# Patient Record
Sex: MALE | Race: WHITE | Employment: OTHER | URBAN - METROPOLITAN AREA
[De-identification: names, ages, dates, MRNs, and addresses within clinical notes are randomized per-mention and may not be internally consistent; named-entity substitution may affect disease eponyms.]

---

## 2019-05-24 ENCOUNTER — HOSPITAL ENCOUNTER (INPATIENT)
Dept: CARDIAC CATH/INVASIVE PROCEDURES | Age: 49
DRG: 174 | End: 2019-05-24
Attending: INTERNAL MEDICINE | Admitting: INTERNAL MEDICINE
Payer: MEDICARE

## 2019-05-24 ENCOUNTER — APPOINTMENT (OUTPATIENT)
Dept: CT IMAGING | Age: 49
End: 2019-05-24
Payer: MEDICARE

## 2019-05-24 ENCOUNTER — APPOINTMENT (OUTPATIENT)
Dept: CARDIAC CATH/INVASIVE PROCEDURES | Age: 49
DRG: 174 | End: 2019-05-24
Attending: INTERNAL MEDICINE
Payer: MEDICARE

## 2019-05-24 ENCOUNTER — APPOINTMENT (OUTPATIENT)
Dept: GENERAL RADIOLOGY | Age: 49
DRG: 174 | End: 2019-05-24
Attending: INTERNAL MEDICINE
Payer: MEDICARE

## 2019-05-24 ENCOUNTER — HOSPITAL ENCOUNTER (EMERGENCY)
Age: 49
Discharge: ANOTHER HOSPITAL | End: 2019-05-24
Attending: EMERGENCY MEDICINE
Payer: MEDICARE

## 2019-05-24 ENCOUNTER — HOSPITAL ENCOUNTER (INPATIENT)
Age: 49
LOS: 3 days | Discharge: HOME | DRG: 174 | End: 2019-05-27
Attending: INTERNAL MEDICINE | Admitting: INTERNAL MEDICINE
Payer: MEDICARE

## 2019-05-24 VITALS
HEART RATE: 78 BPM | TEMPERATURE: 98.1 F | RESPIRATION RATE: 26 BRPM | WEIGHT: 258 LBS | DIASTOLIC BLOOD PRESSURE: 106 MMHG | SYSTOLIC BLOOD PRESSURE: 163 MMHG | HEIGHT: 73 IN | OXYGEN SATURATION: 100 % | BODY MASS INDEX: 34.19 KG/M2

## 2019-05-24 DIAGNOSIS — R07.9 ACUTE CHEST PAIN: Primary | ICD-10-CM

## 2019-05-24 DIAGNOSIS — I21.3 ST ELEVATION MYOCARDIAL INFARCTION (STEMI), UNSPECIFIED ARTERY (HCC): Primary | ICD-10-CM

## 2019-05-24 LAB
ABSOLUTE EOS #: 0.2 K/UL (ref 0–0.4)
ABSOLUTE IMMATURE GRANULOCYTE: ABNORMAL K/UL (ref 0–0.3)
ABSOLUTE LYMPH #: 1.9 K/UL (ref 1–4.8)
ABSOLUTE MONO #: 0.8 K/UL (ref 0.1–1.3)
ACTIVATED CLOTTING TIME: 160 SEC (ref 79–149)
ANION GAP SERPL CALCULATED.3IONS-SCNC: 12 MMOL/L (ref 9–17)
BASOPHILS # BLD: 1 % (ref 0–2)
BASOPHILS ABSOLUTE: 0.1 K/UL (ref 0–0.2)
BNP INTERPRETATION: ABNORMAL
BUN BLDV-MCNC: 23 MG/DL (ref 6–20)
BUN/CREAT BLD: ABNORMAL (ref 9–20)
CALCIUM SERPL-MCNC: 9.3 MG/DL (ref 8.6–10.4)
CHLORIDE BLD-SCNC: 101 MMOL/L (ref 98–107)
CHOLESTEROL/HDL RATIO: 4.7
CHOLESTEROL: 201 MG/DL
CO2: 27 MMOL/L (ref 20–31)
CREAT SERPL-MCNC: 1.52 MG/DL (ref 0.7–1.2)
DIFFERENTIAL TYPE: ABNORMAL
EOSINOPHILS RELATIVE PERCENT: 1 % (ref 0–4)
ESTIMATED AVERAGE GLUCOSE: 108 MG/DL
GFR AFRICAN AMERICAN: 60 ML/MIN
GFR NON-AFRICAN AMERICAN: 49 ML/MIN
GFR SERPL CREATININE-BSD FRML MDRD: ABNORMAL ML/MIN/{1.73_M2}
GFR SERPL CREATININE-BSD FRML MDRD: ABNORMAL ML/MIN/{1.73_M2}
GLUCOSE BLD-MCNC: 157 MG/DL (ref 70–99)
HBA1C MFR BLD: 5.4 % (ref 4–6)
HCT VFR BLD CALC: 44.9 % (ref 41–53)
HDLC SERPL-MCNC: 43 MG/DL
HEMOGLOBIN: 14.8 G/DL (ref 13.5–17.5)
IMMATURE GRANULOCYTES: ABNORMAL %
LDL CHOLESTEROL: 143 MG/DL (ref 0–130)
LV EF: 43 %
LVEF MODALITY: NORMAL
LYMPHOCYTES # BLD: 17 % (ref 24–44)
MCH RBC QN AUTO: 28.2 PG (ref 26–34)
MCHC RBC AUTO-ENTMCNC: 32.9 G/DL (ref 31–37)
MCV RBC AUTO: 85.8 FL (ref 80–100)
MONOCYTES # BLD: 7 % (ref 1–7)
NRBC AUTOMATED: ABNORMAL PER 100 WBC
PDW BLD-RTO: 14.2 % (ref 11.5–14.9)
PLATELET # BLD: 237 K/UL (ref 150–450)
PLATELET ESTIMATE: ABNORMAL
PMV BLD AUTO: 9.8 FL (ref 6–12)
POTASSIUM SERPL-SCNC: 3.4 MMOL/L (ref 3.7–5.3)
PRO-BNP: 651 PG/ML
RBC # BLD: 5.23 M/UL (ref 4.5–5.9)
RBC # BLD: ABNORMAL 10*6/UL
SEG NEUTROPHILS: 74 % (ref 36–66)
SEGMENTED NEUTROPHILS ABSOLUTE COUNT: 8.5 K/UL (ref 1.3–9.1)
SODIUM BLD-SCNC: 140 MMOL/L (ref 135–144)
TRIGL SERPL-MCNC: 73 MG/DL
TROPONIN INTERP: ABNORMAL
TROPONIN T: ABNORMAL NG/ML
TROPONIN, HIGH SENSITIVITY: 39 NG/L (ref 0–22)
TROPONIN, HIGH SENSITIVITY: 40 NG/L (ref 0–22)
TROPONIN, HIGH SENSITIVITY: 51 NG/L (ref 0–22)
VLDLC SERPL CALC-MCNC: ABNORMAL MG/DL (ref 1–30)
WBC # BLD: 11.4 K/UL (ref 3.5–11)
WBC # BLD: ABNORMAL 10*3/UL

## 2019-05-24 PROCEDURE — 93005 ELECTROCARDIOGRAM TRACING: CPT | Performed by: STUDENT IN AN ORGANIZED HEALTH CARE EDUCATION/TRAINING PROGRAM

## 2019-05-24 PROCEDURE — 80048 BASIC METABOLIC PNL TOTAL CA: CPT

## 2019-05-24 PROCEDURE — 93010 ELECTROCARDIOGRAM REPORT: CPT | Performed by: INTERNAL MEDICINE

## 2019-05-24 PROCEDURE — 6360000002 HC RX W HCPCS: Performed by: EMERGENCY MEDICINE

## 2019-05-24 PROCEDURE — 80061 LIPID PANEL: CPT

## 2019-05-24 PROCEDURE — 6370000000 HC RX 637 (ALT 250 FOR IP): Performed by: EMERGENCY MEDICINE

## 2019-05-24 PROCEDURE — C1769 GUIDE WIRE: HCPCS

## 2019-05-24 PROCEDURE — 6360000004 HC RX CONTRAST MEDICATION

## 2019-05-24 PROCEDURE — 96376 TX/PRO/DX INJ SAME DRUG ADON: CPT

## 2019-05-24 PROCEDURE — 92941 PRQ TRLML REVSC TOT OCCL AMI: CPT | Performed by: INTERNAL MEDICINE

## 2019-05-24 PROCEDURE — 71260 CT THORAX DX C+: CPT

## 2019-05-24 PROCEDURE — C1887 CATHETER, GUIDING: HCPCS

## 2019-05-24 PROCEDURE — 6360000002 HC RX W HCPCS: Performed by: STUDENT IN AN ORGANIZED HEALTH CARE EDUCATION/TRAINING PROGRAM

## 2019-05-24 PROCEDURE — 4A023N7 MEASUREMENT OF CARDIAC SAMPLING AND PRESSURE, LEFT HEART, PERCUTANEOUS APPROACH: ICD-10-PCS | Performed by: INTERNAL MEDICINE

## 2019-05-24 PROCEDURE — 2500000003 HC RX 250 WO HCPCS: Performed by: STUDENT IN AN ORGANIZED HEALTH CARE EDUCATION/TRAINING PROGRAM

## 2019-05-24 PROCEDURE — 6360000004 HC RX CONTRAST MEDICATION: Performed by: EMERGENCY MEDICINE

## 2019-05-24 PROCEDURE — 2709999900 HC NON-CHARGEABLE SUPPLY

## 2019-05-24 PROCEDURE — 2580000003 HC RX 258: Performed by: EMERGENCY MEDICINE

## 2019-05-24 PROCEDURE — 93458 L HRT ARTERY/VENTRICLE ANGIO: CPT | Performed by: INTERNAL MEDICINE

## 2019-05-24 PROCEDURE — 6370000000 HC RX 637 (ALT 250 FOR IP): Performed by: STUDENT IN AN ORGANIZED HEALTH CARE EDUCATION/TRAINING PROGRAM

## 2019-05-24 PROCEDURE — 93306 TTE W/DOPPLER COMPLETE: CPT

## 2019-05-24 PROCEDURE — C1874 STENT, COATED/COV W/DEL SYS: HCPCS

## 2019-05-24 PROCEDURE — C1894 INTRO/SHEATH, NON-LASER: HCPCS

## 2019-05-24 PROCEDURE — 36415 COLL VENOUS BLD VENIPUNCTURE: CPT

## 2019-05-24 PROCEDURE — 71045 X-RAY EXAM CHEST 1 VIEW: CPT

## 2019-05-24 PROCEDURE — 85347 COAGULATION TIME ACTIVATED: CPT

## 2019-05-24 PROCEDURE — 99291 CRITICAL CARE FIRST HOUR: CPT

## 2019-05-24 PROCEDURE — 83880 ASSAY OF NATRIURETIC PEPTIDE: CPT

## 2019-05-24 PROCEDURE — 85025 COMPLETE CBC W/AUTO DIFF WBC: CPT

## 2019-05-24 PROCEDURE — B2111ZZ FLUOROSCOPY OF MULTIPLE CORONARY ARTERIES USING LOW OSMOLAR CONTRAST: ICD-10-PCS | Performed by: INTERNAL MEDICINE

## 2019-05-24 PROCEDURE — 027034Z DILATION OF CORONARY ARTERY, ONE ARTERY WITH DRUG-ELUTING INTRALUMINAL DEVICE, PERCUTANEOUS APPROACH: ICD-10-PCS | Performed by: INTERNAL MEDICINE

## 2019-05-24 PROCEDURE — C1760 CLOSURE DEV, VASC: HCPCS

## 2019-05-24 PROCEDURE — 83036 HEMOGLOBIN GLYCOSYLATED A1C: CPT

## 2019-05-24 PROCEDURE — 2500000003 HC RX 250 WO HCPCS

## 2019-05-24 PROCEDURE — 93005 ELECTROCARDIOGRAM TRACING: CPT

## 2019-05-24 PROCEDURE — 93005 ELECTROCARDIOGRAM TRACING: CPT | Performed by: EMERGENCY MEDICINE

## 2019-05-24 PROCEDURE — 6360000002 HC RX W HCPCS

## 2019-05-24 PROCEDURE — 2000000000 HC ICU R&B

## 2019-05-24 PROCEDURE — 96374 THER/PROPH/DIAG INJ IV PUSH: CPT

## 2019-05-24 PROCEDURE — 6370000000 HC RX 637 (ALT 250 FOR IP)

## 2019-05-24 PROCEDURE — 84484 ASSAY OF TROPONIN QUANT: CPT

## 2019-05-24 PROCEDURE — 2580000003 HC RX 258

## 2019-05-24 RX ORDER — ATORVASTATIN CALCIUM 80 MG/1
80 TABLET, FILM COATED ORAL NIGHTLY
Status: DISCONTINUED | OUTPATIENT
Start: 2019-05-24 | End: 2019-05-27 | Stop reason: HOSPADM

## 2019-05-24 RX ORDER — ATORVASTATIN CALCIUM 80 MG/1
80 TABLET, FILM COATED ORAL NIGHTLY
Status: DISCONTINUED | OUTPATIENT
Start: 2019-05-24 | End: 2019-05-24 | Stop reason: SDUPTHER

## 2019-05-24 RX ORDER — MORPHINE SULFATE 4 MG/ML
4 INJECTION, SOLUTION INTRAMUSCULAR; INTRAVENOUS ONCE
Status: COMPLETED | OUTPATIENT
Start: 2019-05-24 | End: 2019-05-24

## 2019-05-24 RX ORDER — ASPIRIN 81 MG/1
81 TABLET ORAL DAILY
Status: DISCONTINUED | OUTPATIENT
Start: 2019-05-25 | End: 2019-05-27 | Stop reason: HOSPADM

## 2019-05-24 RX ORDER — SODIUM CHLORIDE 0.9 % (FLUSH) 0.9 %
10 SYRINGE (ML) INJECTION EVERY 12 HOURS SCHEDULED
Status: DISCONTINUED | OUTPATIENT
Start: 2019-05-24 | End: 2019-05-27 | Stop reason: HOSPADM

## 2019-05-24 RX ORDER — SODIUM CHLORIDE 0.9 % (FLUSH) 0.9 %
10 SYRINGE (ML) INJECTION EVERY 12 HOURS SCHEDULED
Status: DISPENSED | OUTPATIENT
Start: 2019-05-24

## 2019-05-24 RX ORDER — AMLODIPINE BESYLATE 10 MG/1
10 TABLET ORAL DAILY
Status: DISCONTINUED | OUTPATIENT
Start: 2019-05-24 | End: 2019-05-27 | Stop reason: HOSPADM

## 2019-05-24 RX ORDER — 0.9 % SODIUM CHLORIDE 0.9 %
80 INTRAVENOUS SOLUTION INTRAVENOUS ONCE
Status: COMPLETED | OUTPATIENT
Start: 2019-05-24 | End: 2019-05-24

## 2019-05-24 RX ORDER — ASPIRIN 81 MG/1
324 TABLET, CHEWABLE ORAL ONCE
Status: COMPLETED | OUTPATIENT
Start: 2019-05-24 | End: 2019-05-24

## 2019-05-24 RX ORDER — ASPIRIN 81 MG/1
81 TABLET ORAL DAILY
Status: DISCONTINUED | OUTPATIENT
Start: 2019-05-25 | End: 2019-05-24 | Stop reason: SDUPTHER

## 2019-05-24 RX ORDER — ACETAMINOPHEN 325 MG/1
650 TABLET ORAL EVERY 4 HOURS PRN
Status: DISCONTINUED | OUTPATIENT
Start: 2019-05-24 | End: 2019-05-27 | Stop reason: HOSPADM

## 2019-05-24 RX ORDER — SODIUM CHLORIDE 0.9 % (FLUSH) 0.9 %
10 SYRINGE (ML) INJECTION PRN
Status: DISCONTINUED | OUTPATIENT
Start: 2019-05-24 | End: 2019-05-27 | Stop reason: HOSPADM

## 2019-05-24 RX ORDER — SODIUM CHLORIDE 0.9 % (FLUSH) 0.9 %
10 SYRINGE (ML) INJECTION PRN
Status: ACTIVE | OUTPATIENT
Start: 2019-05-24

## 2019-05-24 RX ORDER — HYDRALAZINE HYDROCHLORIDE 20 MG/ML
10 INJECTION INTRAMUSCULAR; INTRAVENOUS EVERY 6 HOURS PRN
Status: DISCONTINUED | OUTPATIENT
Start: 2019-05-24 | End: 2019-05-26

## 2019-05-24 RX ORDER — SODIUM CHLORIDE 0.9 % (FLUSH) 0.9 %
10 SYRINGE (ML) INJECTION PRN
Status: DISCONTINUED | OUTPATIENT
Start: 2019-05-24 | End: 2019-05-24 | Stop reason: HOSPADM

## 2019-05-24 RX ORDER — NITROGLYCERIN 0.4 MG/1
0.4 TABLET SUBLINGUAL ONCE
Status: COMPLETED | OUTPATIENT
Start: 2019-05-24 | End: 2019-05-24

## 2019-05-24 RX ORDER — NITROGLYCERIN 0.4 MG/1
0.4 TABLET SUBLINGUAL EVERY 5 MIN PRN
Status: DISCONTINUED | OUTPATIENT
Start: 2019-05-24 | End: 2019-05-27 | Stop reason: HOSPADM

## 2019-05-24 RX ORDER — CARVEDILOL 3.12 MG/1
3.12 TABLET ORAL 2 TIMES DAILY WITH MEALS
Status: DISCONTINUED | OUTPATIENT
Start: 2019-05-24 | End: 2019-05-25

## 2019-05-24 RX ORDER — ACETAMINOPHEN 325 MG/1
650 TABLET ORAL EVERY 4 HOURS PRN
Status: DISCONTINUED | OUTPATIENT
Start: 2019-05-24 | End: 2020-02-14 | Stop reason: SDUPTHER

## 2019-05-24 RX ORDER — AMLODIPINE BESYLATE 2.5 MG/1
10 TABLET ORAL DAILY
Status: DISCONTINUED | OUTPATIENT
Start: 2019-05-24 | End: 2019-05-24 | Stop reason: SDUPTHER

## 2019-05-24 RX ADMIN — CARVEDILOL 3.12 MG: 3.12 TABLET, FILM COATED ORAL at 08:59

## 2019-05-24 RX ADMIN — Medication 10 ML: at 05:58

## 2019-05-24 RX ADMIN — Medication 4 MG: at 06:11

## 2019-05-24 RX ADMIN — CARVEDILOL 3.12 MG: 3.12 TABLET, FILM COATED ORAL at 16:51

## 2019-05-24 RX ADMIN — ATORVASTATIN CALCIUM 80 MG: 80 TABLET, FILM COATED ORAL at 21:42

## 2019-05-24 RX ADMIN — ACETAMINOPHEN 650 MG: 325 TABLET ORAL at 14:45

## 2019-05-24 RX ADMIN — NICARDIPINE HYDROCHLORIDE 5 MG: 0.1 INJECTION, SOLUTION INTRAVENOUS at 07:30

## 2019-05-24 RX ADMIN — IOVERSOL 75 ML: 741 INJECTION INTRA-ARTERIAL; INTRAVENOUS at 05:57

## 2019-05-24 RX ADMIN — NICARDIPINE HYDROCHLORIDE 10 MG/HR: 0.1 INJECTION, SOLUTION INTRAVENOUS at 21:41

## 2019-05-24 RX ADMIN — TICAGRELOR 90 MG: 90 TABLET ORAL at 08:59

## 2019-05-24 RX ADMIN — NICARDIPINE HYDROCHLORIDE 7.5 MG/HR: 0.1 INJECTION, SOLUTION INTRAVENOUS at 18:52

## 2019-05-24 RX ADMIN — MORPHINE SULFATE 4 MG: 4 INJECTION INTRAVENOUS at 05:34

## 2019-05-24 RX ADMIN — SODIUM CHLORIDE 80 ML: 9 INJECTION, SOLUTION INTRAVENOUS at 05:58

## 2019-05-24 RX ADMIN — ASPIRIN 81 MG 324 MG: 81 TABLET ORAL at 05:13

## 2019-05-24 RX ADMIN — NITROGLYCERIN 0.4 MG: 0.4 TABLET SUBLINGUAL at 05:13

## 2019-05-24 RX ADMIN — HYDRALAZINE HYDROCHLORIDE 10 MG: 20 INJECTION INTRAMUSCULAR; INTRAVENOUS at 22:08

## 2019-05-24 RX ADMIN — AMLODIPINE BESYLATE 10 MG: 10 TABLET ORAL at 08:59

## 2019-05-24 RX ADMIN — TICAGRELOR 90 MG: 90 TABLET ORAL at 21:42

## 2019-05-24 ASSESSMENT — ENCOUNTER SYMPTOMS
EYE PAIN: 0
BACK PAIN: 0
COUGH: 0
SORE THROAT: 0
ABDOMINAL PAIN: 0
SHORTNESS OF BREATH: 0
CHEST TIGHTNESS: 0
VOMITING: 0
DIARRHEA: 0
NAUSEA: 1
CONSTIPATION: 0

## 2019-05-24 ASSESSMENT — PAIN DESCRIPTION - DESCRIPTORS
DESCRIPTORS: ACHING
DESCRIPTORS: DISCOMFORT

## 2019-05-24 ASSESSMENT — PAIN SCALES - GENERAL
PAINLEVEL_OUTOF10: 8
PAINLEVEL_OUTOF10: 0
PAINLEVEL_OUTOF10: 8
PAINLEVEL_OUTOF10: 5
PAINLEVEL_OUTOF10: 3
PAINLEVEL_OUTOF10: 4
PAINLEVEL_OUTOF10: 0

## 2019-05-24 ASSESSMENT — PAIN DESCRIPTION - FREQUENCY
FREQUENCY: CONTINUOUS
FREQUENCY: CONTINUOUS

## 2019-05-24 ASSESSMENT — PAIN DESCRIPTION - ORIENTATION: ORIENTATION: MID

## 2019-05-24 ASSESSMENT — PAIN DESCRIPTION - PROGRESSION: CLINICAL_PROGRESSION: GRADUALLY IMPROVING

## 2019-05-24 ASSESSMENT — PAIN DESCRIPTION - LOCATION
LOCATION: CHEST
LOCATION: CHEST

## 2019-05-24 NOTE — CARE COORDINATION
Case Management Initial Discharge Plan  Vernell Felton             Met with:patient to discuss discharge plans. Information verified: address, contacts, phone number, , insurance Yes  PCP: No primary care provider on file. Date of last visit: Dr. Paris Boas, hasn't seen in a while     Insurance Provider: Wilsey ADV     Discharge Planning    Living Arrangements:  Family Members   Support Systems:  Family Members    Home has two  stories  Few  stairs to climb to get into front door, flight of stairs to climb to reach second floor  Location of bedroom/bathroom in home upstairs     Patient able to perform ADL's:Independent    Current Services (outpatient & in home) na  DME equipment: na  DME provider: halle    [facility]    Potential Assistance Purchasing Medications:  No  Does patient want to participate in local refill/ meds to beds program?  No    Potential Assistance Needed:  N/A    Patient agreeable to home care: No  [place] of choice provided:  n/a    Prior SNF/Rehab Placement and Facility: na  Agreeable to SNF/Rehab: No  [place] of choice provided: n/a   Evaluation: no    Expected Discharge date:  19  Patient expects to be discharged to:  home  Follow Up Appointment: Best Day/ Time:      Transportation provider: family   Transportation arrangements needed for discharge: No    Readmission Risk              Risk of Unplanned Readmission:        9             Does patient have a readmission risk score greater than 14?: No  If yes, follow-up appointment must be made within 7 days of discharge.      Discharge Plan: return home independently           Electronically signed by Solomon Rea RN on 19 at 10:54 AM

## 2019-05-24 NOTE — H&P
400 MG/5ML suspension 30 mL, 30 mL, Oral, Daily PRN  atorvastatin (LIPITOR) tablet 80 mg, 80 mg, Oral, Nightly  [START ON 5/25/2019] aspirin EC tablet 81 mg, 81 mg, Oral, Daily  ticagrelor (BRILINTA) tablet 90 mg, 90 mg, Oral, BID  Facility-Administered Medications Ordered in Other Encounters: niCARdipine in sodium chloride (CARDENE) 20-0.86 MG/200ML-% infusion, , ,     Allergies:  Patient has no known allergies. Social History:   reports that he has never smoked. He has never used smokeless tobacco. He reports that he drinks alcohol. He reports that he does not use drugs. Family History: family history is not on file. No h/o sudden cardiac death. No for premature CAD    REVIEW OF SYSTEMS:    · Constitutional: there has been no unanticipated weight loss. There's been No change in energy level, No change in activity level. · Eyes: No visual changes or diplopia. No scleral icterus. · ENT: No Headaches  · Cardiovascular: Remaining as above  · Respiratory: No previous pulmonary problems, No cough  · Gastrointestinal: No abdominal pain. No change in bowel or bladder habits. · Genitourinary: No dysuria, trouble voiding, or hematuria. · Musculoskeletal:  No gait disturbance, No weakness or joint complaints. · Integumentary: No rash or pruritis. · Neurological: No headache, diplopia, change in muscle strength, numbness or tingling. No change in gait, balance, coordination, mood, affect, memory, mentation, behavior. · Psychiatric: No anxiety, or depression. · Endocrine: No temperature intolerance. No excessive thirst, fluid intake, or urination. No tremor. · Hematologic/Lymphatic: No abnormal bruising or bleeding, blood clots or swollen lymph nodes. · Allergic/Immunologic: No nasal congestion or hives. PHYSICAL EXAM:      There were no vitals taken for this visit.    No intake or output data in the 24 hours ending 05/24/19 0735      Constitutional and General Appearance: alert, cooperative, no distress and appears stated age  [de-identified]: PERRL, no cervical lymphadenopathy. No masses palpable. Normal oral mucosa  Respiratory:  · Normal excursion and expansion without use of accessory muscles  · Resp Auscultation: Good respiratory effort. No for increased work of breathing. On auscultation: clear to auscultation bilaterally  Cardiovascular:  · The apical impulse is not displaced  · Heart tones are crisp and normal. regular S1 and S2.  · Jugular venous pulsation Normal  · The carotid upstroke is normal in amplitude and contour without delay or bruit  · Peripheral pulses are symmetrical and full     Abdomen:   · No masses or tenderness  · Bowel sounds present  Extremities:  ·  No Cyanosis or Clubbing  ·  Lower extremity edema: No  ·  Skin: Warm and dry  Neurological:  · Alert and oriented. · Moves all extremities well  · No abnormalities of mood, affect, memory, mentation, or behavior are noted    DATA:    Diagnostics:    EKG: NSR, LVH, T wave inverions in anterior leads. Labs:     CBC:   Recent Labs     05/24/19 0455   WBC 11.4*   HGB 14.8   HCT 44.9          BMP:   Recent Labs     05/24/19 0455      K 3.4*   CO2 27   BUN 23*   CREATININE 1.52*   LABGLOM 49*   GLUCOSE 157*     Pro-BNP:    Recent Labs     05/24/19 0455   PROBNP 651*     BNP: No results for input(s): BNP in the last 72 hours. PT/INR: No results for input(s): PROTIME, INR in the last 72 hours. APTT:No results for input(s): APTT in the last 72 hours. CARDIAC ENZYMES:No results for input(s): CKTOTAL, CKMB, CKMBINDEX, TROPONINI in the last 72 hours. Invalid input(s):  [address]  Recent Labs     05/24/19 0455   TROPONINT NOT REPORTED      Recent Labs     05/24/19 0455   TROPHS 39*     FASTING LIPID PANEL:No results found for: HDL, LDLDIRECT, LDLCALC, TRIG  LIVER PROFILE:No results for input(s): AST, ALT, LABALBU in the last 72 hours. Patient's Active Problem List  Active Problems:    * No active hospital problems. *  Resolved Problems:    * No resolved hospital problems. *        IMPRESSION:    1. Typical chest pain with EKG questionable for STEMI  2. HTN  3. RASHMI vs CKD (no previous baseline)    RECOMMENDATIONS:  1. Proceed with cardia cath  2. Further recommendations post procedure            Discussed with patient and Nurse. Janey Blancas M.D. Fellow, [address]        Attestation signed by      Attending Physician Statement:    I have discussed the care of  Sea Locke , including pertinent history and exam findings, with the Cardiology fellow/resident. I have seen and examined the patient and the key elements of all parts of the encounter have been performed by me. I agree with the assessment, plan and orders as documented by the fellow/resident, after I modified exam findings and plan of treatments, and the final version is my approved version of the assessment.      Additional Comments:

## 2019-05-24 NOTE — ED NOTES
..    5/24/2019 7:23 AM    Patient: Lukas Sutherland, 54 y.o., male Race:    Patient Address: [address] [place] [address] Gbae Robbins [address]    Sending Facility:  [] [facility]  [] [facility]  [x] [facility]  [] [facility]  [] [facility]  [] Other:    Sending Physician: Dr. Padilla Michael     Patient Phone #: [phone]   Insurance: Payor: Jessee Lowe /  /  /   Henry Farrow:  []  Yes   [x]  No  Emergency Contact: Extended Emergency Contact Information  Primary Emergency Contact: [address] [facility], [address] Phone: [phone]  Relation: Other  Secondary Emergency Contact: Kaelyn Vargas, [address] Phone: [phone]  Relation: Child  MRN: [id number]    YOB: 1970  Primary Care Physician: No primary care provider on file. Advance Directive: [x] Full Code   []DNR-CC   []DNR-CCA    [facility] Crew: Marcio Valdes  ALEX Osborne, T. [facility]    Receiving Facility:  [x] [facility]  [] [facility]  [] [facility]  [] Other:  Receiving Physician: Dr. Gordo Garcia    Reason for transfer:   [x]   Speciality care required, not available at sending facility   []   Pt/family request   []   Physician request    []   Other:    Response Code   [x] 2  [] 3  []   Change  [] 2  [] 3   Transport Code   [x] 2  [] 3  []   Change  [] 2  [] 3     Call Received [phone]   Dispatched [phone]   Enroute [id number]   Arrived Scene [id number]   At Patient [id number]   Departed Scene [facility] 0645   In Service 0645         Allergies  has No Known Allergies. Medications  Prior to Admission medications    Not on File    Scheduled Meds:   niCARdipine in sodium chloride         Continuous Infusions:  PRN Meds:.  Past Medical History   has a past medical history of Hypertension.     Patient Weight: 210lbs   [x]   Estimated   []   Stated     Vital Signs    Time Blood Pressure Pulse   Resp  Rate N-normal  S-shallow  D-deep Cardiac Monitor SpO2 O2  LPM BGL   Temp Pain pupils GCS              R L E  4 V  5 M  6 T  15   0603 179/112 78 17 N NSR 97% 2lNC secured with straps x3. Zoll ECG, SpO2, and NIBP applied and monitored throughout encounter. HOB maintained at 30 degrees. INFUSIONS        Time Medication Name Concentration Route Rate Dose Pump - Bassett, Carbajal, or gravity   NA                                        Patient is being transported to [facility]  for tertiary Cardiac care unavailable at sending hospital.   During transport patient rests comfortably. Cabin temp maintained at 70-72 °F throughout transport. Patient was transferred to cath lab table via 4 person sheet lift. Report, care, and all paperwork from sending facility turned over to cath team at bedside.         IV Lines:  Time Type Site/Route Size # Attempts Performed By   PTA INT RT AC 18g UNK ST [facility] ED   [facility]                     Total Amount of IV Fluids Infused: NONE    Meds / Electrical rx   Time Therapy Dosage Route Response Preformed By   ISAAC                                                        Mileage:  Scene 31 [facility] Notification:    Report called to -  [x] [facility] Marinelli 83  [] [facility]  [] [facility]  [] Other:     []    Med Channel:     [x]    Cell Phone     Med Control Orders Received:   [x] No  []  Yes:     Med Control Physician (if on line medical direction received)  -      Bedside Report Given To:  Cath lab  []  RN   []   MD   []   DO    Hospital Team Alert:   []    Trauma Alert    [x]    STEMI Alert    []    RACE Alert      Description Of Valuables: Black t-shirt, (tennis shoes with family)    Patient Valuables:   [x]    With Patient    []    Not Recieved    [x]    ER / Lab     Call Outcome:   [x]    Transport to Facility    []    Transported by other     []    Cancelled    []    Patient Refusal    []                [facility]  Mobile Stroke Unit    Electronically signed by Daniela Ashraf RN on 5/24/19 at 7:23 AM     Daniela Ashraf RN  05/24/19 6272

## 2019-05-24 NOTE — ED NOTES
Per Dr. Suzanna Jerez Pt is to go to CT before going to [facility]    Pt to CT on monitor with RN     Dacia Gar RN  05/24/19 5879

## 2019-05-24 NOTE — H&P
[facility]    Consult / H&P               Today's Date: 5/24/2019  Patient Name: Kwame Copping  Date of admission: 5/24/2019  6:20 AM  Patient's age: 54 y.o., 1970  Admission Dx: STEMI (ST elevation myocardial infarction) ([facility] [address] [I21.3]    Reason for Admission / Consult:  STEMI    Requesting Physician: Eveline Ngueyn MD    CHIEF COMPLAINT:  Chest pain    History Obtained From:  patient, electronic medical record    HISTORY OF PRESENT ILLNESS:      The patient is a 54 y.o.  male with a history of HTN (not on medications) who presented with the chief complaint of chest pain. 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 He states that the pressure started in his neck and started working its way down to his chest. He denies associated shortness of breath or palpitations. He denies any previous episodes of chest pain. He initially went to [facility], where his son drove him to. EKG showed signs of LVH with mostly J point elevations and T wave inversions concerning for strain pattern in lateral leads. Due to ongoing chest pain, patient was taken to the cath lab. Past Medical History:   has a past medical history of Hypertension. Past Surgical History:   has a past surgical history that includes knee surgery and Foot surgery (Bilateral).      Home Medications:    Prior to Admission medications    Not on File        Current Facility-Administered Medications: sodium chloride flush 0.9 % injection 10 mL, 10 mL, Intravenous, 2 times per day  sodium chloride flush 0.9 % injection 10 mL, 10 mL, Intravenous, PRN  acetaminophen (TYLENOL) tablet 650 mg, 650 mg, Oral, Q4H PRN  carvedilol (COREG) tablet 3.125 mg, 3.125 mg, Oral, BID WC  atorvastatin (LIPITOR) tablet 80 mg, 80 mg, Oral, Nightly  [START ON 5/25/2019] aspirin EC tablet 81 mg, 81 mg, Oral, Daily  ticagrelor (BRILINTA) tablet 90 mg, 90 mg, Oral, 69   Temp 98.3 °F (36.8 °C) (Oral)   Resp 14   SpO2 97%    No intake or output data in the 24 hours ending 05/24/19 1102      Constitutional and General Appearance: alert, cooperative, no distress and appears stated age  HEENT: PERRL, no cervical lymphadenopathy. No masses palpable. Normal oral mucosa  Respiratory:  · Normal excursion and expansion without use of accessory muscles  · Resp Auscultation: Good respiratory effort. No for increased work of breathing. On auscultation: clear to auscultation bilaterally  Cardiovascular:  · The apical impulse is not displaced  · Heart tones are crisp and normal. regular S1 and S2.  · Jugular venous pulsation Normal  · The carotid upstroke is normal in amplitude and contour without delay or bruit  · Peripheral pulses are symmetrical and full     Abdomen:   · No masses or tenderness  · Bowel sounds present  Extremities:  ·  No Cyanosis or Clubbing  ·  Lower extremity edema: No  ·  Skin: Warm and dry  Neurological:  · Alert and oriented. · Moves all extremities well  · No abnormalities of mood, affect, memory, mentation, or behavior are noted    DATA:    Diagnostics:    EKG: NSR, LVH, T wave inverions in anterior leads. Labs:     CBC:   Recent Labs     05/24/19 0455   WBC 11.4*   HGB 14.8   HCT 44.9          BMP:   Recent Labs     05/24/19 0455      K 3.4*   CO2 27   BUN 23*   CREATININE 1.52*   LABGLOM 49*   GLUCOSE 157*     Pro-BNP:    Recent Labs     05/24/19 0455   PROBNP 651*     BNP: No results for input(s): BNP in the last 72 hours. PT/INR: No results for input(s): PROTIME, INR in the last 72 hours. APTT:No results for input(s): APTT in the last 72 hours. CARDIAC ENZYMES:No results for input(s): CKTOTAL, CKMB, CKMBINDEX, TROPONINI in the last 72 hours.     Invalid input(s):  [address]     05/24/19 0455 05/24/19  0936   TROPONINT NOT REPORTED NOT REPORTED      Recent Labs     05/24/19 0455 05/24/19  0936   TROPHS 39* 40* FASTING LIPID PANEL:  Lab Results   Component Value Date    HDL 43 05/24/2019    TRIG 73 05/24/2019     LIVER PROFILE:No results for input(s): AST, ALT, LABALBU in the last 72 hours. Patient's Active Problem List  Active Problems:    STEMI (ST elevation myocardial infarction) ([address]  Resolved Problems:    * No resolved hospital problems. *        IMPRESSION:    1. Typical chest pain with EKG questionable for STEMI  2. HTN  3. RASHMI vs CKD (no previous baseline)  4. HLD    RECOMMENDATIONS:  1. Proceed with cardia cath  2. Further recommendations post procedure            Discussed with patient and Nurse. Blake Love M.D. Fellow, [address]        Attestation signed by      Attending Physician Statement:    I have discussed the care of  Queen Jginesh , including pertinent history and exam findings, with the Cardiology fellow/resident. I have seen and examined the patient and the key elements of all parts of the encounter have been performed by me. I agree with the assessment, plan and orders as documented by the fellow/resident, after I modified exam findings and plan of treatments, and the final version is my approved version of the assessment.      Additional Comments:

## 2019-05-24 NOTE — ED NOTES

## 2019-05-24 NOTE — OP NOTE
disease   [] Prior PCI              [] Diabetes Mellitus    [] Left Main PCI. [] Currently on Dialysis. [] Prior CABG. [] Currently smoker. [] Cardiac Arrest outside of healthcare facility. [] Yes    [] No        Witnessed     [] Yes   [] No     Arrest after arrival of EMS  [] Yes   [] No     [] Cardiac Arrest at other Facility. [] Yes   [] No    Pre-Procedure Information. Heart Failure       [] Yes    [] No        Class  [] I      [] II  [] III    [] IV. New Diagnosis    [] Yes  [] No    HF Type      [] Systolic   [] Diastolic          [] Unknown. Diagnostic Test:   EKG       [] Normal   [] Abnormal    New antiarrhythmia medications:    [] Yes   [] No   New onset atrial fibrillation / Flutter     [] Yes   [] No   ECG Abnormalities:      [] V. Fib   [] Iva V. Tach           [] NS V. T   [] New LBBB           [] T. Inv  []  ST dev > 0.5 mm         [] PVC's freq  [] PVC's infrequent    Stress Test Performed:      [] Yes    [] No     Type:     [] Stress Echo   [] Exercise Stress Test (no imaging)      [] Stress Nuclear  [] Stress Imaging     Results   [] Negative   [] Positive        [] Indeterminate  [] Unavailable     If Positive/ Risk / Extent of Ischemia:       [] Low  [] Intermediate         [] High  [] Unavailable      Cardiac CTA Performed:     [] Yes    [] No      Results   [] CAD   [] Non obstructive CAD      [] No CAD   [] Uncertain      [] Unknown   [] Structural Disease.      Pre Procedure Medications:   [] Yes    [] No         [] ASA  [] Beta Blockers      [] Nitrate  [] Ca Channel Blockers      [] Ranolazine  [] Statin       [] Plavix/Others antiplatelets          Rolf Wynn MD  Fellow, [address]            I have reviewed the case / procedure with resident / fellow  I have examined the patient personally  Patient agree with treatment plan, correction innotes was made as appropriate, and discussed final arrangement based on  my evaluation and exam.    Risk and benefit of procedure if planned were explained in details. Procedure was performed by me, with all aspect of the procedure being done using standard protocol. Note was modified based on my own assessment and treatment.     Kendra Hernandez MD  [facility]

## 2019-05-24 NOTE — OP NOTE
[facility]        Date:   5/24/2019  Patient name:  Juel Castleman  Date of admission:  5/24/2019  6:20 AM  MRN:   [id number]  YOB: 1970    CARDIAC CATHETERIZATION    Operators:  Car Damon MD        Procedure performed:     [] Left Heart Catheterization. [] Graft Angiography.  [] Left Ventriculography. [] Right Heart Catheterization. [x] Coronary Angiography. [] Aortic Valve Studies. [x] PCI:      [] Other:       Pre Procedure Conscious Sedation Data:    ASA Class:    [] I [x] II [] III [] IV    Mallampati Class:  [] I [x] II [] III [] IV      Indication:  [x] STEMI      [] + Stress test  [] ACS      [] + EKG Changes  [] Non Q MI       [] Significant Risk Factors  [] Recurrent Angina             [] Diabetes Mellitus    [] New LBBB      [] Uncontrolled HTN. [] CHF / Low EF changes     [] Abnormal CTA / Ca Score  [] Other:     Procedure:  Access:  [x] Femoral artery  [] Radial  artery       [x] Right   [] Left    Procedure: After informed consent was obtained with explanation of the risks and benefits, patient was brought to the cath lab. The access area was prepped and draped in sterile fashion. 1% lidocaine was used for local block. The artery was cannulated with 6  Fr sheath with brisk arterial blood return. The side port was frequently flushed and aspirated with normal saline. Findings:    Cardiac Arteries and Lesion Findings     LMCA: Normal 0% stenosis. LAD: mid area stenosis 80%  d1 and D2: Minimal disease    LCx: Normal 0% stenosis. RCA: Mild irregularities 20-30%. The LV gram: Not performed              Conclusions:  1. Single vessel CAD  2. Successful PTCA -SANDEEP mid LAD.       Recommendations:  1.  Post MI and stent protocol   2. ECho to assess LV    History and Risk Factors    [x] Hypertension     [] Family history of CAD  [] Hyperlipidemia     [] Cerebrovascular Disease   [] Prior MI       [] Peripheral Vascular disease   [] Prior PCI

## 2019-05-24 NOTE — PROGRESS NOTES
Smoking Cessation - topics covered   []  Health Risks  []  Benefits of Quitting   []  Smoking Cessation  [x]  Patient has no history of tobacco use  []  Patient is former smoker. [x]  No need for tobacco cessation education. []  Booklet given  []  Patient verbalizes understanding. []  Patient denies need for tobacco cessation education. []  Unable to meet with patient today. Will follow up as able.   Deshawn Sexton  9:33 AM

## 2019-05-24 NOTE — OP NOTE
[facility]        Date:   5/24/2019  Patient name:  Yvette Mccallum  Date of admission:  No admission date for patient encounter. MRN:   [id number]  YOB: 1970    CARDIAC CATHETERIZATION    Operators:  Denise Davidson MD        Procedure performed:    [] Left Heart Catheterization. [] Graft Angiography.  [] Left Ventriculography. [] Right Heart Catheterization. [x] Coronary Angiography. [] Aortic Valve Studies. [x] PCI:      [] Other:       Pre Procedure Conscious Sedation Data:    ASA Class:    [] I [x] II [] III [] IV    Mallampati Class:  [] I [x] II [] III [] IV      Indication:  [x] STEMI      [] + Stress test  [] ACS      [] + EKG Changes  [] Non Q MI       [] Significant Risk Factors  [] Recurrent Angina             [] Diabetes Mellitus    [] New LBBB      [] Uncontrolled HTN. [] CHF / Low EF changes     [] Abnormal CTA / Ca Score  [] Other:     Procedure:  Access:  [x] Femoral artery  [] Radial  artery       [x] Right   [] Left    Procedure: After informed consent was obtained with explanation of the risks and benefits, patient was brought to the cath lab. The access area was prepped and draped in sterile fashion. 1% lidocaine was used for local block. The artery was cannulated with 6  Fr sheath with brisk arterial blood return. The side port was frequently flushed and aspirated with normal saline. Findings:    LMCA: Normal 0% stenosis. LAD: mid area stenosis 80%  d1 and D2: Minimal disease    LCx: Normal 0% stenosis. RCA: Mild irregularities 20-30%. The LV gram was not  Performed due to RASHMI        Conclusions:  1. Single vessel CAD  2. Successful PTCA -SANDEEP mid LAD.       Recommendations:  1. Post MI and stent protocol  2.  ECho to assess LVMedical Therapy.   3.       History and Risk Factors    [x] Hypertension     [] Family history of CAD  [] Hyperlipidemia     [] Cerebrovascular Disease   [] Prior MI       [] Peripheral Vascular disease   [] Prior PCI              [] Diabetes Mellitus    [] Left Main PCI. [] Currently on Dialysis. [] Prior CABG. [] Currently smoker. [] Cardiac Arrest outside of healthcare facility. [] Yes    [x] No        Witnessed     [] Yes   [] No     Arrest after arrival of EMS  [] Yes   [] No     [] Cardiac Arrest at other Facility. [] Yes   [x] No    Pre-Procedure Information. Heart Failure       [] Yes    [x] No        Class  [] I      [] II  [] III    [] IV. New Diagnosis    [] Yes  [] No    HF Type      [] Systolic   [] Diastolic          [] Unknown. Diagnostic Test:   EKG       [] Normal   [x] Abnormal    New antiarrhythmia medications:    [] Yes   [] No   New onset atrial fibrillation / Flutter     [] Yes   [] No   ECG Abnormalities:      [] V. Fib   [] Iva V. Tach           [] NS V. T   [] New LBBB           [] T. Inv  [x]  ST dev > 0.5 mm         [] PVC's freq  [] PVC's infrequent    Stress Test Performed:      [] Yes    [x] No     Type:     [] Stress Echo   [] Exercise Stress Test (no imaging)      [] Stress Nuclear  [] Stress Imaging     Results   [] Negative   [] Positive        [] Indeterminate  [] Unavailable     If Positive/ Risk / Extent of Ischemia:       [] Low  [] Intermediate         [] High  [] Unavailable      Cardiac CTA Performed:     [] Yes    [x] No      Results   [] CAD   [] Non obstructive CAD      [] No CAD   [] Uncertain      [] Unknown   [] Structural Disease.      Pre Procedure Medications:   [] Yes    [x] No         [] ASA  [] Beta Blockers      [] Nitrate  [] Ca Channel Blockers      [] Ranolazine  [] Statin       [] Plavix/Others antiplatelets          Janes Strong MD  Fellow, [address]            I have reviewed the case / procedure with resident / fellow  I have examined the patient personally  Patient agree with treatment plan, correction innotes was made as appropriate, and discussed final arrangement based on  my evaluation and exam.    Risk and benefit of procedure if planned were explained in details. Procedure was performed by me, with all aspect of the procedure being done using standard protocol. Note was modified based on my own assessment and treatment.     Carin Raygoza MD  [facility]

## 2019-05-25 LAB
ABSOLUTE EOS #: 0.07 K/UL (ref 0–0.44)
ABSOLUTE IMMATURE GRANULOCYTE: 0.06 K/UL (ref 0–0.3)
ABSOLUTE LYMPH #: 2.56 K/UL (ref 1.1–3.7)
ABSOLUTE MONO #: 1.55 K/UL (ref 0.1–1.2)
ALBUMIN SERPL-MCNC: 4 G/DL (ref 3.5–5.2)
ALBUMIN/GLOBULIN RATIO: 1.3 (ref 1–2.5)
ALP BLD-CCNC: 103 U/L (ref 40–129)
ALT SERPL-CCNC: 57 U/L (ref 5–41)
AMPHETAMINE SCREEN URINE: NEGATIVE
ANION GAP SERPL CALCULATED.3IONS-SCNC: 14 MMOL/L (ref 9–17)
AST SERPL-CCNC: 29 U/L
BARBITURATE SCREEN URINE: NEGATIVE
BASOPHILS # BLD: 0 % (ref 0–2)
BASOPHILS ABSOLUTE: 0.04 K/UL (ref 0–0.2)
BENZODIAZEPINE SCREEN, URINE: NEGATIVE
BILIRUB SERPL-MCNC: 1.02 MG/DL (ref 0.3–1.2)
BUN BLDV-MCNC: 15 MG/DL (ref 6–20)
BUN/CREAT BLD: ABNORMAL (ref 9–20)
BUPRENORPHINE URINE: NORMAL
CALCIUM SERPL-MCNC: 9.2 MG/DL (ref 8.6–10.4)
CANNABINOID SCREEN URINE: NEGATIVE
CHLORIDE BLD-SCNC: 103 MMOL/L (ref 98–107)
CO2: 22 MMOL/L (ref 20–31)
COCAINE METABOLITE, URINE: NEGATIVE
CREAT SERPL-MCNC: 1.04 MG/DL (ref 0.7–1.2)
DIFFERENTIAL TYPE: ABNORMAL
EKG ATRIAL RATE: 72 BPM
EKG ATRIAL RATE: 84 BPM
EKG P AXIS: 41 DEGREES
EKG P AXIS: 42 DEGREES
EKG P-R INTERVAL: 150 MS
EKG P-R INTERVAL: 152 MS
EKG Q-T INTERVAL: 384 MS
EKG Q-T INTERVAL: 412 MS
EKG QRS DURATION: 118 MS
EKG QRS DURATION: 118 MS
EKG QTC CALCULATION (BAZETT): 451 MS
EKG QTC CALCULATION (BAZETT): 453 MS
EKG R AXIS: 12 DEGREES
EKG R AXIS: 22 DEGREES
EKG T AXIS: -100 DEGREES
EKG T AXIS: 161 DEGREES
EKG VENTRICULAR RATE: 72 BPM
EKG VENTRICULAR RATE: 84 BPM
EOSINOPHILS RELATIVE PERCENT: 1 % (ref 1–4)
GFR AFRICAN AMERICAN: >60 ML/MIN
GFR NON-AFRICAN AMERICAN: >60 ML/MIN
GFR SERPL CREATININE-BSD FRML MDRD: ABNORMAL ML/MIN/{1.73_M2}
GFR SERPL CREATININE-BSD FRML MDRD: ABNORMAL ML/MIN/{1.73_M2}
GLUCOSE BLD-MCNC: 107 MG/DL (ref 70–99)
HCT VFR BLD CALC: 41.3 % (ref 40.7–50.3)
HEMOGLOBIN: 13.5 G/DL (ref 13–17)
IMMATURE GRANULOCYTES: 0 %
LYMPHOCYTES # BLD: 18 % (ref 24–43)
MAGNESIUM: 2.1 MG/DL (ref 1.6–2.6)
MCH RBC QN AUTO: 28.3 PG (ref 25.2–33.5)
MCHC RBC AUTO-ENTMCNC: 32.7 G/DL (ref 28.4–34.8)
MCV RBC AUTO: 86.6 FL (ref 82.6–102.9)
MDMA URINE: NORMAL
METHADONE SCREEN, URINE: NEGATIVE
METHAMPHETAMINE, URINE: NORMAL
MONOCYTES # BLD: 11 % (ref 3–12)
NRBC AUTOMATED: 0 PER 100 WBC
OPIATES, URINE: NEGATIVE
OXYCODONE SCREEN URINE: NEGATIVE
PDW BLD-RTO: 13.2 % (ref 11.8–14.4)
PHENCYCLIDINE, URINE: NEGATIVE
PLATELET # BLD: 214 K/UL (ref 138–453)
PLATELET ESTIMATE: ABNORMAL
PMV BLD AUTO: 11.9 FL (ref 8.1–13.5)
POTASSIUM SERPL-SCNC: 3.4 MMOL/L (ref 3.7–5.3)
PROPOXYPHENE, URINE: NORMAL
RBC # BLD: 4.77 M/UL (ref 4.21–5.77)
RBC # BLD: ABNORMAL 10*6/UL
SEG NEUTROPHILS: 71 % (ref 36–65)
SEGMENTED NEUTROPHILS ABSOLUTE COUNT: 10.36 K/UL (ref 1.5–8.1)
SODIUM BLD-SCNC: 139 MMOL/L (ref 135–144)
TEST INFORMATION: NORMAL
TOTAL PROTEIN: 7.1 G/DL (ref 6.4–8.3)
TRICYCLIC ANTIDEPRESSANTS, UR: NORMAL
TROPONIN INTERP: ABNORMAL
TROPONIN INTERP: ABNORMAL
TROPONIN T: ABNORMAL NG/ML
TROPONIN T: ABNORMAL NG/ML
TROPONIN, HIGH SENSITIVITY: 47 NG/L (ref 0–22)
TROPONIN, HIGH SENSITIVITY: 52 NG/L (ref 0–22)
WBC # BLD: 13.7 K/UL (ref 3.5–11.3)
WBC # BLD: ABNORMAL 10*3/UL

## 2019-05-25 PROCEDURE — 6370000000 HC RX 637 (ALT 250 FOR IP): Performed by: INTERNAL MEDICINE

## 2019-05-25 PROCEDURE — 2580000003 HC RX 258: Performed by: STUDENT IN AN ORGANIZED HEALTH CARE EDUCATION/TRAINING PROGRAM

## 2019-05-25 PROCEDURE — 2500000003 HC RX 250 WO HCPCS: Performed by: STUDENT IN AN ORGANIZED HEALTH CARE EDUCATION/TRAINING PROGRAM

## 2019-05-25 PROCEDURE — 80053 COMPREHEN METABOLIC PANEL: CPT

## 2019-05-25 PROCEDURE — 6370000000 HC RX 637 (ALT 250 FOR IP): Performed by: STUDENT IN AN ORGANIZED HEALTH CARE EDUCATION/TRAINING PROGRAM

## 2019-05-25 PROCEDURE — 80307 DRUG TEST PRSMV CHEM ANLYZR: CPT

## 2019-05-25 PROCEDURE — 6360000002 HC RX W HCPCS: Performed by: STUDENT IN AN ORGANIZED HEALTH CARE EDUCATION/TRAINING PROGRAM

## 2019-05-25 PROCEDURE — 83735 ASSAY OF MAGNESIUM: CPT

## 2019-05-25 PROCEDURE — 2000000000 HC ICU R&B

## 2019-05-25 PROCEDURE — 85027 COMPLETE CBC AUTOMATED: CPT

## 2019-05-25 PROCEDURE — 84484 ASSAY OF TROPONIN QUANT: CPT

## 2019-05-25 RX ORDER — CARVEDILOL 12.5 MG/1
12.5 TABLET ORAL 2 TIMES DAILY WITH MEALS
Status: DISCONTINUED | OUTPATIENT
Start: 2019-05-25 | End: 2019-05-27

## 2019-05-25 RX ORDER — VERAPAMIL HYDROCHLORIDE 2.5 MG/ML
5 INJECTION, SOLUTION INTRAVENOUS EVERY 6 HOURS PRN
Status: DISCONTINUED | OUTPATIENT
Start: 2019-05-25 | End: 2019-05-25

## 2019-05-25 RX ORDER — POTASSIUM CHLORIDE 20 MEQ/1
40 TABLET, EXTENDED RELEASE ORAL ONCE
Status: COMPLETED | OUTPATIENT
Start: 2019-05-25 | End: 2019-05-25

## 2019-05-25 RX ADMIN — POTASSIUM CHLORIDE 40 MEQ: 20 TABLET, EXTENDED RELEASE ORAL at 12:13

## 2019-05-25 RX ADMIN — ACETAMINOPHEN 650 MG: 325 TABLET ORAL at 00:50

## 2019-05-25 RX ADMIN — NICARDIPINE HYDROCHLORIDE 10 MG/HR: 0.1 INJECTION, SOLUTION INTRAVENOUS at 04:43

## 2019-05-25 RX ADMIN — AMLODIPINE BESYLATE 10 MG: 10 TABLET ORAL at 08:55

## 2019-05-25 RX ADMIN — ACETAMINOPHEN 650 MG: 325 TABLET ORAL at 22:09

## 2019-05-25 RX ADMIN — NICARDIPINE HYDROCHLORIDE 5 MG/HR: 0.1 INJECTION, SOLUTION INTRAVENOUS at 17:23

## 2019-05-25 RX ADMIN — NICARDIPINE HYDROCHLORIDE 10 MG/HR: 0.1 INJECTION, SOLUTION INTRAVENOUS at 06:44

## 2019-05-25 RX ADMIN — NICARDIPINE HYDROCHLORIDE 7.5 MG/HR: 0.1 INJECTION, SOLUTION INTRAVENOUS at 02:40

## 2019-05-25 RX ADMIN — ASPIRIN 81 MG: 81 TABLET ORAL at 08:52

## 2019-05-25 RX ADMIN — HYDRALAZINE HYDROCHLORIDE 10 MG: 20 INJECTION INTRAMUSCULAR; INTRAVENOUS at 18:50

## 2019-05-25 RX ADMIN — NICARDIPINE HYDROCHLORIDE 10 MG/HR: 0.1 INJECTION, SOLUTION INTRAVENOUS at 12:13

## 2019-05-25 RX ADMIN — NICARDIPINE HYDROCHLORIDE 10 MG/HR: 0.1 INJECTION, SOLUTION INTRAVENOUS at 08:57

## 2019-05-25 RX ADMIN — TICAGRELOR 90 MG: 90 TABLET ORAL at 22:00

## 2019-05-25 RX ADMIN — TICAGRELOR 90 MG: 90 TABLET ORAL at 08:56

## 2019-05-25 RX ADMIN — CARVEDILOL 12.5 MG: 12.5 TABLET, FILM COATED ORAL at 17:22

## 2019-05-25 RX ADMIN — Medication 10 ML: at 22:01

## 2019-05-25 RX ADMIN — NICARDIPINE HYDROCHLORIDE 10 MG/HR: 0.1 INJECTION, SOLUTION INTRAVENOUS at 00:00

## 2019-05-25 RX ADMIN — NICARDIPINE HYDROCHLORIDE 10 MG/HR: 0.1 INJECTION, SOLUTION INTRAVENOUS at 14:18

## 2019-05-25 RX ADMIN — NICARDIPINE HYDROCHLORIDE 15 MG/HR: 0.1 INJECTION, SOLUTION INTRAVENOUS at 10:36

## 2019-05-25 RX ADMIN — CARVEDILOL 12.5 MG: 12.5 TABLET, FILM COATED ORAL at 08:53

## 2019-05-25 RX ADMIN — NICARDIPINE HYDROCHLORIDE 4 MG/HR: 0.1 INJECTION, SOLUTION INTRAVENOUS at 22:27

## 2019-05-25 RX ADMIN — ATORVASTATIN CALCIUM 80 MG: 80 TABLET, FILM COATED ORAL at 22:00

## 2019-05-25 ASSESSMENT — PAIN DESCRIPTION - PAIN TYPE: TYPE: OTHER (COMMENT)

## 2019-05-25 ASSESSMENT — PAIN DESCRIPTION - DESCRIPTORS
DESCRIPTORS: ACHING
DESCRIPTORS: SORE;TENDER

## 2019-05-25 ASSESSMENT — PAIN DESCRIPTION - PROGRESSION
CLINICAL_PROGRESSION: GRADUALLY IMPROVING
CLINICAL_PROGRESSION: GRADUALLY WORSENING

## 2019-05-25 ASSESSMENT — PAIN - FUNCTIONAL ASSESSMENT: PAIN_FUNCTIONAL_ASSESSMENT: PREVENTS OR INTERFERES SOME ACTIVE ACTIVITIES AND ADLS

## 2019-05-25 ASSESSMENT — PAIN SCALES - GENERAL
PAINLEVEL_OUTOF10: 5
PAINLEVEL_OUTOF10: 0
PAINLEVEL_OUTOF10: 2
PAINLEVEL_OUTOF10: 4
PAINLEVEL_OUTOF10: 0
PAINLEVEL_OUTOF10: 2

## 2019-05-25 ASSESSMENT — PAIN DESCRIPTION - ONSET: ONSET: GRADUAL

## 2019-05-25 ASSESSMENT — PAIN DESCRIPTION - LOCATION
LOCATION: GROIN
LOCATION: CHEST

## 2019-05-25 ASSESSMENT — PAIN DESCRIPTION - ORIENTATION: ORIENTATION: RIGHT

## 2019-05-25 ASSESSMENT — PAIN DESCRIPTION - FREQUENCY
FREQUENCY: CONTINUOUS
FREQUENCY: CONTINUOUS

## 2019-05-25 NOTE — PLAN OF CARE
Problem: Pain:  Goal: Pain level will decrease  Description  Pain level will decrease  5/25/2019 0042 by Felice Bullock RN  Outcome: Met This Shift     Problem: Pain:  Goal: Control of acute pain  Description  Control of acute pain  5/25/2019 0042 by Felice Bullock RN  Outcome: Met This Shift     Problem: Pain:  Goal: Control of chronic pain  Description  Control of chronic pain  Outcome: Met This Shift

## 2019-05-25 NOTE — PROGRESS NOTES
[facility]   Progress Note                 Date:   5/25/2019  Patient name:  Asia Ray  Date of admission:  5/24/2019  6:20 AM  MRN:   [id number]  YOB: 1970  PCP:    No primary care provider on file. Reason for Admission: STEMI      Subjective:       Clinical Changes / Abnormalities:     Patient seen and examined. Had some chest pressure last night after patient's BP decreased to 110s. Still on Cardene due to HTN. D/W RN. Medications:   Scheduled Meds:    sodium chloride flush  10 mL Intravenous 2 times per day    carvedilol  3.125 mg Oral BID WC    atorvastatin  80 mg Oral Nightly    aspirin  81 mg Oral Daily    ticagrelor  90 mg Oral BID    amLODIPine  10 mg Oral Daily     Continuous Infusions:    niCARdipine 10 mg/hr (05/25/19 0644)     CBC:   Recent Labs     05/24/19  0455 05/25/19  0610   WBC 11.4* 13.7*   HGB 14.8 13.5    214     BMP:    Recent Labs     05/24/19  0455      K 3.4*      CO2 27   BUN 23*   CREATININE 1.52*   GLUCOSE 157*     Hepatic: No results for input(s): AST, ALT, ALB, BILITOT, ALKPHOS in the last 72 hours. Troponin:   Recent Labs     05/24/19  0936 05/24/19  1547 05/24/19  2359   TROPHS 40* 51* 52*     BNP: No results for input(s): BNP in the last 72 hours. Lipids:   Recent Labs     05/24/19  0936   CHOL 201*   HDL 43     INR: No results for input(s): INR in the last 72 hours. Objective:   Vitals: BP (!) 144/77   Pulse 89   Temp 98.4 °F (36.9 °C) (Oral)   Resp 17   SpO2 98%   General appearance: Alert. No acute distress. HEENT: Head: Normal, normocephalic, atraumatic.   Neck: Supple, no carotid bruit, no JVD, trachea midline  Lungs: Clear to auscultation bilaterally  Heart: Regular rate and rhythm, S1, S2 normal, no murmur, click, rub or gallop  Abdomen: Soft, non-tender; bowel sounds normal  Extremities: No cyanosis or edema  Neurologic: No focal neurologic deficits  Mental status: Alert, oriented, mood appropriate    EK19  Normal sinus rhythm, Left ventricular hypertrophy with QRS widening  T wave abnormality, consider inferolateral ischemia. ECHO: 19  Normal LV size , severely increased LV wall thickness. Moderate global hypokinesis seen. Moderately LV systolic function with LVEF 40-45%. Normal RV size and function. RVSP 31 mmHg  LA and RA appears mildly dilated. No obvious significant structural valvular abnormality noted. No significant valvular stenosis or regurgitation noted. Moderately dilated Aortic root 4.8 cm  No significant pericardial effusion. Cardiac Angiography: 19. LMCA: Normal 0% stenosis. LAD: mid area stenosis 80%  D1 and D2: Minimal disease    LCx: Normal 0% stenosis. RCA: Mild irregularities 20-30%.    Conclusions:  1. Single vessel CAD  2. Successful PTCA -SANDEEP mid LAD.        Recommendations:  1. Post MI and stent protocol  2. Echo to assess LV  3. Medical Therapy. Assessment:   1. Anterior STEMI s/p SANDEEP to LAD  2. Ischemic cardiomyopathy (EF 40-45%)  3. HTN emergency - on Cardene drip  4. RASHMI - resolved  5. Hypokalemia      Plan / Recommendations:   1. On ASA, Brilinta, Lipitor, Coreg, and Norvasc  2. Still on Cardene drip at 10 mg/hr  3. Wean Cardene as able  4. Increase Coreg to 12.5 bid from 3.125 bid  5. Replace potassium  6. Monitor labs      Given stent placement, dual antiplatelet therapy, including Aspirin and Brilinta was discussed with patient. Explained to patient, in detail, that stopping or missing any doses of ASA and/or Brilinta can result in-stent thrombosis, which can lead to a heart attack that can result in severe debility, and even death. Patient verbalized understanding.     Electronically signed on 19 at 6:50 AM by:    Sophia Alejandra MD   Fellow, [address]      Attending Physician Statement  I have discussed the care of Yesica Tolbert, including pertinent history and exam findings,  with the cardiology fellow/resident. I have seen and examined the patient and the key elements of all parts of the encounter have been performed by me. I agree with the assessment, plan and orders as documented by the resident with additional recommendations as below:       Feeling better, no further chest pain, no dyspnea, groin soft and no hematoma, BP still elevated, will uptitrate coreg and norvasc and d/c drip today. Ok to step down. D/c planning once BP controlled.        Britta Deshpande MD   [facility]  [facility], [address]   [phone]

## 2019-05-26 LAB
ABSOLUTE EOS #: 0.26 K/UL (ref 0–0.44)
ABSOLUTE IMMATURE GRANULOCYTE: 0 K/UL (ref 0–0.3)
ABSOLUTE LYMPH #: 1.69 K/UL (ref 1.1–3.7)
ABSOLUTE MONO #: 1.56 K/UL (ref 0.1–1.2)
ANION GAP SERPL CALCULATED.3IONS-SCNC: 12 MMOL/L (ref 9–17)
BASOPHILS # BLD: 1 % (ref 0–2)
BASOPHILS ABSOLUTE: 0.13 K/UL (ref 0–0.2)
BUN BLDV-MCNC: 14 MG/DL (ref 6–20)
BUN/CREAT BLD: ABNORMAL (ref 9–20)
CALCIUM SERPL-MCNC: 9.2 MG/DL (ref 8.6–10.4)
CHLORIDE BLD-SCNC: 103 MMOL/L (ref 98–107)
CO2: 23 MMOL/L (ref 20–31)
CREAT SERPL-MCNC: 0.91 MG/DL (ref 0.7–1.2)
DIFFERENTIAL TYPE: ABNORMAL
EOSINOPHILS RELATIVE PERCENT: 2 % (ref 1–4)
GFR AFRICAN AMERICAN: >60 ML/MIN
GFR NON-AFRICAN AMERICAN: >60 ML/MIN
GFR SERPL CREATININE-BSD FRML MDRD: ABNORMAL ML/MIN/{1.73_M2}
GFR SERPL CREATININE-BSD FRML MDRD: ABNORMAL ML/MIN/{1.73_M2}
GLUCOSE BLD-MCNC: 112 MG/DL (ref 70–99)
HCT VFR BLD CALC: 41.5 % (ref 40.7–50.3)
HEMOGLOBIN: 13.3 G/DL (ref 13–17)
IMMATURE GRANULOCYTES: 0 %
LYMPHOCYTES # BLD: 13 % (ref 24–43)
MAGNESIUM: 2.1 MG/DL (ref 1.6–2.6)
MCH RBC QN AUTO: 28.1 PG (ref 25.2–33.5)
MCHC RBC AUTO-ENTMCNC: 32 G/DL (ref 28.4–34.8)
MCV RBC AUTO: 87.6 FL (ref 82.6–102.9)
MONOCYTES # BLD: 12 % (ref 3–12)
MORPHOLOGY: NORMAL
NRBC AUTOMATED: 0 PER 100 WBC
PDW BLD-RTO: 13.3 % (ref 11.8–14.4)
PLATELET # BLD: 221 K/UL (ref 138–453)
PLATELET ESTIMATE: ABNORMAL
PMV BLD AUTO: 11.8 FL (ref 8.1–13.5)
POTASSIUM SERPL-SCNC: 3.5 MMOL/L (ref 3.7–5.3)
RBC # BLD: 4.74 M/UL (ref 4.21–5.77)
RBC # BLD: ABNORMAL 10*6/UL
SEG NEUTROPHILS: 72 % (ref 36–65)
SEGMENTED NEUTROPHILS ABSOLUTE COUNT: 9.36 K/UL (ref 1.5–8.1)
SODIUM BLD-SCNC: 138 MMOL/L (ref 135–144)
WBC # BLD: 13 K/UL (ref 3.5–11.3)
WBC # BLD: ABNORMAL 10*3/UL

## 2019-05-26 PROCEDURE — 2580000003 HC RX 258: Performed by: STUDENT IN AN ORGANIZED HEALTH CARE EDUCATION/TRAINING PROGRAM

## 2019-05-26 PROCEDURE — 6370000000 HC RX 637 (ALT 250 FOR IP): Performed by: INTERNAL MEDICINE

## 2019-05-26 PROCEDURE — 6370000000 HC RX 637 (ALT 250 FOR IP): Performed by: STUDENT IN AN ORGANIZED HEALTH CARE EDUCATION/TRAINING PROGRAM

## 2019-05-26 PROCEDURE — 85025 COMPLETE CBC W/AUTO DIFF WBC: CPT

## 2019-05-26 PROCEDURE — 2000000000 HC ICU R&B

## 2019-05-26 PROCEDURE — 80048 BASIC METABOLIC PNL TOTAL CA: CPT

## 2019-05-26 PROCEDURE — 83735 ASSAY OF MAGNESIUM: CPT

## 2019-05-26 PROCEDURE — 36415 COLL VENOUS BLD VENIPUNCTURE: CPT

## 2019-05-26 RX ORDER — CARVEDILOL 12.5 MG/1
12.5 TABLET ORAL 2 TIMES DAILY WITH MEALS
Qty: 60 TABLET | Refills: 3 | Status: SHIPPED | OUTPATIENT
Start: 2019-05-26 | End: 2019-05-27 | Stop reason: HOSPADM

## 2019-05-26 RX ORDER — LISINOPRIL 10 MG/1
10 TABLET ORAL DAILY
Status: DISCONTINUED | OUTPATIENT
Start: 2019-05-26 | End: 2019-05-27

## 2019-05-26 RX ORDER — LISINOPRIL 10 MG/1
10 TABLET ORAL DAILY
Qty: 30 TABLET | Refills: 3 | Status: SHIPPED | OUTPATIENT
Start: 2019-05-26 | End: 2019-05-27 | Stop reason: HOSPADM

## 2019-05-26 RX ORDER — NITROGLYCERIN 0.4 MG/1
TABLET SUBLINGUAL
Qty: 25 TABLET | Refills: 3 | Status: ON HOLD | OUTPATIENT
Start: 2019-05-26 | End: 2020-03-11 | Stop reason: HOSPADM

## 2019-05-26 RX ORDER — ATORVASTATIN CALCIUM 80 MG/1
80 TABLET, FILM COATED ORAL NIGHTLY
Qty: 30 TABLET | Refills: 3 | Status: ON HOLD | OUTPATIENT
Start: 2019-05-26 | End: 2020-03-11 | Stop reason: HOSPADM

## 2019-05-26 RX ORDER — POTASSIUM CHLORIDE 20 MEQ/1
40 TABLET, EXTENDED RELEASE ORAL ONCE
Status: COMPLETED | OUTPATIENT
Start: 2019-05-26 | End: 2019-05-26

## 2019-05-26 RX ORDER — AMLODIPINE BESYLATE 10 MG/1
10 TABLET ORAL DAILY
Qty: 30 TABLET | Refills: 3 | Status: SHIPPED | OUTPATIENT
Start: 2019-05-26

## 2019-05-26 RX ORDER — LORAZEPAM 0.5 MG/1
0.5 TABLET ORAL EVERY 4 HOURS PRN
Status: DISCONTINUED | OUTPATIENT
Start: 2019-05-26 | End: 2019-05-27 | Stop reason: HOSPADM

## 2019-05-26 RX ORDER — ASPIRIN 81 MG/1
81 TABLET ORAL DAILY
Qty: 30 TABLET | Refills: 3 | Status: SHIPPED | OUTPATIENT
Start: 2019-05-26

## 2019-05-26 RX ORDER — SPIRONOLACTONE 25 MG/1
25 TABLET ORAL DAILY
Status: DISCONTINUED | OUTPATIENT
Start: 2019-05-26 | End: 2019-05-27 | Stop reason: HOSPADM

## 2019-05-26 RX ADMIN — ATORVASTATIN CALCIUM 80 MG: 80 TABLET, FILM COATED ORAL at 22:04

## 2019-05-26 RX ADMIN — TICAGRELOR 90 MG: 90 TABLET ORAL at 08:13

## 2019-05-26 RX ADMIN — SPIRONOLACTONE 25 MG: 25 TABLET ORAL at 19:47

## 2019-05-26 RX ADMIN — TICAGRELOR 90 MG: 90 TABLET ORAL at 22:04

## 2019-05-26 RX ADMIN — LORAZEPAM 0.5 MG: 0.5 TABLET ORAL at 16:39

## 2019-05-26 RX ADMIN — ASPIRIN 81 MG: 81 TABLET ORAL at 08:13

## 2019-05-26 RX ADMIN — AMLODIPINE BESYLATE 10 MG: 10 TABLET ORAL at 08:13

## 2019-05-26 RX ADMIN — Medication 10 ML: at 08:14

## 2019-05-26 RX ADMIN — LISINOPRIL 10 MG: 10 TABLET ORAL at 08:14

## 2019-05-26 RX ADMIN — CARVEDILOL 12.5 MG: 12.5 TABLET, FILM COATED ORAL at 06:46

## 2019-05-26 RX ADMIN — Medication 10 ML: at 22:04

## 2019-05-26 RX ADMIN — CARVEDILOL 12.5 MG: 12.5 TABLET, FILM COATED ORAL at 16:03

## 2019-05-26 RX ADMIN — POTASSIUM CHLORIDE 40 MEQ: 20 TABLET, EXTENDED RELEASE ORAL at 07:05

## 2019-05-26 ASSESSMENT — PAIN DESCRIPTION - PROGRESSION

## 2019-05-26 ASSESSMENT — PAIN SCALES - GENERAL
PAINLEVEL_OUTOF10: 0
PAINLEVEL_OUTOF10: 3

## 2019-05-26 NOTE — PROGRESS NOTES
Notified on call via perfect serve text Dr Logan Nicolas. Mariana Dennis of pt had 8 beat run v-tach, asymptomatic bp 149/84. Off Cardene drip for one hour.

## 2019-05-26 NOTE — FLOWSHEET NOTE
Assessment: Patient is a 54 y.o. male who was admitted to the hospital due to experiencing a \"heart attack. \" Patient was standing beside hospital bed, with friends in room visiting with him, when  visited. Patient's sister and other family members also arrived during visit.  experienced patient as restless and family confirmed that patient is \"very active and having difficulty having to stay in the hospital.\" Patient has good family support and expressed gratitude for their visits. Intervention:  visited patient per initial rounding visits and nurse referral. Chioma Ac introduced herself to patient, friends, and family and learned about patient's heart attack.  provided comfort, encouragement, and hospitality to patient and family during visit. Outcomes: Patient and family thanked  for visit and support. Recommendation: Chaplains can make follow-up visit, per request. Yogi Hansen can be reached 24/7 via [employer]. Denise Veronica     05/25/19 2039   Encounter Summary   Services provided to: Patient and family together   Referral/Consult From: Nurse;Rounding   Support System Family members   Place of [address] [place] [address] Visiting   (5/25/2019)   Complexity of Encounter Low   Length of Encounter 15 minutes   Spiritual Assessment Completed Yes   Routine   Type Initial   Spiritual/Restoration   Type Spiritual support   Assessment Calm; Approachable; Anxious; Hopeful;Coping   Intervention Active listening;Explored feelings, thoughts, concerns;Sustaining presence/ Ministry of presence; Discussed illness/injury and it's impact   Outcome Comfort;Expressed gratitude;Receptive

## 2019-05-26 NOTE — PROGRESS NOTES
in-stent thrombosis, which can lead to a heart attack that can result in severe debility, and even death. Patient verbalized understanding. Electronically signed on 05/26/19 by:    Sophia Alejandra MD   Fellow, [address]      Attending Physician Statement  I have discussed the care of Yesica Tolbert, including pertinent history and exam findings,  with the cardiology fellow/resident. I have seen and examined the patient and the key elements of all parts of the encounter have been performed by me.   I agree with the assessment, plan and orders as documented by the fellow.   Elizabeth Singletary [facility]  [address] [facility], [address]   [phone]

## 2019-05-26 NOTE — DISCHARGE SUMMARY
[facility]  Discharge Note                 Name:  Mary Ellen Ortiz  YOB: 1970  Social Security Number:  [id number]  Medical Record Number:  [id number]    Date of Admission:  5/24/2019  Date of Discharge:  5/27/2019    Admitting physician: Jose Rea MD    Discharge Attending: Dr. Melanie Feng  Primary Care Physician: No primary care provider on file. Consultants: None  Discharge to [address] [place] [address] LIST:  Patient Active Problem List   Diagnosis    STEMI (ST elevation myocardial infarction) ([facility] [address]       Procedures:  cardiac catheterization, echocardiogram    HOSPITAL COURSE :   The patient is a 54 y.o.  male with a history of HTN (not on medications) who presented with the chief complaint of chest pain. 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 He states that the pressure started in his neck and started working its way down to his chest. He denies associated shortness of breath or palpitations. He denies any previous episodes of chest pain.     He initially went to [facility], ED, where his son drove him to. EKG showed signs of LVH with mostly J point elevations and T wave inversions concerning for strain pattern in lateral leads. Due to ongoing chest pain, patient was taken to the cath lab. His cath showed:  LMCA: Normal 0% stenosis. LAD: mid area stenosis 80% reduced to 0% using PTCA-SANDEEP  D1 and D2: Minimal disease    LCx: Normal 0% stenosis. RCA: Mild irregularities 20-30%.    Conclusions:  1. Single vessel CAD  2. Successful PTCA-SANDEEP mid LAD        Recommendations:  1. Post MI and stent protocol  2. Echo to assess LV  3. Medical Therapy    Echocardiogram showed:  Normal LV size , severely increased LV wall thickness. Moderate global hypokinesis seen. Moderately LV systolic function with LVEF 40-45%. Normal RV size and function.  RVSP 31 mmHg  LA and RA appears mildly dilated. No obvious significant structural valvular abnormality noted. No significant valvular stenosis or regurgitation noted. Moderately dilated Aortic root 4.8 cm  No significant pericardial effusion      He continued to have hypertensive emergency and required Nicardipine drip post-cath. His BP improved on Day 3 with increase in Coreg and Lisinopril. He was successfully discharged on 5/27/2019. He will need work-up for resistant HTN as outpatient       Medications changes recommendation: As per discharge list   Follow Up Plan: 4 weeks as outpatient       Discharge exam:   Vitals:    05/27/19 0929   BP:    Pulse: 69   Resp:    Temp:    SpO2:        Patient is feeling much better, denies any chest pain, dyspnea, orthopnea or palpitations. Neuro: normal  Chest: Clear to ausculation. No wheezing. Cardiac: Cor RRR  Abdomen/groin: soft, non-tender, without masses or organomegaly  Lower extremity edema: none     Follow up with primary care provider 1 week  Follow up with cardiology 4 weeks  Follow up with other consultant physicians at their directions. Discharge Medications:   [facility] Medication Instructions VKQ:[id number]    Printed on:05/27/19 1768   Medication Information                      amLODIPine (NORVASC) 10 MG tablet  Take 1 tablet by mouth daily             aspirin 81 MG EC tablet  Take 1 tablet by mouth daily             atorvastatin (LIPITOR) 80 MG tablet  Take 1 tablet by mouth nightly             carvedilol (COREG) 25 MG tablet  Take 1 tablet by mouth 2 times daily (with meals)             lisinopril (PRINIVIL;ZESTRIL) 20 MG tablet  Take 1 tablet by mouth daily             nitroGLYCERIN (NITROSTAT) 0.4 MG SL tablet  up to max of 3 total doses. If no relief after 1 dose, call 911. ticagrelor (BRILINTA) 90 MG TABS tablet  Take 1 tablet by mouth 2 times daily                    Discussed with patient and nursing.  Medications and discharge instructions reviewed with patient and nursing. Patient counseled in detail regarding medication compliance and risk factor modification.        Electronically signed by Shannon Delgado MD on 5/27/2019 at 9:39 AM  Fellow, [address]

## 2019-05-27 VITALS
DIASTOLIC BLOOD PRESSURE: 92 MMHG | WEIGHT: 261.47 LBS | BODY MASS INDEX: 34.65 KG/M2 | HEIGHT: 73 IN | HEART RATE: 79 BPM | TEMPERATURE: 98.5 F | OXYGEN SATURATION: 98 % | SYSTOLIC BLOOD PRESSURE: 140 MMHG | RESPIRATION RATE: 18 BRPM

## 2019-05-27 LAB
ABSOLUTE EOS #: 0.25 K/UL (ref 0–0.44)
ABSOLUTE IMMATURE GRANULOCYTE: 0.13 K/UL (ref 0–0.3)
ABSOLUTE LYMPH #: 2.38 K/UL (ref 1.1–3.7)
ABSOLUTE MONO #: 1.63 K/UL (ref 0.1–1.2)
ANION GAP SERPL CALCULATED.3IONS-SCNC: 14 MMOL/L (ref 9–17)
ANION GAP SERPL CALCULATED.3IONS-SCNC: 15 MMOL/L (ref 9–17)
BASOPHILS # BLD: 1 % (ref 0–2)
BASOPHILS ABSOLUTE: 0.13 K/UL (ref 0–0.2)
BUN BLDV-MCNC: 19 MG/DL (ref 6–20)
BUN BLDV-MCNC: 22 MG/DL (ref 6–20)
BUN/CREAT BLD: ABNORMAL (ref 9–20)
BUN/CREAT BLD: ABNORMAL (ref 9–20)
CALCIUM SERPL-MCNC: 9.2 MG/DL (ref 8.6–10.4)
CALCIUM SERPL-MCNC: 9.5 MG/DL (ref 8.6–10.4)
CHLORIDE BLD-SCNC: 101 MMOL/L (ref 98–107)
CHLORIDE BLD-SCNC: 103 MMOL/L (ref 98–107)
CO2: 22 MMOL/L (ref 20–31)
CO2: 24 MMOL/L (ref 20–31)
CREAT SERPL-MCNC: 1.32 MG/DL (ref 0.7–1.2)
CREAT SERPL-MCNC: 1.39 MG/DL (ref 0.7–1.2)
DIFFERENTIAL TYPE: ABNORMAL
EOSINOPHILS RELATIVE PERCENT: 2 % (ref 1–4)
GFR AFRICAN AMERICAN: >60 ML/MIN
GFR AFRICAN AMERICAN: >60 ML/MIN
GFR NON-AFRICAN AMERICAN: 55 ML/MIN
GFR NON-AFRICAN AMERICAN: 58 ML/MIN
GFR SERPL CREATININE-BSD FRML MDRD: ABNORMAL ML/MIN/{1.73_M2}
GLUCOSE BLD-MCNC: 88 MG/DL (ref 70–99)
GLUCOSE BLD-MCNC: 93 MG/DL (ref 70–99)
HCT VFR BLD CALC: 42.4 % (ref 40.7–50.3)
HEMOGLOBIN: 14 G/DL (ref 13–17)
IMMATURE GRANULOCYTES: 1 %
LYMPHOCYTES # BLD: 19 % (ref 24–43)
MAGNESIUM: 2.3 MG/DL (ref 1.6–2.6)
MCH RBC QN AUTO: 28.7 PG (ref 25.2–33.5)
MCHC RBC AUTO-ENTMCNC: 33 G/DL (ref 28.4–34.8)
MCV RBC AUTO: 87.1 FL (ref 82.6–102.9)
MONOCYTES # BLD: 13 % (ref 3–12)
MORPHOLOGY: NORMAL
NRBC AUTOMATED: 0 PER 100 WBC
PDW BLD-RTO: 13 % (ref 11.8–14.4)
PLATELET # BLD: 233 K/UL (ref 138–453)
PLATELET ESTIMATE: ABNORMAL
PMV BLD AUTO: 11.7 FL (ref 8.1–13.5)
POTASSIUM SERPL-SCNC: 3.7 MMOL/L (ref 3.7–5.3)
POTASSIUM SERPL-SCNC: 4.1 MMOL/L (ref 3.7–5.3)
RBC # BLD: 4.87 M/UL (ref 4.21–5.77)
RBC # BLD: ABNORMAL 10*6/UL
SEG NEUTROPHILS: 64 % (ref 36–65)
SEGMENTED NEUTROPHILS ABSOLUTE COUNT: 7.98 K/UL (ref 1.5–8.1)
SODIUM BLD-SCNC: 139 MMOL/L (ref 135–144)
SODIUM BLD-SCNC: 140 MMOL/L (ref 135–144)
WBC # BLD: 12.5 K/UL (ref 3.5–11.3)
WBC # BLD: ABNORMAL 10*3/UL

## 2019-05-27 PROCEDURE — 80048 BASIC METABOLIC PNL TOTAL CA: CPT

## 2019-05-27 PROCEDURE — 2580000003 HC RX 258: Performed by: STUDENT IN AN ORGANIZED HEALTH CARE EDUCATION/TRAINING PROGRAM

## 2019-05-27 PROCEDURE — 6370000000 HC RX 637 (ALT 250 FOR IP): Performed by: INTERNAL MEDICINE

## 2019-05-27 PROCEDURE — 36415 COLL VENOUS BLD VENIPUNCTURE: CPT

## 2019-05-27 PROCEDURE — 85025 COMPLETE CBC W/AUTO DIFF WBC: CPT

## 2019-05-27 PROCEDURE — 6370000000 HC RX 637 (ALT 250 FOR IP): Performed by: STUDENT IN AN ORGANIZED HEALTH CARE EDUCATION/TRAINING PROGRAM

## 2019-05-27 PROCEDURE — 83735 ASSAY OF MAGNESIUM: CPT

## 2019-05-27 PROCEDURE — 6360000002 HC RX W HCPCS: Performed by: INTERNAL MEDICINE

## 2019-05-27 RX ORDER — LISINOPRIL 20 MG/1
20 TABLET ORAL DAILY
Status: DISCONTINUED | OUTPATIENT
Start: 2019-05-27 | End: 2019-05-27 | Stop reason: HOSPADM

## 2019-05-27 RX ORDER — FUROSEMIDE 10 MG/ML
40 INJECTION INTRAMUSCULAR; INTRAVENOUS ONCE
Status: COMPLETED | OUTPATIENT
Start: 2019-05-27 | End: 2019-05-27

## 2019-05-27 RX ORDER — CARVEDILOL 25 MG/1
25 TABLET ORAL 2 TIMES DAILY WITH MEALS
Status: DISCONTINUED | OUTPATIENT
Start: 2019-05-27 | End: 2019-05-27 | Stop reason: HOSPADM

## 2019-05-27 RX ORDER — POTASSIUM CHLORIDE 20 MEQ/1
40 TABLET, EXTENDED RELEASE ORAL
Status: COMPLETED | OUTPATIENT
Start: 2019-05-27 | End: 2019-05-27

## 2019-05-27 RX ORDER — LISINOPRIL 20 MG/1
20 TABLET ORAL DAILY
Qty: 30 TABLET | Refills: 3 | Status: SHIPPED | OUTPATIENT
Start: 2019-05-28 | End: 2019-10-18 | Stop reason: SDUPTHER

## 2019-05-27 RX ORDER — CARVEDILOL 25 MG/1
25 TABLET ORAL 2 TIMES DAILY WITH MEALS
Qty: 60 TABLET | Refills: 3 | Status: SHIPPED | OUTPATIENT
Start: 2019-05-27 | End: 2019-10-18 | Stop reason: SDUPTHER

## 2019-05-27 RX ADMIN — SPIRONOLACTONE 25 MG: 25 TABLET ORAL at 09:27

## 2019-05-27 RX ADMIN — LISINOPRIL 20 MG: 20 TABLET ORAL at 09:28

## 2019-05-27 RX ADMIN — AMLODIPINE BESYLATE 10 MG: 10 TABLET ORAL at 09:28

## 2019-05-27 RX ADMIN — TICAGRELOR 90 MG: 90 TABLET ORAL at 09:27

## 2019-05-27 RX ADMIN — ASPIRIN 81 MG: 81 TABLET ORAL at 09:27

## 2019-05-27 RX ADMIN — POTASSIUM CHLORIDE 40 MEQ: 1500 TABLET, EXTENDED RELEASE ORAL at 09:27

## 2019-05-27 RX ADMIN — Medication 10 ML: at 09:42

## 2019-05-27 RX ADMIN — CARVEDILOL 25 MG: 12.5 TABLET, FILM COATED ORAL at 09:29

## 2019-05-27 RX ADMIN — FUROSEMIDE 40 MG: 10 INJECTION, SOLUTION INTRAVENOUS at 06:28

## 2019-05-27 RX ADMIN — POTASSIUM CHLORIDE 40 MEQ: 1500 TABLET, EXTENDED RELEASE ORAL at 13:42

## 2019-05-27 ASSESSMENT — PAIN SCALES - GENERAL
PAINLEVEL_OUTOF10: 0

## 2019-05-27 NOTE — PROGRESS NOTES
Case management at bedside- talked to pt re:primary care doctor and resources given to pt. Case management will make appointment for pt at the internal medicine office tomorrow and notify pt. Discharge instructions given to pt and pt significant other. Educated on new meds, new diagnosis and need to keep up with appointments to manage his diagnosis. Questions answered, both verbalized understanding of discharge instructions. IV d/cd at tjis time and pt escorted to the front of the hospital per tech.

## 2019-05-28 NOTE — CARE COORDINATION
Call to Primary Clinic, attempted to schedule appt with Dr Ruby Cardoza. Spoke with Lex Xiao, states Dr Ruby Cardoza does not schedule appt, it is walk in only. Call to Mr James Farfan and informed him of this.   Phone # given so he can find out office hours

## 2019-05-30 LAB
EKG ATRIAL RATE: 77 BPM
EKG P AXIS: 34 DEGREES
EKG P-R INTERVAL: 154 MS
EKG Q-T INTERVAL: 384 MS
EKG QRS DURATION: 112 MS
EKG QTC CALCULATION (BAZETT): 434 MS
EKG R AXIS: 14 DEGREES
EKG T AXIS: 176 DEGREES
EKG VENTRICULAR RATE: 77 BPM

## 2019-06-03 ENCOUNTER — OFFICE VISIT (OUTPATIENT)
Dept: PRIMARY CARE CLINIC | Age: 49
End: 2019-06-03
Payer: MEDICARE

## 2019-06-03 VITALS
HEART RATE: 59 BPM | SYSTOLIC BLOOD PRESSURE: 131 MMHG | TEMPERATURE: 97.9 F | WEIGHT: 259 LBS | BODY MASS INDEX: 34.17 KG/M2 | DIASTOLIC BLOOD PRESSURE: 78 MMHG

## 2019-06-03 DIAGNOSIS — I21.9 MYOCARDIAL INFARCTION, UNSPECIFIED MI TYPE, UNSPECIFIED ARTERY (HCC): Primary | ICD-10-CM

## 2019-06-03 DIAGNOSIS — I25.10 CORONARY ARTERY DISEASE INVOLVING NATIVE CORONARY ARTERY OF NATIVE HEART WITHOUT ANGINA PECTORIS: ICD-10-CM

## 2019-06-03 PROCEDURE — 1036F TOBACCO NON-USER: CPT | Performed by: INTERNAL MEDICINE

## 2019-06-03 PROCEDURE — G8427 DOCREV CUR MEDS BY ELIG CLIN: HCPCS | Performed by: INTERNAL MEDICINE

## 2019-06-03 PROCEDURE — G8417 CALC BMI ABV UP PARAM F/U: HCPCS | Performed by: INTERNAL MEDICINE

## 2019-06-03 PROCEDURE — 1111F DSCHRG MED/CURRENT MED MERGE: CPT | Performed by: INTERNAL MEDICINE

## 2019-06-03 PROCEDURE — 99202 OFFICE O/P NEW SF 15 MIN: CPT | Performed by: INTERNAL MEDICINE

## 2019-06-03 PROCEDURE — G8598 ASA/ANTIPLAT THER USED: HCPCS | Performed by: INTERNAL MEDICINE

## 2019-06-03 ASSESSMENT — ENCOUNTER SYMPTOMS: SHORTNESS OF BREATH: 1

## 2019-06-03 NOTE — PROGRESS NOTES
[address] [facility]  [facility] [address]  Dept: [phone]  Dept Fax: [phone]    Manolo Winter is a 54 y.o. male who presents today for   Chief Complaint   Patient presents with    Follow-Up from Hospital    and follow upof chronic medical problems:   Patient Active Problem List   Diagnosis    STEMI (ST elevation myocardial infarction) ([facility] [address]   . Past Medical History:   Diagnosis Date    Hypertension        Past Surgical History:   Procedure Laterality Date    FOOT SURGERY Bilateral     KNEE SURGERY         No family history on file. Social History     Tobacco Use    Smoking status: Never Smoker    Smokeless tobacco: Never Used   Substance Use Topics    Alcohol use: Yes      Current Outpatient Medications   Medication Sig Dispense Refill    lisinopril (PRINIVIL;ZESTRIL) 20 MG tablet Take 1 tablet by mouth daily 30 tablet 3    carvedilol (COREG) 25 MG tablet Take 1 tablet by mouth 2 times daily (with meals) 60 tablet 3    aspirin 81 MG EC tablet Take 1 tablet by mouth daily 30 tablet 3    atorvastatin (LIPITOR) 80 MG tablet Take 1 tablet by mouth nightly 30 tablet 3    amLODIPine (NORVASC) 10 MG tablet Take 1 tablet by mouth daily 30 tablet 3    ticagrelor (BRILINTA) 90 MG TABS tablet Take 1 tablet by mouth 2 times daily 60 tablet 11    nitroGLYCERIN (NITROSTAT) 0.4 MG SL tablet up to max of 3 total doses. If no relief after 1 dose, call 911. 25 tablet 3     No current facility-administered medications for this visit.       Facility-Administered Medications Ordered in Other Visits   Medication Dose Route Frequency Provider Last Rate Last Dose    sodium chloride flush 0.9 % injection 10 mL  10 mL Intravenous 2 times per day Rolf Wynn MD        sodium chloride flush 0.9 % injection 10 mL  10 mL Intravenous PRN Rolf Wynn MD        acetaminophen (TYLENOL) tablet 650 mg  650 mg Oral Q4H PRN Rolf Wynn MD  magnesium hydroxide (MILK OF MAGNESIA) 400 MG/5ML suspension 30 mL  30 mL Oral Daily PRN Eder Burciaga MD         No Known Allergies    Health Maintenance   Topic Date Due    HIV screen  01/10/1987    DTaP/Tdap/Td vaccine (1 - Tdap) 01/10/1991    Flu vaccine (Season Ended) 09/01/2019    Potassium monitoring  05/27/2020    Creatinine monitoring  05/27/2020    Lipid screen  05/24/2024    Pneumococcal 0-64 years Vaccine  Aged Out       Controlled Substances Monitoring:      HPI:     Coronary Artery Disease   Presents for initial visit. The disease course has been improving. The condition has lasted for 10 days. Symptoms include chest pain and shortness of breath. Past treatments include beta blockers, ACE inhibitors, aspirin, calcium channel blockers and nitrates (brilinta). The treatment provided significant relief. Compliance with prior treatments has been good. His past medical history is significant for past myocardial infarction. Past surgical history includes angioplasty and cardiac stents. ROS:     Review of Systems   Respiratory: Positive for shortness of breath. Cardiovascular: Positive for chest pain. All other systems reviewed and are negative. Objective:     Physical Exam   Constitutional: He is oriented to person, place, and time. He appears well-developed and well-nourished. HENT:   Head: Normocephalic and atraumatic. Cardiovascular: Normal rate, regular rhythm, normal heart sounds and intact distal pulses. Pulmonary/Chest: Effort normal and breath sounds normal.   Abdominal: Soft. Musculoskeletal: He exhibits no edema. Neurological: He is alert and oriented to person, place, and time. Skin: Skin is warm and dry. Psychiatric: He has a normal mood and affect. His behavior is normal.   Vitals reviewed.

## 2019-06-05 ENCOUNTER — HOSPITAL ENCOUNTER (OUTPATIENT)
Dept: CARDIAC REHAB | Age: 49
Setting detail: THERAPIES SERIES
Discharge: HOME | End: 2019-06-05
Payer: MEDICARE

## 2019-06-05 VITALS — HEIGHT: 73 IN | WEIGHT: 259.2 LBS | BODY MASS INDEX: 34.35 KG/M2

## 2019-06-05 PROCEDURE — 93798 PHYS/QHP OP CAR RHAB W/ECG: CPT

## 2019-06-05 ASSESSMENT — PATIENT HEALTH QUESTIONNAIRE - PHQ9: SUM OF ALL RESPONSES TO PHQ QUESTIONS 1-9: 0

## 2019-06-07 ENCOUNTER — HOSPITAL ENCOUNTER (OUTPATIENT)
Dept: CARDIAC REHAB | Age: 49
Setting detail: THERAPIES SERIES
Discharge: HOME | End: 2019-06-07
Payer: MEDICARE

## 2019-06-07 VITALS — BODY MASS INDEX: 33.91 KG/M2 | WEIGHT: 257 LBS

## 2019-06-07 PROCEDURE — 93798 PHYS/QHP OP CAR RHAB W/ECG: CPT

## 2019-06-10 ENCOUNTER — HOSPITAL ENCOUNTER (OUTPATIENT)
Dept: CARDIAC REHAB | Age: 49
Setting detail: THERAPIES SERIES
Discharge: HOME | End: 2019-06-10
Payer: MEDICARE

## 2019-06-10 VITALS — BODY MASS INDEX: 33.91 KG/M2 | WEIGHT: 257 LBS

## 2019-06-10 PROCEDURE — 93798 PHYS/QHP OP CAR RHAB W/ECG: CPT

## 2019-06-11 ENCOUNTER — HOSPITAL ENCOUNTER (OUTPATIENT)
Dept: CARDIAC REHAB | Age: 49
Setting detail: THERAPIES SERIES
Discharge: HOME | End: 2019-06-11
Payer: MEDICARE

## 2019-06-11 VITALS — BODY MASS INDEX: 33.67 KG/M2 | WEIGHT: 255.2 LBS

## 2019-06-11 PROCEDURE — 93798 PHYS/QHP OP CAR RHAB W/ECG: CPT

## 2019-06-14 ENCOUNTER — HOSPITAL ENCOUNTER (OUTPATIENT)
Dept: CARDIAC REHAB | Age: 49
Setting detail: THERAPIES SERIES
Discharge: HOME | End: 2019-06-14
Payer: MEDICARE

## 2019-06-14 VITALS — WEIGHT: 254 LBS | BODY MASS INDEX: 33.51 KG/M2

## 2019-06-14 PROCEDURE — 93798 PHYS/QHP OP CAR RHAB W/ECG: CPT

## 2019-06-17 ENCOUNTER — HOSPITAL ENCOUNTER (OUTPATIENT)
Dept: CARDIAC REHAB | Age: 49
Setting detail: THERAPIES SERIES
Discharge: HOME | End: 2019-06-17
Payer: MEDICARE

## 2019-06-17 VITALS — BODY MASS INDEX: 33.31 KG/M2 | WEIGHT: 252.5 LBS

## 2019-06-17 PROCEDURE — 93798 PHYS/QHP OP CAR RHAB W/ECG: CPT

## 2019-06-21 ENCOUNTER — HOSPITAL ENCOUNTER (OUTPATIENT)
Dept: CARDIAC REHAB | Age: 49
Setting detail: THERAPIES SERIES
Discharge: HOME | End: 2019-06-21
Payer: MEDICARE

## 2019-06-21 VITALS — WEIGHT: 251.7 LBS | BODY MASS INDEX: 33.21 KG/M2

## 2019-06-21 PROCEDURE — 93798 PHYS/QHP OP CAR RHAB W/ECG: CPT

## 2019-06-24 ENCOUNTER — HOSPITAL ENCOUNTER (OUTPATIENT)
Dept: CARDIAC REHAB | Age: 49
Setting detail: THERAPIES SERIES
Discharge: HOME | End: 2019-06-24
Payer: MEDICARE

## 2019-06-24 VITALS — WEIGHT: 250.1 LBS | BODY MASS INDEX: 33 KG/M2

## 2019-06-24 PROCEDURE — 93798 PHYS/QHP OP CAR RHAB W/ECG: CPT

## 2019-06-28 ENCOUNTER — HOSPITAL ENCOUNTER (OUTPATIENT)
Dept: CARDIAC REHAB | Age: 49
Setting detail: THERAPIES SERIES
Discharge: HOME | End: 2019-06-28
Payer: MEDICARE

## 2019-06-28 VITALS — WEIGHT: 251 LBS | BODY MASS INDEX: 33.12 KG/M2

## 2019-06-28 PROCEDURE — 93798 PHYS/QHP OP CAR RHAB W/ECG: CPT

## 2019-06-28 NOTE — PROGRESS NOTES
GOAL REVIEWED. FEELS LIKE HE IS GETTING BACK TO HIS NORMAL \"SLOWLY BUT SURELY\" STATES HE STILL GETS SOB AND SOME CHEST DISCOMFORT RANDOMLY. HAS NOT VERBALIZED THIS DURING HIS TIME IN CARDIAC REHAB.

## 2019-07-01 ENCOUNTER — HOSPITAL ENCOUNTER (OUTPATIENT)
Dept: CARDIAC REHAB | Age: 49
Setting detail: THERAPIES SERIES
Discharge: HOME | End: 2019-07-01
Payer: MEDICARE

## 2019-07-01 VITALS — WEIGHT: 248.6 LBS | BODY MASS INDEX: 32.8 KG/M2

## 2019-07-01 PROCEDURE — 93798 PHYS/QHP OP CAR RHAB W/ECG: CPT

## 2019-07-02 ENCOUNTER — OFFICE VISIT (OUTPATIENT)
Dept: PRIMARY CARE CLINIC | Age: 49
End: 2019-07-02
Payer: MEDICARE

## 2019-07-02 VITALS
BODY MASS INDEX: 32.74 KG/M2 | HEIGHT: 73 IN | TEMPERATURE: 97 F | DIASTOLIC BLOOD PRESSURE: 87 MMHG | HEART RATE: 59 BPM | SYSTOLIC BLOOD PRESSURE: 132 MMHG | WEIGHT: 247 LBS

## 2019-07-02 DIAGNOSIS — I21.3 ST ELEVATION MYOCARDIAL INFARCTION (STEMI), UNSPECIFIED ARTERY (HCC): ICD-10-CM

## 2019-07-02 DIAGNOSIS — E66.09 CLASS 1 OBESITY DUE TO EXCESS CALORIES WITH BODY MASS INDEX (BMI) OF 32.0 TO 32.9 IN ADULT, UNSPECIFIED WHETHER SERIOUS COMORBIDITY PRESENT: ICD-10-CM

## 2019-07-02 DIAGNOSIS — I25.10 CORONARY ARTERY DISEASE INVOLVING NATIVE CORONARY ARTERY OF NATIVE HEART WITHOUT ANGINA PECTORIS: Primary | ICD-10-CM

## 2019-07-02 DIAGNOSIS — Z76.89 ENCOUNTER TO ESTABLISH CARE: ICD-10-CM

## 2019-07-02 PROCEDURE — 99396 PREV VISIT EST AGE 40-64: CPT | Performed by: PHYSICIAN ASSISTANT

## 2019-07-02 ASSESSMENT — ENCOUNTER SYMPTOMS
COUGH: 1
SHORTNESS OF BREATH: 1

## 2019-07-02 NOTE — PROGRESS NOTES
[facility] PRIMARY CARE  [address]  [address]  Dept: [phone]  Dept Fax: [phone]    New Patient Visit Note  Date of patient's visit: 7/2/2019  Patient's Name:  Alyssa Maurer YOB: 1970            Patient Care Team:  Tricia Nunez PA-C as PCP - General (Physician Assistant)  Tricia Nunez PA-C as PCP - [facility] Provider  Kyle Rooney MD as Consulting Physician (Cardiology)  ______________________________________________________________________    Reason for visit: Establish care/Preventative care  ______________________________________________________________________  Chief Compliant   Establish Care      ______________________________________________________________________  History of Presenting Illness:  History was obtained from the patient. Alyssa Maurer is a 54 y.o. is here to establish care. Patient significant other is present. Patient is here for physical.  Patient was admitted to the hospital on 5/24/2019 for MI, received 1 stent. Patient denies any chest pain since being discharged, currently going to cardiac rehab twice weekly. Patient states he was seen by cardiology this a.m., Dr. Shannan Carmona, informed patient will obtain records. Patient has past medical history of high blood pressure. Patient blood pressure stable in office. Patient is obese. Discussed weight loss in depth with patient, encourage patient make changes in diet. Patient is a non-smoker. Labs reviewed. Health maintenance reviewed. Medications reviewed.      HPI  ______________________________________________________________________  Past Medical/Surgical History:        Diagnosis Date    Hypertension            Procedure Laterality Date    CORONARY ANGIOPLASTY WITH STENT PLACEMENT  2019    x 1 stent for LAD    FOOT SURGERY Bilateral     KNEE SURGERY Right ______________________________________________________________________  Health Maintenance Due   Topic Date Due    HIV screen  01/10/1987    DTaP/Tdap/Td vaccine (1 - Tdap) 01/10/1991       No Known Allergies    Current Outpatient Medications   Medication Sig Dispense Refill    lisinopril (PRINIVIL;ZESTRIL) 20 MG tablet Take 1 tablet by mouth daily 30 tablet 3    carvedilol (COREG) 25 MG tablet Take 1 tablet by mouth 2 times daily (with meals) 60 tablet 3    aspirin 81 MG EC tablet Take 1 tablet by mouth daily 30 tablet 3    atorvastatin (LIPITOR) 80 MG tablet Take 1 tablet by mouth nightly 30 tablet 3    amLODIPine (NORVASC) 10 MG tablet Take 1 tablet by mouth daily 30 tablet 3    ticagrelor (BRILINTA) 90 MG TABS tablet Take 1 tablet by mouth 2 times daily 60 tablet 11    nitroGLYCERIN (NITROSTAT) 0.4 MG SL tablet up to max of 3 total doses. If no relief after 1 dose, call 911. 25 tablet 3     No current facility-administered medications for this visit. Facility-Administered Medications Ordered in Other Visits   Medication Dose Route Frequency Provider Last Rate Last Dose    sodium chloride flush 0.9 % injection 10 mL  10 mL Intravenous 2 times per day Jordon Figueroa MD        sodium chloride flush 0.9 % injection 10 mL  10 mL Intravenous PRN Jordon Figueroa MD        acetaminophen (TYLENOL) tablet 650 mg  650 mg Oral Q4H PRN Jordon Figueroa MD        magnesium hydroxide (MILK OF MAGNESIA) 400 MG/5ML suspension 30 mL  30 mL Oral Daily PRN Jordon Figueroa MD           Social History     Tobacco Use    Smoking status: Never Smoker    Smokeless tobacco: Never Used   Substance Use Topics    Alcohol use:  Yes    Drug use: Never       Family History   Problem Relation Age of Onset    High Blood Pressure Mother     High Blood Pressure Father     Hypertension Sister     Hypertension Brother       ______________________________________________________________________  Review of Systems:    Review of Systems   Constitutional: Negative for chills and fever. HENT: Negative for congestion, hearing loss, rhinorrhea and sore throat. Eyes: Negative for pain and visual disturbance. Respiratory: Positive for cough and shortness of breath. Cardiovascular: Positive for chest pain. Gastrointestinal: Negative for abdominal pain, constipation, diarrhea, nausea and vomiting. Genitourinary: Negative for difficulty urinating, dysuria, frequency and urgency. Musculoskeletal: Negative for arthralgias, back pain and myalgias. Neurological: Negative for dizziness and headaches.     ______________________________________________________________________  Physical Exam:  Vitals:    07/02/19 1144   BP: 132/87   Site: Right Upper Arm   Position: Sitting   Cuff Size: Large Adult   Pulse: 59   Temp: 97 °F (36.1 °C)   TempSrc: Oral   Weight: 247 lb (112 kg)   Height: 6' 1\" (1.854 m)     BP Readings from Last 3 Encounters:   07/02/19 132/87   06/03/19 131/78   05/27/19 (!) 140/92        Physical Exam   Constitutional: He is oriented to person, place, and time. Vital signs are normal. He appears well-developed and well-nourished. He is cooperative. HENT:   Head: Normocephalic and atraumatic. Right Ear: Hearing and external ear normal.   Left Ear: Hearing and external ear normal.   Nose: Nose normal.   Mouth/Throat: Uvula is midline, oropharynx is clear and moist and mucous membranes are normal.   Eyes: Pupils are equal, round, and reactive to light. Conjunctivae, EOM and lids are normal.   Cardiovascular: Normal rate, regular rhythm and normal heart sounds. Pulmonary/Chest: Effort normal and breath sounds normal.   Abdominal: Soft. There is no tenderness. Musculoskeletal: Normal range of motion. He exhibits no edema, tenderness or deformity. Neurological: He is alert and oriented to person, place, and time. He has normal strength. Skin: Skin is warm, dry and intact.    Psychiatric: He has a normal mood and intending to generate a document that actually reflects thecontent of the visit, the document can still have some mistakes which may not have been identified and corrected by editing.

## 2019-07-02 NOTE — PROGRESS NOTES
Visit Information    Have you changed or started any medications since your last visit including any over-the-counter medicines, vitamins, or herbal medicines? no   Are you having any side effects from any of your medications? -  no  Have you stopped taking any of your medications? Is so, why? -  no    Have you seen any other physician or provider since your last visit? Yes - Records Obtained  Have you had any other diagnostic tests since your last visit? No  Have you been seen in the emergency room and/or had an admission to a hospital since we last saw you? No  Have you had your routine dental cleaning in the past 6 months? no    Have you activated your [employer] account? If not, what are your barriers?  No:      No care team member to display    Medical History Review  Past Medical, Family, and Social History reviewed and does contribute to the patient presenting condition    Health Maintenance   Topic Date Due    HIV screen  01/10/1987    DTaP/Tdap/Td vaccine (1 - Tdap) 01/10/1991    Flu vaccine (1) 09/01/2019    Potassium monitoring  05/27/2020    Creatinine monitoring  05/27/2020    Lipid screen  05/24/2024    Pneumococcal 0-64 years Vaccine  Aged Ck

## 2019-07-08 ENCOUNTER — HOSPITAL ENCOUNTER (OUTPATIENT)
Dept: CARDIAC REHAB | Age: 49
Setting detail: THERAPIES SERIES
Discharge: HOME | End: 2019-07-08
Payer: MEDICARE

## 2019-07-08 PROCEDURE — 93798 PHYS/QHP OP CAR RHAB W/ECG: CPT

## 2019-07-11 VITALS — BODY MASS INDEX: 32.74 KG/M2 | HEIGHT: 73 IN | WEIGHT: 247 LBS

## 2019-07-12 ENCOUNTER — HOSPITAL ENCOUNTER (OUTPATIENT)
Dept: CARDIAC REHAB | Age: 49
Setting detail: THERAPIES SERIES
End: 2019-07-12
Payer: MEDICARE

## 2019-07-13 PROBLEM — I25.10 CORONARY ARTERY DISEASE INVOLVING NATIVE CORONARY ARTERY OF NATIVE HEART WITHOUT ANGINA PECTORIS: Status: ACTIVE | Noted: 2019-07-13

## 2019-07-13 ASSESSMENT — ENCOUNTER SYMPTOMS
NAUSEA: 0
DIARRHEA: 0
ABDOMINAL PAIN: 0
BACK PAIN: 0
CONSTIPATION: 0
EYE PAIN: 0
VOMITING: 0
SORE THROAT: 0
RHINORRHEA: 0

## 2019-07-15 ENCOUNTER — HOSPITAL ENCOUNTER (OUTPATIENT)
Dept: CARDIAC REHAB | Age: 49
Setting detail: THERAPIES SERIES
Discharge: HOME | End: 2019-07-15
Payer: MEDICARE

## 2019-07-15 VITALS — WEIGHT: 245.8 LBS | BODY MASS INDEX: 32.43 KG/M2

## 2019-07-15 PROCEDURE — 93798 PHYS/QHP OP CAR RHAB W/ECG: CPT

## 2019-07-19 ENCOUNTER — APPOINTMENT (OUTPATIENT)
Dept: CARDIAC REHAB | Age: 49
End: 2019-07-19
Payer: MEDICARE

## 2019-07-22 ENCOUNTER — HOSPITAL ENCOUNTER (OUTPATIENT)
Dept: CARDIAC REHAB | Age: 49
Setting detail: THERAPIES SERIES
Discharge: HOME | End: 2019-07-22
Payer: MEDICARE

## 2019-07-22 VITALS — BODY MASS INDEX: 32.3 KG/M2 | WEIGHT: 244.8 LBS

## 2019-07-22 PROCEDURE — 93798 PHYS/QHP OP CAR RHAB W/ECG: CPT

## 2019-07-26 ENCOUNTER — HOSPITAL ENCOUNTER (OUTPATIENT)
Dept: CARDIAC REHAB | Age: 49
Setting detail: THERAPIES SERIES
Discharge: HOME | End: 2019-07-26
Payer: MEDICARE

## 2019-07-26 VITALS — WEIGHT: 245 LBS | BODY MASS INDEX: 32.32 KG/M2

## 2019-07-26 PROCEDURE — 93798 PHYS/QHP OP CAR RHAB W/ECG: CPT

## 2019-07-29 ENCOUNTER — HOSPITAL ENCOUNTER (OUTPATIENT)
Dept: CARDIAC REHAB | Age: 49
Setting detail: THERAPIES SERIES
Discharge: HOME | End: 2019-07-29
Payer: MEDICARE

## 2019-07-30 ENCOUNTER — HOSPITAL ENCOUNTER (OUTPATIENT)
Dept: CARDIAC REHAB | Age: 49
Setting detail: THERAPIES SERIES
Discharge: HOME | End: 2019-07-30
Payer: MEDICARE

## 2019-07-30 VITALS — BODY MASS INDEX: 32.56 KG/M2 | WEIGHT: 246.8 LBS

## 2019-07-30 PROCEDURE — 93798 PHYS/QHP OP CAR RHAB W/ECG: CPT

## 2019-08-02 ENCOUNTER — HOSPITAL ENCOUNTER (OUTPATIENT)
Dept: CARDIAC REHAB | Age: 49
Setting detail: THERAPIES SERIES
End: 2019-08-02
Payer: MEDICARE

## 2019-08-05 ENCOUNTER — HOSPITAL ENCOUNTER (OUTPATIENT)
Dept: CARDIAC REHAB | Age: 49
Setting detail: THERAPIES SERIES
Discharge: HOME | End: 2019-08-05
Payer: MEDICARE

## 2019-08-05 VITALS — BODY MASS INDEX: 32.4 KG/M2 | WEIGHT: 245.6 LBS

## 2019-08-05 PROCEDURE — 93798 PHYS/QHP OP CAR RHAB W/ECG: CPT

## 2019-08-09 ENCOUNTER — HOSPITAL ENCOUNTER (OUTPATIENT)
Dept: CARDIAC REHAB | Age: 49
Setting detail: THERAPIES SERIES
Discharge: HOME | End: 2019-08-09
Payer: MEDICARE

## 2019-08-09 VITALS — WEIGHT: 246.1 LBS | BODY MASS INDEX: 32.47 KG/M2

## 2019-08-09 PROCEDURE — 93798 PHYS/QHP OP CAR RHAB W/ECG: CPT

## 2019-08-12 ENCOUNTER — HOSPITAL ENCOUNTER (OUTPATIENT)
Dept: CARDIAC REHAB | Age: 49
Setting detail: THERAPIES SERIES
Discharge: HOME | End: 2019-08-12
Payer: MEDICARE

## 2019-08-12 VITALS — WEIGHT: 245.3 LBS | HEIGHT: 73 IN | BODY MASS INDEX: 32.51 KG/M2

## 2019-08-12 PROCEDURE — 93798 PHYS/QHP OP CAR RHAB W/ECG: CPT

## 2019-08-16 ENCOUNTER — HOSPITAL ENCOUNTER (OUTPATIENT)
Dept: CARDIAC REHAB | Age: 49
Setting detail: THERAPIES SERIES
Discharge: HOME | End: 2019-08-16
Payer: MEDICARE

## 2019-08-16 VITALS — BODY MASS INDEX: 32.1 KG/M2 | WEIGHT: 243.3 LBS

## 2019-08-16 PROCEDURE — 93798 PHYS/QHP OP CAR RHAB W/ECG: CPT

## 2019-08-19 ENCOUNTER — HOSPITAL ENCOUNTER (OUTPATIENT)
Dept: CARDIAC REHAB | Age: 49
Setting detail: THERAPIES SERIES
Discharge: HOME | End: 2019-08-19
Payer: MEDICARE

## 2019-08-19 VITALS — WEIGHT: 241.3 LBS | BODY MASS INDEX: 31.84 KG/M2

## 2019-08-19 PROCEDURE — 93798 PHYS/QHP OP CAR RHAB W/ECG: CPT

## 2019-08-20 ENCOUNTER — HOSPITAL ENCOUNTER (OUTPATIENT)
Dept: CARDIAC REHAB | Age: 49
Setting detail: THERAPIES SERIES
End: 2019-08-20
Payer: MEDICARE

## 2019-08-23 ENCOUNTER — HOSPITAL ENCOUNTER (OUTPATIENT)
Dept: CARDIAC REHAB | Age: 49
Setting detail: THERAPIES SERIES
Discharge: HOME | End: 2019-08-23
Payer: MEDICARE

## 2019-08-23 VITALS — BODY MASS INDEX: 31.99 KG/M2 | WEIGHT: 242.5 LBS

## 2019-08-23 PROCEDURE — 93798 PHYS/QHP OP CAR RHAB W/ECG: CPT

## 2019-08-26 ENCOUNTER — HOSPITAL ENCOUNTER (OUTPATIENT)
Dept: CARDIAC REHAB | Age: 49
Setting detail: THERAPIES SERIES
Discharge: HOME | End: 2019-08-26
Payer: MEDICARE

## 2019-08-26 VITALS — WEIGHT: 243.2 LBS | BODY MASS INDEX: 32.09 KG/M2

## 2019-08-26 PROCEDURE — 93798 PHYS/QHP OP CAR RHAB W/ECG: CPT

## 2019-08-30 ENCOUNTER — HOSPITAL ENCOUNTER (OUTPATIENT)
Dept: CARDIAC REHAB | Age: 49
Setting detail: THERAPIES SERIES
Discharge: HOME | End: 2019-08-30
Payer: MEDICARE

## 2019-08-30 VITALS — WEIGHT: 243 LBS | BODY MASS INDEX: 32.06 KG/M2

## 2019-08-30 PROCEDURE — 93798 PHYS/QHP OP CAR RHAB W/ECG: CPT

## 2019-09-06 ENCOUNTER — HOSPITAL ENCOUNTER (OUTPATIENT)
Dept: CARDIAC REHAB | Age: 49
Setting detail: THERAPIES SERIES
Discharge: HOME | End: 2019-09-06
Payer: MEDICARE

## 2019-09-06 VITALS — WEIGHT: 243.5 LBS | BODY MASS INDEX: 32.13 KG/M2

## 2019-09-06 PROCEDURE — 93798 PHYS/QHP OP CAR RHAB W/ECG: CPT

## 2019-09-09 ENCOUNTER — HOSPITAL ENCOUNTER (OUTPATIENT)
Dept: CARDIAC REHAB | Age: 49
Setting detail: THERAPIES SERIES
Discharge: HOME | End: 2019-09-09
Payer: MEDICARE

## 2019-09-09 VITALS — WEIGHT: 239.9 LBS | BODY MASS INDEX: 31.65 KG/M2

## 2019-09-09 PROCEDURE — 93798 PHYS/QHP OP CAR RHAB W/ECG: CPT

## 2019-09-13 ENCOUNTER — HOSPITAL ENCOUNTER (OUTPATIENT)
Dept: CARDIAC REHAB | Age: 49
Setting detail: THERAPIES SERIES
Discharge: HOME | End: 2019-09-13
Payer: MEDICARE

## 2019-09-13 VITALS — BODY MASS INDEX: 32.18 KG/M2 | WEIGHT: 243.9 LBS

## 2019-09-13 PROCEDURE — 93798 PHYS/QHP OP CAR RHAB W/ECG: CPT

## 2019-09-16 ENCOUNTER — HOSPITAL ENCOUNTER (OUTPATIENT)
Dept: CARDIAC REHAB | Age: 49
Setting detail: THERAPIES SERIES
Discharge: HOME | End: 2019-09-16
Payer: MEDICARE

## 2019-09-16 VITALS — WEIGHT: 240.5 LBS | BODY MASS INDEX: 31.73 KG/M2

## 2019-09-16 PROCEDURE — 93798 PHYS/QHP OP CAR RHAB W/ECG: CPT

## 2019-09-20 ENCOUNTER — HOSPITAL ENCOUNTER (OUTPATIENT)
Dept: CARDIAC REHAB | Age: 49
Setting detail: THERAPIES SERIES
Discharge: HOME | End: 2019-09-20
Payer: MEDICARE

## 2019-09-20 VITALS — BODY MASS INDEX: 32.05 KG/M2 | WEIGHT: 242.9 LBS

## 2019-09-20 PROCEDURE — 93798 PHYS/QHP OP CAR RHAB W/ECG: CPT

## 2019-09-23 ENCOUNTER — HOSPITAL ENCOUNTER (OUTPATIENT)
Dept: CARDIAC REHAB | Age: 49
Setting detail: THERAPIES SERIES
Discharge: HOME | End: 2019-09-23
Payer: MEDICARE

## 2019-09-23 VITALS — BODY MASS INDEX: 31.99 KG/M2 | WEIGHT: 242.5 LBS

## 2019-09-23 PROCEDURE — 93798 PHYS/QHP OP CAR RHAB W/ECG: CPT

## 2019-09-27 ENCOUNTER — HOSPITAL ENCOUNTER (OUTPATIENT)
Dept: CARDIAC REHAB | Age: 49
Setting detail: THERAPIES SERIES
Discharge: HOME | End: 2019-09-27
Payer: MEDICARE

## 2019-09-27 VITALS — WEIGHT: 245.1 LBS | BODY MASS INDEX: 32.34 KG/M2

## 2019-09-27 PROCEDURE — 93798 PHYS/QHP OP CAR RHAB W/ECG: CPT

## 2019-09-27 NOTE — PROGRESS NOTES
GOAL REVIEWED. PT STATES HE IS ALMOST BACK TO HIS NORMAL. WOULD LIKE TO GET OFF FROM SOME MEDICATIONS. REMINDED PT MEDICATIONS ARE A VITAL PART OF HIS RECOVERY AND HE WILL BE ON SOME FROM NOW ON. VOICED UNDERSTANDING.

## 2019-09-30 ENCOUNTER — HOSPITAL ENCOUNTER (OUTPATIENT)
Dept: CARDIAC REHAB | Age: 49
Setting detail: THERAPIES SERIES
Discharge: HOME | End: 2019-09-30
Payer: MEDICARE

## 2019-09-30 VITALS — WEIGHT: 244.9 LBS | BODY MASS INDEX: 32.31 KG/M2

## 2019-09-30 PROCEDURE — 93798 PHYS/QHP OP CAR RHAB W/ECG: CPT

## 2019-10-04 ENCOUNTER — HOSPITAL ENCOUNTER (OUTPATIENT)
Dept: CARDIAC REHAB | Age: 49
Setting detail: THERAPIES SERIES
Discharge: HOME | End: 2019-10-04
Payer: MEDICARE

## 2019-10-04 VITALS — WEIGHT: 245.2 LBS | BODY MASS INDEX: 32.35 KG/M2

## 2019-10-04 PROCEDURE — 93798 PHYS/QHP OP CAR RHAB W/ECG: CPT

## 2019-10-07 ENCOUNTER — HOSPITAL ENCOUNTER (OUTPATIENT)
Dept: CARDIAC REHAB | Age: 49
Setting detail: THERAPIES SERIES
Discharge: HOME | End: 2019-10-07
Payer: MEDICARE

## 2019-10-07 VITALS — BODY MASS INDEX: 32.27 KG/M2 | WEIGHT: 244.6 LBS

## 2019-10-07 PROCEDURE — 93798 PHYS/QHP OP CAR RHAB W/ECG: CPT

## 2019-10-08 ENCOUNTER — OFFICE VISIT (OUTPATIENT)
Dept: PRIMARY CARE CLINIC | Age: 49
End: 2019-10-08
Payer: MEDICARE

## 2019-10-08 DIAGNOSIS — E66.09 CLASS 1 OBESITY DUE TO EXCESS CALORIES WITH BODY MASS INDEX (BMI) OF 32.0 TO 32.9 IN ADULT, UNSPECIFIED WHETHER SERIOUS COMORBIDITY PRESENT: ICD-10-CM

## 2019-10-08 DIAGNOSIS — I10 ESSENTIAL HYPERTENSION: Primary | ICD-10-CM

## 2019-10-08 DIAGNOSIS — N52.1 ERECTILE DYSFUNCTION DUE TO DISEASES CLASSIFIED ELSEWHERE: ICD-10-CM

## 2019-10-08 DIAGNOSIS — I25.10 CORONARY ARTERY DISEASE INVOLVING NATIVE CORONARY ARTERY OF NATIVE HEART WITHOUT ANGINA PECTORIS: ICD-10-CM

## 2019-10-08 PROCEDURE — G8427 DOCREV CUR MEDS BY ELIG CLIN: HCPCS | Performed by: PHYSICIAN ASSISTANT

## 2019-10-08 PROCEDURE — G8417 CALC BMI ABV UP PARAM F/U: HCPCS | Performed by: PHYSICIAN ASSISTANT

## 2019-10-08 PROCEDURE — 99214 OFFICE O/P EST MOD 30 MIN: CPT | Performed by: PHYSICIAN ASSISTANT

## 2019-10-08 PROCEDURE — G8484 FLU IMMUNIZE NO ADMIN: HCPCS | Performed by: PHYSICIAN ASSISTANT

## 2019-10-08 PROCEDURE — 1036F TOBACCO NON-USER: CPT | Performed by: PHYSICIAN ASSISTANT

## 2019-10-08 PROCEDURE — G8598 ASA/ANTIPLAT THER USED: HCPCS | Performed by: PHYSICIAN ASSISTANT

## 2019-10-08 RX ORDER — TADALAFIL 10 MG/1
10 TABLET ORAL PRN
Qty: 6 TABLET | Refills: 0 | Status: SHIPPED | OUTPATIENT
Start: 2019-10-08 | End: 2019-10-22 | Stop reason: DRUGHIGH

## 2019-10-08 ASSESSMENT — ENCOUNTER SYMPTOMS: BACK PAIN: 1

## 2019-10-09 VITALS
DIASTOLIC BLOOD PRESSURE: 89 MMHG | WEIGHT: 243.8 LBS | BODY MASS INDEX: 32.17 KG/M2 | SYSTOLIC BLOOD PRESSURE: 152 MMHG | HEART RATE: 59 BPM | TEMPERATURE: 97.3 F

## 2019-10-11 ENCOUNTER — HOSPITAL ENCOUNTER (OUTPATIENT)
Dept: CARDIAC REHAB | Age: 49
Setting detail: THERAPIES SERIES
Discharge: HOME | End: 2019-10-11
Payer: MEDICARE

## 2019-10-11 VITALS — BODY MASS INDEX: 32.36 KG/M2 | WEIGHT: 245.3 LBS

## 2019-10-11 PROCEDURE — 93798 PHYS/QHP OP CAR RHAB W/ECG: CPT

## 2019-10-13 PROBLEM — I10 ESSENTIAL HYPERTENSION: Status: ACTIVE | Noted: 2019-10-13

## 2019-10-13 PROBLEM — I21.3 ACUTE ST SEGMENT ELEVATION MYOCARDIAL INFARCTION (HCC): Status: ACTIVE | Noted: 2019-05-24

## 2019-10-13 PROBLEM — E66.09 CLASS 1 OBESITY DUE TO EXCESS CALORIES WITH BODY MASS INDEX (BMI) OF 32.0 TO 32.9 IN ADULT: Status: ACTIVE | Noted: 2019-10-13

## 2019-10-13 ASSESSMENT — ENCOUNTER SYMPTOMS
VOMITING: 0
CONSTIPATION: 0
WHEEZING: 0
NAUSEA: 0
SHORTNESS OF BREATH: 0
COUGH: 0
DIARRHEA: 0

## 2019-10-14 ENCOUNTER — APPOINTMENT (OUTPATIENT)
Dept: CARDIAC REHAB | Age: 49
End: 2019-10-14
Payer: MEDICARE

## 2019-10-15 ENCOUNTER — TELEPHONE (OUTPATIENT)
Dept: PRIMARY CARE CLINIC | Age: 49
End: 2019-10-15

## 2019-10-15 ENCOUNTER — APPOINTMENT (OUTPATIENT)
Dept: CT IMAGING | Age: 49
End: 2019-10-15
Payer: MEDICARE

## 2019-10-15 ENCOUNTER — HOSPITAL ENCOUNTER (OUTPATIENT)
Dept: CARDIAC REHAB | Age: 49
Setting detail: THERAPIES SERIES
Discharge: HOME | End: 2019-10-15
Payer: MEDICARE

## 2019-10-15 ENCOUNTER — HOSPITAL ENCOUNTER (EMERGENCY)
Age: 49
Discharge: HOME | End: 2019-10-15
Attending: EMERGENCY MEDICINE
Payer: MEDICARE

## 2019-10-15 VITALS
OXYGEN SATURATION: 100 % | BODY MASS INDEX: 32.22 KG/M2 | RESPIRATION RATE: 18 BRPM | DIASTOLIC BLOOD PRESSURE: 74 MMHG | WEIGHT: 243.1 LBS | HEART RATE: 65 BPM | TEMPERATURE: 97.5 F | SYSTOLIC BLOOD PRESSURE: 153 MMHG | HEIGHT: 73 IN

## 2019-10-15 DIAGNOSIS — M54.50 ACUTE BILATERAL LOW BACK PAIN WITHOUT SCIATICA: Primary | ICD-10-CM

## 2019-10-15 PROCEDURE — 6360000002 HC RX W HCPCS: Performed by: NURSE PRACTITIONER

## 2019-10-15 PROCEDURE — 72131 CT LUMBAR SPINE W/O DYE: CPT

## 2019-10-15 PROCEDURE — 99283 EMERGENCY DEPT VISIT LOW MDM: CPT

## 2019-10-15 PROCEDURE — 96372 THER/PROPH/DIAG INJ SC/IM: CPT

## 2019-10-15 PROCEDURE — 6370000000 HC RX 637 (ALT 250 FOR IP): Performed by: NURSE PRACTITIONER

## 2019-10-15 RX ORDER — PREDNISONE 10 MG/1
TABLET ORAL
Qty: 20 TABLET | Refills: 0 | Status: SHIPPED | OUTPATIENT
Start: 2019-10-15 | End: 2020-02-10

## 2019-10-15 RX ORDER — HYDROCODONE BITARTRATE AND ACETAMINOPHEN 5; 325 MG/1; MG/1
1 TABLET ORAL ONCE
Status: COMPLETED | OUTPATIENT
Start: 2019-10-15 | End: 2019-10-15

## 2019-10-15 RX ORDER — METHOCARBAMOL 750 MG/1
750 TABLET, FILM COATED ORAL 3 TIMES DAILY
Qty: 30 TABLET | Refills: 0 | Status: SHIPPED | OUTPATIENT
Start: 2019-10-15 | End: 2020-02-10

## 2019-10-15 RX ORDER — DEXAMETHASONE SODIUM PHOSPHATE 10 MG/ML
10 INJECTION INTRAMUSCULAR; INTRAVENOUS ONCE
Status: COMPLETED | OUTPATIENT
Start: 2019-10-15 | End: 2019-10-15

## 2019-10-15 RX ORDER — HYDROCODONE BITARTRATE AND ACETAMINOPHEN 5; 325 MG/1; MG/1
1 TABLET ORAL EVERY 8 HOURS PRN
Qty: 12 TABLET | Refills: 0 | Status: SHIPPED | OUTPATIENT
Start: 2019-10-15 | End: 2019-10-20

## 2019-10-15 RX ADMIN — HYDROCODONE BITARTRATE AND ACETAMINOPHEN 1 TABLET: 5; 325 TABLET ORAL at 18:23

## 2019-10-15 RX ADMIN — DEXAMETHASONE SODIUM PHOSPHATE 10 MG: 10 INJECTION INTRAMUSCULAR; INTRAVENOUS at 18:23

## 2019-10-15 ASSESSMENT — ENCOUNTER SYMPTOMS
COLOR CHANGE: 0
ABDOMINAL PAIN: 0
BACK PAIN: 1

## 2019-10-15 ASSESSMENT — PAIN DESCRIPTION - PAIN TYPE: TYPE: ACUTE PAIN

## 2019-10-15 ASSESSMENT — PAIN DESCRIPTION - LOCATION: LOCATION: BACK

## 2019-10-15 ASSESSMENT — PAIN SCALES - GENERAL
PAINLEVEL_OUTOF10: 8
PAINLEVEL_OUTOF10: 8

## 2019-10-17 ENCOUNTER — TELEPHONE (OUTPATIENT)
Dept: PRIMARY CARE CLINIC | Age: 49
End: 2019-10-17

## 2019-10-18 ENCOUNTER — HOSPITAL ENCOUNTER (OUTPATIENT)
Dept: CARDIAC REHAB | Age: 49
Setting detail: THERAPIES SERIES
Discharge: HOME | End: 2019-10-18
Payer: MEDICARE

## 2019-10-18 RX ORDER — LISINOPRIL 20 MG/1
20 TABLET ORAL DAILY
Qty: 30 TABLET | Refills: 2 | Status: SHIPPED | OUTPATIENT
Start: 2019-10-18 | End: 2020-02-10 | Stop reason: SDUPTHER

## 2019-10-18 RX ORDER — CARVEDILOL 25 MG/1
25 TABLET ORAL 2 TIMES DAILY WITH MEALS
Qty: 60 TABLET | Refills: 2 | Status: SHIPPED | OUTPATIENT
Start: 2019-10-18 | End: 2020-02-24

## 2019-10-21 ENCOUNTER — HOSPITAL ENCOUNTER (OUTPATIENT)
Dept: CARDIAC REHAB | Age: 49
Setting detail: THERAPIES SERIES
Discharge: HOME | End: 2019-10-21
Payer: MEDICARE

## 2019-10-21 ENCOUNTER — OFFICE VISIT (OUTPATIENT)
Dept: ORTHOPEDIC SURGERY | Age: 49
End: 2019-10-21
Payer: MEDICARE

## 2019-10-21 ENCOUNTER — TELEPHONE (OUTPATIENT)
Dept: PRIMARY CARE CLINIC | Age: 49
End: 2019-10-21

## 2019-10-21 VITALS — BODY MASS INDEX: 32.5 KG/M2 | WEIGHT: 246.3 LBS

## 2019-10-21 VITALS — HEIGHT: 73 IN | WEIGHT: 246.25 LBS | BODY MASS INDEX: 32.64 KG/M2

## 2019-10-21 DIAGNOSIS — M54.50 CHRONIC LEFT-SIDED LOW BACK PAIN WITHOUT SCIATICA: Primary | ICD-10-CM

## 2019-10-21 DIAGNOSIS — N52.1 ERECTILE DYSFUNCTION DUE TO DISEASES CLASSIFIED ELSEWHERE: ICD-10-CM

## 2019-10-21 DIAGNOSIS — G89.29 CHRONIC LEFT-SIDED LOW BACK PAIN WITHOUT SCIATICA: Primary | ICD-10-CM

## 2019-10-21 PROCEDURE — G8427 DOCREV CUR MEDS BY ELIG CLIN: HCPCS | Performed by: ORTHOPAEDIC SURGERY

## 2019-10-21 PROCEDURE — 99204 OFFICE O/P NEW MOD 45 MIN: CPT | Performed by: ORTHOPAEDIC SURGERY

## 2019-10-21 PROCEDURE — G8417 CALC BMI ABV UP PARAM F/U: HCPCS | Performed by: ORTHOPAEDIC SURGERY

## 2019-10-21 PROCEDURE — 93798 PHYS/QHP OP CAR RHAB W/ECG: CPT

## 2019-10-21 PROCEDURE — G8598 ASA/ANTIPLAT THER USED: HCPCS | Performed by: ORTHOPAEDIC SURGERY

## 2019-10-21 PROCEDURE — 1036F TOBACCO NON-USER: CPT | Performed by: ORTHOPAEDIC SURGERY

## 2019-10-21 PROCEDURE — G8484 FLU IMMUNIZE NO ADMIN: HCPCS | Performed by: ORTHOPAEDIC SURGERY

## 2019-10-21 RX ORDER — METHYLPREDNISOLONE 4 MG/1
TABLET ORAL
Qty: 1 KIT | Refills: 0 | Status: SHIPPED | OUTPATIENT
Start: 2019-10-21 | End: 2019-10-27

## 2019-10-22 RX ORDER — TADALAFIL 20 MG/1
20 TABLET ORAL PRN
Qty: 6 TABLET | Refills: 2 | Status: SHIPPED | OUTPATIENT
Start: 2019-10-22 | End: 2020-02-10 | Stop reason: SDUPTHER

## 2019-10-25 ENCOUNTER — HOSPITAL ENCOUNTER (OUTPATIENT)
Dept: CARDIAC REHAB | Age: 49
Setting detail: THERAPIES SERIES
Discharge: HOME | End: 2019-10-25
Payer: MEDICARE

## 2019-10-25 VITALS — HEIGHT: 73 IN | WEIGHT: 246.5 LBS | BODY MASS INDEX: 32.67 KG/M2

## 2019-10-25 PROCEDURE — 93798 PHYS/QHP OP CAR RHAB W/ECG: CPT

## 2019-10-29 ENCOUNTER — HOSPITAL ENCOUNTER (OUTPATIENT)
Dept: PHYSICAL THERAPY | Age: 49
Setting detail: THERAPIES SERIES
Discharge: HOME | End: 2019-10-29
Payer: MEDICARE

## 2019-10-29 PROCEDURE — 97140 MANUAL THERAPY 1/> REGIONS: CPT

## 2019-10-29 PROCEDURE — 97161 PT EVAL LOW COMPLEX 20 MIN: CPT

## 2020-01-27 ENCOUNTER — HOSPITAL ENCOUNTER (EMERGENCY)
Age: 50
Discharge: HOME | End: 2020-01-27
Attending: EMERGENCY MEDICINE
Payer: MEDICARE

## 2020-01-27 VITALS
OXYGEN SATURATION: 98 % | HEART RATE: 61 BPM | BODY MASS INDEX: 33.19 KG/M2 | HEIGHT: 73 IN | SYSTOLIC BLOOD PRESSURE: 161 MMHG | RESPIRATION RATE: 16 BRPM | TEMPERATURE: 97.7 F | DIASTOLIC BLOOD PRESSURE: 93 MMHG | WEIGHT: 250.4 LBS

## 2020-01-27 PROCEDURE — 6370000000 HC RX 637 (ALT 250 FOR IP): Performed by: EMERGENCY MEDICINE

## 2020-01-27 PROCEDURE — 99283 EMERGENCY DEPT VISIT LOW MDM: CPT

## 2020-01-27 RX ORDER — HYDROCODONE BITARTRATE AND ACETAMINOPHEN 5; 325 MG/1; MG/1
2 TABLET ORAL ONCE
Status: COMPLETED | OUTPATIENT
Start: 2020-01-27 | End: 2020-01-27

## 2020-01-27 RX ORDER — HYDROCODONE BITARTRATE AND ACETAMINOPHEN 5; 325 MG/1; MG/1
1 TABLET ORAL EVERY 6 HOURS PRN
Qty: 10 TABLET | Refills: 0 | Status: SHIPPED | OUTPATIENT
Start: 2020-01-27 | End: 2020-01-30

## 2020-01-27 RX ORDER — CYCLOBENZAPRINE HCL 10 MG
10 TABLET ORAL ONCE
Status: COMPLETED | OUTPATIENT
Start: 2020-01-27 | End: 2020-01-27

## 2020-01-27 RX ORDER — CYCLOBENZAPRINE HCL 10 MG
10 TABLET ORAL 3 TIMES DAILY PRN
Qty: 21 TABLET | Refills: 0 | Status: SHIPPED | OUTPATIENT
Start: 2020-01-27 | End: 2020-02-06

## 2020-01-27 RX ADMIN — CYCLOBENZAPRINE 10 MG: 10 TABLET, FILM COATED ORAL at 22:23

## 2020-01-27 RX ADMIN — HYDROCODONE BITARTRATE AND ACETAMINOPHEN 2 TABLET: 5; 325 TABLET ORAL at 22:22

## 2020-01-27 ASSESSMENT — PAIN SCALES - GENERAL
PAINLEVEL_OUTOF10: 7
PAINLEVEL_OUTOF10: 7

## 2020-01-27 ASSESSMENT — ENCOUNTER SYMPTOMS
EYE DISCHARGE: 0
COUGH: 0
SORE THROAT: 0
SHORTNESS OF BREATH: 0
EYE REDNESS: 0
BACK PAIN: 1
RHINORRHEA: 0
NAUSEA: 0
VOMITING: 0
COLOR CHANGE: 0
DIARRHEA: 0

## 2020-01-27 ASSESSMENT — PAIN DESCRIPTION - ONSET: ONSET: ON-GOING

## 2020-01-27 ASSESSMENT — PAIN DESCRIPTION - FREQUENCY: FREQUENCY: CONTINUOUS

## 2020-01-27 ASSESSMENT — PAIN DESCRIPTION - DESCRIPTORS: DESCRIPTORS: SHARP

## 2020-01-27 ASSESSMENT — PAIN DESCRIPTION - LOCATION: LOCATION: BACK

## 2020-01-27 ASSESSMENT — PAIN DESCRIPTION - PROGRESSION: CLINICAL_PROGRESSION: NOT CHANGED

## 2020-01-27 ASSESSMENT — PAIN DESCRIPTION - ORIENTATION: ORIENTATION: LOWER

## 2020-01-27 ASSESSMENT — PAIN DESCRIPTION - PAIN TYPE: TYPE: ACUTE PAIN

## 2020-01-28 ENCOUNTER — TELEPHONE (OUTPATIENT)
Dept: PRIMARY CARE CLINIC | Age: 50
End: 2020-01-28

## 2020-01-28 NOTE — ED NOTES
Pt presents to ED via private auto with c/o back pain. Pt states he started having lower back pain last night. Pt denies a specific injury but states that he could have \"tweeked it while doing some extra things around the house. \" Pt ambulated to room 6 without difficulty. Pt refused wheelchair. Pt denies numbness and tingling. Pt's mucous membranes are pink and moist, pt's skin is warm and dry. Pt's respirations are even and non-labored, no distress noted at this time, will continue to monitor pt.       Jack Rodriguez RN  01/27/20 4067

## 2020-01-28 NOTE — ED PROVIDER NOTES
EMERGENCY DEPARTMENT ENCOUNTER    Pt Name: Ruchi Abrams  MRN: [id number]  [place] 1970  Date of evaluation: 1/27/20  CHIEF COMPLAINT       Chief Complaint   Patient presents with    Back Pain     lower back possible injury. Pt states he \"could've tweaked it last night doing things around the house\"     HISTORY OF PRESENT ILLNESS   This is a 26-year-old male that presents with complaints of back pain. Patient states that yesterday evening he was doing some exercises, he then did some rebounding for his son at the gym and subsequently was moving some tables at home. He states that he woke up today with severe pain in the bilateral low back. He denies any incontinence to urine or stool. Patient has a previous history of back pain and injury. He denies any numbness or tingling in his lower extremities, he denies any significant weakness in his legs no gait difficulties. REVIEW OF SYSTEMS     Review of Systems   Constitutional: Negative for chills and fever. HENT: Negative for rhinorrhea and sore throat. Eyes: Negative for discharge, redness and visual disturbance. Respiratory: Negative for cough and shortness of breath. Cardiovascular: Negative for chest pain, palpitations and leg swelling. Gastrointestinal: Negative for diarrhea, nausea and vomiting. Genitourinary: Negative for difficulty urinating, dysuria and hematuria. Musculoskeletal: Positive for back pain. Negative for arthralgias, myalgias and neck pain. Skin: Negative for color change and rash. Neurological: Negative for seizures, weakness and headaches. Psychiatric/Behavioral: Negative for hallucinations, self-injury and suicidal ideas.      PASTMEDICAL HISTORY     Past Medical History:   Diagnosis Date    Heart attack ([address]     Hyperlipidemia     Hypertension      SURGICAL HISTORY       Past Surgical History:   Procedure Laterality Date    CARDIAC SURGERY      CORONARY ANGIOPLASTY WITH STENT PLACEMENT  2019    x 1 reports below, unless otherwisenoted in MDM or here. No orders to display     LABS: All lab results were reviewed by myself, and all abnormals are listed below. Labs Reviewed - No data to display    EMERGENCY DEPARTMENTCOURSE:         Vitals:    Vitals:    01/27/20 2111   BP: (!) 161/93   Pulse: 61   Resp: 16   Temp: 97.7 °F (36.5 °C)   TempSrc: Oral   SpO2: 98%   Weight: 250 lb 6.4 oz (113.6 kg)   Height: 6' 1\" (1.854 m)       The patient was given the following medications while in the emergency department:  Orders Placed This Encounter   Medications    HYDROcodone-acetaminophen (NORCO) 5-325 MG per tablet 2 tablet    cyclobenzaprine (FLEXERIL) tablet 10 mg    HYDROcodone-acetaminophen (NORCO) 5-325 MG per tablet     Sig: Take 1 tablet by mouth every 6 hours as needed for Pain for up to 3 days. Dispense:  10 tablet     Refill:  0    cyclobenzaprine (FLEXERIL) 10 MG tablet     Sig: Take 1 tablet by mouth 3 times daily as needed for Muscle spasms     Dispense:  21 tablet     Refill:  0     CONSULTS:  None    FINAL IMPRESSION      1. Back strain, initial encounter    2. Spasm of muscle          DISPOSITION/PLAN   DISPOSITION        PATIENT REFERRED TO:  Alva Long PA-C  [address]  [address]  [phone]    Schedule an appointment as soon as possible for a visit in 2 days      DISCHARGE MEDICATIONS:  New Prescriptions    CYCLOBENZAPRINE (FLEXERIL) 10 MG TABLET    Take 1 tablet by mouth 3 times daily as needed for Muscle spasms    HYDROCODONE-ACETAMINOPHEN (NORCO) 5-325 MG PER TABLET    Take 1 tablet by mouth every 6 hours as needed for Pain for up to 3 days.      Alverto Joseph MD  Attending Emergency Physician                      Alverto Joseph MD  01/27/20 1232

## 2020-02-10 ENCOUNTER — OFFICE VISIT (OUTPATIENT)
Dept: PRIMARY CARE CLINIC | Age: 50
End: 2020-02-10
Payer: MEDICARE

## 2020-02-10 VITALS
OXYGEN SATURATION: 97 % | DIASTOLIC BLOOD PRESSURE: 83 MMHG | BODY MASS INDEX: 33.38 KG/M2 | SYSTOLIC BLOOD PRESSURE: 151 MMHG | TEMPERATURE: 99 F | WEIGHT: 253 LBS | HEART RATE: 55 BPM

## 2020-02-10 PROCEDURE — G8427 DOCREV CUR MEDS BY ELIG CLIN: HCPCS | Performed by: PHYSICIAN ASSISTANT

## 2020-02-10 PROCEDURE — 96160 PT-FOCUSED HLTH RISK ASSMT: CPT | Performed by: PHYSICIAN ASSISTANT

## 2020-02-10 PROCEDURE — 99214 OFFICE O/P EST MOD 30 MIN: CPT | Performed by: PHYSICIAN ASSISTANT

## 2020-02-10 PROCEDURE — G8484 FLU IMMUNIZE NO ADMIN: HCPCS | Performed by: PHYSICIAN ASSISTANT

## 2020-02-10 PROCEDURE — 1036F TOBACCO NON-USER: CPT | Performed by: PHYSICIAN ASSISTANT

## 2020-02-10 PROCEDURE — G8417 CALC BMI ABV UP PARAM F/U: HCPCS | Performed by: PHYSICIAN ASSISTANT

## 2020-02-10 RX ORDER — TADALAFIL 20 MG/1
20 TABLET ORAL PRN
Qty: 6 TABLET | Refills: 3 | Status: ON HOLD
Start: 2020-02-10 | End: 2020-03-11 | Stop reason: HOSPADM

## 2020-02-10 RX ORDER — CARVEDILOL 25 MG/1
25 TABLET ORAL 2 TIMES DAILY WITH MEALS
Qty: 60 TABLET | Refills: 3 | Status: CANCELLED | OUTPATIENT
Start: 2020-02-10

## 2020-02-10 RX ORDER — MELOXICAM 7.5 MG/1
7.5 TABLET ORAL 2 TIMES DAILY PRN
Qty: 14 TABLET | Refills: 0 | Status: ON HOLD | OUTPATIENT
Start: 2020-02-10 | End: 2020-03-11 | Stop reason: HOSPADM

## 2020-02-10 RX ORDER — NEBIVOLOL 5 MG/1
10 TABLET ORAL DAILY
Qty: 14 TABLET | Refills: 0 | COMMUNITY
Start: 2020-02-10 | End: 2020-02-24

## 2020-02-10 RX ORDER — LISINOPRIL 40 MG/1
40 TABLET ORAL DAILY
Qty: 30 TABLET | Refills: 2 | Status: SHIPPED | OUTPATIENT
Start: 2020-02-10

## 2020-02-10 ASSESSMENT — PATIENT HEALTH QUESTIONNAIRE - PHQ9
SUM OF ALL RESPONSES TO PHQ QUESTIONS 1-9: 10
6. FEELING BAD ABOUT YOURSELF - OR THAT YOU ARE A FAILURE OR HAVE LET YOURSELF OR YOUR FAMILY DOWN: 2
SUM OF ALL RESPONSES TO PHQ9 QUESTIONS 1 & 2: 4
4. FEELING TIRED OR HAVING LITTLE ENERGY: 0
9. THOUGHTS THAT YOU WOULD BE BETTER OFF DEAD, OR OF HURTING YOURSELF: 2
SUM OF ALL RESPONSES TO PHQ QUESTIONS 1-9: 10
3. TROUBLE FALLING OR STAYING ASLEEP: 2
10. IF YOU CHECKED OFF ANY PROBLEMS, HOW DIFFICULT HAVE THESE PROBLEMS MADE IT FOR YOU TO DO YOUR WORK, TAKE CARE OF THINGS AT HOME, OR GET ALONG WITH OTHER PEOPLE: 1
5. POOR APPETITE OR OVEREATING: 0
8. MOVING OR SPEAKING SO SLOWLY THAT OTHER PEOPLE COULD HAVE NOTICED. OR THE OPPOSITE, BEING SO FIGETY OR RESTLESS THAT YOU HAVE BEEN MOVING AROUND A LOT MORE THAN USUAL: 0
1. LITTLE INTEREST OR PLEASURE IN DOING THINGS: 2
2. FEELING DOWN, DEPRESSED OR HOPELESS: 2
7. TROUBLE CONCENTRATING ON THINGS, SUCH AS READING THE NEWSPAPER OR WATCHING TELEVISION: 0

## 2020-02-10 ASSESSMENT — ENCOUNTER SYMPTOMS
BACK PAIN: 1
SHORTNESS OF BREATH: 1

## 2020-02-10 ASSESSMENT — COLUMBIA-SUICIDE SEVERITY RATING SCALE - C-SSRS
2. HAVE YOU ACTUALLY HAD ANY THOUGHTS OF KILLING YOURSELF?: YES
3. HAVE YOU BEEN THINKING ABOUT HOW YOU MIGHT KILL YOURSELF?: NO
1. WITHIN THE PAST MONTH, HAVE YOU WISHED YOU WERE DEAD OR WISHED YOU COULD GO TO SLEEP AND NOT WAKE UP?: NO
6. HAVE YOU EVER DONE ANYTHING, STARTED TO DO ANYTHING, OR PREPARED TO DO ANYTHING TO END YOUR LIFE?: NO
5. HAVE YOU STARTED TO WORK OUT OR WORKED OUT THE DETAILS OF HOW TO KILL YOURSELF? DO YOU INTEND TO CARRY OUT THIS PLAN?: NO
4. HAVE YOU HAD THESE THOUGHTS AND HAD SOME INTENTION OF ACTING ON THEM?: NO

## 2020-02-10 NOTE — PROGRESS NOTES
Visit Information    Have you changed or started any medications since your last visit including any over-the-counter medicines, vitamins, or herbal medicines? no   Are you having any side effects from any of your medications? -  no  Have you stopped taking any of your medications? Is so, why? -  no    Have you seen any other physician or provider since your last visit? No  Have you had any other diagnostic tests since your last visit? No  Have you been seen in the emergency room and/or had an admission to a hospital since we last saw you? Yes - Records Requested  Have you had your routine dental cleaning in the past 6 months? no    Have you activated your [employer] account? If not, what are your barriers?  Yes     Patient Care Team:  Delonte Castro PA-C as PCP - General (Physician Assistant)  Delonte Castro PA-C as PCP - [facility]  Preston Gardner MD as Consulting Physician (Cardiology)    Medical History Review  Past Medical, Family, and Social History reviewed and does not contribute to the patient presenting condition    Health Maintenance   Topic Date Due    DTaP/Tdap/Td vaccine (1 - Tdap) 01/10/1983    HIV screen  01/10/1987    Flu vaccine (1) 09/01/2019    Lipid screen  05/24/2020    Potassium monitoring  05/27/2020    Creatinine monitoring  05/27/2020    Shingles Vaccine (1 of 2) 01/10/2022    Hepatitis A vaccine  Aged Out    Hepatitis B vaccine  Aged Out    Hib vaccine  Aged Out    Meningococcal (ACWY) vaccine  Aged Out    Pneumococcal 0-64 years Vaccine  Aged Out

## 2020-02-10 NOTE — PROGRESS NOTES
patient, encourage patient make changes in diet and the importance of physical activity by exercising multiple times weekly. Labs reviewed. Health maintenance reviewed, discussed influenza vaccination in depth with patient, patient declined influenza vaccine in in office. Medications reviewed, prescribed Lisinopril 40 mg daily, refilled meloxicam 7.5 mg, Cialis 20 mg. HPI    Patient Active Problem List   Diagnosis    [facility]I ([facility] elevation myocardial infarction) [facility] [facility]    Coronary artery disease involving native coronary artery of native heart without angina pectoris    Acute ST segment elevation myocardial infarction ([facility] [address] [facility]    Essential hypertension    Class 1 obesity due to excess calories with body mass index (BMI) of 32.0 to 32.9 in adult    Stroke-like symptoms    Ischemic stroke ([facility] [address]    Hypertensive emergency without congestive heart failure    Numbness    Basilar artery stenosis/occlusion with infarction ([facility] [address] [facility]    Right arm weakness    Acute ischemic stroke ([facility] [address]    Left-sided weakness    Systolic murmur       Health Maintenance Due   Topic Date Due    DTaP/Tdap/Td vaccine (1 - Tdap) 01/10/1983    HIV screen  01/10/1987       No Known Allergies      No current facility-administered medications for this visit. No current outpatient medications on file.      Facility-Administered Medications Ordered in Other Visits   Medication Dose Route Frequency Provider Last Rate Last Dose    hydrALAZINE (APRESOLINE) injection 10 mg  10 mg Intravenous Q4H PRN Jase Hogan MD   10 mg at 02/17/20 1658    enoxaparin (LOVENOX) injection 30 mg  30 mg Subcutaneous BID Jase Hogan MD   30 mg at 02/17/20 0843    amLODIPine (NORVASC) tablet 10 mg  10 mg Oral Nightly Jase Hogan MD       [facility] [START ON 2/18/2020] hydrochlorothiazide (HYDRODIURIL) tablet 12.5 mg  12.5 mg Oral Daily Jase Hogan MD        FLUoxetine (PROZAC) capsule 20 mg  20 mg Oral Daily Susan Velez MD   20 mg at 02/17/20 1702    lisinopril (PRINIVIL;ZESTRIL) tablet 40 mg  40 mg Oral Daily Marlon Wynn MD   40 mg at 02/17/20 0843    rosuvastatin (CRESTOR) tablet 40 mg  40 mg Oral Nightly Frieda Dowling MD   40 mg at 42/04/86 1633    folic acid (FOLVITE) tablet 1 mg  1 mg Oral BID Frieda Dowling MD   1 mg at 02/17/20 0843    sennosides-docusate sodium (SENOKOT-S) 8.6-50 MG tablet 2 tablet  2 tablet Oral Daily Annette Roldan, APRN - CNP   2 tablet at 02/17/20 0843    aspirin EC tablet 81 mg  81 mg Oral Daily Angelita Flores MD   81 mg at 02/17/20 0843    ticagrelor (BRILINTA) tablet 90 mg  90 mg Oral BID Angelita Flores MD   90 mg at 02/17/20 0843    magnesium hydroxide (MILK OF MAGNESIA) 400 MG/5ML suspension 30 mL  30 mL Oral Daily PRN Angelita Flores MD        ondansetron [facility]) injection 4 mg  4 mg Intravenous Q6H PRN Angelita Flores MD        sodium chloride flush 0.9 % injection 10 mL  10 mL Intravenous 2 times per day Layla Doty DO   10 mL at 02/17/20 0759    sodium chloride flush 0.9 % injection 10 mL  10 mL Intravenous PRN Layla Doty DO        acetaminophen (TYLENOL) tablet 650 mg  650 mg Oral Q4H PRN Layla Doty DO   650 mg at 02/16/20 2116    0.9 % sodium chloride infusion   Intravenous Continuous Marlon Wynn MD 75 mL/hr at 02/14/20 1030      sodium chloride flush 0.9 % injection 10 mL  10 mL Intravenous 2 times per day Negrita Stoll MD        sodium chloride flush 0.9 % injection 10 mL  10 mL Intravenous PRN Negrita Stoll MD           Social History     Tobacco Use    Smoking status: Never Smoker    Smokeless tobacco: Never Used   Substance Use Topics    Alcohol use: Yes    Drug use: Never       Family History   Problem Relation Age of Onset    High Blood Pressure Mother     High Blood Pressure Father     Hypertension Sister     Hypertension Brother         REVIEW OFSYSTEMS:  Review of Systems   Constitutional: Negative for chills and fever.    HENT: Negative for assistance of a speech-recognition program. While intendingto generate a document that actually reflects the content of the visit, the document can still have some mistakes which may not have been identified and corrected by editing.

## 2020-02-10 NOTE — PATIENT INSTRUCTIONS
· Call and schedule appointment with cardiology  [address] Candelaria MD   [facility], [address] [place] [address], [address] [phone]    · Hold Coreg (Carvedilol),take Bystolic sample (Nebivolol) 2 tablets daily, contact PCP office after finishing samples to update on symptoms  · Increase lisinopril to 40 mg, take 2-20 mg mg until new prescription is picked up

## 2020-02-13 ENCOUNTER — APPOINTMENT (OUTPATIENT)
Dept: MRI IMAGING | Age: 50
DRG: 045 | End: 2020-02-13
Payer: MEDICARE

## 2020-02-13 ENCOUNTER — APPOINTMENT (OUTPATIENT)
Dept: CT IMAGING | Age: 50
DRG: 045 | End: 2020-02-13
Payer: MEDICARE

## 2020-02-13 ENCOUNTER — HOSPITAL ENCOUNTER (INPATIENT)
Age: 50
LOS: 6 days | Discharge: NURSING, REHAB OR LONG-TERM CARE | DRG: 045 | End: 2020-02-19
Attending: EMERGENCY MEDICINE | Admitting: INTERNAL MEDICINE
Payer: MEDICARE

## 2020-02-13 ENCOUNTER — APPOINTMENT (OUTPATIENT)
Dept: GENERAL RADIOLOGY | Age: 50
DRG: 045 | End: 2020-02-13
Payer: MEDICARE

## 2020-02-13 PROBLEM — R29.90 STROKE-LIKE SYMPTOMS: Status: ACTIVE | Noted: 2020-02-13

## 2020-02-13 PROBLEM — I63.9 ISCHEMIC STROKE (HCC): Status: ACTIVE | Noted: 2020-02-13

## 2020-02-13 PROBLEM — I16.1 HYPERTENSIVE EMERGENCY WITHOUT CONGESTIVE HEART FAILURE: Status: ACTIVE | Noted: 2020-02-13

## 2020-02-13 LAB
% CKMB: 1.6 % (ref 0–3.5)
ABSOLUTE EOS #: 0.62 K/UL (ref 0–0.44)
ABSOLUTE IMMATURE GRANULOCYTE: <0.03 K/UL (ref 0–0.3)
ABSOLUTE LYMPH #: 2.76 K/UL (ref 1.1–3.7)
ABSOLUTE MONO #: 0.86 K/UL (ref 0.1–1.2)
ALLEN TEST: ABNORMAL
ANION GAP SERPL CALCULATED.3IONS-SCNC: 12 MMOL/L (ref 9–17)
ANION GAP: 7 MMOL/L (ref 7–16)
BASOPHILS # BLD: 1 % (ref 0–2)
BASOPHILS ABSOLUTE: 0.08 K/UL (ref 0–0.2)
BUN BLDV-MCNC: 11 MG/DL (ref 6–20)
BUN/CREAT BLD: ABNORMAL (ref 9–20)
CALCIUM SERPL-MCNC: 9.3 MG/DL (ref 8.6–10.4)
CHLORIDE BLD-SCNC: 101 MMOL/L (ref 98–107)
CK MB: 4.5 NG/ML
CKMB INTERPRETATION: ABNORMAL
CO2: 24 MMOL/L (ref 20–31)
CREAT SERPL-MCNC: 1.19 MG/DL (ref 0.7–1.2)
DIFFERENTIAL TYPE: ABNORMAL
EOSINOPHILS RELATIVE PERCENT: 7 % (ref 1–4)
FIO2: ABNORMAL
GFR AFRICAN AMERICAN: >60 ML/MIN
GFR NON-AFRICAN AMERICAN: 51 ML/MIN
GFR NON-AFRICAN AMERICAN: >60 ML/MIN
GFR SERPL CREATININE-BSD FRML MDRD: >60 ML/MIN
GFR SERPL CREATININE-BSD FRML MDRD: ABNORMAL ML/MIN/{1.73_M2}
GLUCOSE BLD-MCNC: 93 MG/DL (ref 70–99)
GLUCOSE BLD-MCNC: 96 MG/DL (ref 74–100)
HCO3 VENOUS: 29.1 MMOL/L (ref 22–29)
HCT VFR BLD CALC: 46.6 % (ref 40.7–50.3)
HEMOGLOBIN: 15.5 G/DL (ref 13–17)
IMMATURE GRANULOCYTES: 0 %
INR BLD: 1
LYMPHOCYTES # BLD: 31 % (ref 24–43)
MCH RBC QN AUTO: 28.9 PG (ref 25.2–33.5)
MCHC RBC AUTO-ENTMCNC: 33.3 G/DL (ref 28.4–34.8)
MCV RBC AUTO: 86.8 FL (ref 82.6–102.9)
MODE: ABNORMAL
MONOCYTES # BLD: 10 % (ref 3–12)
MYOGLOBIN: 73 NG/ML (ref 28–72)
NEGATIVE BASE EXCESS, VEN: ABNORMAL (ref 0–2)
NRBC AUTOMATED: 0 PER 100 WBC
O2 DEVICE/FLOW/%: ABNORMAL
O2 SAT, VEN: 72 % (ref 60–85)
PARTIAL THROMBOPLASTIN TIME: 24.9 SEC (ref 20.5–30.5)
PARTIAL THROMBOPLASTIN TIME: 25.2 SEC (ref 20.5–30.5)
PATIENT TEMP: ABNORMAL
PCO2, VEN: 49.8 MM HG (ref 41–51)
PDW BLD-RTO: 12.7 % (ref 11.8–14.4)
PH VENOUS: 7.37 (ref 7.32–7.43)
PLATELET # BLD: 275 K/UL (ref 138–453)
PLATELET ESTIMATE: ABNORMAL
PMV BLD AUTO: 11 FL (ref 8.1–13.5)
PO2, VEN: 39.7 MM HG (ref 30–50)
POC CHLORIDE: 107 MMOL/L (ref 98–107)
POC CREATININE: 1.46 MG/DL (ref 0.51–1.19)
POC HEMATOCRIT: 51 % (ref 41–53)
POC HEMOGLOBIN: 17.3 G/DL (ref 13.5–17.5)
POC IONIZED CALCIUM: 1.25 MMOL/L (ref 1.15–1.33)
POC LACTIC ACID: 0.75 MMOL/L (ref 0.56–1.39)
POC PCO2 TEMP: ABNORMAL MM HG
POC PH TEMP: ABNORMAL
POC PO2 TEMP: ABNORMAL MM HG
POC POTASSIUM: 3.5 MMOL/L (ref 3.5–4.5)
POC SODIUM: 143 MMOL/L (ref 138–146)
POSITIVE BASE EXCESS, VEN: 3 (ref 0–3)
POTASSIUM SERPL-SCNC: 3.9 MMOL/L (ref 3.7–5.3)
PROTHROMBIN TIME: 10.9 SEC (ref 9–12)
RBC # BLD: 5.37 M/UL (ref 4.21–5.77)
RBC # BLD: ABNORMAL 10*6/UL
SAMPLE SITE: ABNORMAL
SEG NEUTROPHILS: 51 % (ref 36–65)
SEGMENTED NEUTROPHILS ABSOLUTE COUNT: 4.49 K/UL (ref 1.5–8.1)
SODIUM BLD-SCNC: 137 MMOL/L (ref 135–144)
TOTAL CK: 275 U/L (ref 39–308)
TOTAL CO2, VENOUS: 31 MMOL/L (ref 23–30)
TROPONIN INTERP: ABNORMAL
TROPONIN INTERP: ABNORMAL
TROPONIN T: ABNORMAL NG/ML
TROPONIN T: ABNORMAL NG/ML
TROPONIN, HIGH SENSITIVITY: 23 NG/L (ref 0–22)
TROPONIN, HIGH SENSITIVITY: 25 NG/L (ref 0–22)
WBC # BLD: 8.8 K/UL (ref 3.5–11.3)
WBC # BLD: ABNORMAL 10*3/UL

## 2020-02-13 PROCEDURE — 70450 CT HEAD/BRAIN W/O DYE: CPT

## 2020-02-13 PROCEDURE — 85014 HEMATOCRIT: CPT

## 2020-02-13 PROCEDURE — 84484 ASSAY OF TROPONIN QUANT: CPT

## 2020-02-13 PROCEDURE — 84132 ASSAY OF SERUM POTASSIUM: CPT

## 2020-02-13 PROCEDURE — 70551 MRI BRAIN STEM W/O DYE: CPT

## 2020-02-13 PROCEDURE — 83605 ASSAY OF LACTIC ACID: CPT

## 2020-02-13 PROCEDURE — 82550 ASSAY OF CK (CPK): CPT

## 2020-02-13 PROCEDURE — 2000000003 HC NEURO ICU R&B

## 2020-02-13 PROCEDURE — 70496 CT ANGIOGRAPHY HEAD: CPT

## 2020-02-13 PROCEDURE — 82330 ASSAY OF CALCIUM: CPT

## 2020-02-13 PROCEDURE — 84295 ASSAY OF SERUM SODIUM: CPT

## 2020-02-13 PROCEDURE — 99285 EMERGENCY DEPT VISIT HI MDM: CPT

## 2020-02-13 PROCEDURE — 80048 BASIC METABOLIC PNL TOTAL CA: CPT

## 2020-02-13 PROCEDURE — APPNB180 APP NON BILLABLE TIME > 60 MINS: Performed by: NURSE PRACTITIONER

## 2020-02-13 PROCEDURE — 82803 BLOOD GASES ANY COMBINATION: CPT

## 2020-02-13 PROCEDURE — 82553 CREATINE MB FRACTION: CPT

## 2020-02-13 PROCEDURE — 82947 ASSAY GLUCOSE BLOOD QUANT: CPT

## 2020-02-13 PROCEDURE — 99223 1ST HOSP IP/OBS HIGH 75: CPT | Performed by: INTERNAL MEDICINE

## 2020-02-13 PROCEDURE — 71260 CT THORAX DX C+: CPT

## 2020-02-13 PROCEDURE — 83874 ASSAY OF MYOGLOBIN: CPT

## 2020-02-13 PROCEDURE — 85610 PROTHROMBIN TIME: CPT

## 2020-02-13 PROCEDURE — 6360000002 HC RX W HCPCS: Performed by: NURSE PRACTITIONER

## 2020-02-13 PROCEDURE — 6360000004 HC RX CONTRAST MEDICATION: Performed by: STUDENT IN AN ORGANIZED HEALTH CARE EDUCATION/TRAINING PROGRAM

## 2020-02-13 PROCEDURE — 71046 X-RAY EXAM CHEST 2 VIEWS: CPT

## 2020-02-13 PROCEDURE — 85730 THROMBOPLASTIN TIME PARTIAL: CPT

## 2020-02-13 PROCEDURE — 93005 ELECTROCARDIOGRAM TRACING: CPT | Performed by: STUDENT IN AN ORGANIZED HEALTH CARE EDUCATION/TRAINING PROGRAM

## 2020-02-13 PROCEDURE — 82435 ASSAY OF BLOOD CHLORIDE: CPT

## 2020-02-13 PROCEDURE — 82565 ASSAY OF CREATININE: CPT

## 2020-02-13 PROCEDURE — 85025 COMPLETE CBC W/AUTO DIFF WBC: CPT

## 2020-02-13 RX ORDER — HEPARIN SODIUM 10000 [USP'U]/100ML
1000 INJECTION, SOLUTION INTRAVENOUS CONTINUOUS
Status: DISCONTINUED | OUTPATIENT
Start: 2020-02-13 | End: 2020-02-15

## 2020-02-13 RX ORDER — LISINOPRIL 10 MG/1
40 TABLET ORAL DAILY
Status: CANCELLED | OUTPATIENT
Start: 2020-02-13

## 2020-02-13 RX ADMIN — IOVERSOL 90 ML: 741 INJECTION INTRA-ARTERIAL; INTRAVENOUS at 15:39

## 2020-02-13 RX ADMIN — IOVERSOL 75 ML: 741 INJECTION INTRA-ARTERIAL; INTRAVENOUS at 16:36

## 2020-02-13 RX ADMIN — HEPARIN SODIUM AND DEXTROSE 1000 UNITS/HR: 10000; 5 INJECTION INTRAVENOUS at 20:19

## 2020-02-13 RX ADMIN — HEPARIN SODIUM AND DEXTROSE 1000 UNITS/HR: 10000; 5 INJECTION INTRAVENOUS at 22:36

## 2020-02-13 ASSESSMENT — ENCOUNTER SYMPTOMS
VOMITING: 0
ABDOMINAL PAIN: 0
PHOTOPHOBIA: 0
SORE THROAT: 0
SHORTNESS OF BREATH: 0
NAUSEA: 0
RHINORRHEA: 0
BACK PAIN: 0
COUGH: 0

## 2020-02-13 NOTE — ED NOTES
Kandace Wagner NP and Jeff BEAVERS with stroke team at bedside evaluating patient.      Juan Valle RN  02/13/20 8193

## 2020-02-13 NOTE — ED NOTES
Pt ambulatory to ED with steady gait c/o numbness to left arm and leg and slurred speech since 7am this morning. Pt with hx of HTN and cardiac stent. Recent changes to BP meds. Denies chest pain, headaches, blurred vision, nausea or vomiting. Pt placed on full cardiac monitor. Resting on stretcher, NAD noted, will continue to monitor.      Carloyn Bowels  02/13/20 1525

## 2020-02-13 NOTE — H&P
[facility]     Department of Internal Medicine - Staff Internal Medicine Teaching Service          ADMISSION NOTE/HISTORY AND PHYSICAL EXAMINATION   Date: 2/13/2020  Patient Name: Rose Cruz  Date of admission: 2/13/2020  2:54 PM  YOB: 1970  PCP: Roxanne Bragg PA-C  History Obtained From:  patient, electronic medical record    CHIEF COMPLAINT     Chief complaint: left sided numbness and weakness     HISTORY OF PRESENTING ILLNESS   The patient is a pleasant 52 y.o. male with PMHx significant for   · Hypertension on amlodipine 10, lisinopril 40, nebivolol 5,   · CAD s/p PTCA -SANDEEP mid LAD 5/24/19 on aspirin 81, brilinta 90 and Lipitor 80     presents with a chief complaint of acute onset left-sided numbness/weakness, dizziness, last known well 7:30 AM. Patient says symptoms started all of a sudden while he was standing and getting his son ready for the school. Patient says he had a dizziness episode in the past while he was walking on the steps but denied any other symptoms associated with the dizziness episode in the past.  Patient denies headache, h/o seizure, chest pain, SOB, palpitation, fever with chills, nausea, vomiting, bowel/bladder dysfunction. On arrival to the ED, /80, VT 63, RR 18, afebrile, sating on RA. Labs drawn unremarkable except for myoglobin 73 and troponin 25-23. Rest of the Ix unremarkable. CT head Wo contrast  No acute intracranial abnormality. CTA head and neck showed  No significant stenosis or evidence for occlusion.       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  The superior cerebellar arteries   also appear to arise from the posterior cerebral arteries. CT chest   1. No acute abnormalities seen in the chest   2. Tortuous ascending thoracic aorta accounts for the density seen on the   chest radiograph   3.  Coronary artery calcifications MRI brain   Acute right paramedian pontine stroke. 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 Remote left posterior cerebellar stroke. Mild non-specific white matter changes favor chronic small ischemic disease. Demyelinating process may also be considered in differential although thought less likely. Initial plan was to admit the patient to interna medicine floor but after MRI report came back, it was decided to transfer patient to neuro critical ICU. PAST MEDICAL HISTORY     Past Medical History:   Diagnosis Date    Heart attack ([address]     Hyperlipidemia     Hypertension        PAST SURGICAL HISTORY     Past Surgical History:   Procedure Laterality Date    CARDIAC SURGERY      CORONARY ANGIOPLASTY WITH STENT PLACEMENT  2019    x 1 stent for LAD    FOOT SURGERY Bilateral     KNEE SURGERY Right      ALLERGIES     Patient has no known allergies. MEDICATIONS PRIOR TO ADMISSION     Prior to Admission medications    Medication Sig Start Date End Date Taking? Authorizing Provider   meloxicam (MOBIC) 7.5 MG tablet Take 1 tablet by mouth 2 times daily as needed for Pain 2/10/20 2/17/20  Barbaraann Seip, PA-C   nebivolol (BYSTOLIC) 5 MG tablet Take 2 tablets by mouth daily for 7 days 2/10/20 2/17/20  Barbaraann Seip, PA-C   tadalafil (CIALIS) 20 MG tablet Take 1 tablet by mouth as needed for Erectile Dysfunction 2/10/20   Barbaraann Seip, PA-C   lisinopril (PRINIVIL;ZESTRIL) 40 MG tablet Take 1 tablet by mouth daily 2/10/20   Barbaraann Seip, PA-C   carvedilol (COREG) 25 MG tablet Take 1 tablet by mouth 2 times daily (with meals) 10/18/19   Barbaraann Seip, PA-C   aspirin 81 MG EC tablet Take 1 tablet by mouth daily 5/26/19   Lazarus Derrick, MD   nitroGLYCERIN (NITROSTAT) 0.4 MG SL tablet up to max of 3 total doses.  If no relief after 1 dose, call 911. 5/26/19   Lazarus Derrick, MD   atorvastatin (LIPITOR) 80 MG tablet Take 1 tablet by mouth nightly 5/26/19   Lazarus Derrick, MD amLODIPine (NORVASC) 10 MG tablet Take 1 tablet by mouth daily 19   Wendy Adler MD   ticagrelor (BRILINTA) 90 MG TABS tablet Take 1 tablet by mouth 2 times daily 19   Wendy Adler MD       SOCIAL HISTORY   Tobacco: denies   Alcohol: social   Illicits: denies   FAMILY HISTORY     Family History   Problem Relation Age of Onset    High Blood Pressure Mother     High Blood Pressure Father     Hypertension Sister     Hypertension Brother      PHYSICAL EXAM   Vitals: BP (!) 169/77   Pulse 54   Temp 98.4 °F (36.9 °C) (Oral)   Resp 10   Ht 6' 1\" (1.854 m)   Wt 250 lb (113.4 kg)   SpO2 99%   BMI 32.98 kg/m²   Tmax: Temp (24hrs), Av.4 °F (36.9 °C), Min:98.4 °F (36.9 °C), Max:98.4 °F (36.9 °C)    Last Body weight:   Wt Readings from Last 3 Encounters:   20 250 lb (113.4 kg)   02/10/20 253 lb (114.8 kg)   20 250 lb 6.4 oz (113.6 kg)     Body Mass Index : Body mass index is 32.98 kg/m². PHYSICAL EXAMINATION:  Constitutional: This is a well developed, well nourished, 52y.o. year old male who is alert, oriented, cooperative and in no apparent distress. EENT:  PERRLA. No conjunctival injections. Septum was midline, mucosa was without erythema, exudates or cobblestoning. No thrush was noted. Neck: Supple without thyromegaly. No elevated JVP. Trachea was midline. Respiratory:Breath sounds bilaterally were clear to auscultation. There were no wheezes, rhonchi or rales. Cardiovascular: Regular without murmur, clicks, gallops or rubs. Abdomen: Slightly rounded and soft without organomegaly. No rebound, rigidity or guarding was appreciated. Extremities:  No lower extremity edema, ulcerations, tenderness, varicosities or erythema. Skin:  Warm and dry.   Neurological/Psychiatric: The patient's general behavior, level of consciousness, thought content and emotional status is normal.  INVESTIGATIONS     Laboratory Testing:     Recent Results (from the past 24 hour(s))   EKG 12 Lead mmol/L   POCT Glucose    Collection Time: 02/13/20  3:07 PM   Result Value Ref Range    POC Glucose 96 74 - 100 mg/dL   Anion Gap (Calc) POC    Collection Time: 02/13/20  3:07 PM   Result Value Ref Range    Anion Gap 7 7 - 16 mmol/L   STROKE PANEL    Collection Time: 02/13/20  3:16 PM   Result Value Ref Range    WBC 8.8 3.5 - 11.3 k/uL    RBC 5.37 4.21 - 5.77 m/uL    Hemoglobin 15.5 13.0 - 17.0 g/dL    Hematocrit 46.6 40.7 - 50.3 %    MCV 86.8 82.6 - 102.9 fL    MCH 28.9 25.2 - 33.5 pg    MCHC 33.3 28.4 - 34.8 g/dL    RDW 12.7 11.8 - 14.4 %    Platelets 610 180 - 939 k/uL    MPV 11.0 8.1 - 13.5 fL    NRBC Automated 0.0 0.0 per 100 WBC    Differential Type NOT REPORTED     Seg Neutrophils 51 36 - 65 %    Lymphocytes 31 24 - 43 %    Monocytes 10 3 - 12 %    Eosinophils % 7 (H) 1 - 4 %    Basophils 1 0 - 2 %    Immature Granulocytes 0 0 %    Segs Absolute 4.49 1.50 - 8.10 k/uL    Absolute Lymph # 2.76 1.10 - 3.70 k/uL    Absolute Mono # 0.86 0.10 - 1.20 k/uL    Absolute Eos # 0.62 (H) 0.00 - 0.44 k/uL    Basophils Absolute 0.08 0.00 - 0.20 k/uL    Absolute Immature Granulocyte <0.03 0.00 - 0.30 k/uL    WBC Morphology NOT REPORTED     RBC Morphology NOT REPORTED     Platelet Estimate NOT REPORTED     Protime 10.9 9.0 - 12.0 sec    INR 1.0     PTT 25.2 20.5 - 30.5 sec    Myoglobin 73 (H) 28 - 72 ng/mL    Troponin, High Sensitivity 25 (H) 0 - 22 ng/L    Troponin T NOT REPORTED <0.03 ng/mL    Troponin Interp NOT REPORTED     Glucose 93 70 - 99 mg/dL    BUN 11 6 - 20 mg/dL    CREATININE 1.19 0.70 - 1.20 mg/dL    Bun/Cre Ratio NOT REPORTED 9 - 20    Calcium 9.3 8.6 - 10.4 mg/dL    Sodium 137 135 - 144 mmol/L    Potassium 3.9 3.7 - 5.3 mmol/L    Chloride 101 98 - 107 mmol/L    CO2 24 20 - 31 mmol/L    Anion Gap 12 9 - 17 mmol/L    GFR Non-African American >60 >60 mL/min    GFR African American >60 >60 mL/min    GFR Comment          GFR Staging NOT REPORTED     Total  39 - 308 U/L    CK-MB 4.5 <10.5 ng/mL    % CKMB 1.6 0.0 - 3.5 %    CKMB Interpretation NORMAL ISOENZYME PATTERN        Imaging:   Xr Chest Standard (2 Vw)    Result Date: 2/13/2020  Indeterminate prominence of the right hilar shadow could relate to adenopathy, mass, adjacent consolidation or pulmonary artery enlargement. Correlation with IV contrast-enhanced CT chest recommended. Cardiomegaly. RECOMMENDATION: IV contrast-enhanced CT chest     Ct Head Wo Contrast    Result Date: 2/13/2020  No acute intracranial abnormality. Findings were discussed with Dr. Cristian Lazo At 3:41 pm on 2/13/2020. Ct Chest W Contrast    Result Date: 2/13/2020  1. No acute abnormalities seen in the chest 2. Tortuous ascending thoracic aorta accounts for the density seen on the chest radiograph 3. Coronary artery calcifications     Cta Head Neck W Contrast    Result Date: 2/13/2020  No significant stenosis or evidence for occlusion. 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 The superior cerebellar arteries also appear to arise from the posterior cerebral arteries. ASSESSMENT & PLAN   · Ischemic stroke- no TPA given as was outside treatment window, started on stroke pathway. Neurology following. · Hypertensive emergency-systolic BP more than 410 at presentation, will keep on IV labetalol as needed, permissive hypertension. Hold on home medication for now   · Coronary artery disease- will resume home medication except antihypertensives after swallow study.    · Class I obesity      DVT ppx: Lovenox   GI ppx: none  PT/OT/SW: consulted   Discharge Planning: CM to assist with       Charo Haider MD.  Internal Medicine Resident   [facility], [facility]   2/13/2020, 5:31 PM

## 2020-02-13 NOTE — ED PROVIDER NOTES
[address] ED  Emergency Department Encounter  EmergencyMedicine Resident     Pt Gt Band  MRN: [id number]  Gus 1970  Date of evaluation: 2/13/20  PCP:  Deshaun Beach [address]       Chief Complaint   Patient presents with    Aphasia    Numbness     left side since this morning       HISTORY OF PRESENT ILLNESS  (Location/Symptom, Timing/Onset, Context/Setting, Quality, Duration, Modifying Factors, Severity.)      Tierra Collado is a 52 y.o. male who presents with strokelike symptoms. No history of CVA does have history of cardiac disease, STEMI. Presenting with last known well 0430 today states that he was up using the bathroom last known well for 30 as he went back to sleep woke up at 7 with mild slurred speech dysarthria, numbness to mainly left upper extremity but as well as left lower extremity with mild weakness. Denies chest pain shortness of breath fevers chills recent URI symptoms. PAST MEDICAL / SURGICAL / SOCIAL / FAMILY HISTORY      has a past medical history of Heart attack ([address] Hyperlipidemia, and Hypertension. has a past surgical history that includes knee surgery (Right); Foot surgery (Bilateral); Coronary angioplasty with stent (2019); and Cardiac surgery. Social History     Socioeconomic History    Marital status: Single     Spouse name: Not on file    Number of children: Not on file    Years of education: Not on file    Highest education level: Not on file   Occupational History    Not on file   Social Needs    Financial resource strain: Not on file    Food insecurity:     Worry: Not on file     Inability: Not on file    Transportation needs:     Medical: Not on file     Non-medical: Not on file   Tobacco Use    Smoking status: Never Smoker    Smokeless tobacco: Never Used   Substance and Sexual Activity    Alcohol use:  Yes    Drug use: Never    Sexual activity: Not on file   Lifestyle    Physical activity: Days per week: Not on file     Minutes per session: Not on file    Stress: Not on file   Relationships    Social connections:     Talks on phone: Not on file     Gets together: Not on file     Attends [employer] service: Not on file     Active member of club or organization: Not on file     Attends meetings of clubs or organizations: Not on file     Relationship status: Not on file    Intimate partner violence:     Fear of current or ex partner: Not on file     Emotionally abused: Not on file     Physically abused: Not on file     Forced sexual activity: Not on file   Other Topics Concern    Not on file   Social History Narrative    Not on file       Family History   Problem Relation Age of Onset    High Blood Pressure Mother     High Blood Pressure Father     Hypertension Sister     Hypertension Brother        Allergies:  Patient has no known allergies. Home Medications:  Prior to Admission medications    Medication Sig Start Date End Date Taking? Authorizing Provider   meloxicam (MOBIC) 7.5 MG tablet Take 1 tablet by mouth 2 times daily as needed for Pain 2/10/20 2/17/20  Ruslan Mendieta PA-C   nebivolol (BYSTOLIC) 5 MG tablet Take 2 tablets by mouth daily for 7 days 2/10/20 2/17/20  Ruslan Mendieta PA-C   tadalafil (CIALIS) 20 MG tablet Take 1 tablet by mouth as needed for Erectile Dysfunction 2/10/20   Ruslan Mendieta PA-C   lisinopril (PRINIVIL;ZESTRIL) 40 MG tablet Take 1 tablet by mouth daily 2/10/20   Ruslan Mendieta PA-C   carvedilol (COREG) 25 MG tablet Take 1 tablet by mouth 2 times daily (with meals) 10/18/19   Ruslan Mendieta PA-C   aspirin 81 MG EC tablet Take 1 tablet by mouth daily 5/26/19   Otoniel Ford MD   nitroGLYCERIN (NITROSTAT) 0.4 MG SL tablet up to max of 3 total doses.  If no relief after 1 dose, call 911. 5/26/19   Otoniel Ford MD   atorvastatin (LIPITOR) 80 MG tablet Take 1 tablet by mouth nightly 5/26/19   Otoniel Ford MD   amLODIPine (NORVASC) 10 MG tablet Take 1 tablet by mouth daily 5/26/19   Griffin Sigala MD   ticagrelor (BRILINTA) 90 MG TABS tablet Take 1 tablet by mouth 2 times daily 5/26/19   Griffin Sigala MD       REVIEW OF SYSTEMS    (2-9 systems for level 4, 10 or more for level 5)      Review of Systems   Constitutional: Negative for chills, fatigue and fever. HENT: Negative for congestion, rhinorrhea and sore throat. Eyes: Negative for photophobia and visual disturbance. Respiratory: Negative for cough and shortness of breath. Cardiovascular: Negative for chest pain. Gastrointestinal: Negative for abdominal pain, nausea and vomiting. Genitourinary: Negative for decreased urine volume. Musculoskeletal: Negative for back pain, neck pain and neck stiffness. Skin: Negative for rash. Neurological: Positive for speech difficulty, weakness and numbness. Negative for headaches. Psychiatric/Behavioral: Negative for confusion. PHYSICAL EXAM   (up to 7 for level 4, 8 or more for level 5)      INITIAL VITALS:   BP (!) 169/77   Pulse 54   Temp 98.4 °F (36.9 °C) (Oral)   Resp 10   Ht 6' 1\" (1.854 m)   Wt 250 lb (113.4 kg)   SpO2 99%   BMI 32.98 kg/m²     Physical Exam  Constitutional:       General: He is not in acute distress. Appearance: He is not diaphoretic. HENT:      Head: Normocephalic and atraumatic. Eyes:      Pupils: Pupils are equal, round, and reactive to light. Neck:      Musculoskeletal: Normal range of motion and neck supple. Trachea: No tracheal deviation. Cardiovascular:      Rate and Rhythm: Normal rate and regular rhythm. Heart sounds: Murmur present. Pulmonary:      Effort: Pulmonary effort is normal. No respiratory distress. Breath sounds: No stridor. No wheezing. Abdominal:      General: There is no distension. Palpations: Abdomen is soft. Tenderness: There is no abdominal tenderness. Musculoskeletal: Normal range of motion. General: No deformity.    Skin:     General: Creatinine W/GFR Point of Care    Lactic Acid, POC    POCT Glucose    Anion Gap (Calc) POC    EKG 12 Lead       MEDICATIONS ORDERED:  Orders Placed This Encounter   Medications    ioversol (OPTIRAY) 74 % injection 90 mL    ioversol (OPTIRAY) 74 % injection 75 mL           DIAGNOSTIC RESULTS / EMERGENCY DEPARTMENT COURSE / MDM     LABS:  Results for orders placed or performed during the hospital encounter of 02/13/20   STROKE PANEL   Result Value Ref Range    WBC 8.8 3.5 - 11.3 k/uL    RBC 5.37 4.21 - 5.77 m/uL    Hemoglobin 15.5 13.0 - 17.0 g/dL    Hematocrit 46.6 40.7 - 50.3 %    MCV 86.8 82.6 - 102.9 fL    MCH 28.9 25.2 - 33.5 pg    MCHC 33.3 28.4 - 34.8 g/dL    RDW 12.7 11.8 - 14.4 %    Platelets 041 888 - 046 k/uL    MPV 11.0 8.1 - 13.5 fL    NRBC Automated 0.0 0.0 per 100 WBC    Differential Type NOT REPORTED     Seg Neutrophils 51 36 - 65 %    Lymphocytes 31 24 - 43 %    Monocytes 10 3 - 12 %    Eosinophils % 7 (H) 1 - 4 %    Basophils 1 0 - 2 %    Immature Granulocytes 0 0 %    Segs Absolute 4.49 1.50 - 8.10 k/uL    Absolute Lymph # 2.76 1.10 - 3.70 k/uL    Absolute Mono # 0.86 0.10 - 1.20 k/uL    Absolute Eos # 0.62 (H) 0.00 - 0.44 k/uL    Basophils Absolute 0.08 0.00 - 0.20 k/uL    Absolute Immature Granulocyte <0.03 0.00 - 0.30 k/uL    WBC Morphology NOT REPORTED     RBC Morphology NOT REPORTED     Platelet Estimate NOT REPORTED     Protime 10.9 9.0 - 12.0 sec    INR 1.0     PTT 25.2 20.5 - 30.5 sec    Myoglobin 73 (H) 28 - 72 ng/mL    Troponin, High Sensitivity 25 (H) 0 - 22 ng/L    Troponin T NOT REPORTED <0.03 ng/mL    Troponin Interp NOT REPORTED     Glucose 93 70 - 99 mg/dL    BUN 11 6 - 20 mg/dL    CREATININE 1.19 0.70 - 1.20 mg/dL    Bun/Cre Ratio NOT REPORTED 9 - 20    Calcium 9.3 8.6 - 10.4 mg/dL    Sodium 137 135 - 144 mmol/L    Potassium 3.9 3.7 - 5.3 mmol/L    Chloride 101 98 - 107 mmol/L    CO2 24 20 - 31 mmol/L    Anion Gap 12 9 - 17 mmol/L    GFR Non-African American >60 >60 mL/min GFR African American >60 >60 mL/min    GFR Comment          GFR Staging NOT REPORTED     Total  39 - 308 U/L    CK-MB 4.5 <10.5 ng/mL    % CKMB 1.6 0.0 - 3.5 %    CKMB Interpretation NORMAL ISOENZYME PATTERN    Hemoglobin and hematocrit, blood   Result Value Ref Range    POC Hemoglobin 17.3 13.5 - 17.5 g/dL    POC Hematocrit 51 41 - 53 %   SODIUM (POC)   Result Value Ref Range    POC Sodium 143 138 - 146 mmol/L   POTASSIUM (POC)   Result Value Ref Range    POC Potassium 3.5 3.5 - 4.5 mmol/L   CHLORIDE (POC)   Result Value Ref Range    POC Chloride 107 98 - 107 mmol/L   CALCIUM, IONIC (POC)   Result Value Ref Range    POC Ionized Calcium 1.25 1.15 - 1.33 mmol/L   Venous Blood Gas, POC   Result Value Ref Range    pH, Bladimir 7.374 7.320 - 7.430    pCO2, Bladimir 49.8 41.0 - 51.0 mm Hg    pO2, Bladimir 39.7 30.0 - 50.0 mm Hg    HCO3, Venous 29.1 (H) 22.0 - 29.0 mmol/L    Total CO2, Venous 31 (H) 23.0 - 30.0 mmol/L    Negative Base Excess, Bladimir NOT REPORTED 0.0 - 2.0    Positive Base Excess, Bladimir 3 0.0 - 3.0    O2 Sat, Bladimir 72 60.0 - 85.0 %    O2 Device/Flow/% NOT REPORTED     Glenn Test NOT REPORTED     Sample Site NOT REPORTED     Mode NOT REPORTED     FIO2 NOT REPORTED     Pt Temp NOT REPORTED     POC pH Temp NOT REPORTED     POC pCO2 Temp NOT REPORTED mm Hg    POC pO2 Temp NOT REPORTED mm Hg   Creatinine W/GFR Point of Care   Result Value Ref Range    POC Creatinine 1.46 (H) 0.51 - 1.19 mg/dL    GFR Comment >60 >60 mL/min    GFR Non- 51 (L) >60 mL/min    GFR Comment         Lactic Acid, POC   Result Value Ref Range    POC Lactic Acid 0.75 0.56 - 1.39 mmol/L   POCT Glucose   Result Value Ref Range    POC Glucose 96 74 - 100 mg/dL   Anion Gap (Calc) POC   Result Value Ref Range    Anion Gap 7 7 - 16 mmol/L   EKG 12 Lead   Result Value Ref Range    Ventricular Rate 59 BPM    Atrial Rate 59 BPM    P-R Interval 192 ms    QRS Duration 132 ms    Q-T Interval 418 ms    QTc Calculation (Bazett) 413 ms    P Axis 39 degrees    R Axis 16 degrees    T Axis 29 degrees           RADIOLOGY:  CT CHEST W CONTRAST   Final Result   1. No acute abnormalities seen in the chest   2. Tortuous ascending thoracic aorta accounts for the density seen on the   chest radiograph   3. Coronary artery calcifications         XR CHEST STANDARD (2 VW)   Final Result   Indeterminate prominence of the right hilar shadow could relate to   adenopathy, mass, adjacent consolidation or pulmonary artery enlargement. Correlation with IV contrast-enhanced CT chest recommended. Cardiomegaly. RECOMMENDATION:   IV contrast-enhanced CT chest         CTA HEAD NECK W CONTRAST   Final Result   No significant stenosis or evidence for occlusion. 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 The superior cerebellar arteries   also appear to arise from the posterior cerebral arteries. CT HEAD WO CONTRAST   Final Result   No acute intracranial abnormality. Findings were discussed with Dr. Cinthia Sorto At 3:41 pm on 2/13/2020. MRI BRAIN WO CONTRAST    (Results Pending)          EKG  None    All EKG's are interpreted by the Emergency Department Physician who either signs or Co-signs this chart in the absence of a cardiologist.    EMERGENCY DEPARTMENT COURSE:  Patient is a 82-year-old male presenting with left-sided numbness, mild weakness since 4:30 AM 2/13/2020last known normal.  Woke up at 7 with symptoms also complaining of dysarthria. On physical exam patient with reported mild sensory change to left upper left lower extremity as well as a subtle pronator drift to left upper extremity, does have drift minimally to left lower. No facial asymmetry. Patient's voice sounds fluent however he notes some difficulty with speech. Heart regular rate and rhythm, positive murmur patient has never been told he has murmur before., lungs are clear abdomen soft nontender.   There is no headaches no vision change. Stroke alert due to symptom onset less than 24 hours, will obtain CT head CTAs, discussed with stroke team. Patient outside of TPA window    4:26 PM cussed with stroke team who is recommending a medicine admission with neurology consult. Patient was updated on plan is agreeable for admission. Chest x-ray came back concerning area right hilum, recommended CT chest will obtain this now prior to admission. Otherwise patient remained stable. CT chest showing tortuous aorta. No new changes CT imaging negative for acute pathology. Will admit to internal medicine for further management. Neurology will be on his consult. PROCEDURES:  None    CONSULTS:  IP CONSULT TO STROKE TEAM  IP CONSULT TO INTERNAL MEDICINE    CRITICAL CARE:  None    FINAL IMPRESSION      1. Numbness    2. Cardiac murmur          DISPOSITION / PLAN     DISPOSITION        PATIENT REFERRED TO:  No follow-up provider specified.     DISCHARGE MEDICATIONS:  New Prescriptions    No medications on file       Raza Monsivais DO  Emergency Medicine Resident    (Please note that portions of thisnote were completed with a voice recognition program.  Efforts were made to edit the dictations but occasionally words are mis-transcribed.)        Raza Monsivais DO  02/13/20 [id number]

## 2020-02-13 NOTE — CONSULTS
Department of Neurology                                         Resident Progress Note    Reason for Consult: Left-sided numbness, dysarthria  Requesting Physician: Dino Guzman DO      History Obtained From:  patient, electronic medical record, left sided numbness    CHIEF COMPLAINT:       Left-sided numbness    HISTORY OF PRESENT ILLNESS:       The patient is a 52 y.o. male who presents with left-sided numbness/weakness, dizziness, last known well 7:30 AM.  Patient says symptoms started all of a sudden while he was standing and getting his son ready for the school. Patient past medical history of hypertension on aspirin 81 mg at home patient says he takes his medication daily. Patient denies any smoking, drugs or alcohol in the past.  Patient says he had a dizziness episode in the past while he was walking on the steps but denied any other symptoms associated with the dizziness episode in the past.  Patient denies history of headaches or seizures in the past.  Patient was brought to the ER. CT head was done that was negative. Initial NIH was 3. SBP 3.  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 SCAs arising from PCAs. Was admitted for stroke work-up.              PAST MEDICAL HISTORY :       Past Medical History:        Diagnosis Date    Heart attack ([address]     Hyperlipidemia     Hypertension        Past Surgical History:        Procedure Laterality Date    CARDIAC SURGERY      CORONARY ANGIOPLASTY WITH STENT PLACEMENT  2019    x 1 stent for LAD    FOOT SURGERY Bilateral     KNEE SURGERY Right        Social History:   Social History     Socioeconomic History    Marital status: Single     Spouse name: Not on file    Number of children: Not on file    Years of education: Not on file    Highest education level: Not on file   Occupational History    Not on file   Social Needs    Financial resource strain: Not on file    Food insecurity:     Worry: Not on file     Inability: Not on file    Transportation needs:     Medical: Not on file     Non-medical: Not on file   Tobacco Use    Smoking status: Never Smoker    Smokeless tobacco: Never Used   Substance and Sexual Activity    Alcohol use: Yes    Drug use: Never    Sexual activity: Not on file   Lifestyle    Physical activity:     Days per week: Not on file     Minutes per session: Not on file    Stress: Not on file   Relationships    Social connections:     Talks on phone: Not on file     Gets together: Not on file     Attends Mu-ism service: Not on file     Active member of club or organization: Not on file     Attends meetings of clubs or organizations: Not on file     Relationship status: Not on file    Intimate partner violence:     Fear of current or ex partner: Not on file     Emotionally abused: Not on file     Physically abused: Not on file     Forced sexual activity: Not on file   Other Topics Concern    Not on file   Social History Narrative    Not on file       Family History:       Problem Relation Age of Onset    High Blood Pressure Mother     High Blood Pressure Father     Hypertension Sister     Hypertension Brother        Allergies:  Patient has no known allergies. Home Medications:  Prior to Admission medications    Medication Sig Start Date End Date Taking?  Authorizing Provider   meloxicam (MOBIC) 7.5 MG tablet Take 1 tablet by mouth 2 times daily as needed for Pain 2/10/20 2/17/20  Ruslan Mendieta PA-C   nebivolol (BYSTOLIC) 5 MG tablet Take 2 tablets by mouth daily for 7 days 2/10/20 2/17/20  Ruslan Mendieta PA-C   tadalafil (CIALIS) 20 MG tablet Take 1 tablet by mouth as needed for Erectile Dysfunction 2/10/20   Ruslan Mendieta PA-C   lisinopril (PRINIVIL;ZESTRIL) 40 MG tablet Take 1 tablet by mouth daily 2/10/20   Ruslan Mendieta PA-C   carvedilol (COREG) 25 MG tablet Take 1 tablet by mouth 2 times daily (with meals) 10/18/19   Leopoldo Goring, PA-C   aspirin 81 MG EC tablet Take 1 tablet by mouth daily 5/26/19   Rose Perea MD   nitroGLYCERIN (NITROSTAT) 0.4 MG SL tablet up to max of 3 total doses. If no relief after 1 dose, call 911. 5/26/19   Rose Perea, MD   atorvastatin (LIPITOR) 80 MG tablet Take 1 tablet by mouth nightly 5/26/19   Rose Check, MD   amLODIPine (NORVASC) 10 MG tablet Take 1 tablet by mouth daily 5/26/19   Rose Check, MD   ticagrelor (BRILINTA) 90 MG TABS tablet Take 1 tablet by mouth 2 times daily 5/26/19   Rose Perea MD       Current Medications:   No current facility-administered medications for this encounter. Facility-Administered Medications Ordered in Other Encounters: sodium chloride flush 0.9 % injection 10 mL, 10 mL, Intravenous, 2 times per day  sodium chloride flush 0.9 % injection 10 mL, 10 mL, Intravenous, PRN  acetaminophen (TYLENOL) tablet 650 mg, 650 mg, Oral, Q4H PRN  magnesium hydroxide (MILK OF MAGNESIA) 400 MG/5ML suspension 30 mL, 30 mL, Oral, Daily PRN    REVIEW OF SYSTEMS:       CONSTITUTIONAL: negative for fatigue and malaise   EYES: negative for double vision and photophobia    HEENT: negative for tinnitus and sore throat   RESPIRATORY: negative for cough, shortness of breath   CARDIOVASCULAR: negative for chest pain, palpitations   GASTROINTESTINAL: negative for nausea, vomiting   GENITOURINARY: negative for incontinence   MUSCULOSKELETAL: negative for neck or back pain   NEUROLOGICAL: negative for seizures   PSYCHIATRIC: negative for fatigue     Review of systems otherwise negative. PHYSICAL EXAM:       BP (!) 169/77   Pulse 54   Temp 98.4 °F (36.9 °C) (Oral)   Resp 10   Ht 6' 1\" (1.854 m)   Wt 250 lb (113.4 kg)   SpO2 99%   BMI 32.98 kg/m²     CONSTITUTIONAL:  Well developed, well nourished, alert and oriented x 3, in no acute distress. GCS 15, nontoxic. No dysarthria, no aphasia. EOMI.     HEAD: paralysis (flattened nasolabial fold, asymmetric on smiling)  5a. Motor left arm:  0 - no drift, limb holds 90 (or 45) degrees for full 10 seconds  5b. Motor right arm:  0 - no drift, limb holds 90 (or 45) degrees for full 10 seconds  6a. Motor left le - no drift; leg holds 30 degree position for full 5 seconds  6b. Motor right le - no drift; leg holds 30 degree position for full 5 seconds  7. Limb Ataxia:  0 - absent  8. Sensory:  0 - normal; no sensory loss  9. Best Language:  0 - no aphasia, normal  10. Dysarthria:  0 - normal  11.   Extinction and Inattention:  0 - no abnormality    TOTAL:  0     SKIN:  no rash      LABS AND IMAGING:     CBC with Differential:    Lab Results   Component Value Date    WBC 8.8 2020    RBC 5.37 2020    HGB 15.5 2020    HCT 46.6 2020     2020    MCV 86.8 2020    MCH 28.9 2020    MCHC 33.3 2020    RDW 12.7 2020    LYMPHOPCT 31 2020    MONOPCT 10 2020    BASOPCT 1 2020    MONOSABS 0.86 2020    LYMPHSABS 2.76 2020    EOSABS 0.62 2020    BASOSABS 0.08 2020    DIFFTYPE NOT REPORTED 2020     BMP:    Lab Results   Component Value Date     2020    K 3.9 2020     2020    CO2 24 2020    BUN 11 2020    LABALBU 4.0 2019    CREATININE 1.19 2020    CALCIUM 9.3 2020    GFRAA >60 2020    LABGLOM >60 2020    GLUCOSE 93 2020       Radiology Review:  As above      ASSESSMENT AND PLAN:       Patient Active Problem List   Diagnosis    STEMI ([facility] elevation myocardial infarction) [address] [address]    Coronary artery disease involving native coronary artery of native heart without angina pectoris    Acute ST segment elevation myocardial infarction ([facility] [address] [facility]    Essential hypertension    Class 1 obesity due to excess calories with body mass index (BMI) of 32.0 to 32.9 in adult       52 y.o. male who presents with sudden onset dizziness, left-sided numbness, dysarthria. Initial NIH 3 improved to NIH 1.     - MRI Brain WO   - ECHO,   -cont. ASA 81mg ,brillinta 90 bid(home meds)   - Folic acid 1mg BID   - lipitor 80mg   - Fasting Lipid panel   - PT, OT, Speech eval    - Hydrate with  IVF NS @ 75cc/hr    - Telemetry    - Neuro checks per protocol  - We recommend SBP<200  - Blood glucose goal less than 180  - Please avoid dextrose containing solutions    Additional recommendations may follow    Please contact Neurology with any changes in patients neurologic status. Thank you for your consult.        Sonia Suárez MD   2/13/2020  5:06 PM

## 2020-02-13 NOTE — ED NOTES
Pt and wife provided with warm blankets and updated on poc, deny further needs at this time. Call light within reach, will continue to monitor.      Lina Macias RN  02/13/20 4113

## 2020-02-13 NOTE — ED PROVIDER NOTES
treatment plan and disposition of the patient as recorded by the APC.     Lainey Courtney MD  Attending Emergency  Physician       Camilo Murphy MD  02/13/20 2527

## 2020-02-13 NOTE — FLOWSHEET NOTE
[address] [facility] [address]     Emergency/Trauma Note    PATIENT NAME: Jennifer Manley    Shift date: 2/13/2020  Shift day: Thursday   Shift # 2    Room # 29/29   Name: Jennifer Manley            Age: 52 y.o. Gender: male          [place]: [address] of [place]: [employer]    Trauma/Incident type: Stroke Alert  Admit Date & Time: 2/13/2020  2:54 PM  TRAUMA NAME:         PATIENT/EVENT DESCRIPTION:  Jennifer Manley is a 52 y.o. male who arrived  as a Stroke Alert. Patient present with left sided numbness and stroke like symptoms. Pt to be admitted to 29/29. SPIRITUAL ASSESSMENT/INTERVENTION:   responded to page for a Stroke Alert in ED #29. Patient was awake and alert and his significant other was in the room. Patient expressed that he was nervous about the numbness he presents with in his left arm. Patient indicated strong support from his family.  was ministry of presence. Patient was anxious, but expressed confidence in the medical team.  Jared Carpenter provided active, empathetic listening. Patient stated he attends [employer].  asked if a prayer would be helpful and patient accepted. Jared Carpenter will continue to follow up with patient during patient's hospital stay. PATIENT BELONGINGS:  With patient    ANY BELONGINGS OF SIGNIFICANT VALUE NOTED:  None noted    REGISTRATION STAFF NOTIFIED? NO      WHAT IS YOUR SPIRITUAL CARE PLAN FOR THIS PATIENT?:  Chaplains will remain available for spiritual and emotional support as needed. Electronically signed by Vivian Rand Resident      02/13/20 8884   Encounter Summary   Services provided to: Patient;Significant other   Referral/Consult From: Multi-disciplinary team   Support System Significant other   Place of [employer]   [place] No)   Continue Visiting   (2/13/2020)   Complexity of Encounter Moderate   Length of Encounter 30 minutes   Spiritual Assessment Completed Yes   Crisis   Type Stroke Alert   Assessment Calm; Approachable   Intervention Active listening;Prayer;Sustaining presence/ Ministry of presence   Outcome Acceptance;Expressed gratitude   , on 2/13/2020 at 4:21 PM.  [facility]  [phone]

## 2020-02-14 LAB
AMPHETAMINE SCREEN URINE: NEGATIVE
ANION GAP SERPL CALCULATED.3IONS-SCNC: 13 MMOL/L (ref 9–17)
BARBITURATE SCREEN URINE: NEGATIVE
BENZODIAZEPINE SCREEN, URINE: NEGATIVE
BUN BLDV-MCNC: 12 MG/DL (ref 6–20)
BUN/CREAT BLD: ABNORMAL (ref 9–20)
BUPRENORPHINE URINE: NORMAL
CALCIUM SERPL-MCNC: 9.1 MG/DL (ref 8.6–10.4)
CANNABINOID SCREEN URINE: NEGATIVE
CHLORIDE BLD-SCNC: 104 MMOL/L (ref 98–107)
CHOLESTEROL/HDL RATIO: 3.4
CHOLESTEROL: 140 MG/DL
CO2: 19 MMOL/L (ref 20–31)
COCAINE METABOLITE, URINE: NEGATIVE
CREAT SERPL-MCNC: 1.33 MG/DL (ref 0.7–1.2)
EKG ATRIAL RATE: 59 BPM
EKG P AXIS: 39 DEGREES
EKG P-R INTERVAL: 192 MS
EKG Q-T INTERVAL: 418 MS
EKG QRS DURATION: 132 MS
EKG QTC CALCULATION (BAZETT): 413 MS
EKG R AXIS: 16 DEGREES
EKG T AXIS: 29 DEGREES
EKG VENTRICULAR RATE: 59 BPM
ESTIMATED AVERAGE GLUCOSE: 114 MG/DL
GFR AFRICAN AMERICAN: >60 ML/MIN
GFR NON-AFRICAN AMERICAN: 57 ML/MIN
GFR SERPL CREATININE-BSD FRML MDRD: ABNORMAL ML/MIN/{1.73_M2}
GFR SERPL CREATININE-BSD FRML MDRD: ABNORMAL ML/MIN/{1.73_M2}
GLUCOSE BLD-MCNC: 127 MG/DL (ref 70–99)
HBA1C MFR BLD: 5.6 % (ref 4–6)
HCT VFR BLD CALC: 45.4 % (ref 40.7–50.3)
HDLC SERPL-MCNC: 41 MG/DL
HEMOGLOBIN: 14.6 G/DL (ref 13–17)
LDL CHOLESTEROL: 91 MG/DL (ref 0–130)
LV EF: 55 %
LVEF MODALITY: NORMAL
MCH RBC QN AUTO: 29.4 PG (ref 25.2–33.5)
MCHC RBC AUTO-ENTMCNC: 32.2 G/DL (ref 28.4–34.8)
MCV RBC AUTO: 91.5 FL (ref 82.6–102.9)
MDMA URINE: NORMAL
METHADONE SCREEN, URINE: NEGATIVE
METHAMPHETAMINE, URINE: NORMAL
MRSA, DNA, NASAL: NORMAL
NRBC AUTOMATED: 0 PER 100 WBC
OPIATES, URINE: NEGATIVE
OXYCODONE SCREEN URINE: NEGATIVE
PARTIAL THROMBOPLASTIN TIME: 25.7 SEC (ref 20.5–30.5)
PARTIAL THROMBOPLASTIN TIME: 35.5 SEC (ref 20.5–30.5)
PARTIAL THROMBOPLASTIN TIME: 45.7 SEC (ref 20.5–30.5)
PARTIAL THROMBOPLASTIN TIME: 56.1 SEC (ref 20.5–30.5)
PDW BLD-RTO: 12.8 % (ref 11.8–14.4)
PHENCYCLIDINE, URINE: NEGATIVE
PLATELET # BLD: 362 K/UL (ref 138–453)
PMV BLD AUTO: 11.1 FL (ref 8.1–13.5)
POTASSIUM SERPL-SCNC: 3.6 MMOL/L (ref 3.7–5.3)
PROPOXYPHENE, URINE: NORMAL
RBC # BLD: 4.96 M/UL (ref 4.21–5.77)
SODIUM BLD-SCNC: 136 MMOL/L (ref 135–144)
SPECIMEN DESCRIPTION: NORMAL
TEST INFORMATION: NORMAL
TRICYCLIC ANTIDEPRESSANTS, UR: NORMAL
TRIGL SERPL-MCNC: 40 MG/DL
VLDLC SERPL CALC-MCNC: NORMAL MG/DL (ref 1–30)
WBC # BLD: 13.3 K/UL (ref 3.5–11.3)

## 2020-02-14 PROCEDURE — 81241 F5 GENE: CPT

## 2020-02-14 PROCEDURE — 99223 1ST HOSP IP/OBS HIGH 75: CPT | Performed by: PSYCHIATRY & NEUROLOGY

## 2020-02-14 PROCEDURE — 87641 MR-STAPH DNA AMP PROBE: CPT

## 2020-02-14 PROCEDURE — 80048 BASIC METABOLIC PNL TOTAL CA: CPT

## 2020-02-14 PROCEDURE — 36415 COLL VENOUS BLD VENIPUNCTURE: CPT

## 2020-02-14 PROCEDURE — 99255 IP/OBS CONSLTJ NEW/EST HI 80: CPT | Performed by: PSYCHIATRY & NEUROLOGY

## 2020-02-14 PROCEDURE — 6370000000 HC RX 637 (ALT 250 FOR IP): Performed by: FAMILY MEDICINE

## 2020-02-14 PROCEDURE — 80061 LIPID PANEL: CPT

## 2020-02-14 PROCEDURE — 97162 PT EVAL MOD COMPLEX 30 MIN: CPT

## 2020-02-14 PROCEDURE — 97166 OT EVAL MOD COMPLEX 45 MIN: CPT | Performed by: OCCUPATIONAL THERAPIST

## 2020-02-14 PROCEDURE — 6360000002 HC RX W HCPCS: Performed by: NURSE PRACTITIONER

## 2020-02-14 PROCEDURE — 85730 THROMBOPLASTIN TIME PARTIAL: CPT

## 2020-02-14 PROCEDURE — 81291 MTHFR GENE: CPT

## 2020-02-14 PROCEDURE — B246ZZZ ULTRASONOGRAPHY OF RIGHT AND LEFT HEART: ICD-10-PCS | Performed by: INTERNAL MEDICINE

## 2020-02-14 PROCEDURE — 83036 HEMOGLOBIN GLYCOSYLATED A1C: CPT

## 2020-02-14 PROCEDURE — 80307 DRUG TEST PRSMV CHEM ANLYZR: CPT

## 2020-02-14 PROCEDURE — 97535 SELF CARE MNGMENT TRAINING: CPT | Performed by: OCCUPATIONAL THERAPIST

## 2020-02-14 PROCEDURE — 92523 SPEECH SOUND LANG COMPREHEN: CPT

## 2020-02-14 PROCEDURE — 85027 COMPLETE CBC AUTOMATED: CPT

## 2020-02-14 PROCEDURE — 81240 F2 GENE: CPT

## 2020-02-14 PROCEDURE — 2580000003 HC RX 258: Performed by: STUDENT IN AN ORGANIZED HEALTH CARE EDUCATION/TRAINING PROGRAM

## 2020-02-14 PROCEDURE — 93306 TTE W/DOPPLER COMPLETE: CPT

## 2020-02-14 PROCEDURE — 6370000000 HC RX 637 (ALT 250 FOR IP): Performed by: STUDENT IN AN ORGANIZED HEALTH CARE EDUCATION/TRAINING PROGRAM

## 2020-02-14 PROCEDURE — 97530 THERAPEUTIC ACTIVITIES: CPT

## 2020-02-14 PROCEDURE — 2000000003 HC NEURO ICU R&B

## 2020-02-14 PROCEDURE — 93010 ELECTROCARDIOGRAM REPORT: CPT | Performed by: INTERNAL MEDICINE

## 2020-02-14 RX ORDER — SODIUM CHLORIDE 0.9 % (FLUSH) 0.9 %
10 SYRINGE (ML) INJECTION EVERY 12 HOURS SCHEDULED
Status: DISCONTINUED | OUTPATIENT
Start: 2020-02-14 | End: 2020-02-14

## 2020-02-14 RX ORDER — ATORVASTATIN CALCIUM 80 MG/1
80 TABLET, FILM COATED ORAL NIGHTLY
Status: DISCONTINUED | OUTPATIENT
Start: 2020-02-14 | End: 2020-02-15

## 2020-02-14 RX ORDER — SODIUM CHLORIDE 0.9 % (FLUSH) 0.9 %
10 SYRINGE (ML) INJECTION PRN
Status: DISCONTINUED | OUTPATIENT
Start: 2020-02-14 | End: 2020-02-14

## 2020-02-14 RX ORDER — SODIUM CHLORIDE 0.9 % (FLUSH) 0.9 %
10 SYRINGE (ML) INJECTION EVERY 12 HOURS SCHEDULED
Status: DISCONTINUED | OUTPATIENT
Start: 2020-02-14 | End: 2020-02-19 | Stop reason: HOSPADM

## 2020-02-14 RX ORDER — ASPIRIN 81 MG/1
81 TABLET ORAL DAILY
Status: DISCONTINUED | OUTPATIENT
Start: 2020-02-14 | End: 2020-02-19 | Stop reason: HOSPADM

## 2020-02-14 RX ORDER — ACETAMINOPHEN 325 MG/1
650 TABLET ORAL EVERY 4 HOURS PRN
Status: DISCONTINUED | OUTPATIENT
Start: 2020-02-14 | End: 2020-02-19 | Stop reason: HOSPADM

## 2020-02-14 RX ORDER — POTASSIUM CHLORIDE 20 MEQ/1
20 TABLET, EXTENDED RELEASE ORAL ONCE
Status: COMPLETED | OUTPATIENT
Start: 2020-02-14 | End: 2020-02-14

## 2020-02-14 RX ORDER — ONDANSETRON 2 MG/ML
4 INJECTION INTRAMUSCULAR; INTRAVENOUS EVERY 6 HOURS PRN
Status: DISCONTINUED | OUTPATIENT
Start: 2020-02-14 | End: 2020-02-19 | Stop reason: HOSPADM

## 2020-02-14 RX ORDER — SODIUM CHLORIDE 9 MG/ML
INJECTION, SOLUTION INTRAVENOUS CONTINUOUS
Status: DISCONTINUED | OUTPATIENT
Start: 2020-02-14 | End: 2020-02-19 | Stop reason: HOSPADM

## 2020-02-14 RX ORDER — LABETALOL 20 MG/4 ML (5 MG/ML) INTRAVENOUS SYRINGE
10 EVERY 10 MIN PRN
Status: DISCONTINUED | OUTPATIENT
Start: 2020-02-14 | End: 2020-02-17

## 2020-02-14 RX ORDER — SODIUM CHLORIDE 0.9 % (FLUSH) 0.9 %
10 SYRINGE (ML) INJECTION PRN
Status: DISCONTINUED | OUTPATIENT
Start: 2020-02-14 | End: 2020-02-19 | Stop reason: HOSPADM

## 2020-02-14 RX ADMIN — SODIUM CHLORIDE, PRESERVATIVE FREE 10 ML: 5 INJECTION INTRAVENOUS at 10:31

## 2020-02-14 RX ADMIN — POTASSIUM CHLORIDE 20 MEQ: 1500 TABLET, EXTENDED RELEASE ORAL at 10:52

## 2020-02-14 RX ADMIN — ATORVASTATIN CALCIUM 80 MG: 80 TABLET, FILM COATED ORAL at 01:50

## 2020-02-14 RX ADMIN — TICAGRELOR 90 MG: 90 TABLET ORAL at 21:23

## 2020-02-14 RX ADMIN — ATORVASTATIN CALCIUM 80 MG: 80 TABLET, FILM COATED ORAL at 21:29

## 2020-02-14 RX ADMIN — Medication 81 MG: at 10:52

## 2020-02-14 RX ADMIN — SODIUM CHLORIDE, PRESERVATIVE FREE 10 ML: 5 INJECTION INTRAVENOUS at 21:23

## 2020-02-14 RX ADMIN — SODIUM CHLORIDE: 9 INJECTION, SOLUTION INTRAVENOUS at 10:30

## 2020-02-14 RX ADMIN — HEPARIN SODIUM AND DEXTROSE 18 UNITS/KG/HR: 10000; 5 INJECTION INTRAVENOUS at 16:22

## 2020-02-14 ASSESSMENT — PAIN DESCRIPTION - PAIN TYPE
TYPE: ACUTE PAIN

## 2020-02-14 ASSESSMENT — PAIN SCALES - GENERAL
PAINLEVEL_OUTOF10: 0
PAINLEVEL_OUTOF10: 0
PAINLEVEL_OUTOF10: 5
PAINLEVEL_OUTOF10: 5
PAINLEVEL_OUTOF10: 0
PAINLEVEL_OUTOF10: 5

## 2020-02-14 ASSESSMENT — PAIN DESCRIPTION - LOCATION
LOCATION: GENERALIZED
LOCATION: GENERALIZED

## 2020-02-14 ASSESSMENT — PAIN DESCRIPTION - ORIENTATION
ORIENTATION: LEFT

## 2020-02-14 NOTE — FLOWSHEET NOTE
Pt stopped  in hallway. Pt inquired about contacting dietary.  suggested contacting nurse.  assisted with pt's remote to call nurse. Pt was thankful for the assistance.

## 2020-02-14 NOTE — PROGRESS NOTES
Occupational Therapy   Occupational Therapy Initial Assessment  Date: 2020   Patient Name: Yolanda English  MRN: [id number]     : 1970    Date of Service: 2020    Discharge Recommendations:    Further therapy recommended at discharge. Assessment   Performance deficits / Impairments: Decreased functional mobility ; Decreased strength;Decreased safe awareness;Decreased ADL status; Decreased high-level IADLs;Decreased balance;Decreased fine motor control;Decreased coordination  Prognosis: Good  Decision Making: Medium Complexity  OT Education: OT Role;Transfer Training;Plan of Care;Home Exercise Program  REQUIRES OT FOLLOW UP: Yes  Activity Tolerance  Activity Tolerance: Patient Tolerated treatment well  Safety Devices  Safety Devices in place: Yes  Type of devices: Left in bed;Gait belt;Call light within reach; All fall risk precautions in place           Patient Diagnosis(es): The primary encounter diagnosis was Numbness. A diagnosis of Cardiac murmur was also pertinent to this visit. has a past medical history of Heart attack ([facility] [address] Hyperlipidemia, and Hypertension. has a past surgical history that includes knee surgery (Right); Foot surgery (Bilateral); Coronary angioplasty with stent (2019); and Cardiac surgery.          Restrictions  Restrictions/Precautions  Restrictions/Precautions: Fall Risk, Up as Tolerated, General Precautions  Required Braces or Orthoses?: No  Position Activity Restriction  Other position/activity restrictions: SBP goal 140-220    Subjective   General  Patient assessed for rehabilitation services?: Yes  Family / Caregiver Present: Yes(oldest son)    Social/Functional History  Social/Functional History  Lives With: Significant other, Son(son is 17)  Type of Home: House  Home Layout: Two level, Bed/Bath upstairs, Able to Live on Main level with bedroom/bathroom(bathroom on main level)  Home Access: Stairs to enter without rails  Entrance Stairs - Number of Steps:

## 2020-02-14 NOTE — PROGRESS NOTES
Smoking Cessation - topics covered   []  Health Risks  []  Benefits of Quitting   []  Smoking Cessation  [x]  Patient has no history of tobacco use per note in significant history. []  Patient is former smoker per note in significant history. Patient quit in   [x]  No need for tobacco cessation education. []  Booklet given  []  Patient verbalizes understanding. []  Patient denies need for tobacco cessation education. []  Unable to meet with patient today. Will follow up as able.   Anastasia Denny  9:38 AM

## 2020-02-14 NOTE — H&P
Neuro ICU History & Physical    Patient Name: Hermes Perry  Patient : 1970  Room/Bed: 9768/3689-91  Code Status: Full Code  Allergies: No Known Allergies    CHIEF COMPLAINT     L hemiparesis, dysarthria    HPI    History Obtained From: pt, efe    Hermes Perry is a 52 y.o. male with history of HTN and MI s/p cardiac stent x 1 2019 who presents with dysarthria and mild left hemiparesis. LKW 20 @ 0530AM. Patient states he awoke from sleep around 330 AM without symptoms, falling back to sleep around 0530 - he then reports he awoke around 0700 with dizziness and then shortly thereafter developed dysarthria and left sided weakness. Denies any prior stroke or stroke like symptoms. On exam patient is awake, alert, and oriented. He is tearful on exam. Mild dysarthria and left upper and lower drift noted. NIH 3. Out of tPA window.      Patient denies chest pain but reports intermittent dyspnea - mostly at rest.  On exam patient was found to have a undocumented heart murmur. CTA chest was obtained which showed tortuous aorta and no other abnormalities.     Patient states he is on Aspirin 81 mg and Brilinta 90 mg BID currently along with Lipitor 80 mg QHS and his home antihypertensives. Stents placed by Dr. Kenyetta Aguilar 2019.      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 MRI brain with acute right pontine infarct and concern for possible basilar thrombus. Low-dose heparin was started without bolus. At 2200, while patient was still in the ED nurses noted that patient had worsening left upper extremity weakness. At this point he had 1500 units of heparin. Heparin was stopped, on my exam he had 4+/5 flexion of left upper extremity, 5/5 to remaining motions of extremities. He was not complained of a headache or any other symptoms. Stat CT was obtained did not show any hemorrhagic conversion. Heparin infusion was restarted.       Admitted to ICU From: known allergies. Medications Prior to Admission:    Medications Prior to Admission: meloxicam (MOBIC) 7.5 MG tablet, Take 1 tablet by mouth 2 times daily as needed for Pain  nebivolol (BYSTOLIC) 5 MG tablet, Take 2 tablets by mouth daily for 7 days  tadalafil (CIALIS) 20 MG tablet, Take 1 tablet by mouth as needed for Erectile Dysfunction  lisinopril (PRINIVIL;ZESTRIL) 40 MG tablet, Take 1 tablet by mouth daily  carvedilol (COREG) 25 MG tablet, Take 1 tablet by mouth 2 times daily (with meals)  aspirin 81 MG EC tablet, Take 1 tablet by mouth daily  nitroGLYCERIN (NITROSTAT) 0.4 MG SL tablet, up to max of 3 total doses.  If no relief after 1 dose, call 911.  atorvastatin (LIPITOR) 80 MG tablet, Take 1 tablet by mouth nightly  amLODIPine (NORVASC) 10 MG tablet, Take 1 tablet by mouth daily  ticagrelor (BRILINTA) 90 MG TABS tablet, Take 1 tablet by mouth 2 times daily    Current Medications:  Current Facility-Administered Medications: aspirin EC tablet 81 mg, 81 mg, Oral, Daily  atorvastatin (LIPITOR) tablet 80 mg, 80 mg, Oral, Nightly  ticagrelor (BRILINTA) tablet 90 mg, 90 mg, Oral, BID  sodium chloride flush 0.9 % injection 10 mL, 10 mL, Intravenous, 2 times per day  sodium chloride flush 0.9 % injection 10 mL, 10 mL, Intravenous, PRN  magnesium hydroxide (MILK OF MAGNESIA) 400 MG/5ML suspension 30 mL, 30 mL, Oral, Daily PRN  ondansetron (ZOFRAN) injection 4 mg, 4 mg, Intravenous, Q6H PRN  labetalol (NORMODYNE;TRANDATE) injection syringe 10 mg, 10 mg, Intravenous, Q10 Min PRN  sodium chloride flush 0.9 % injection 10 mL, 10 mL, Intravenous, 2 times per day  sodium chloride flush 0.9 % injection 10 mL, 10 mL, Intravenous, PRN  acetaminophen (TYLENOL) tablet 650 mg, 650 mg, Oral, Q4H PRN  heparin 25,000 units in dextrose 5% 250 mL infusion, 1,000 Units/hr, Intravenous, Continuous  Facility-Administered Medications Ordered in Other Encounters: sodium chloride flush 0.9 % injection 10 mL, 10 mL, Intravenous, 2 times per day  sodium chloride flush 0.9 % injection 10 mL, 10 mL, Intravenous, PRN    REVIEW OF SYSTEMS     CONSTITUTIONAL: negative for fatigue and malaise   EYES: negative for double vision and photophobia    HEENT: negative for tinnitus and sore throat   RESPIRATORY: negative for cough, shortness of breath   CARDIOVASCULAR: negative for chest pain, palpitations   GASTROINTESTINAL: negative for nausea, vomiting   GENITOURINARY: negative for incontinence   MUSCULOSKELETAL: negative for neck or back pain   NEUROLOGICAL: negative for seizures; positive for dysarthria and left sided weakness   PSYCHIATRIC: negative for agitated     PHYSICAL EXAM:     BP (!) 150/95   Pulse 61   Temp 98.6 °F (37 °C) (Oral)   Resp 17   Ht 6' 1\" (1.854 m)   Wt 250 lb (113.4 kg)   SpO2 95%   BMI 32.98 kg/m²     PHYSICAL EXAM:  CONSTITUTIONAL:  Well developed, well nourished, alert and oriented x 3, in no acute distress. GCS 15. Nontoxic. Mild dysarthria. No aphasia.    HEAD:  normocephalic, atraumatic    EYES:  PERRLA, EOMI.   ENT:  moist mucous membranes   NECK:  supple, symmetric   LUNGS:  Equal air entry bilaterally   CARDIOVASCULAR:  Systolic heart murmur, RRR, distal pulses intact   ABDOMEN:  Soft, no rigidity   NEUROLOGIC:  Mental Status:  A & O x3,awake             Cranial Nerves:    II: Visual fields:  normal  III: Pupils:  equal, round, reactive to light  III,IV,VI: Extra Ocular Movements: intact  V: Facial sensation:  intact  VII: Facial strength: intact  XI: shoulder shrug equal b/l     Motor Exam:    Drift:  present - left upper slight, none to LE  Tone:  normal     4+/5 MS in LUE with flexion, normal extension  5/5 to remaining BUE and BLE     Sensory:    Touch:    Right Upper Extremity:  normal  Left Upper Extremity:  normal  Right Lower Extremity:  normal  Left Lower Extremity:  normal     Coordination/Dysmetria:  Heel to Shin:  Right:  normal  Left:  normal  Finger to Nose:   Right:  normal  Left:  abnormal - mild end point dysmetria        LABS AND IMAGING:     RECENT LABS:  CBC with Differential:    Lab Results   Component Value Date    WBC 8.8 02/13/2020    RBC 5.37 02/13/2020    HGB 15.5 02/13/2020    HCT 46.6 02/13/2020     02/13/2020    MCV 86.8 02/13/2020    MCH 28.9 02/13/2020    MCHC 33.3 02/13/2020    RDW 12.7 02/13/2020    LYMPHOPCT 31 02/13/2020    MONOPCT 10 02/13/2020    BASOPCT 1 02/13/2020    MONOSABS 0.86 02/13/2020    LYMPHSABS 2.76 02/13/2020    EOSABS 0.62 02/13/2020    BASOSABS 0.08 02/13/2020    DIFFTYPE NOT REPORTED 02/13/2020     BMP:    Lab Results   Component Value Date     02/13/2020    K 3.9 02/13/2020     02/13/2020    CO2 24 02/13/2020    BUN 11 02/13/2020    LABALBU 4.0 05/25/2019    CREATININE 1.19 02/13/2020    CALCIUM 9.3 02/13/2020    GFRAA >60 02/13/2020    LABGLOM >60 02/13/2020    GLUCOSE 93 02/13/2020       RADIOLOGY:  CT HEAD WO CONTRAST   Final Result   No evidence of hemorrhagic transformation of the right paramedian pontine   infarct. Moderate sinus disease. MRI BRAIN WO CONTRAST   Final Result   Acute right paramedian pontine stroke. 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 Remote left posterior cerebellar stroke. Mild non-specific white matter changes favor chronic small ischemic disease. Demyelinating process may also be considered in differential although thought   less likely. CT CHEST W CONTRAST   Final Result   1. No acute abnormalities seen in the chest   2. Tortuous ascending thoracic aorta accounts for the density seen on the   chest radiograph   3. Coronary artery calcifications         XR CHEST STANDARD (2 VW)   Final Result   Indeterminate prominence of the right hilar shadow could relate to   adenopathy, mass, adjacent consolidation or pulmonary artery enlargement. Correlation with IV contrast-enhanced CT chest recommended. Cardiomegaly.       RECOMMENDATION:   IV contrast-enhanced CT chest         CTA HEAD NECK W CONTRAST   Final Result   No significant stenosis or evidence for occlusion. 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 The superior cerebellar arteries   also appear to arise from the posterior cerebral arteries. CT HEAD WO CONTRAST   Final Result   No acute intracranial abnormality. Findings were discussed with Dr. Kayden Sheridan At 3:41 pm on 2020. Labs and Images reviewed with:    [] Mayo Choi MD    [x] Richie Arenas MD  [] Laura Guzman MD  --[] there are no new interval images to review. ASSESSMENT AND PLAN:         ASSESSMENT:     This is a 52 y.o. male with hypertension and stents placed for MI 2019 presented with left sided weakness, dysarthria. Last known well 5:30 AM.  MRI showed acute pontine stroke, occlusion of basilar artery, remote left posterior cerebellar stroke. CTA head and neck showed hypoplastic basilar artery, persistent fetal PC OM bilaterally. Patient care will be discussed with attending, will reevaluate patient along with attending. PLAN/MEDICAL DECISION MAKING:    NEUROLOGIC:  -MRI brain: Acute right paramedian pontine stroke, occlusion of basilar artery, remote left posterior cerebellar stroke, mild nonspecific white matter changes  -CTA head neck: Hypoplastic basilar artery, persistent fetal PC OM bilaterally  -Initial and repeat CT head negative.   -Goal -220  -DC Brilinta  -Continue aspirin  -Low dose Heparin w/o bolus, goal PTT 50-70    CARDIOVASCULAR:  BP Range: Systolic (26TLC), ZCO:291 , Min:140 , MARIANA:563     Diastolic (84AME), YAM:98, Min:60, Max:95    Pulse Range: Pulse  Av.1  Min: 50  Max: 68  - Goal -220  - Continue telemetry  - CTA Chest: tortuous aorta  - trop 25, repeat  - f/u Lipid panel  - f/u ECHO for new murmur    PULMONARY:  Respiration Range: Resp  Avg: 15.9  Min: 10  Max: 22  Current Pulse Ox: SpO2: 95 %  24HR Pulse Ox Range: SpO2  Av.1 %  Min: 95 %  Max: 100 %  On room air    RENAL/FLUID/ELECTROLYTE:  - BUN 11/ Creatinine 1.19  - I/O: No intake/output data recorded. - IVF: NS @75cc/hr    GI/NUTRITION:  NUTRITION:  Diet NPO Effective Now  - Bowel regimen:  MoM, Zofran  - GI prophylaxis: pepcid    ID:  Tmax: Temp (24hrs), Av.5 °F (36.9 °C), Min:98.4 °F (36.9 °C), Max:98.6 °F (37 °C)    Temperature Range: Temp: 98.6 °F (37 °C) Temp  Av.5 °F (36.9 °C)  Min: 98.4 °F (36.9 °C)  Max: 98.6 °F (37 °C)  - WBC   Lab Results   Component Value Date    WBC 8.8 2020     - Antimicrobials: not indicated     HEME:   Recent Labs     20  1516   HGB 15.5    stable    ENDOCRINE:  - Continue to monitor blood glucose, goal <180  - glucose controlled - most recent BGL is   Recent Labs     20  1516   GLUCOSE 93   - HgA1C    OTHER:  - PT/OT/ST   - Code Status: Full Code    PROPHYLAXIS:  Stress ulcer: H2 blocker    DVT PROPHYLAXIS:  - SCD sleeves - Thigh High   -low dose heparin for stroke      Brian Gallagher DO  Neuro Critical Care Service   Pager [phone]  2020     1:26 AM

## 2020-02-14 NOTE — PROGRESS NOTES
Physical Therapy    Facility/Department: [address]  Initial Assessment    NAME: Miguel Dior  : 1970  MRN: [id number]    Date of Service: 2020  Chief Complaint   Patient presents with    Aphasia    Numbness     left side since this morning     Discharge Recommendations:    Further therapy recommended at discharge. PT Equipment Recommendations  Vendor Name: ongoing assessment for AD need    Assessment   Assessment: Pt is grossly SBA For bed mobility. CGA for transfers with mild unsteadiness upon initial rising. Amb x30ft with RW modA d/t imbalance and impulsivity. Pt is frustrated about his current situation and very addiment about going home; poor safety awareness and regard for safety at this time placing him at increased risk for falls. In present state pt is unsafe to return home. Pt would benefit from acute PT services at this time to address deficits. Prognosis: Good  Decision Making: Medium Complexity  PT Education: PT Role;Plan of Care;General Safety;Gait Training;Equipment;Transfer Training  REQUIRES PT FOLLOW UP: Yes  Activity Tolerance  Activity Tolerance: Patient Tolerated treatment well;Patient limited by endurance; Patient limited by fatigue  Activity Tolerance: pt did note fatigue after amb and requested to get back in bed       Patient Diagnosis(es): The primary encounter diagnosis was Numbness. A diagnosis of Cardiac murmur was also pertinent to this visit. has a past medical history of Heart attack ([address] Hyperlipidemia, and Hypertension. has a past surgical history that includes knee surgery (Right); Foot surgery (Bilateral); Coronary angioplasty with stent (2019); and Cardiac surgery.     Restrictions  Restrictions/Precautions  Restrictions/Precautions: Fall Risk, Up as Tolerated, General Precautions  Required Braces or Orthoses?: No  Position Activity Restriction  Other position/activity restrictions: SBP goal 140-220  Vision/Hearing  Vision: Within Functional Limits  Hearing: Within functional limits     Subjective  General  Chart Reviewed: Yes  Patient assessed for rehabilitation services?: Yes  Response To Previous Treatment: Not applicable  Family / Caregiver Present: Yes(oldest son)  Follows Commands: Within Functional Limits  General Comment  Comments: co-eval with OT  Subjective  Subjective: RN reports pt is okay for therapy. Pt is laying in bed watching television with son present. Pain Screening  Patient Currently in Pain: Yes  Pain Assessment  Pain Assessment: 0-10  Pain Level: 5  Pain Type: Acute pain  Pain Location: Generalized  Pain Orientation: Left  Non-Pharmaceutical Pain Intervention(s): Ambulation/Increased Activity; Emotional support  Response to Pain Intervention: Patient Satisfied  Vital Signs  Patient Currently in Pain: Yes  Pre Treatment Pain Screening  Intervention List: Patient able to continue with treatment    Orientation  Orientation  Overall Orientation Status: Within Functional Limits  Social/Functional History  Social/Functional History  Lives With: Significant other, Son(son is 17)  Type of Home: House  Home Layout: Two level, Bed/Bath upstairs, Able to Live on Main level with bedroom/bathroom(bathroom on main level)  Home Access: Stairs to enter without rails  Entrance Stairs - Number of Steps: 1  Bathroom Shower/Tub: Walk-in shower, Tub/Shower unit(tub/shower on main floor, walk in shower in basement)  Bathroom Toilet: Standard  Bathroom Equipment: Grab bars in [address]: (pt reports no DME)  Receives Help From: Family, Friend(s)  ADL Assistance: Independent  Homemaking Assistance: Independent  Homemaking Responsibilities: Yes  Ambulation Assistance: Independent  Transfer Assistance: Independent  Active : Yes  Mode of Transportation: [place]  Occupation: Full time employment  Type of occupation: self employed  Leisure & Hobbies: watch basketball  Cognition   Cognition  Overall Cognitive Status: Exceptions  Safety Judgement: Decreased awareness of need for assistance;Decreased awareness of need for safety  Problem Solving: Decreased awareness of errors  Insights: Decreased awareness of deficits  Cognition Comment: pt's mood changes throughout session from flat to frustrated to defensive about his present condition    Objective   AROM RLE (degrees)  RLE AROM: WFL  AROM LLE (degrees)  LLE AROM : WFL  AROM RUE (degrees)  RUE AROM : WFL  AROM LUE (degrees)  LUE General AROM: shoulder to ~90 deg, elbow flex to ~20 deg  Strength RLE  Strength RLE: WFL  Strength LLE  Comment: grossly 4-/5  Strength RUE  Strength RUE: WFL  Strength LUE  Comment: at least 3+/5 based on observed functional mobility;  3/5  Tone RLE  RLE Tone: Normotonic  Tone LLE  LLE Tone: Normotonic  Motor Control  Gross Motor?: WFL  Sensation  Overall Sensation Status: Impaired(numbness/tingling in L UE/LE)  Bed mobility  Supine to Sit: Stand by assistance  Sit to Supine: Stand by assistance  Scooting: Stand by assistance  Comment: completed with HOB elevated ~45 deg and use of bedrails  Transfers  Sit to Stand: Contact guard assistance  Stand to sit: Contact guard assistance  Comment: pt is impulsive and stood up with the mention of walking with mild unsteadiness upon standing; completed x2 additional transfers with RW this date  Ambulation  Ambulation?: Yes  More Ambulation?: No  Ambulation 1  Surface: level tile  Device: Rolling Walker  Assistance:  Moderate assistance  Quality of Gait: pt is mild-moderately unsteady with amb and has increased lateral sway, uses small stepping strategies when needed to regain balance, short but equal step lengths, no evidence of LE buckling or giving way  Distance: 30ft  Comments: pt without warning starts squatting using RW in attempt to \"gain feeling and put weight through\" his L LE; request writer let go of gait belt when amb as to not \"hold him back\"  Stairs/Curb  Stairs?: No     Balance  Sitting - Static: Good  Sitting - Dynamic: Good  Standing - Static: Fair  Standing - Dynamic: Fair;-  Comments: standing balance assessed with RW  Exercises  Comments: mini squats x6     Plan   Plan  Times per week: 6-7x/wk  Current Treatment Recommendations: Functional Mobility Training, Safety Education & Training, Transfer Training, Endurance Training, Home Exercise Program, Balance Training, Equipment Evaluation, Education, & procurement, Stair training, ROM, Strengthening, Gait Training, Patient/Caregiver Education & Training  Safety Devices  Type of devices: Left in bed, Nurse notified, Gait belt, Patient at risk for falls, Call light within reach  Restraints  Initially in place: No    AM-PAC Score  AM-PAC Inpatient Mobility Raw Score : 20 (02/14/20 1135)  AM-PAC Inpatient T-Scale Score : 47.67 (02/14/20 1135)  Mobility Inpatient CMS 0-100% Score: 35.83 (02/14/20 1135)  Mobility Inpatient CMS G-Code Modifier : Ian Sánchez (02/14/20 1135)          Goals  Short term goals  Time Frame for Short term goals: 14 visits  Short term goal 1: Complete bed mobiltiy independently from flat position without bedrails for return to prior level  Short term goal 2: Transfer MOD I with RW or least restrictive device for greater independence  Short term goal 3: Amb x250ft MOD I with RW or least restrictive device for ease of home navigation  Short term goal 4: Complete x3 stairs SBA without handrails for safe home entry/exit  Short term goal 5: Improve stnding static and dynamic balance to good to reduce risk of falls and injury       Therapy Time   Individual Concurrent Group Co-treatment   Time In 0916         Time Out 0940         Minutes 24         Timed Code Treatment Minutes: 10 Minutes       Angelic Shore    This treatment/evaluation completed by signing SPT. Signing PT agrees with treatment and documentation.

## 2020-02-14 NOTE — PROGRESS NOTES
Speech Language Pathology  Facility/Department: [facility]  Initial Speech/Language/Cognitive Assessment    NAME: Carmen Beck  : 1970   MRN: [id number]  ADMISSION DATE: 2020  ADMITTING DIAGNOSIS: has STEMI (ST elevation myocardial infarction) ([facility] [address] Coronary artery disease involving native coronary artery of native heart without angina pectoris; Acute ST segment elevation myocardial infarction [facility]); Essential hypertension; Class 1 obesity due to excess calories with body mass index (BMI) of 32.0 to 32.9 in adult; Stroke-like symptoms; Ischemic stroke [facility]); and Hypertensive emergency without congestive heart failure on their problem list.    Date of Eval: 2020   Evaluating Therapist: Elio Guzman,  Clinician       Primary Complaint:   Celestino Sue a 52 y. o. male with history of HTN and MI s/p cardiac stent x 1 2019 who presents with dysarthria and mild left hemiparesis. LKW 20 @ 0530AM. Patient states he awoke from sleep around 330 AM without symptoms, falling back to sleep around 0530 - he then reports he awoke around 0700 with dizziness and then shortly thereafter developed dysarthria and left sided weakness. Denies any prior stroke or stroke like symptoms. On exam patient is awake, alert, and oriented. He is tearful on exam. Mild dysarthria and left upper and lower drift noted. NIH 3. Out of tPA window. Pain:  Pain Assessment  Pain Assessment: 0-10  Pain Level: 0/10    Assessment:  Pt presents with mild cognitive deficits characterized by impaired immediate recall and deductive reasoning. Pt. Presents with mild dysarthria characterized by slurred speech that impacts intelligibility, no O/M deficits at this time. ST to follow up and provide treatment to address noted deficits. Education provided.      Recommendations:  Requires SLP Intervention: Yes  Duration/Frequency of Treatment: 3-5x per week  D/C Recommendations: Further therapy recommended at discharge. Plan:   Goals:  Short-term Goals  Goal 1: Pt will recall 3-5 units of information without distractions with 90% accuracy  Goal 2: Pt will utilize memory compensatory strategies to aid in recall  Goal 3: Pt will complete deductuve reasoning tasks with 90% accuracy  Goal 4: Pt will utilize speech compensatory strategies to improve intelligibility   Patient/family involved in developing goals and treatment plan: yes    Subjective:  General  Chart Reviewed: Yes  Family / Caregiver Present: Yes  Social/Functional History  Lives With: Significant other; Son  Vision  Vision: Within Functional Limits  Hearing  Hearing: Within functional limits           Objective:     Oral/Motor  Oral Motor: Within functional limits    Auditory Comprehension  Comprehension: Within Functional Limits         Expression  Primary Mode of Expression: Verbal    Verbal Expression  Verbal Expression: Within functional limits         Motor Speech  Motor Speech: Exceptions to [employer] [place]  Intelligibility: Mild  Dysarthria : Mild         Cognition:      Orientation  Overall Orientation Status: Within Normal Limits  Attention  Attention: Within Functional Limits  Memory  Memory: Exceptions to [facility] [place]  Immediate Memory: Mild(3/3, 4/5, 3/3)  Problem Solving  Verbal Reasoning Skills: Mild(Deductive Reasonin/5)  Word Associations: Within Functional Limits  Verbal Sequencing: Within Functional Limits  Word Generation: Within Functional Limits    Prognosis:  Speech Therapy Prognosis  Prognosis: Fair  Individuals consulted  Consulted and agree with results and recommendations: Patient    Education:  Patient Education: yes  Patient Education Response: Verbalizes understanding          Therapy Time:   Individual Concurrent Group Co-treatment   Time In 85 99 60         Time Out 4761         Minutes 11               Completed by: Lorena Aguilar,  Clinician    Cosigned By: Jojo Worley S.CCC/SLP    2020 8:54 AM

## 2020-02-14 NOTE — ED NOTES
Pt and wife provided with boxed lunch x2 and meal tray ordered.      Domenic Barillas RN  02/13/20 [id number]

## 2020-02-14 NOTE — ED NOTES
RN reconnected heparin per neuro resident. Pt able to completely lift and hold left arm up for RN to connect heparin. Family at bedside.       Loi So RN  02/13/20 0010

## 2020-02-14 NOTE — CONSULTS
Endovascular Neurosurgery Note  Stroke Alert paged @ [phone]  ER Room # 29  Arrival to patient bedside @ [phone]  2/13/2020 8:06 PM    Pt Name: Toñito Linares  MRN: [id number]  [place]: 1970  Date of evaluation: 2/13/2020  Primary Care Physician: Wendy Wong PA-C    Toñito Linares is a 52 y.o. male with history of HTN and MI s/p cardiac stent x 1 05/2019 who presents with dysarthria and mild left hemiparesis. LKW 2/13/20 @ 0530AM. Patient states he awoke from sleep around 330 AM without symptoms, falling back to sleep around 0530 - he then reports he awoke around 0700 with dizziness and then shortly thereafter developed dysarthria and left sided weakness. Denies any prior stroke or stroke like symptoms. On exam patient is awake, alert, and oriented. He is tearful on exam. Mild dysarthria and left upper and lower drift noted. NIH 3. Out of tPA window. Patient denies chest pain but reports intermittent dyspnea - mostly at rest.     Patient states he is on Aspirin 81 mg and Brilinta 90 mg BID currently along with Lipitor 80 mg QHS and his home antihypertensives. 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 MRI brain with acute right pontine infarct and concern for possible basilar thrombus. Patient to be admitted to the neuro ICU for close neurological and hemodynamic monitoring. Will start low dose - no bolus heparin. Allergies  has No Known Allergies. Medications  Prior to Admission medications    Medication Sig Start Date End Date Taking?  Authorizing Provider   meloxicam (MOBIC) 7.5 MG tablet Take 1 tablet by mouth 2 times daily as needed for Pain 2/10/20 2/17/20  Wendy Wong PA-C   nebivolol (BYSTOLIC) 5 MG tablet Take 2 tablets by mouth daily for 7 days 2/10/20 2/17/20  Wendy Wong PA-C   tadalafil (CIALIS) 20 MG tablet Take 1 tablet by mouth as needed for Erectile Dysfunction 2/10/20   Wendy Wong PA-C   lisinopril Motor left le - drift; leg falls by the end of the 5 second period but does not hit bed  6b. Motor right le - no drift; leg holds 30 degree position for full 5 seconds  7. Limb Ataxia:  1 - present in one limb  8. Sensory:  0 - normal; no sensory loss  9. Best Language:  0 - no aphasia, normal  10. Dysarthria:  1 - mild to moderate, patient slurs at least some words and at worst, can be understood with some difficulty  11. Extinction and Inattention:  0 - no abnormality    TOTAL:  4    Imaging:  CT brain without contrast: no acute abnormality  CTA head/neck with and without contrast: hypoplastic basilar with bilateral fetal PCA  MRI brain without contrast (limited stroke protocol): acute right pontine infarct    Assessment  1. Last Known Well (date and time): 20 @ 0530  2. Inclusion and Exclusion criteria reviewed: Candidate for IV tPA therapy     Yes []     No  [x] due to the following exclusion criteria: out of window  3. Candidate for Thrombectomy    Yes []      No [x] due to the following exclusion criteria: Concern for possible basilar thrombus, in setting of low NIH - will hold off on thrombectomy or diagnostic angiogram at this time.      Recommendations:   [x] ICU Status - prefer Neuro ICU (5B)  Please use the following admission order set for stroke admission:   [] [id number] - JOE Intercerebral Hemorrhage Admission   [] [id number] - JOE Sub Arachnoid Hemorrhage Admission   [] [id number] - JOE Ischemic Stroke TPA Treatment Focused   [] [id number] - IP Ischemic Stroke ICU Post Alteplase (TPA) Admission    [x] [id number] - GEN Ischemic Stroke Non-Thrombolytic Focussed    MRI head without contrast - limited stroke evaluation  Monitor neurological status closely, q1h neuro checks  0.9% NS infusion at 125 ml/hour  Recommend -220  Start low intensity heparin infusion without bolus, PTT goal 50-70  ASA 81 mg daily   Lipitor 80mg nightly  2D cardiac echo  Physical Therapy  Occupational Therapy  Speech Therapy with swallow evaluation  Fasting Lipid panel  Hgb A1c  Ok for primary team to start anticoagulation as appropriate for DVT prophylaxis  NIHSS assessment every shift / change in caregiver.      Discussed with Dr. Cleopatra Berumen and Dr. Radha Deleon APRN - CNP   Neuro Critical Care  Pager [address] [place] [address]

## 2020-02-14 NOTE — ED NOTES
Pt now experiencing left sided arm flaccid and left leg weakness. Heparin paused. Dr. Shannan Winters notified.       Guanakito Molina RN  02/13/20 [id number]

## 2020-02-14 NOTE — PLAN OF CARE
Pt assessed as a fall risk this shift. Remains free from falls and accidental injury at this time. Fall precautions in place, including falling star sign and fall risk band on pt. Floor free from obstacles, and bed is locked and in lowest position. Adequate lighting provided. Pt encouraged to call before getting Out Of Bed for any need. Bed alarm activated.  Will continue to monitor needs during hourly rounding, and reinforce education on use of call light

## 2020-02-14 NOTE — ED NOTES
15ml total of heparin administered since starting heparin. Neuro at bedside.  Pt to CT      Matti Avila RN  02/13/20 [id number]

## 2020-02-14 NOTE — PROGRESS NOTES
Daily Progress Note  Neuro Critical Care    Patient Name: Tierra Collado  Patient : 1970  Room/Bed: 7360/6224-56  Allergies: No Known Allergies  Problem List:   Patient Active Problem List   Diagnosis    STEMI (ST elevation myocardial infarction) ([facility]    Coronary artery disease involving native coronary artery of native heart without angina pectoris    Acute ST segment elevation [facility] infarction ([facility]    Essential hypertension    Class 1 obesity due to excess calories with body mass index (BMI) of 32.0 to 32.9 in adult    Stroke-like symptoms    Ischemic stroke ([facility]    Hypertensive emergency without congestive heart failure       CHIEF COMPLAINT:     Dizziness, dysarthria, left-sided weakness and numbness     INTERVAL HISTORY    History Obtained From: pt, efe     Elisha Ahn a 52 y. o. male with history of HTN and MI s/p cardiac stent x 1 2019 who presents with dysarthria and mild left hemiparesis. LKW 20 @ 0530AM. Patient states he awoke from sleep around 330 AM without symptoms, falling back to sleep around 0530 - he then reports he awoke around 0700 with dizziness and then shortly thereafter developed dysarthria and left sided weakness. Denies any prior stroke or stroke like symptoms. On exam patient is awake, alert, and oriented. He is tearful on exam. Mild dysarthria and left upper and lower drift noted. NIH 3. Out of tPA window.      Patient denies chest pain but reports intermittent dyspnea - mostly at rest.  On exam patient was found to have a undocumented heart murmur. CTA chest was obtained which showed tortuous aorta and no other abnormalities.     Patient states he is on Aspirin 81 mg and Brilinta 90 mg BID currently along with Lipitor 80 mg QHS and his home antihypertensives. Stents placed by Dr. Ambreen Sarabia 2019.      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  MRI brain with acute right pontine infarct and concern for possible basilar thrombus.      Low-dose heparin was started without bolus. At 2200, while patient was still in the ED nurses noted that patient had worsening left upper extremity weakness. At this point he had 1500 units of heparin. Heparin was stopped, on my exam he had 4+/5 flexion of left upper extremity, 5/5 to remaining motions of extremities. He was not complained of a headache or any other symptoms. Stat CT was obtained did not show any hemorrhagic conversion. Heparin infusion was restarted.     Last 24h: The patient was seen and examined at bedside, he is vitally stable alert oriented x4. No acute events overnight. The patient is on heparin drip for 48 hours. MRI of the brain showed paramedian pontine infarction. Pending hypercoagulable test.  Pending echo and hemoglobin A1c. Pending urine drug screen.     CURRENT MEDICATIONS:  SCHEDULED MEDICATIONS:   aspirin  81 mg Oral Daily    atorvastatin  80 mg Oral Nightly    [Held by provider] ticagrelor  90 mg Oral BID    sodium chloride flush  10 mL Intravenous 2 times per day    sodium chloride flush  10 mL Intravenous 2 times per day    potassium chloride  20 mEq Oral Once     CONTINUOUS INFUSIONS:   sodium chloride      heparin (porcine) 1,400 Units/hr (20 0449)     PRN MEDICATIONS:   sodium chloride flush, magnesium hydroxide, ondansetron, labetalol, sodium chloride flush, acetaminophen    VITALS:  Temperature Range: Temp: 98.5 °F (36.9 °C) Temp  Av.5 °F (36.9 °C)  Min: 98.4 °F (36.9 °C)  Max: 98.6 °F (37 °C)  BP Range: Systolic (85JYV), OPI:124 , Min:138 , DANISHA:361     Diastolic (38WYC), PRX:24, Min:56, Max:125    Pulse Range: Pulse  Av.7  Min: 50  Max: 68  Respiration Range: Resp  Av.1  Min: 10  Max: 22  Current Pulse Ox: SpO2: 97 %  24HR Pulse Ox Range: SpO2  Av.6 %  Min: 95 %  Max: 100 %  Patient Vitals for the past 12 hrs:   BP Temp Temp src Pulse Resp SpO2   20 0704 (!) 161/56 -- -- 56 -- 97 % Exam: Unknown FINDINGS: The cardiac silhouette is enlarged. The cardiothoracic ratio is 16.8/31.1 cm. Indeterminate prominence of the right hilar silhouette. The mediastinal and left hilar silhouettes appear unremarkable. The lungs appear clear. No pleural effusion. No pneumothorax is seen. No acute osseous abnormality is identified. Indeterminate prominence of the right hilar shadow could relate to adenopathy, mass, adjacent consolidation or pulmonary artery enlargement. Correlation with IV contrast-enhanced CT chest recommended. Cardiomegaly. RECOMMENDATION: IV contrast-enhanced CT chest     Ct Head Wo Contrast    Result Date: 2/13/2020  EXAMINATION: CT OF THE HEAD WITHOUT CONTRAST  2/13/2020 10:03 pm TECHNIQUE: CT of the head was performed without the administration of intravenous contrast. Dose modulation, iterative reconstruction, and/or weight based adjustment of the mA/kV was utilized to reduce the radiation dose to as low as reasonably achievable. COMPARISON: CT and MRI 02/13/2020 HISTORY: ORDERING SYSTEM PROVIDED HISTORY: worsening left weakness, was on heparin TECHNOLOGIST PROVIDED HISTORY: worsening left weakness, was on heparin FINDINGS: BRAIN/VENTRICLES: There is no acute intracranial hemorrhage, mass effect or midline shift. No abnormal extra-axial fluid collection. Right paramedian pontine infarct not well visualized on this exam.  There is no evidence of hydrocephalus. Intracranial vascular calcifications. ORBITS: The visualized portion of the orbits demonstrate no acute abnormality. SINUSES: Moderate right sphenoid sinus air-fluid level. Moderate ethmoid sinus mucosal thickening. Mild-to-moderate mucosal thickening involving maxillary sinuses. Mastoids are clear SOFT TISSUES/SKULL:  No acute abnormality of the visualized skull or soft tissues. No evidence of hemorrhagic transformation of the right paramedian pontine infarct. Moderate sinus disease.      Ct Head Wo Contrast    Result Date: 2/13/2020  EXAMINATION: CT OF THE HEAD WITHOUT CONTRAST  2/13/2020 3:26 pm TECHNIQUE: CT of the head was performed without the administration of intravenous contrast. Dose modulation, iterative reconstruction, and/or weight based adjustment of the mA/kV was utilized to reduce the radiation dose to as low as reasonably achievable. COMPARISON: None. HISTORY: ORDERING SYSTEM PROVIDED HISTORY: left sided numbness, weakness, slurred speech TECHNOLOGIST PROVIDED HISTORY: left sided numbness, weakness, slurred speech FINDINGS: BRAIN/VENTRICLES: There is no acute intracranial hemorrhage, mass effect, or midline shift. There is satisfactory overall gray-white matter differentiation. The ventricular structures are symmetric and unremarkable. The infratentorial structures are unremarkable. ORBITS: The visualized portion of the orbits demonstrate no acute abnormality. SINUSES: There is chronic sinusitis. The mastoid air cells are normally aerated. SOFT TISSUES/SKULL:  No acute abnormality of the visualized skull or soft tissues. No acute intracranial abnormality. Findings were discussed with Dr. Yovany Pickens At 3:41 pm on 2/13/2020. Ct Chest W Contrast    Result Date: 2/13/2020  EXAMINATION: CT OF THE CHEST WITH CONTRAST 2/13/2020 4:50 pm TECHNIQUE: CT of the chest was performed with the administration of intravenous contrast. Multiplanar reformatted images are provided for review. Dose modulation, iterative reconstruction, and/or weight based adjustment of the mA/kV was utilized to reduce the radiation dose to as low as reasonably achievable. COMPARISON: Chest radiograph done today HISTORY: ORDERING SYSTEM PROVIDED HISTORY: right hilar shadow on CXR TECHNOLOGIST PROVIDED HISTORY: right hilar shadow on CXR Reason for Exam: right hilar shadow on CXR FINDINGS: Mediastinum: No mediastinal or hilar masses. Coronary artery calcifications. No pericardial effusion.   The aorta is mildly tortuous accounting for the density seen on chest x-ray. Lungs/pleura: No acute airspace disease. No pleural or pericardial effusion Upper Abdomen: Unremarkable. Soft Tissues/Bones: No acute fracture. No lytic or blastic lesion. Degenerative changes seen in the spine. 1. No acute abnormalities seen in the chest 2. Tortuous ascending thoracic aorta accounts for the density seen on the chest radiograph 3. Coronary artery calcifications     Cta Head Neck W Contrast    Result Date: 2/13/2020  EXAMINATION: CTA OF THE HEAD AND NECK WITH CONTRAST 2/13/2020 3:27 pm: TECHNIQUE: CTA of the head and neck was performed with the administration of intravenous contrast. Multiplanar reformatted images are provided for review. MIP images are provided for review. Stenosis of the internal carotid arteries measured using NASCET criteria. Dose modulation, iterative reconstruction, and/or weight based adjustment of the mA/kV was utilized to reduce the radiation dose to as low as reasonably achievable. COMPARISON: None. HISTORY: ORDERING SYSTEM PROVIDED HISTORY: left sided deficits TECHNOLOGIST PROVIDED HISTORY: left sided deficits Reason for Exam: lt sided numbness weakness slurred speech FINDINGS: CTA NECK: AORTIC ARCH/ARCH VESSELS: No dissection or arterial injury. No significant stenosis of the brachiocephalic or subclavian arteries. CAROTID ARTERIES: No dissection, arterial injury, or hemodynamically significant stenosis by NASCET criteria. VERTEBRAL ARTERIES: No dissection, arterial injury, or significant stenosis. SOFT TISSUES: The lung apices are clear. No cervical or superior mediastinal lymphadenopathy. The larynx and pharynx are unremarkable. No acute abnormality of the salivary and thyroid glands. BONES: No acute osseous abnormality. CTA HEAD: ANTERIOR CIRCULATION: No significant stenosis of the intracranial internal carotid, anterior cerebral, or middle cerebral arteries. No aneurysm. 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 There is persistent fetal origin of the posterior cerebral arteries bilaterally. The superior cerebellar arteries appear to arise from the posterior cerebral arteries. OTHER: No dural venous sinus thrombosis on this non-dedicated study. BRAIN: No mass effect or midline shift. No extra-axial fluid collection. The gray-white differentiation is maintained. No significant stenosis or evidence for occlusion. 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 The superior cerebellar arteries also appear to arise from the posterior cerebral arteries. Mri Brain Wo Contrast    Result Date: 2/13/2020  EXAMINATION: MRI OF THE BRAIN WITHOUT CONTRAST  2/13/2020 6:20 pm TECHNIQUE: Multiplanar multisequence MRI of the brain was performed without the administration of intravenous contrast. COMPARISON: CT head neck 02/13/2020 and CT head 02/13/2020 HISTORY: ORDERING SYSTEM PROVIDED HISTORY: left sided weakness TECHNOLOGIST PROVIDED HISTORY: left sided weakness FINDINGS: INTRACRANIAL STRUCTURES/VENTRICLES: There is a right paramedian pontine acute stroke. No obvious T2 prolongation. No mass effect or midline shift. No evidence of an acute intracranial hemorrhage. The ventricles and sulci are normal in size and configuration. Mild non-specific periventricular and subcortical T2 prolongation identified may be sequelae of chronic small ischemic disease versus demyelinating process. Wedge-shaped focus of T2 prolongation identified left posterior cerebellum without corresponding restricted diffusion suggestive of prior stroke. The sellar/suprasellar regions appear unremarkable. There is normal flow of the normal flow void involving the basilar artery. The Remainder of the normal signal voids within the major intracranial vessels appear maintained.   Foci of hemosiderin left posterior cerebellum noted of uncertain clinical significance ORBITS: The visualized perverted reflex, no Babinski sign   STATION and GAIT  Not tested          DRAINS:  [] There are no drains for Neuro Critical Care to monitor at this time. ASSESSMENT AND PLAN:       68-year-old male patient with past medical history of hypertension, MI status post stent in May 2019 on aspirin implant, presented to the hospital with left-sided weakness and dysarthria. NEUROLOGIC:  - Imaging CT head was negative for acute changes. 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 MRI of the brain showed right pontine infarction  - Goal -220  -Continue heparin drip for 48 hours  -On aspirin and Brilinta 90 mg twice daily  - Neuro checks per protocol    CARDIOVASCULAR:  - Goal -220  -Pending echo  -Pending lipid panel  - Continue telemetry    PULMONARY:  -Maintain oxygen saturation at room air    RENAL/FLUID/ELECTROLYTE:  -Improving RASHMI with creatinine around 1.3  -Monitor urine output   - IVF: 75 cc/h  - Replace electrolytes PRN  - Daily BMP    GI/NUTRITION:  NUTRITION:    - Bowel regimen: Milk of mag  - GI prophylaxis: None    ID:  -Afebrile  - Continue to monitor for fevers  - Daily CBC    HEME:   - H&H 14  - Platelets 685  - Daily CBC    ENDOCRINE:  - Continue to monitor blood glucose, goal <180  -Pending hemoglobin A1c    OTHER:  - PT/OT/ST   - Code Status: full     PROPHYLAXIS:  Stress ulcer: None    DVT PROPHYLAXIS:  - SCD sleeves - Thigh High   - HIEU stockings - Thigh High  -On heparin drip    DISPOSITION:  [x] To remain ICU:   [] OK for out of ICU from Neuro Critical Care standpoint    We will continue to follow along. For any changes in exam or patient status please contact Neuro Critical Care.       Wilmer Mckeon MD  Neuro Critical Care  2/14/2020     8:22 AM

## 2020-02-15 ENCOUNTER — APPOINTMENT (OUTPATIENT)
Dept: CT IMAGING | Age: 50
DRG: 045 | End: 2020-02-15
Payer: MEDICARE

## 2020-02-15 LAB
ABSOLUTE EOS #: 0.88 K/UL (ref 0–0.44)
ABSOLUTE IMMATURE GRANULOCYTE: 0.03 K/UL (ref 0–0.3)
ABSOLUTE LYMPH #: 2.09 K/UL (ref 1.1–3.7)
ABSOLUTE MONO #: 0.88 K/UL (ref 0.1–1.2)
ANION GAP SERPL CALCULATED.3IONS-SCNC: 13 MMOL/L (ref 9–17)
BASOPHILS # BLD: 1 % (ref 0–2)
BASOPHILS ABSOLUTE: 0.07 K/UL (ref 0–0.2)
BUN BLDV-MCNC: 14 MG/DL (ref 6–20)
BUN/CREAT BLD: ABNORMAL (ref 9–20)
CALCIUM SERPL-MCNC: 9 MG/DL (ref 8.6–10.4)
CHLORIDE BLD-SCNC: 109 MMOL/L (ref 98–107)
CO2: 22 MMOL/L (ref 20–31)
CREAT SERPL-MCNC: 1.06 MG/DL (ref 0.7–1.2)
DIFFERENTIAL TYPE: ABNORMAL
EOSINOPHILS RELATIVE PERCENT: 10 % (ref 1–4)
GFR AFRICAN AMERICAN: >60 ML/MIN
GFR NON-AFRICAN AMERICAN: >60 ML/MIN
GFR SERPL CREATININE-BSD FRML MDRD: ABNORMAL ML/MIN/{1.73_M2}
GFR SERPL CREATININE-BSD FRML MDRD: ABNORMAL ML/MIN/{1.73_M2}
GLUCOSE BLD-MCNC: 93 MG/DL (ref 70–99)
HCT VFR BLD CALC: 44.6 % (ref 40.7–50.3)
HEMOGLOBIN: 14.5 G/DL (ref 13–17)
IMMATURE GRANULOCYTES: 0 %
LYMPHOCYTES # BLD: 25 % (ref 24–43)
MCH RBC QN AUTO: 28.5 PG (ref 25.2–33.5)
MCHC RBC AUTO-ENTMCNC: 32.5 G/DL (ref 28.4–34.8)
MCV RBC AUTO: 87.6 FL (ref 82.6–102.9)
MONOCYTES # BLD: 10 % (ref 3–12)
NRBC AUTOMATED: 0 PER 100 WBC
PARTIAL THROMBOPLASTIN TIME: 49.3 SEC (ref 20.5–30.5)
PARTIAL THROMBOPLASTIN TIME: 53.9 SEC (ref 20.5–30.5)
PARTIAL THROMBOPLASTIN TIME: 57.5 SEC (ref 20.5–30.5)
PARTIAL THROMBOPLASTIN TIME: 93.7 SEC (ref 20.5–30.5)
PDW BLD-RTO: 12.8 % (ref 11.8–14.4)
PLATELET # BLD: 224 K/UL (ref 138–453)
PLATELET ESTIMATE: ABNORMAL
PMV BLD AUTO: 11.6 FL (ref 8.1–13.5)
POTASSIUM SERPL-SCNC: 3.6 MMOL/L (ref 3.7–5.3)
RBC # BLD: 5.09 M/UL (ref 4.21–5.77)
RBC # BLD: ABNORMAL 10*6/UL
SEG NEUTROPHILS: 53 % (ref 36–65)
SEGMENTED NEUTROPHILS ABSOLUTE COUNT: 4.52 K/UL (ref 1.5–8.1)
SODIUM BLD-SCNC: 144 MMOL/L (ref 135–144)
WBC # BLD: 8.5 K/UL (ref 3.5–11.3)
WBC # BLD: ABNORMAL 10*3/UL

## 2020-02-15 PROCEDURE — 85025 COMPLETE CBC W/AUTO DIFF WBC: CPT

## 2020-02-15 PROCEDURE — 97116 GAIT TRAINING THERAPY: CPT

## 2020-02-15 PROCEDURE — 6370000000 HC RX 637 (ALT 250 FOR IP): Performed by: NURSE PRACTITIONER

## 2020-02-15 PROCEDURE — 70498 CT ANGIOGRAPHY NECK: CPT

## 2020-02-15 PROCEDURE — 99233 SBSQ HOSP IP/OBS HIGH 50: CPT | Performed by: PSYCHIATRY & NEUROLOGY

## 2020-02-15 PROCEDURE — 2000000003 HC NEURO ICU R&B

## 2020-02-15 PROCEDURE — 36415 COLL VENOUS BLD VENIPUNCTURE: CPT

## 2020-02-15 PROCEDURE — 6370000000 HC RX 637 (ALT 250 FOR IP): Performed by: STUDENT IN AN ORGANIZED HEALTH CARE EDUCATION/TRAINING PROGRAM

## 2020-02-15 PROCEDURE — APPNB45 APP NON BILLABLE 31-45 MINUTES: Performed by: NURSE PRACTITIONER

## 2020-02-15 PROCEDURE — 80048 BASIC METABOLIC PNL TOTAL CA: CPT

## 2020-02-15 PROCEDURE — 85730 THROMBOPLASTIN TIME PARTIAL: CPT

## 2020-02-15 PROCEDURE — 6370000000 HC RX 637 (ALT 250 FOR IP): Performed by: FAMILY MEDICINE

## 2020-02-15 PROCEDURE — 6360000004 HC RX CONTRAST MEDICATION: Performed by: STUDENT IN AN ORGANIZED HEALTH CARE EDUCATION/TRAINING PROGRAM

## 2020-02-15 PROCEDURE — 97110 THERAPEUTIC EXERCISES: CPT

## 2020-02-15 PROCEDURE — 2580000003 HC RX 258: Performed by: STUDENT IN AN ORGANIZED HEALTH CARE EDUCATION/TRAINING PROGRAM

## 2020-02-15 PROCEDURE — 97530 THERAPEUTIC ACTIVITIES: CPT

## 2020-02-15 RX ORDER — POTASSIUM CHLORIDE 20 MEQ/1
40 TABLET, EXTENDED RELEASE ORAL ONCE
Status: COMPLETED | OUTPATIENT
Start: 2020-02-15 | End: 2020-02-15

## 2020-02-15 RX ORDER — ROSUVASTATIN CALCIUM 20 MG/1
40 TABLET, COATED ORAL NIGHTLY
Status: DISCONTINUED | OUTPATIENT
Start: 2020-02-15 | End: 2020-02-19 | Stop reason: HOSPADM

## 2020-02-15 RX ORDER — SENNA AND DOCUSATE SODIUM 50; 8.6 MG/1; MG/1
2 TABLET, FILM COATED ORAL DAILY
Status: DISCONTINUED | OUTPATIENT
Start: 2020-02-15 | End: 2020-02-19 | Stop reason: HOSPADM

## 2020-02-15 RX ORDER — FOLIC ACID 1 MG/1
1 TABLET ORAL 2 TIMES DAILY
Status: DISCONTINUED | OUTPATIENT
Start: 2020-02-15 | End: 2020-02-19 | Stop reason: HOSPADM

## 2020-02-15 RX ADMIN — SODIUM CHLORIDE, PRESERVATIVE FREE 10 ML: 5 INJECTION INTRAVENOUS at 20:42

## 2020-02-15 RX ADMIN — TICAGRELOR 90 MG: 90 TABLET ORAL at 08:48

## 2020-02-15 RX ADMIN — IOVERSOL 90 ML: 741 INJECTION INTRA-ARTERIAL; INTRAVENOUS at 20:29

## 2020-02-15 RX ADMIN — POTASSIUM CHLORIDE 40 MEQ: 1500 TABLET, EXTENDED RELEASE ORAL at 16:16

## 2020-02-15 RX ADMIN — FOLIC ACID 1 MG: 1 TABLET ORAL at 20:42

## 2020-02-15 RX ADMIN — Medication 81 MG: at 08:49

## 2020-02-15 RX ADMIN — TICAGRELOR 90 MG: 90 TABLET ORAL at 20:42

## 2020-02-15 RX ADMIN — ROSUVASTATIN CALCIUM 40 MG: 20 TABLET, FILM COATED ORAL at 20:42

## 2020-02-15 ASSESSMENT — PAIN SCALES - GENERAL: PAINLEVEL_OUTOF10: 0

## 2020-02-15 NOTE — CONSULTS
Endovascular Neurosurgery Consult    Pt Name: Alejo Dakin  MRN: [id number]  [place]: 1970  Date of evaluation: 2/15/2020  Primary Care Physician: Jose Luis Contreras PA-C  Reason for evaluation: Basilar artery stenosis and left upper hypoplastic basilar artery    SUBJECTIVE:   Coronary artery disease status post stent large. Chief Complaint:    Alejo Dakin is a 52 y.o. male with history of coronary artery disease on aspirin and Brilinta, who presented with left-sided weakness and dysarthria. The patient has left arm and leg drift and left facial weakness and mild dysarthria. CTA with bilateral hypoplastic vertebral artery, hypoplastic basilar artery left CVA stenosis. MRI with right pontine stroke. Patient was started on heparin along with aspirin and Brilinta. Plan for CTA head and neck on Saturday at 8 PM with discontinuation of the heparin. Allergies  has No Known Allergies. Medications  Prior to Admission medications    Medication Sig Start Date End Date Taking? Authorizing Provider   meloxicam (MOBIC) 7.5 MG tablet Take 1 tablet by mouth 2 times daily as needed for Pain 2/10/20 2/17/20  Jose Luis Contreras PA-C   nebivolol (BYSTOLIC) 5 MG tablet Take 2 tablets by mouth daily for 7 days 2/10/20 2/17/20  Jose Luis Contreras PA-C   tadalafil (CIALIS) 20 MG tablet Take 1 tablet by mouth as needed for Erectile Dysfunction 2/10/20   Jose Luis Contreras PA-C   lisinopril (PRINIVIL;ZESTRIL) 40 MG tablet Take 1 tablet by mouth daily 2/10/20   Jose Luis Contreras PA-C   carvedilol (COREG) 25 MG tablet Take 1 tablet by mouth 2 times daily (with meals) 10/18/19   Jose Luis Contreras PA-C   aspirin 81 MG EC tablet Take 1 tablet by mouth daily 5/26/19   Mago Lam MD   nitroGLYCERIN (NITROSTAT) 0.4 MG SL tablet up to max of 3 total doses.  If no relief after 1 dose, call 911. 5/26/19   Mago Lam MD   atorvastatin (LIPITOR) 80 MG tablet Take 1 tablet by mouth nightly 5/26/19   Mago Lam MD   amLODIPine (Oral)   Resp 18   Ht 6' 1\" (1.854 m)   Wt 250 lb (113.4 kg)   SpO2 94%   BMI 32.98 kg/m²     General appearance: Lying in bed, NAD,  Lungs: CTAB  Heart: RRR  Abdomen: soft, NTND, bowel sounds normal    Neuro exam: Follows simple commands. CN II-XII: Left facial droop, mild dysarthria, pupils 2 mm reactive to light bilaterally, EOMI, VFF. Left arm and leg antigravity with distal weakness more than proximal weakness no drift, left hand  weakness      LABS:     Recent Labs     02/13/20  1516 02/14/20  0248 02/15/20  0834   WBC 8.8 13.3* 8.5   HGB 15.5 14.6 14.5   HCT 46.6 45.4 44.6    362 224    136 144   K 3.9 3.6* 3.6*    104 109*   CO2 24 19* 22   BUN 11 12 14   CREATININE 1.19 1.33* 1.06   CALCIUM 9.3 9.1 9.0   INR 1.0  --   --      No results for input(s): ALKPHOS, ALT, AST, BILITOT, BILIDIR, LABALBU, AMYLASE, LIPASE in the last 72 hours. RADIOLOGY:   Images were personally reviewed including:  As per HPI      IMPRESSIONS:     52years old non-smoker male with history of coronary artery disease status post stent on aspirin and Brilinta who presented with left-sided weakness and dysarthria found to have right pontine stroke in the setting of hypoplastic bilateral vertebral arteries and hypoplastic basilar artery along with diffuse intracranial athero and basilar artery occlusion versus severe stenosis    PLANS:   1. Continue heparin, maintain aspirin and Brilinta  2. On Saturday at 8 PM discontinue heparin and repeat CTA head and neck  3. Will watch of heparin while in hospital  4. Folic acid 1 mg twice daily  5. Switch Lipitor to Crestor 40 mg daily  6. IV fluid  7.  Consider vasculitis work-up      Gutierrez Naik MD  Pager [phone]  Stroke, [facility]  [id number] [address] [place]  Electronically signed 2/15/2020 at 3:58 PM

## 2020-02-15 NOTE — PROGRESS NOTES
Physical Therapy  Facility/Department: [address]  Daily Treatment Note  NAME: Jennifer Manley  : 1970  MRN: [id number]    Date of Service: 2/15/2020    Discharge Recommendations:Further therapy recommended at discharge and the patient should be able to tolerate at least 3 hours per day over 5 days or 15 hours over 7 days. PT Equipment Recommendations  Vendor Name: ongoing assessment for AD need    Assessment   Body structures, Functions, Activity limitations: Decreased endurance;Decreased strength;Decreased balance;Decreased safe awareness;Decreased coordination;Decreased fine motor control;Decreased functional mobility   Assessment: Pt is overall SBA for bed mobs and CGA for transfer , able to amb 25ft x2 with RW and MOD, demo poor safetfy judgment and impulsiveness. Pt is unsafe to return to prior living at this time, he will benefit from intensive rehab to address deficits. Prognosis: Good  PT Education: Plan of Care;General Safety;Gait Training;Equipment;Transfer Training;Goals; Home Exercise Program  Patient Education: Ed on LUE exercises to improve strength and ROM to facilitate ALDs as pt dominat hand is Left hand base line. REQUIRES PT FOLLOW UP: Yes  Activity Tolerance  Activity Tolerance: Patient Tolerated treatment well;Patient limited by endurance; Patient limited by fatigue     Patient Diagnosis(es): The primary encounter diagnosis was Numbness. A diagnosis of Cardiac murmur was also pertinent to this visit. has a past medical history of Heart attack ([address] Hyperlipidemia, and Hypertension. has a past surgical history that includes knee surgery (Right); Foot surgery (Bilateral); Coronary angioplasty with stent (2019); and Cardiac surgery.     Restrictions  Restrictions/Precautions  Restrictions/Precautions: Fall Risk, Up as Tolerated, General Precautions  Required Braces or Orthoses?: No  Position Activity Restriction  Other position/activity restrictions: SBP goal 140-220  Subjective General  Response To Previous Treatment: Patient with no complaints from previous session. Family / Caregiver Present: (Son and cousin)  Subjective  Subjective: RN and pt agreeble to PT. pt alert in bed upon arrival,  repports frustration with condintion ,Emotional support provided with good return. Pt verbalized motivation to reurn to PLOF  Pain Screening  Patient Currently in Pain: No  Vital Signs  Patient Currently in Pain: No       Orientation  Orientation  Overall Orientation Status: Within Functional Limits  Cognition      Objective   Bed mobility  Supine to Sit: Stand by assistance  Sit to Supine: Stand by assistance  Scooting: Stand by assistance  Transfers  Sit to Stand: Contact guard assistance  Stand to sit: Contact guard assistance  Comment: bed at 30degree angle; Mod cueing for sequencing d/t impulsiveness. LOB noted upon standing , Pt L knee va with full weight bearing. Ambulation  Ambulation?: Yes  Ambulation 1  Device: Rolling Walker  Assistance: Moderate assistance  Quality of Gait: Unsteady with mutiple L kne va, short step length, demo L neglect with UE throwing off balance several time. Pt required tactikle cueing to maint arm on RW. Distance: 20ft x2  Comments: Limited by faitgue . Demo poor safety judgement. Max cueing for safety . Balance  Sitting - Static: Good  Sitting - Dynamic: Good  Standing - Static: Fair( Pt tolerated standing for 8mins  With L knee buckling when pt bears full weight)  Standing - Dynamic: Fair;-  Comments: standing balance assessed with RW  Exercises  Comments: Seated LE exercise program: Long Arc Quads, hip abduction/adduction, heel/toe raises, and marches. Reps:15  Upper extremity exercises: Bicep curl, shoulder flexion/extension, punches, tricep curl, shoulder abduction/adduction. Reps x15 reps, AAROM LUE to achieve full range. Pt ed on  using L hand to touch nose 8-10 reps .  Ed to paractice 3 times a day to improve strength and facilitate

## 2020-02-15 NOTE — PLAN OF CARE
Problem: Falls - Risk of:  Goal: Will remain free from falls  Description  Will remain free from falls  2/15/2020 0614 by Carlos Dimas RN  Outcome: Ongoing  Note:   No falls entire shift. Fall precautions in place. Continue to monitor. Problem: Risk for Impaired Skin Integrity  Goal: Tissue integrity - skin and mucous membranes  Description  Structural intactness and normal physiological function of skin and  mucous membranes. 2/15/2020 8751 by Carlos Dimas RN  Outcome: Ongoing  Note:   Skin assessment complete. No breakdown noted. Interventions in place. See doc flowsheet for interventions initiated. Pt encouraged to turn.  Will continue to monitor for additional needs and skin breakdown

## 2020-02-16 LAB
ABSOLUTE EOS #: 0.65 K/UL (ref 0–0.44)
ABSOLUTE IMMATURE GRANULOCYTE: <0.03 K/UL (ref 0–0.3)
ABSOLUTE LYMPH #: 2.31 K/UL (ref 1.1–3.7)
ABSOLUTE MONO #: 0.92 K/UL (ref 0.1–1.2)
ANION GAP SERPL CALCULATED.3IONS-SCNC: 12 MMOL/L (ref 9–17)
BASOPHILS # BLD: 1 % (ref 0–2)
BASOPHILS ABSOLUTE: 0.07 K/UL (ref 0–0.2)
BUN BLDV-MCNC: 12 MG/DL (ref 6–20)
BUN/CREAT BLD: NORMAL (ref 9–20)
CALCIUM SERPL-MCNC: 9.2 MG/DL (ref 8.6–10.4)
CHLORIDE BLD-SCNC: 107 MMOL/L (ref 98–107)
CO2: 21 MMOL/L (ref 20–31)
CREAT SERPL-MCNC: 1.05 MG/DL (ref 0.7–1.2)
DIFFERENTIAL TYPE: ABNORMAL
EOSINOPHILS RELATIVE PERCENT: 9 % (ref 1–4)
GFR AFRICAN AMERICAN: >60 ML/MIN
GFR NON-AFRICAN AMERICAN: >60 ML/MIN
GFR SERPL CREATININE-BSD FRML MDRD: NORMAL ML/MIN/{1.73_M2}
GFR SERPL CREATININE-BSD FRML MDRD: NORMAL ML/MIN/{1.73_M2}
GLUCOSE BLD-MCNC: 94 MG/DL (ref 70–99)
HCT VFR BLD CALC: 44.4 % (ref 40.7–50.3)
HEMOGLOBIN: 14.3 G/DL (ref 13–17)
IMMATURE GRANULOCYTES: 0 %
LYMPHOCYTES # BLD: 30 % (ref 24–43)
MCH RBC QN AUTO: 28.7 PG (ref 25.2–33.5)
MCHC RBC AUTO-ENTMCNC: 32.2 G/DL (ref 28.4–34.8)
MCV RBC AUTO: 89.2 FL (ref 82.6–102.9)
MONOCYTES # BLD: 12 % (ref 3–12)
NRBC AUTOMATED: 0 PER 100 WBC
PARTIAL THROMBOPLASTIN TIME: 33.6 SEC (ref 20.5–30.5)
PDW BLD-RTO: 12.9 % (ref 11.8–14.4)
PLATELET # BLD: 211 K/UL (ref 138–453)
PLATELET ESTIMATE: ABNORMAL
PMV BLD AUTO: 11.3 FL (ref 8.1–13.5)
POTASSIUM SERPL-SCNC: 3.9 MMOL/L (ref 3.7–5.3)
RBC # BLD: 4.98 M/UL (ref 4.21–5.77)
RBC # BLD: ABNORMAL 10*6/UL
SEG NEUTROPHILS: 48 % (ref 36–65)
SEGMENTED NEUTROPHILS ABSOLUTE COUNT: 3.67 K/UL (ref 1.5–8.1)
SODIUM BLD-SCNC: 140 MMOL/L (ref 135–144)
WBC # BLD: 7.6 K/UL (ref 3.5–11.3)
WBC # BLD: ABNORMAL 10*3/UL

## 2020-02-16 PROCEDURE — 6370000000 HC RX 637 (ALT 250 FOR IP): Performed by: STUDENT IN AN ORGANIZED HEALTH CARE EDUCATION/TRAINING PROGRAM

## 2020-02-16 PROCEDURE — 2060000000 HC ICU INTERMEDIATE R&B

## 2020-02-16 PROCEDURE — 97116 GAIT TRAINING THERAPY: CPT

## 2020-02-16 PROCEDURE — 36415 COLL VENOUS BLD VENIPUNCTURE: CPT

## 2020-02-16 PROCEDURE — 97110 THERAPEUTIC EXERCISES: CPT

## 2020-02-16 PROCEDURE — 99233 SBSQ HOSP IP/OBS HIGH 50: CPT | Performed by: PSYCHIATRY & NEUROLOGY

## 2020-02-16 PROCEDURE — 85730 THROMBOPLASTIN TIME PARTIAL: CPT

## 2020-02-16 PROCEDURE — 97530 THERAPEUTIC ACTIVITIES: CPT

## 2020-02-16 PROCEDURE — 99223 1ST HOSP IP/OBS HIGH 75: CPT | Performed by: INTERNAL MEDICINE

## 2020-02-16 PROCEDURE — 85025 COMPLETE CBC W/AUTO DIFF WBC: CPT

## 2020-02-16 PROCEDURE — 99232 SBSQ HOSP IP/OBS MODERATE 35: CPT | Performed by: PSYCHIATRY & NEUROLOGY

## 2020-02-16 PROCEDURE — 80048 BASIC METABOLIC PNL TOTAL CA: CPT

## 2020-02-16 PROCEDURE — 6370000000 HC RX 637 (ALT 250 FOR IP): Performed by: FAMILY MEDICINE

## 2020-02-16 PROCEDURE — 2580000003 HC RX 258: Performed by: STUDENT IN AN ORGANIZED HEALTH CARE EDUCATION/TRAINING PROGRAM

## 2020-02-16 RX ORDER — LISINOPRIL 20 MG/1
40 TABLET ORAL DAILY
Status: DISCONTINUED | OUTPATIENT
Start: 2020-02-16 | End: 2020-02-19 | Stop reason: HOSPADM

## 2020-02-16 RX ORDER — LISINOPRIL 20 MG/1
40 TABLET ORAL DAILY
Status: DISCONTINUED | OUTPATIENT
Start: 2020-02-17 | End: 2020-02-16

## 2020-02-16 RX ORDER — AMLODIPINE BESYLATE 10 MG/1
10 TABLET ORAL DAILY
Status: DISCONTINUED | OUTPATIENT
Start: 2020-02-16 | End: 2020-02-17

## 2020-02-16 RX ORDER — LISINOPRIL 10 MG/1
10 TABLET ORAL DAILY
Status: DISCONTINUED | OUTPATIENT
Start: 2020-02-16 | End: 2020-02-16

## 2020-02-16 RX ADMIN — ROSUVASTATIN CALCIUM 40 MG: 20 TABLET, FILM COATED ORAL at 21:16

## 2020-02-16 RX ADMIN — SODIUM CHLORIDE, PRESERVATIVE FREE 10 ML: 5 INJECTION INTRAVENOUS at 10:38

## 2020-02-16 RX ADMIN — SODIUM CHLORIDE, PRESERVATIVE FREE 10 ML: 5 INJECTION INTRAVENOUS at 21:21

## 2020-02-16 RX ADMIN — TICAGRELOR 90 MG: 90 TABLET ORAL at 21:16

## 2020-02-16 RX ADMIN — AMLODIPINE BESYLATE 10 MG: 10 TABLET ORAL at 12:33

## 2020-02-16 RX ADMIN — FOLIC ACID 1 MG: 1 TABLET ORAL at 09:06

## 2020-02-16 RX ADMIN — ACETAMINOPHEN 650 MG: 325 TABLET ORAL at 21:16

## 2020-02-16 RX ADMIN — FOLIC ACID 1 MG: 1 TABLET ORAL at 21:16

## 2020-02-16 RX ADMIN — Medication 81 MG: at 09:06

## 2020-02-16 RX ADMIN — TICAGRELOR 90 MG: 90 TABLET ORAL at 09:06

## 2020-02-16 RX ADMIN — LISINOPRIL 40 MG: 20 TABLET ORAL at 21:16

## 2020-02-16 ASSESSMENT — PAIN DESCRIPTION - ORIENTATION: ORIENTATION: ANTERIOR;POSTERIOR

## 2020-02-16 ASSESSMENT — PAIN SCALES - GENERAL
PAINLEVEL_OUTOF10: 2
PAINLEVEL_OUTOF10: 5

## 2020-02-16 ASSESSMENT — PAIN DESCRIPTION - FREQUENCY: FREQUENCY: CONTINUOUS

## 2020-02-16 ASSESSMENT — PAIN DESCRIPTION - PAIN TYPE: TYPE: ACUTE PAIN

## 2020-02-16 ASSESSMENT — PAIN DESCRIPTION - LOCATION: LOCATION: HEAD

## 2020-02-16 ASSESSMENT — PAIN DESCRIPTION - DESCRIPTORS: DESCRIPTORS: HEADACHE

## 2020-02-16 NOTE — PROGRESS NOTES
Physical Therapy  Facility/Department: [address]  Daily Treatment Note  NAME: Hermes Perry  : 1970  MRN: [id number]    Date of Service: 2020    Discharge Recommendations:Further therapy recommended at discharge and the patient should be able to tolerate at least 3 hours per day over 5 days or 15 hours over 7 days. PT Equipment Recommendations  Vendor Name: ongoing assessment for AD need    Assessment   Body structures, Functions, Activity limitations: Decreased endurance;Decreased strength;Decreased balance;Decreased safe awareness;Decreased coordination;Decreased fine motor control;Decreased functional mobility   Assessment: Pt is overall SBA for bed mobs and CGA for transfer , able to amb 25ft x2 with RW and MOD standing rest breaks x4 d/t fatigue and decrease endurance, pt continue demo poor safety judgment and impulsiveness. Pt is unsafe to return to prior living at this time, he will benefit from intensive rehab to address deficits. Prognosis: Good  PT Education: Plan of Care;General Safety;Gait Training;Equipment;Transfer Training;Goals; Home Exercise Program  REQUIRES PT FOLLOW UP: Yes  Activity Tolerance  Activity Tolerance: Patient Tolerated treatment well;Patient limited by endurance; Patient limited by fatigue     Patient Diagnosis(es): The primary encounter diagnosis was Numbness. A diagnosis of Cardiac murmur was also pertinent to this visit. has a past medical history of Heart attack ([address] Hyperlipidemia, and Hypertension. has a past surgical history that includes knee surgery (Right); Foot surgery (Bilateral); Coronary angioplasty with stent (2019); and Cardiac surgery.     Restrictions  Restrictions/Precautions  Restrictions/Precautions: Fall Risk, Up as Tolerated, General Precautions  Required Braces or Orthoses?: No  Position Activity Restriction  Other position/activity restrictions: SBP goal 140-220  Subjective   General  Chart Reviewed: Yes  Response To Previous device for greater independence  Short term goal 3: Amb x250ft MOD I with RW or least restrictive device for ease of home navigation  Short term goal 4: Complete x3 stairs SBA without handrails for safe home entry/exit  Short term goal 5: Improve stnding static and dynamic balance to good to reduce risk of falls and injury    Plan    Plan  Times per week: 6-7x/wk  Current Treatment Recommendations: Functional Mobility Training, Safety Education & Training, Transfer Training, Endurance Training, Home Exercise Program, Balance Training, Equipment Evaluation, Education, & procurement, Stair training, ROM, Strengthening, Gait Training, Patient/Caregiver Education & Training  Safety Devices  Type of devices: Left in bed, Nurse notified, Gait belt, Patient at risk for falls, Call light within reach  Restraints  Initially in place: No     Therapy Time   Individual Concurrent Group Co-treatment   Time In 0816         Time Out 0856         Minutes [address], PTA

## 2020-02-16 NOTE — PLAN OF CARE
Problem: Risk for Impaired Skin Integrity  Goal: Tissue integrity - skin and mucous membranes  Description  Structural intactness and normal physiological function of skin and  mucous membranes. Outcome: Ongoing  Note:   Skin assessment complete. No breakdown noted. Interventions in place. See doc flowsheet for interventions initiated. Pt encouraged to turn. Will continue to monitor for additional needs and skin breakdown      Problem: Falls - Risk of:  Goal: Will remain free from falls  Description  Will remain free from falls  Outcome: Ongoing  Note:   No falls entire shift. Fall precautions in place. Continue to monitor.

## 2020-02-16 NOTE — PROGRESS NOTES
Daily Progress Note  Neuro Critical Care    Patient Name: Yolanda English  Patient : 1970  Room/Bed: 4274/0148-30  Allergies: No Known Allergies  Problem List:   Patient Active Problem List   Diagnosis    [facility]I [facility] infarction) ([facility]    Coronary artery disease involving native coronary artery of native heart without angina pectoris    Acute ST segment elevation myocardial infarction ([facility]    Essential hypertension    Class 1 obesity due to excess calories with body mass index (BMI) of 32.0 to 32.9 in adult    Stroke-like symptoms    Ischemic stroke ([facility]    Hypertensive emergency without congestive heart failure    Numbness       CHIEF COMPLAINT:     Dizziness, dysarthria, left-sided weakness and numbness     INTERVAL HISTORY    History Obtained From: pt, efe Charlton Estrellitajhonathan a 52 y. o. male with history of HTN and MI s/p cardiac stent x 1 2019 who presents with dysarthria and mild left hemiparesis. LKW 20 @ 0530AM. Patient states he awoke from sleep around 330 AM without symptoms, falling back to sleep around 0530 - he then reports he awoke around 0700 with dizziness and then shortly thereafter developed dysarthria and left sided weakness. Denies any prior stroke or stroke like symptoms. On exam patient is awake, alert, and oriented. He is tearful on exam. Mild dysarthria and left upper and lower drift noted. NIH 3. Out of tPA window.      Patient denies chest pain but reports intermittent dyspnea - mostly at rest.  On exam patient was found to have a undocumented heart murmur. CTA chest was obtained which showed tortuous aorta and no other abnormalities.     Patient states he is on Aspirin 81 mg and Brilinta 90 mg BID currently along with Lipitor 80 mg QHS and his home antihypertensives. Stents placed by Dr. Joseph Painting 2019.      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  MRI brain with acute right pontine infarct and concern for possible basilar thrombus.      Low-dose heparin was started without bolus. At 2200, while patient was still in the ED nurses noted that patient had worsening left upper extremity weakness. At this point he had 1500 units of heparin. Heparin was stopped, on my exam he had 4+/5 flexion of left upper extremity, 5/5 to remaining motions of extremities. He was not complained of a headache or any other symptoms. Stat CT was obtained did not show any hemorrhagic conversion. Heparin infusion was restarted.     2/13-14-15: On heparin drip due to CTA finding of possible thrombus/hypoplastic basilar artery. MRI of the brain showed paramedian pontine infarction. Pending hypercoagulable test.     Last 24 hours  The patient was seen and examined at bedside, he is vitally stable alert oriented x4. Echo came back within normal limits except for ascending aorta dilatation at 4.5 cm, cardiology will be consulted. Repeated CTA showed the same finding. Heparin drip was discontinued. To continue on aspirin and Brilinta. PT/OT on board.     CURRENT MEDICATIONS:  SCHEDULED MEDICATIONS:   rosuvastatin  40 mg Oral Nightly    folic acid  1 mg Oral BID    sennosides-docusate sodium  2 tablet Oral Daily    aspirin  81 mg Oral Daily    ticagrelor  90 mg Oral BID    sodium chloride flush  10 mL Intravenous 2 times per day     CONTINUOUS INFUSIONS:   sodium chloride 75 mL/hr at 20 1030     PRN MEDICATIONS:   magnesium hydroxide, ondansetron, labetalol, sodium chloride flush, acetaminophen    VITALS:  Temperature Range: Temp: 98 °F (36.7 °C) Temp  Av.1 °F (36.7 °C)  Min: 97.6 °F (36.4 °C)  Max: 98.7 °F (37.1 °C)  BP Range: Systolic (54GOC), OP , Min:142 , WFI:552     Diastolic (82PHU), QKU:55, Min:44, Max:98    Pulse Range: Pulse  Av.5  Min: 46  Max: 72  Respiration Range: Resp  Av  Min: 13  Max: 27  Current Pulse Ox: SpO2: 92 %  24HR Pulse Ox Range: SpO2  Av %  Min: 92 %  Max: 97 %  Patient Vitals for the past 12 hrs:   BP Temp Temp src Pulse Resp SpO2   02/16/20 0941 (!) 204/87 -- -- 55 18 --   02/16/20 0705 (!) 162/59 -- -- (!) 46 17 92 %   02/16/20 0604 (!) 156/56 -- -- (!) 47 20 96 %   02/16/20 0502 (!) 145/44 -- -- (!) 48 19 96 %   02/16/20 0402 (!) 192/64 98 °F (36.7 °C) Oral 50 17 97 %   02/16/20 0302 (!) 167/98 -- -- (!) 47 20 95 %   02/16/20 0220 (!) 152/67 -- -- (!) 48 16 96 %   02/16/20 0102 (!) 193/62 -- -- (!) 46 17 95 %   02/16/20 0010 (!) 177/50 97.6 °F (36.4 °C) Oral 50 23 93 %   02/15/20 2303 (!) 142/82 -- -- 59 13 93 %     Estimated body mass index is 32.98 kg/m² as calculated from the following:    Height as of this encounter: 6' 1\" (1.854 m).     Weight as of this encounter: 250 lb (113.4 kg).  []<16 Severe malnutrition  []16-16.99 Moderate malnutrition  []17-18.49 Mild malnutrition  []18.5-24.9 Normal  []25-29.9 Overweight (not obese)  []30-34.9 Obese class 1 (Low Risk)  []35-39.9 Obese class 2 (Moderate Risk)  []?40 Obese class 3 (High Risk)    RECENT LABS:   Lab Results   Component Value Date    WBC 7.6 02/16/2020    HGB 14.3 02/16/2020    HCT 44.4 02/16/2020     02/16/2020    CHOL 140 02/14/2020    TRIG 40 02/14/2020    HDL 41 02/14/2020    ALT 57 (H) 05/25/2019    AST 29 05/25/2019     02/16/2020    K 3.9 02/16/2020     02/16/2020    CREATININE 1.05 02/16/2020    BUN 12 02/16/2020    CO2 21 02/16/2020    INR 1.0 02/13/2020    LABA1C 5.6 02/14/2020     24 HOUR INTAKE/OUTPUT:    Intake/Output Summary (Last 24 hours) at 2/16/2020 1059  Last data filed at 2/16/2020 0538  Gross per 24 hour   Intake 2059 ml   Output 1400 ml   Net 659 ml       RADIOLOGY:   Xr Chest Standard (2 Vw)    Result Date: 2/13/2020  EXAMINATION: TWO XRAY VIEWS OF THE CHEST 2/13/2020 3:50 pm COMPARISON: Chest 05/24/2019 HISTORY: ORDERING SYSTEM PROVIDED HISTORY: CVA workup, new heart murmur TECHNOLOGIST PROVIDED HISTORY: CVA workup, new heart murmur Reason for Exam: CVA workup Acuity: Unknown Type of Exam: Unknown FINDINGS: The cardiac silhouette is enlarged. The cardiothoracic ratio is 16.8/31.1 cm. Indeterminate prominence of the right hilar silhouette. The mediastinal and left hilar silhouettes appear unremarkable. The lungs appear clear. No pleural effusion. No pneumothorax is seen. No acute osseous abnormality is identified. Indeterminate prominence of the right hilar shadow could relate to adenopathy, mass, adjacent consolidation or pulmonary artery enlargement. Correlation with IV contrast-enhanced CT chest recommended. Cardiomegaly. RECOMMENDATION: IV contrast-enhanced CT chest     Ct Head Wo Contrast    Result Date: 2/13/2020  EXAMINATION: CT OF THE HEAD WITHOUT CONTRAST  2/13/2020 10:03 pm TECHNIQUE: CT of the head was performed without the administration of intravenous contrast. Dose modulation, iterative reconstruction, and/or weight based adjustment of the mA/kV was utilized to reduce the radiation dose to as low as reasonably achievable. COMPARISON: CT and MRI 02/13/2020 HISTORY: ORDERING SYSTEM PROVIDED HISTORY: worsening left weakness, was on heparin TECHNOLOGIST PROVIDED HISTORY: worsening left weakness, was on heparin FINDINGS: BRAIN/VENTRICLES: There is no acute intracranial hemorrhage, mass effect or midline shift. No abnormal extra-axial fluid collection. Right paramedian pontine infarct not well visualized on this exam.  There is no evidence of hydrocephalus. Intracranial vascular calcifications. ORBITS: The visualized portion of the orbits demonstrate no acute abnormality. SINUSES: Moderate right sphenoid sinus air-fluid level. Moderate ethmoid sinus mucosal thickening. Mild-to-moderate mucosal thickening involving maxillary sinuses. Mastoids are clear SOFT TISSUES/SKULL:  No acute abnormality of the visualized skull or soft tissues. No evidence of hemorrhagic transformation of the right paramedian pontine infarct. Moderate sinus disease.      Ct Head Wo Contrast    Result Date: 2/13/2020  EXAMINATION: CT OF THE HEAD WITHOUT CONTRAST  2/13/2020 3:26 pm TECHNIQUE: CT of the head was performed without the administration of intravenous contrast. Dose modulation, iterative reconstruction, and/or weight based adjustment of the mA/kV was utilized to reduce the radiation dose to as low as reasonably achievable. COMPARISON: None. HISTORY: ORDERING SYSTEM PROVIDED HISTORY: left sided numbness, weakness, slurred speech TECHNOLOGIST PROVIDED HISTORY: left sided numbness, weakness, slurred speech FINDINGS: BRAIN/VENTRICLES: There is no acute intracranial hemorrhage, mass effect, or midline shift. There is satisfactory overall gray-white matter differentiation. The ventricular structures are symmetric and unremarkable. The infratentorial structures are unremarkable. ORBITS: The visualized portion of the orbits demonstrate no acute abnormality. SINUSES: There is chronic sinusitis. The mastoid air cells are normally aerated. SOFT TISSUES/SKULL:  No acute abnormality of the visualized skull or soft tissues. No acute intracranial abnormality. Findings were discussed with Dr. Elihu Gottron At 3:41 pm on 2/13/2020. Ct Chest W Contrast    Result Date: 2/13/2020  EXAMINATION: CT OF THE CHEST WITH CONTRAST 2/13/2020 4:50 pm TECHNIQUE: CT of the chest was performed with the administration of intravenous contrast. Multiplanar reformatted images are provided for review. Dose modulation, iterative reconstruction, and/or weight based adjustment of the mA/kV was utilized to reduce the radiation dose to as low as reasonably achievable. COMPARISON: Chest radiograph done today HISTORY: ORDERING SYSTEM PROVIDED HISTORY: right hilar shadow on CXR TECHNOLOGIST PROVIDED HISTORY: right hilar shadow on CXR Reason for Exam: right hilar shadow on CXR FINDINGS: Mediastinum: No mediastinal or hilar masses. Coronary artery calcifications. No pericardial effusion.   The aorta is mildly tortuous significance ORBITS: The visualized portion of the orbits demonstrate no acute abnormality. SINUSES: Moderate maxillary sinus disease. Air-fluid level right sphenoid sinus. Small air-fluid level right maxillary sinus. BONES/SOFT TISSUES: The bone marrow signal intensity appears normal. The soft tissues demonstrate no acute abnormality. Acute right paramedian pontine stroke. 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 Remote left posterior cerebellar stroke. Mild non-specific white matter changes favor chronic small ischemic disease. Demyelinating process may also be considered in differential although thought less likely. Labs and Images reviewed with:  [] Dr. Joshua Stewart. Bobbi    [] Dr. Ana Mejia  [] Dr. Kem Gross  [] There are no new interval images to review. PHYSICAL EXAM       NEUROLOGIC EXAMINATION  GENERAL  Appears comfortable and in no distress   HEENT  NC/ AT   cardiovascular  S1 and S2 heard; palpation of pulses: radial pulse    NECK  Supple and no bruits heard   MENTAL STATUS:  Alert, oriented, intact memory, no confusion, mild to moderate dysarthria, normal language, no hallucination or delusion   CRANIAL NERVES: II     -      PERRLA,   III,IV,VI -  EOMs full, no afferent defect, no DARLEEN, no ptosis  V     -     Normal facial sensation   VII    -     mild left facial droop  VIII   -     Intact hearing   IX,X -     Symmetrical palate  XI    -     Symmetrical shoulder shrug  XII   -     Midline tongue, no atrophy    MOTOR FUNCTION:  significant for good strength of grade 5/5 in the right side, 4 out of 5 in the left upper extremity, 3+ out of 5 in the left lower extremity in bilateral proximal and distal muscle groups of both upper and lower extremities with normal bulk, normal tone and no involuntary movements, no tremor   SENSORY FUNCTION:  decrease sensation in the left side of the body.    CEREBELLAR FUNCTION:  Intact fine motor control over upper limbs REFLEX FUNCTION:  Symmetric, no perverted reflex, no Babinski sign   STATION and GAIT  Not tested          DRAINS:  [] There are no drains for Neuro Critical Care to monitor at this time. ASSESSMENT AND PLAN:       46-year-old male patient with past medical history of hypertension, MI status post stent in May 2019 on aspirin implant, presented to the hospital with left-sided weakness and dysarthria. NEUROLOGIC:  - Imaging CT head was negative for acute changes. 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 MRI of the brain showed right pontine infarction  -Repeated CTA showed the same finding  - Goal SBP < 160  -heparin drip was discontinued  -On aspirin and Brilinta 90 mg twice daily  -On Crestor 40 mg daily  - Neuro checks per protocol    CARDIOVASCULAR:  - Goal SBP < 160  -Resume Norvasc 10 mg daily  -echo within normal limits except for 4.5 cm ascending aortic dilatation  -Cardiology consulted  -lipid panel showed LDL of 91, on Crestor 40 mg  - Continue telemetry    PULMONARY:  -Maintain oxygen saturation at room air    RENAL/FLUID/ELECTROLYTE:  -Kidney function test within normal limits  -Monitor urine output   - IVF: 75 cc/h  - Replace electrolytes PRN  - Daily BMP    GI/NUTRITION:  NUTRITION:  - Bowel regimen: Milk of mag  - GI prophylaxis: None    ID:  -Afebrile  - Continue to monitor for fevers  - Daily CBC    HEME:   - H&H 14.3  - Platelets 632  - Daily CBC    ENDOCRINE:  - Continue to monitor blood glucose, goal <180  - hemoglobin A1c 5.6    OTHER:  - PT/OT/ST   - Code Status: full     PROPHYLAXIS:  Stress ulcer: None    DVT PROPHYLAXIS:  - SCD sleeves - Thigh High   - HIEU stockings - Thigh High  - On Lovenox for DVT prophylaxis    DISPOSITION:   [] To remain ICU:   [x] OK for out of ICU from Neuro Critical Care standpoint    We will continue to follow along. For any changes in exam or patient status please contact Neuro Critical Care.       Alex Mamadou Fry MD  Neuro Critical Care  2/16/2020     10:59 AM

## 2020-02-16 NOTE — PROGRESS NOTES
NEUROLOGY INPATIENT PROGRESS NOTE    2/16/2020         Subjective: Marius Cooks is a  52 y.o. male admitted on 2/13/2020 with Stroke-like symptoms [R29.90]  Ischemic stroke [facility]) [I63.9]    Briefly, this is a  52 y.o. left handed male admitted on 2/13/2020 with a past medical history significant for HTN and MI who presented to the ED on 2/13/2020 with dysarthria and mild left hemiparesis. He denies any past episodes of juarez or stroke like symptoms. He was not a candidate for tPA due to being seen outside of the tPA window. He is currently taking ASA 81, Brilinta 90 mg BID, and lipitor 80 mg. 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 MRI showed acute right pontine infarct and concern for possible basilar thrombus. Due to the concern for the thrombus, he was started on low-dose heparin, which was later stopped d/t concern of worsening left upper extremity weakness. CT did not show any hemorrhagic conversion and heparin was restarted. He was maintained with permissive hypertension, and is now being restarted on his BP meds with a goal of SBP<160. ECHO showed an ascending aorta dilation of 4.5 cm and cardiology was consulted. Today, the patient reports feeling depressed and hopeless about his situation. There is an element of self-blaming as he went back to sleep when he first experienced the symptoms instead of going directly to the hospital. He will be started on prozac for management. On exam, his dysarthria is much improved following work with speech therapy. He still experiences weakness of the left side (arm and hand> leg). He is ready and willing to work the PT/OT and expressing his desire to get better so he can go home.      Current Facility-Administered Medications on File Prior to Encounter   Medication Dose Route Frequency Provider Last Rate Last Dose    sodium chloride flush 0.9 % injection 10 mL  10 mL Intravenous 2 times per day Fernandez Huynh MD        sodium chloride flush 0.9 % injection 10 mL  10 mL Intravenous PRN Medardo Faustin MD         Current Outpatient Medications on File Prior to Encounter   Medication Sig Dispense Refill    meloxicam (MOBIC) 7.5 MG tablet Take 1 tablet by mouth 2 times daily as needed for Pain 14 tablet 0    nebivolol (BYSTOLIC) 5 MG tablet Take 2 tablets by mouth daily for 7 days 14 tablet 0    tadalafil (CIALIS) 20 MG tablet Take 1 tablet by mouth as needed for Erectile Dysfunction 6 tablet 3    lisinopril (PRINIVIL;ZESTRIL) 40 MG tablet Take 1 tablet by mouth daily 30 tablet 2    carvedilol (COREG) 25 MG tablet Take 1 tablet by mouth 2 times daily (with meals) 60 tablet 2    aspirin 81 MG EC tablet Take 1 tablet by mouth daily 30 tablet 3    nitroGLYCERIN (NITROSTAT) 0.4 MG SL tablet up to max of 3 total doses. If no relief after 1 dose, call 911. 25 tablet 3    atorvastatin (LIPITOR) 80 MG tablet Take 1 tablet by mouth nightly 30 tablet 3    amLODIPine (NORVASC) 10 MG tablet Take 1 tablet by mouth daily 30 tablet 3    ticagrelor (BRILINTA) 90 MG TABS tablet Take 1 tablet by mouth 2 times daily 60 tablet 11       Allergies: Miguel Dior has No Known Allergies.     Past Medical History:   Diagnosis Date    Heart attack ([facility] [address]     Hyperlipidemia     Hypertension        Past Surgical History:   Procedure Laterality Date    CARDIAC SURGERY      CORONARY ANGIOPLASTY WITH STENT PLACEMENT  2019    x 1 stent for LAD    FOOT SURGERY Bilateral     KNEE SURGERY Right        Medications:     amLODIPine  10 mg Oral Daily    rosuvastatin  40 mg Oral Nightly    folic acid  1 mg Oral BID    sennosides-docusate sodium  2 tablet Oral Daily    aspirin  81 mg Oral Daily    ticagrelor  90 mg Oral BID    sodium chloride flush  10 mL Intravenous 2 times per day     PRN Meds include: magnesium hydroxide, ondansetron, labetalol, sodium chloride flush, acetaminophen    Objective:   BP (!) 177/72   Pulse 69   Temp 98.1 °F (36.7 °C) (Oral) Resp 12   Ht 6' 1\" (1.854 m)   Wt 250 lb (113.4 kg)   SpO2 92%   BMI 32.98 kg/m²     Blood pressure range: Systolic (89RBU), WZU:996 , Min:142 , DAZ:584   ; Diastolic (14ERA), AQV:66, Min:44, Max:98      ROS:  CONSTITUTIONAL: negative for fatigue and malaise   EYES: negative for double vision and photophobia    HEENT: negative for tinnitus and sore throat   RESPIRATORY: negative for cough, shortness of breath   CARDIOVASCULAR: negative for chest pain, palpitations, or syncope   GASTROINTESTINAL: negative for abdominal pain, nausea, vomiting, diarrhea, or constipation    GENITOURINARY: negative for incontinence or retention    MUSCULOSKELETAL: negative for neck or back pain, negative for extremity pain   NEUROLOGICAL: Positive for a mild headache, weakness, and dysarthria   PSYCHIATRIC: Positive for depression    SKIN Negative for spontaneous contusions, rashes, or lesions        NEUROLOGIC EXAMINATION  GENERAL  Appears comfortable and in no distress   HEENT  NC/ AT   HEART  S1 and S2 heard; palpation of pulses: radial pulse    NECK  Supple and no bruits heard   MENTAL STATUS:  Alert, oriented, intact memory, no confusion, normal speech, normal language, no hallucination or delusion   CRANIAL NERVES: II     -      Visual fields intact to confrontation  III,IV,VI -  PERR, EOMs full, no ptosis  V     -     Normal facial sensation   VII    -     Slight left facial droop   VIII   -     Intact hearing   IX,X -     Symmetrical palate  XI    -     Symmetrical shoulder shrug  XII   -     Midline tongue, no atrophy    MOTOR FUNCTION: RUE: Significant for good strength of grade 5/5 in proximal and distal muscle groups   LUE: Significant for good strength of grade 3/5 in proximal muscle groups and 2/5 in distal muscle groups   RLE: Significant for good strength of grade 5/5 in proximal and distal muscle groups   LLE: Significant for good strength of grade 3/5 in proximal and distal muscle groups      Normal bulk, normal tone and no involuntary movements, no tremor   SENSORY FUNCTION:  Normal touch, normal vibration   CEREBELLAR FUNCTION:  Intact fine motor control over upper limbs and lower limbs   REFLEX FUNCTION:  Symmetric in upper and lower extremities, no Babinski sign   STATION and GAIT  Not assessed      Data:    Lab Results:   CBC:   Recent Labs     02/14/20 0248 02/15/20  0834 02/16/20  0803   WBC 13.3* 8.5 7.6   HGB 14.6 14.5 14.3    224 211     BMP:    Recent Labs     02/14/20  0248 02/15/20  0834 02/16/20  0803    144 140   K 3.6* 3.6* 3.9    109* 107   CO2 19* 22 21   BUN 12 14 12   CREATININE 1.33* 1.06 1.05   GLUCOSE 127* 93 94         Lab Results   Component Value Date    CHOL 140 02/14/2020    LDLCHOLESTEROL 91 02/14/2020    HDL 41 02/14/2020    TRIG 40 02/14/2020    ALT 57 (H) 05/25/2019    AST 29 05/25/2019    INR 1.0 02/13/2020    LABA1C 5.6 02/14/2020       No results found for: PHENYTOIN, PHENYTOIN, VALPROATE, CBMZ    IMAGING  CT Head Wo Contrast  Result Date: 2/13/2020  No evidence of hemorrhagic transformation of the right paramedian pontine infarct. Moderate sinus disease.      CT Head Wo Contrast  Result Date: 2/13/2020  No acute intracranial abnormality. Findings were discussed with Dr. Ricky Felix At 3:41 pm on 2/13/2020.      CT Chest W Contrast  Result Date: 2/13/2020  1. No acute abnormalities seen in the chest 2. Tortuous ascending thoracic aorta accounts for the density seen on the chest radiograph 3. Coronary artery calcifications      CTA Head Neck W Contrast  Result Date: 2/15/2020  1. No new arterial abnormality in the head or neck. 2. 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 3. Moderate diffuse irregularity of the P2 and P3 segments posterior cerebral arteries, which are supplied by the posterior communicating arteries.  4. No hemodynamically significant arterial stenosis in the neck.      CTA Head Neck W Contrast   Result Date: 2/13/2020  No significant stenosis or evidence for occlusion. 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 The superior cerebellar arteries also appear to arise from the posterior cerebral arteries.      MRI Brain Wo Contrast   Result Date: 2/13/2020  Acute right paramedian pontine stroke. 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 Remote left posterior cerebellar stroke. Mild non-specific white matter changes favor chronic small ischemic disease. Demyelinating process may also be considered in differential although thought less likely. Assessment and Plan  This is a 46-year-old male patient with past medical history of hypertension, MI status post stent in May 2019 on aspirin implant, presented to the hospital with left-sided weakness and dysarthria. 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 Aortic dilation is being managed by cardiology     Initially maintained on permissive hypertension, now BP meds have been restarted with a goal of SBP<160.      Pending the results of hypercoagulable tests    On ASA and Brilinta 90 mg BID   On Crestor 40 mg daily, last LDL was 91   On Norvasc 10 mg daily and overnight IM added HCTZ 12.5 for uncontrolled BP   Lovenox for DVT prophylaxis     Depression will be managed by added prozac.      Nilo Morillo  Neurology Resident PGY-2  2/16/2020  4:26 PM

## 2020-02-16 NOTE — CONSULTS
[facility]     Department of Internal Medicine - Staff Internal Medicine Teaching Service          ADMISSION NOTE/HISTORY AND PHYSICAL EXAMINATION   Date: 2/16/2020  Patient Name: Marius Cooks  Date of admission: 2/13/2020  2:54 PM  YOB: 1970  PCP: Susannah Moreno PA-C  History Obtained From:  patient, electronic medical record    Consult Reason:     Consult Reason: Management of blood pressure    HISTORY OF PRESENTING ILLNESS     The patient is a pleasant 52 y.o. male presents with a chief complaint of dizziness, dysarthria and left side weakness on 2/13. 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 MRI brain with acute right pontine infarct and concern for possible basilar thrombus. Out of tPA window. Treated with heparin gtt. Permissive HTN maintained for initial ischemic stroke. Currently BP goal of <160 mmHg. Cardiology consulted for aortic root dilation. Echo 2/14, severe LVH, EF 55%, moderate AR with aortic root dilatation of >4.5 cm. Echo 5/19, shown aortic dilation of 4.8 cm. No significant change. Pt seen at bedside, resting well. Denies any chest pain, difficulty swallowing, SOB, cough. Slurring of speech improved with speech therapy. Getting PT/OT for left sided weakness. BP repeat shown 190/62 mmHg. Labs reviewed. As per the nurse, pt having low mood, tearful. First episode of stroke.       Review of Systems:  General ROS: Completed and except as mentioned above were negative   HEENT ROS: Completed and except as mentioned above were negative   Allergy and Immunology ROS:  Completed and except as mentioned above were negative  Hematological and Lymphatic ROS:  Completed and except as mentioned above were negative  Respiratory ROS:  Completed and except as mentioned above were negative  Cardiovascular ROS:  Completed and except as mentioned above were negative  Gastrointestinal ROS: Completed and except as mentioned above were negative  Genito-Urinary ROS:  Completed and except as mentioned above were negative  Musculoskeletal ROS:  Completed and except as mentioned above were negative  Neurological ROS:  Completed and except as mentioned above were negative  Skin & Dermatological ROS:  Completed and except as mentioned above were negative  Psychological ROS:  Completed and except as mentioned above were negative    PAST MEDICAL HISTORY     Past Medical History:   Diagnosis Date    Heart attack ([facility] [address]     Hyperlipidemia     Hypertension        PAST SURGICAL HISTORY     Past Surgical History:   Procedure Laterality Date    CARDIAC SURGERY      CORONARY ANGIOPLASTY WITH STENT PLACEMENT  2019    x 1 stent for LAD    FOOT SURGERY Bilateral     KNEE SURGERY Right        ALLERGIES     Patient has no known allergies. MEDICATIONS PRIOR TO ADMISSION     Prior to Admission medications    Medication Sig Start Date End Date Taking? Authorizing Provider   meloxicam (MOBIC) 7.5 MG tablet Take 1 tablet by mouth 2 times daily as needed for Pain 2/10/20 2/17/20  Ginger Push, PA-C   nebivolol (BYSTOLIC) 5 MG tablet Take 2 tablets by mouth daily for 7 days 2/10/20 2/17/20  Ginger Push, PA-C   tadalafil (CIALIS) 20 MG tablet Take 1 tablet by mouth as needed for Erectile Dysfunction 2/10/20   Ginger Push, PA-C   lisinopril (PRINIVIL;ZESTRIL) 40 MG tablet Take 1 tablet by mouth daily 2/10/20   Ginger Push, PA-C   carvedilol (COREG) 25 MG tablet Take 1 tablet by mouth 2 times daily (with meals) 10/18/19   Ginger Push, PA-C   aspirin 81 MG EC tablet Take 1 tablet by mouth daily 5/26/19   Kieran Rodney MD   nitroGLYCERIN (NITROSTAT) 0.4 MG SL tablet up to max of 3 total doses.  If no relief after 1 dose, call 911. 5/26/19   Kieran Rodney MD   atorvastatin (LIPITOR) 80 MG tablet Take 1 tablet by mouth nightly 5/26/19   Kieran Rodney MD   amLODIPine (NORVASC) 10 MG tablet Take 1 tablet by mouth daily tone normal. Strength Rt UE, LE 5/5. Left UE 2/5 and LE 3/5. No myoclonus, DTR 3+. No involuntary movements are noted. Skin:  Warm and dry. Good color, turgor and pigmentation. No lesions or scars.   No cyanosis or clubbing  Neurological/Psychiatric: The patient's general behavior, level of consciousness, thought content and emotional status is normal.          INVESTIGATIONS     Laboratory Testing:     Recent Results (from the past 24 hour(s))   APTT    Collection Time: 02/15/20  7:20 PM   Result Value Ref Range    PTT 49.3 (H) 20.5 - 30.5 sec   APTT    Collection Time: 02/16/20 12:10 AM   Result Value Ref Range    PTT 33.6 (H) 20.5 - 30.5 sec   Basic Metabolic Panel    Collection Time: 02/16/20  8:03 AM   Result Value Ref Range    Glucose 94 70 - 99 mg/dL    BUN 12 6 - 20 mg/dL    CREATININE 1.05 0.70 - 1.20 mg/dL    Bun/Cre Ratio NOT REPORTED 9 - 20    Calcium 9.2 8.6 - 10.4 mg/dL    Sodium 140 135 - 144 mmol/L    Potassium 3.9 3.7 - 5.3 mmol/L    Chloride 107 98 - 107 mmol/L    CO2 21 20 - 31 mmol/L    Anion Gap 12 9 - 17 mmol/L    GFR Non-African American >60 >60 mL/min    GFR African American >60 >60 mL/min    GFR Comment          GFR Staging NOT REPORTED    CBC Auto Differential    Collection Time: 02/16/20  8:03 AM   Result Value Ref Range    WBC 7.6 3.5 - 11.3 k/uL    RBC 4.98 4.21 - 5.77 m/uL    Hemoglobin 14.3 13.0 - 17.0 g/dL    Hematocrit 44.4 40.7 - 50.3 %    MCV 89.2 82.6 - 102.9 fL    MCH 28.7 25.2 - 33.5 pg    MCHC 32.2 28.4 - 34.8 g/dL    RDW 12.9 11.8 - 14.4 %    Platelets 387 415 - 277 k/uL    MPV 11.3 8.1 - 13.5 fL    NRBC Automated 0.0 0.0 per 100 WBC    Differential Type NOT REPORTED     Seg Neutrophils 48 36 - 65 %    Lymphocytes 30 24 - 43 %    Monocytes 12 3 - 12 %    Eosinophils % 9 (H) 1 - 4 %    Basophils 1 0 - 2 %    Immature Granulocytes 0 0 %    Segs Absolute 3.67 1.50 - 8.10 k/uL    Absolute Lymph # 2.31 1.10 - 3.70 k/uL    Absolute Mono # 0.92 0.10 - 1.20 k/uL    Absolute Eos #

## 2020-02-17 ENCOUNTER — APPOINTMENT (OUTPATIENT)
Dept: ULTRASOUND IMAGING | Age: 50
DRG: 045 | End: 2020-02-17
Payer: MEDICARE

## 2020-02-17 PROBLEM — R01.1 SYSTOLIC MURMUR: Status: ACTIVE | Noted: 2020-02-17

## 2020-02-17 LAB
ABSOLUTE EOS #: 0.37 K/UL (ref 0–0.44)
ABSOLUTE IMMATURE GRANULOCYTE: 0.04 K/UL (ref 0–0.3)
ABSOLUTE LYMPH #: 1.47 K/UL (ref 1.1–3.7)
ABSOLUTE MONO #: 0.99 K/UL (ref 0.1–1.2)
ANION GAP SERPL CALCULATED.3IONS-SCNC: 14 MMOL/L (ref 9–17)
BASOPHILS # BLD: 1 % (ref 0–2)
BASOPHILS ABSOLUTE: 0.06 K/UL (ref 0–0.2)
BUN BLDV-MCNC: 12 MG/DL (ref 6–20)
BUN/CREAT BLD: ABNORMAL (ref 9–20)
CALCIUM SERPL-MCNC: 9.7 MG/DL (ref 8.6–10.4)
CHLORIDE BLD-SCNC: 103 MMOL/L (ref 98–107)
CO2: 21 MMOL/L (ref 20–31)
CREAT SERPL-MCNC: 1.17 MG/DL (ref 0.7–1.2)
DIFFERENTIAL TYPE: ABNORMAL
EOSINOPHILS RELATIVE PERCENT: 3 % (ref 1–4)
GFR AFRICAN AMERICAN: >60 ML/MIN
GFR NON-AFRICAN AMERICAN: >60 ML/MIN
GFR SERPL CREATININE-BSD FRML MDRD: ABNORMAL ML/MIN/{1.73_M2}
GFR SERPL CREATININE-BSD FRML MDRD: ABNORMAL ML/MIN/{1.73_M2}
GLUCOSE BLD-MCNC: 101 MG/DL (ref 70–99)
GLUCOSE BLD-MCNC: 109 MG/DL (ref 75–110)
HCT VFR BLD CALC: 47 % (ref 40.7–50.3)
HEMOGLOBIN: 15.7 G/DL (ref 13–17)
IMMATURE GRANULOCYTES: 0 %
LYMPHOCYTES # BLD: 12 % (ref 24–43)
MCH RBC QN AUTO: 28.9 PG (ref 25.2–33.5)
MCHC RBC AUTO-ENTMCNC: 33.4 G/DL (ref 28.4–34.8)
MCV RBC AUTO: 86.6 FL (ref 82.6–102.9)
MONOCYTES # BLD: 8 % (ref 3–12)
NRBC AUTOMATED: 0 PER 100 WBC
PDW BLD-RTO: 12.9 % (ref 11.8–14.4)
PLATELET # BLD: 240 K/UL (ref 138–453)
PLATELET ESTIMATE: ABNORMAL
PMV BLD AUTO: 11.3 FL (ref 8.1–13.5)
POTASSIUM SERPL-SCNC: 3.5 MMOL/L (ref 3.7–5.3)
RBC # BLD: 5.43 M/UL (ref 4.21–5.77)
RBC # BLD: ABNORMAL 10*6/UL
SEG NEUTROPHILS: 76 % (ref 36–65)
SEGMENTED NEUTROPHILS ABSOLUTE COUNT: 9.43 K/UL (ref 1.5–8.1)
SODIUM BLD-SCNC: 138 MMOL/L (ref 135–144)
WBC # BLD: 12.4 K/UL (ref 3.5–11.3)
WBC # BLD: ABNORMAL 10*3/UL

## 2020-02-17 PROCEDURE — 6370000000 HC RX 637 (ALT 250 FOR IP): Performed by: STUDENT IN AN ORGANIZED HEALTH CARE EDUCATION/TRAINING PROGRAM

## 2020-02-17 PROCEDURE — 2580000003 HC RX 258: Performed by: STUDENT IN AN ORGANIZED HEALTH CARE EDUCATION/TRAINING PROGRAM

## 2020-02-17 PROCEDURE — 6360000002 HC RX W HCPCS: Performed by: STUDENT IN AN ORGANIZED HEALTH CARE EDUCATION/TRAINING PROGRAM

## 2020-02-17 PROCEDURE — 99223 1ST HOSP IP/OBS HIGH 75: CPT | Performed by: INTERNAL MEDICINE

## 2020-02-17 PROCEDURE — 76770 US EXAM ABDO BACK WALL COMP: CPT

## 2020-02-17 PROCEDURE — 1200000000 HC SEMI PRIVATE

## 2020-02-17 PROCEDURE — 99233 SBSQ HOSP IP/OBS HIGH 50: CPT

## 2020-02-17 PROCEDURE — 36415 COLL VENOUS BLD VENIPUNCTURE: CPT

## 2020-02-17 PROCEDURE — 82947 ASSAY GLUCOSE BLOOD QUANT: CPT

## 2020-02-17 PROCEDURE — 80048 BASIC METABOLIC PNL TOTAL CA: CPT

## 2020-02-17 PROCEDURE — 99233 SBSQ HOSP IP/OBS HIGH 50: CPT | Performed by: PSYCHIATRY & NEUROLOGY

## 2020-02-17 PROCEDURE — 6370000000 HC RX 637 (ALT 250 FOR IP): Performed by: FAMILY MEDICINE

## 2020-02-17 PROCEDURE — 6370000000 HC RX 637 (ALT 250 FOR IP): Performed by: NURSE PRACTITIONER

## 2020-02-17 PROCEDURE — 99254 IP/OBS CNSLTJ NEW/EST MOD 60: CPT | Performed by: PHYSICAL MEDICINE & REHABILITATION

## 2020-02-17 PROCEDURE — 97110 THERAPEUTIC EXERCISES: CPT

## 2020-02-17 PROCEDURE — 85025 COMPLETE CBC W/AUTO DIFF WBC: CPT

## 2020-02-17 PROCEDURE — 97129 THER IVNTJ 1ST 15 MIN: CPT

## 2020-02-17 RX ORDER — AMLODIPINE BESYLATE 10 MG/1
10 TABLET ORAL NIGHTLY
Status: DISCONTINUED | OUTPATIENT
Start: 2020-02-17 | End: 2020-02-19 | Stop reason: HOSPADM

## 2020-02-17 RX ORDER — HYDRALAZINE HYDROCHLORIDE 20 MG/ML
10 INJECTION INTRAMUSCULAR; INTRAVENOUS EVERY 4 HOURS PRN
Status: DISCONTINUED | OUTPATIENT
Start: 2020-02-17 | End: 2020-02-19 | Stop reason: HOSPADM

## 2020-02-17 RX ORDER — HYDROCHLOROTHIAZIDE 25 MG/1
12.5 TABLET ORAL DAILY
Status: DISCONTINUED | OUTPATIENT
Start: 2020-02-18 | End: 2020-02-18

## 2020-02-17 RX ORDER — FLUOXETINE HYDROCHLORIDE 20 MG/1
20 CAPSULE ORAL DAILY
Status: DISCONTINUED | OUTPATIENT
Start: 2020-02-17 | End: 2020-02-19 | Stop reason: HOSPADM

## 2020-02-17 RX ORDER — HYDROCHLOROTHIAZIDE 25 MG/1
12.5 TABLET ORAL DAILY
Status: DISCONTINUED | OUTPATIENT
Start: 2020-02-17 | End: 2020-02-17

## 2020-02-17 RX ORDER — HYDROCHLOROTHIAZIDE 25 MG/1
12.5 TABLET ORAL
Status: DISCONTINUED | OUTPATIENT
Start: 2020-02-18 | End: 2020-02-17

## 2020-02-17 RX ADMIN — AMLODIPINE BESYLATE 10 MG: 10 TABLET ORAL at 08:43

## 2020-02-17 RX ADMIN — Medication 81 MG: at 08:43

## 2020-02-17 RX ADMIN — ENOXAPARIN SODIUM 30 MG: 30 INJECTION SUBCUTANEOUS at 21:57

## 2020-02-17 RX ADMIN — SODIUM CHLORIDE, PRESERVATIVE FREE 10 ML: 5 INJECTION INTRAVENOUS at 07:59

## 2020-02-17 RX ADMIN — HYDRALAZINE HYDROCHLORIDE 10 MG: 20 INJECTION INTRAMUSCULAR; INTRAVENOUS at 16:58

## 2020-02-17 RX ADMIN — FOLIC ACID 1 MG: 1 TABLET ORAL at 21:57

## 2020-02-17 RX ADMIN — HYDROCHLOROTHIAZIDE 12.5 MG: 25 TABLET ORAL at 08:43

## 2020-02-17 RX ADMIN — FLUOXETINE HYDROCHLORIDE 20 MG: 20 CAPSULE ORAL at 17:02

## 2020-02-17 RX ADMIN — LISINOPRIL 40 MG: 20 TABLET ORAL at 08:43

## 2020-02-17 RX ADMIN — SODIUM CHLORIDE, PRESERVATIVE FREE 10 ML: 5 INJECTION INTRAVENOUS at 22:01

## 2020-02-17 RX ADMIN — FOLIC ACID 1 MG: 1 TABLET ORAL at 08:43

## 2020-02-17 RX ADMIN — TICAGRELOR 90 MG: 90 TABLET ORAL at 08:43

## 2020-02-17 RX ADMIN — SENNOSIDES AND DOCUSATE SODIUM 2 TABLET: 8.6; 5 TABLET ORAL at 08:43

## 2020-02-17 RX ADMIN — TICAGRELOR 90 MG: 90 TABLET ORAL at 21:57

## 2020-02-17 RX ADMIN — ENOXAPARIN SODIUM 30 MG: 30 INJECTION SUBCUTANEOUS at 08:43

## 2020-02-17 RX ADMIN — ROSUVASTATIN CALCIUM 40 MG: 20 TABLET, FILM COATED ORAL at 21:57

## 2020-02-17 RX ADMIN — AMLODIPINE BESYLATE 10 MG: 10 TABLET ORAL at 21:57

## 2020-02-17 RX ADMIN — HYDRALAZINE HYDROCHLORIDE 10 MG: 20 INJECTION INTRAMUSCULAR; INTRAVENOUS at 00:47

## 2020-02-17 ASSESSMENT — ENCOUNTER SYMPTOMS
VOMITING: 0
NAUSEA: 0
DIARRHEA: 0
CONSTIPATION: 0
COUGH: 0
WHEEZING: 0

## 2020-02-17 ASSESSMENT — PAIN SCALES - GENERAL
PAINLEVEL_OUTOF10: 0
PAINLEVEL_OUTOF10: 2

## 2020-02-17 ASSESSMENT — PAIN DESCRIPTION - PAIN TYPE: TYPE: ACUTE PAIN

## 2020-02-17 ASSESSMENT — PAIN DESCRIPTION - LOCATION: LOCATION: HEAD

## 2020-02-17 ASSESSMENT — PAIN DESCRIPTION - DESCRIPTORS: DESCRIPTORS: HEADACHE

## 2020-02-17 NOTE — CONSULTS
[facility]   Consult Note         Today's Date: 2/17/2020  Patient Name: Janki Jaimes  Date of admission: 2/13/2020  2:54 PM  Patient's age: 52 y. o., 1970  Admission Dx: Stroke-like symptoms [R29.90]  Ischemic stroke ([facility] [address] [I63.9]    Reason for Consult:  Cardiac evaluation    Requesting Physician: Nicolas Kinsey MD    REASON FOR CONSULT:  Aortic dilation    History Obtained From:  Patient, chart, staff, records    HISTORY OF PRESENT ILLNESS:      The patient is a 52 y.o. male with a past medical history significant for CAD s/p PCI on 05/24/19, HTN, hyperlipidemia, severe left ventricular concentric hypertrophy. He was admitted for right pontine stroke. Echo was performed and patient was found to have a 4.5cm aortic root dilation. His calculated aortic index is 1.9cm/m2 based on height, weight and aortic diameter of 4.5cm on Echo. Patient denies chest pain, chest discomfort, palpitations, headache, diaphoresis, leg edema, abdominal discomfort, nausea. Patient denies known family cardiac history aside from hypertension. Troponins 25, 23    ECHO 5/24/19: EF 40-45%, no regurg/stenosis.      CATH 5/24/19: Single vessel CAD. SANDEEP to mid LAD. Patient denies smoking, illicit drug use. Alcohol use socially. Past Medical History:   has a past medical history of Heart attack ([facility] [address] Hyperlipidemia, and Hypertension. Past Surgical History:   has a past surgical history that includes knee surgery (Right); Foot surgery (Bilateral); Coronary angioplasty with stent (2019); and Cardiac surgery. Home Medications:    Prior to Admission medications    Medication Sig Start Date End Date Taking?  Authorizing Provider   meloxicam (MOBIC) 7.5 MG tablet Take 1 tablet by mouth 2 times daily as needed for Pain 2/10/20 2/17/20  Gemma Rosario PA-C   nebivolol (BYSTOLIC) 5 MG tablet Take 2 tablets by mouth daily for 7 days 2/10/20 2/17/20  Gemma Rosario PA-C   tadalafil (CIALIS) 20 MG throat. · Cardiovascular: No chest pains, sob, diaphoresis  · Respiratory: No previous pulmonary problems  · Gastrointestinal: No abdominal pain, appetite loss, blood in stools. No change in bowel or bladder habits. · Genitourinary: No dysuria, trouble voiding, or hematuria. · Musculoskeletal:  No gait disturbance, No weakness or joint complaints. · Integumentary: No rash or pruritis. · Neurological: No headache, diplopia, change in muscle strength, numbness or tingling. No change in gait, balance, coordination, mood, affect, memory, mentation, behavior. · Psychiatric: No anxiety, or depression. · Endocrine: No temperature intolerance. No excessive thirst, fluid intake, or urination. No tremor. · Hematologic/Lymphatic: No abnormal bruising or bleeding, blood clots or swollen lymph nodes. · Allergic/Immunologic: No nasal congestion or hives. PHYSICAL EXAM:      BP (!) 158/87   Pulse 85   Temp 100.6 °F (38.1 °C) (Oral)   Resp 25   Ht 6' 1\" (1.854 m)   Wt 245 lb 13 oz (111.5 kg)   SpO2 98%   BMI 32.43 kg/m²    Constitutional and General Appearance: alert, cooperative, no distress and appears stated age  HEENT: PERRL, no cervical lymphadenopathy. No masses palpable. Normal oral mucosa  Respiratory:  · Normal excursion and expansion without use of accessory muscles  · Resp Auscultation: Good respiratory effort. No for increased work of breathing. On auscultation: clear to auscultation bilaterally  Cardiovascular:  · Grade 3 aortic holosystolic murmur on auscultation.   · The apical impulse is not displaced  · Heart tones are crisp and normal. regular S1 and S2.  · Jugular venous pulsation Normal  · The carotid upstroke is normal in amplitude and contour without delay or bruit  · Peripheral pulses are symmetrical and full   Abdomen:   · No masses or tenderness  · Bowel sounds present  Extremities:  ·  No Cyanosis or Clubbing  ·  Lower extremity edema: No  ·  Skin: Warm and dry  Neurological:  · Alert and oriented. · Moves all extremities well  · No abnormalities of mood, affect, memory, mentation, or behavior are noted        EKG:    Date: 02/17/20  Reading: No acute ischemia      LAST ECHO:  ECHO 5/24/19: EF 40-45%, no regurg/stenosis. LAST Stress Test:   Date of last ST:  Major Findings:    LAST Cardiac Angiography:  CATH 5/24/19: Single vessel CAD. SANDEEP to mid LAD      Labs:     CBC:   Recent Labs     02/16/20  0803 02/17/20  0434   WBC 7.6 12.4*   HGB 14.3 15.7   HCT 44.4 47.0    240     BMP:   Recent Labs     02/16/20  0803 02/17/20  0434    138   K 3.9 3.5*   CO2 21 21   BUN 12 12   CREATININE 1.05 1.17   LABGLOM >60 >60   GLUCOSE 94 101*     BNP: No results for input(s): BNP in the last 72 hours. PT/INR: No results for input(s): PROTIME, INR in the last 72 hours. APTT:  Recent Labs     02/15/20  1920 02/16/20  0010   APTT 49.3* 33.6*     CARDIAC ENZYMES:No results for input(s): CKTOTAL, CKMB, CKMBINDEX, TROPONINI in the last 72 hours. FASTING LIPID PANEL:  Lab Results   Component Value Date    HDL 41 02/14/2020    TRIG 40 02/14/2020     LIVER PROFILE:No results for input(s): AST, ALT, LABALBU in the last 72 hours. Troponins: Invalid input(s): TROPONIN     Other Current Problems  Patient Active Problem List   Diagnosis    STEMI (ST elevation myocardial infarction) ([address]    Coronary artery disease involving native coronary artery of native heart without angina pectoris    Acute ST segment elevation myocardial infarction ([address]    Essential hypertension    Class 1 obesity due to excess calories with body mass index (BMI) of 32.0 to 32.9 in adult    Stroke-like symptoms    Ischemic stroke ([address]    Hypertensive emergency without congestive heart failure    Numbness    Basilar artery stenosis/occlusion with infarction ([address]    Right arm weakness           IMPRESSION & Recommendations:      1. Aortic root measured to be 4.5cm on 02/14/2020 echo.  He is 6 foot 1 inch tall and

## 2020-02-17 NOTE — PROGRESS NOTES
[facility]upational [address]  Occupational Therapy Not Seen Note    DATE: 2020  Name: Talya Resendez  : 1970  MRN: [id number]    Patient not available for Occupational Therapy due to:    [x] Testing: Pt transferred from 5B to 3B this AM and now leaving 3B to go down for a renal ultrasound. [] Hemodialysis    [] Blood Transfusion in Progress    []Refusal by Patient:    [] Surgery/Procedure:    [] Strict Bedrest    [] Sedation    [] Spine Precautions     [] Pt being transferred to palliative care at this time. Spoke with pt/family and OT services to be defered. [] Pt independent with functional mobility and functional tasks.  Pt with no OT acute care needs at this time, will defer OT eval.        Next Scheduled Treatment: Will check back PM if able or 2020     Yina Blazing OTR/L

## 2020-02-17 NOTE — PROGRESS NOTES
52years old non-smoker male with history of coronary artery disease status post stent on aspirin and Brilinta who presented with left-sided weakness and dysarthria found to have right pontine stroke in the setting of hypoplastic bilateral vertebral arteries and hypoplastic basilar artery along with diffuse intracranial athero and basilar artery occlusion versus severe stenosis, he was treated with heparin for total of 48 hours, stopped on 2/15 2020 at 8 PM, no change in exam after stopping the heparin. Repeated CTA head and neck stable no changes    Patient blood pressure ranged from 140s to 170s, primary team started amlodipine 5 mg    1. Off heparin, maintain aspirin and Brilinta  2. Will keep watching while off heparin in hospital  3. Folic acid 1 mg twice daily  4. Switch Lipitor to Crestor 40 mg daily  5. IV fluid   6. Avoid symptomatic hypotension-can bring the blood pressure goal slowly down gradually over days  7.  Consider vasculitis work-up    Barry Lewis MD  Stroke, [facility]  [id number]  Electronically signed 2/16/2020 at 7:04 PM

## 2020-02-17 NOTE — PROGRESS NOTES
input(s): BNP in the last 72 hours. Lipids:   No results for input(s): CHOL, HDL in the last 72 hours. Invalid input(s): LDLCALCU  INR: No results for input(s): INR in the last 72 hours. 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 Off heparin, maintain aspirin and Brilinta  2. We will discuss with Dr. Maribel Noel for further management however likely outpatient follow-up  3. Folic acid 1 mg twice daily  4. Crestor 40 mg daily  5. IV fluid   6. Avoid symptomatic hypotension-can bring the blood pressure goal slowly down gradually over days  7.  Consider vasculitis work-up    Anne Francis MD  Stroke, [facility]  [id number]  Electronically signed 2/17/2020 at 9:53 AM

## 2020-02-17 NOTE — PROGRESS NOTES
[facility]  Documentation Note                Admission Dx: Stroke-like symptoms [R29.90]  Ischemic stroke ([facility] [I63.9]    Past Medical History:   has a past medical history of Heart attack ([facility] Hyperlipidemia, and Hypertension. Previous Testing:     ECHO 5/24/19: EF 40-45%, no regurg/stenosis. CATH 5/24/19: Single vessel CAD. SANDEEP to mid LAD. Previous office/hospital visit:   Dr. Jon Villegas 7/2/19:   1. CAD status post hospital admission 05/2019 with acute coronary syndrome had 80% proximal LAD stenosis underwent stenting with drug-eluting stent with no disease in the LCX and RCA. 2. Borderline LV systolic function ejection fraction of 40-45% on echocardiogram 05/2019.  3. Severe left ventricular concentric hypertrophy on echocardiogram likely from untreated longstanding hypertension. 4. Diastolic dysfunction  5. Hypertension  6. Hyperlipidemia    Plan --   1. CAD as detailed above with stenting to the LAD. Currently stable and asymptomatic will continue with aspirin Brilinta beta-blockers calcium channel blockers Ace inhibitors and statins. 2. Mild ischemic cardiomyopathy currently without signs of volume overload will continue with the beta-blockers and Ace inhibitors. 3. Hypertensive cardiomyopathy with severe LVH and diastolic dysfunction currently on beta-blockers and calcium channel blockers  4. Hypertension well controlled currently on a combination of beta-blockers Ace inhibitors and calcium channel blockers. I advised him to take his Norvasc at night and lisinopril in a.m. to reduce the dizziness post taking antihypertensive medications. 5. Hyperlipidemia will continue with high-dose statin. 6. Mild obesity he is undergoing cardiac rehab I advised him diet and exercise and losing weight  7. Follow-up in 6 months    Home Medications:      Prior to Admission medications    Medication Sig Start Date End Date Taking?  Authorizing Provider   meloxicam (MOBIC) 7.5 MG tablet Take 1 tablet by mouth 2 times daily as needed for Pain 2/10/20 2/17/20  Herson Samuel PA-C   nebivolol (BYSTOLIC) 5 MG tablet Take 2 tablets by mouth daily for 7 days 2/10/20 2/17/20  Herson Samuel PA-C   tadalafil (CIALIS) 20 MG tablet Take 1 tablet by mouth as needed for Erectile Dysfunction 2/10/20   Herson Samuel PA-C   lisinopril (PRINIVIL;ZESTRIL) 40 MG tablet Take 1 tablet by mouth daily 2/10/20   Herson Samuel PA-C   carvedilol (COREG) 25 MG tablet Take 1 tablet by mouth 2 times daily (with meals) 10/18/19   Herson Samuel PA-C   aspirin 81 MG EC tablet Take 1 tablet by mouth daily 5/26/19   Breana Fleming MD   nitroGLYCERIN (NITROSTAT) 0.4 MG SL tablet up to max of 3 total doses.  If no relief after 1 dose, call 911. 5/26/19   Breana Fleming MD   atorvastatin (LIPITOR) 80 MG tablet Take 1 tablet by mouth nightly 5/26/19   Breana Fleming MD   amLODIPine (NORVASC) 10 MG tablet Take 1 tablet by mouth daily 5/26/19   Breana Fleming MD   ticagrelor (BRILINTA) 90 MG TABS tablet Take 1 tablet by mouth 2 times daily 5/26/19   Breana Fleming MD      Current Facility-Administered Medications: hydrALAZINE (APRESOLINE) injection 10 mg, 10 mg, Intravenous, Q4H PRN  enoxaparin (LOVENOX) injection 30 mg, 30 mg, Subcutaneous, BID  hydrochlorothiazide (HYDRODIURIL) tablet 12.5 mg, 12.5 mg, Oral, Daily  amLODIPine (NORVASC) tablet 10 mg, 10 mg, Oral, Daily  lisinopril (PRINIVIL;ZESTRIL) tablet 40 mg, 40 mg, Oral, Daily  rosuvastatin (CRESTOR) tablet 40 mg, 40 mg, Oral, Nightly  folic acid (FOLVITE) tablet 1 mg, 1 mg, Oral, BID  sennosides-docusate sodium (SENOKOT-S) 8.6-50 MG tablet 2 tablet, 2 tablet, Oral, Daily  aspirin EC tablet 81 mg, 81 mg, Oral, Daily  ticagrelor (BRILINTA) tablet 90 mg, 90 mg, Oral, BID  magnesium hydroxide (MILK OF MAGNESIA) 400 MG/5ML suspension 30 mL, 30 mL, Oral, Daily PRN  ondansetron (ZOFRAN) injection 4 mg, 4 mg, Intravenous, Q6H PRN  sodium chloride flush 0.9 % injection 10 mL, 10 mL, Intravenous, 2 times per day  sodium chloride flush 0.9 % injection 10 mL, 10 mL, Intravenous, PRN  acetaminophen (TYLENOL) tablet 650 mg, 650 mg, Oral, Q4H PRN  0.9 % sodium chloride infusion, , Intravenous, Continuous  Facility-Administered Medications Ordered in Other Encounters: sodium chloride flush 0.9 % injection 10 mL, 10 mL, Intravenous, 2 times per day  sodium chloride flush 0.9 % injection 10 mL, 10 mL, Intravenous, PRN    Labs:     CBC:   Recent Labs     02/16/20  0803 02/17/20  0434   WBC 7.6 12.4*   HGB 14.3 15.7   HCT 44.4 47.0    240     BMP:   Recent Labs     02/16/20  0803 02/17/20  0434    138   K 3.9 3.5*   CO2 21 21   BUN 12 12   CREATININE 1.05 1.17   LABGLOM >60 >60   GLUCOSE 94 101*     APTT:  Recent Labs     02/15/20  1920 02/16/20  0010   APTT 49.3* 33.6*       FASTING LIPID PANEL:  Lab Results   Component Value Date    HDL 41 02/14/2020    TRIG 40 02/14/2020     [facility]   Pager: [phone]

## 2020-02-17 NOTE — PROGRESS NOTES
PT/OT for left sided weakness. BP repeat shown 190/62 mmHg. Labs reviewed. As per the nurse, pt having low mood, tearful. First episode of stroke. OBJECTIVE     Vital Signs:  BP (!) 172/81   Pulse 85   Temp 100.6 °F (38.1 °C) (Oral)   Resp 25   Ht 6' 1\" (1.854 m)   Wt 245 lb 13 oz (111.5 kg)   SpO2 98%   BMI 32.43 kg/m²     Temp (24hrs), Av.7 °F (37.1 °C), Min:97.7 °F (36.5 °C), Max:100.6 °F (38.1 °C)    In: -   Out: 1530 [Urine:1530]    Physical Exam:  Constitutional: This is a well developed, well nourished, 30-34.9 - Obesity Grade I 52y.o. year old male who is alert, oriented, cooperative and in no apparent distress. Head:normocephalic and atraumatic. EENT:  PERRLA. Septum was midline, mucosa was without erythema, exudates or cobblestoning. No thrush was noted. Neck: Supple without thyromegaly. No elevated JVP. Trachea was midline. Respiratory: Chest was symmetrical.  Breath sounds bilaterally were clear to auscultation. There were no wheezes, rhonchi or rales. There is no intercostal retraction or use of accessory muscles. Cardiovascular: Regular without murmur, or clicks. No added sounds. Abdomen: Slightly rounded and soft without organomegaly. No rebound, rigidity or guarding was appreciated. Lymphatic: No lymphadenopathy. Musculoskeletal: Normal curvature of the spine. No gross muscle weakness. Extremities:  No lower extremity edema. No ulcerations, tenderness, varicosities or erythema. Muscle size, tone and strength are normal.  No involuntary movements are noted. Skin:  Warm and dry. Good color, turgor and pigmentation. No cyanosis or clubbing.   Neurological/Psychiatric: The patient's general behavior, level of consciousness, thought content and emotional status is normal.        Medications:  Scheduled Medications:    enoxaparin  30 mg Subcutaneous BID    hydrochlorothiazide  12.5 mg Oral Daily    amLODIPine  10 mg Oral Daily    lisinopril  40 mg Oral Daily    transformation of the right paramedian pontine infarct. Moderate sinus disease. Ct Head Wo Contrast    Result Date: 2/13/2020  No acute intracranial abnormality. Findings were discussed with Dr. Roberta Polo At 3:41 pm on 2/13/2020. Ct Chest W Contrast    Result Date: 2/13/2020  1. No acute abnormalities seen in the chest 2. Tortuous ascending thoracic aorta accounts for the density seen on the chest radiograph 3. Coronary artery calcifications     Cta Head Neck W Contrast    Result Date: 2/15/2020  1. No new arterial abnormality in the head or neck. 2. 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 3. Moderate diffuse irregularity of the P2 and P3 segments posterior cerebral arteries, which are supplied by the posterior communicating arteries. 4. No hemodynamically significant arterial stenosis in the neck. Cta Head Neck W Contrast    Result Date: 2/13/2020  No significant stenosis or evidence for occlusion. 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 The superior cerebellar arteries also appear to arise from the posterior cerebral arteries. Mri Brain Wo Contrast    Result Date: 2/13/2020  Acute right paramedian pontine stroke. 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 Remote left posterior cerebellar stroke. Mild non-specific white matter changes favor chronic small ischemic disease. Demyelinating process may also be considered in differential although thought less likely. ASSESSMENT & PLAN     ASSESSMENT / PLAN:   1. Uncontrolled Hypertension overlap with Ischemic CVA: Initially maintained permissive hypertensive. Currently SBP goal <160 mmHg. Continue Lisinopril 40 mg, Amlodipine 10 mg daily. Started on HCTZ 12.5 mg, will go up as tolerated. Hydralazine IV PRN with holding parameters. 2. Ischemic Right pontine stroke: Management as per Neurology as primary. 3. CAD s/p PCI 5/19.  Continue ASA, brilinta and crestor. 4. Aortic root dilation:  Stable at 4.8 cm. Asymptomatic. Cardiology consulted. Serial monitoring as outpatient required. 5. Minor depression: Secondary to new onset stroke. Might benefit from Cognitive behavioral therapy.     DVT ppx: Lovenox SC 30 mg Bid  Discharge planning: As per primary. Marlyn Coppola MD  Internal Medicine Resident, PGY-1  [address]

## 2020-02-17 NOTE — PROGRESS NOTES
Speech Language Pathology  Speech Language Pathology  [address] [facility]    Cognitive Treatment Note    Date: 2/17/2020  Patients Name: Maricruz Collazo  MRN: [id number]  Diagnosis:   Patient Active Problem List   Diagnosis Code    [facility]) [facility] I21.3    Coronary artery disease involving native coronary artery of native heart without angina pectoris I25.10    Acute ST segment elevation myocardial infarction ([facility] I21.3    Essential hypertension I10    Class 1 obesity due to excess calories with body mass index (BMI) of 32.0 to 32.9 in adult E66.09, Z68.32    Stroke-like symptoms R29.90    Ischemic stroke ([facility] I63.9    Hypertensive emergency without congestive heart failure I16.1    Numbness R20.0    Basilar artery stenosis/occlusion with infarction (HCC) I63.22    Right arm weakness R29.898       Pain: 0/10    Cognitive Treatment    Treatment time: 14:03-14:08      Subjective: [x] Alert [x] Cooperative     [] Confused     [] Agitated    [] Lethargic      Objective/Assessment:    Recall:    Delayed Memory Recall: 6/6    Problem Solving/Reasoning:    Making Word Deductions: 8/10 increased to 10/10 with repetitions and minimal verbal cues    Other: Pt provided with Tips Sheet for memory and Communication Booster Strategies handout. Reviewed with clinician. Pt verbalized understanding. Plan:  [x] Continue ST services    [] Discharge from ST:      Discharge recommendations: [] Inpatient Rehab   [] [place]   [] Outpatient Therapy  [] Follow up at trauma clinic   [x] Other: Further therapy recommended at discharge. Treatment completed by: Completed by: Aureliano Benavides,  Clinician    Cosigned By: Mir Trejo S.CCC/SLP

## 2020-02-17 NOTE — PROGRESS NOTES
beta blocker due to bradycardia    acute right pontine stroke  On aspirin and Brilinta  Folic acid 1 mg twice daily  Crestor 40 mg  Vasculitis work-up as per neurology    Ascending aortic dilatation 4.5 cm-cardiology consulted by primary  Dilation has been stable since last 1 year    DVT prophylaxis as per primary    Mohit Sanford MD            Department of [facility]         2/16/2020, 8:03 PM

## 2020-02-17 NOTE — PROGRESS NOTES
Physical Therapy  Facility/Department: [facility]  Daily Treatment Note  NAME: Flavio Ragland  : 1970  MRN: [id number]    Date of Service: 2020    Discharge Recommendations:  Patient would benefit from continued therapy after discharge        Assessment   Body structures, Functions, Activity limitations: Decreased endurance;Decreased strength;Decreased balance;Decreased safe awareness;Decreased coordination;Decreased fine motor control;Decreased functional mobility   Assessment: Pt performed EOB seated exercises this date. Pt displaying decreased strength on LUE and LLE. Pt refuses ambulation this tx due to increased fatigue. Tx limited by increased fatigue and decreased strength. Continue physical therapy to increase strength and endurance. Pt is unsafe to return to prior living at this time. Prognosis: Good  Decision Making: Medium Complexity  PT Education: Plan of Care;General Safety;Gait Training;Equipment;Transfer Training;Goals; Home Exercise Program  REQUIRES PT FOLLOW UP: Yes  Activity Tolerance  Activity Tolerance: Patient Tolerated treatment well;Patient limited by endurance; Patient limited by fatigue  Activity Tolerance: Pt expressing discouragement and frustration throughout tx. Patient Diagnosis(es): The primary encounter diagnosis was Numbness. A diagnosis of Cardiac murmur was also pertinent to this visit. has a past medical history of Heart attack ([address] Hyperlipidemia, and Hypertension. has a past surgical history that includes knee surgery (Right); Foot surgery (Bilateral); Coronary angioplasty with stent (2019); and Cardiac surgery.     Restrictions  Restrictions/Precautions  Restrictions/Precautions: Fall Risk, Up as Tolerated, General Precautions  Required Braces or Orthoses?: No  Position Activity Restriction  Other position/activity restrictions: SBP goal 140-220  Subjective   General  Chart Reviewed: Yes  Response To Previous Treatment: Patient with no

## 2020-02-17 NOTE — PROGRESS NOTES
Went to hang IVF patient refused to allow writer to hang fluids said he drinking enough fluids.  Neurology resident in room informed of patients refusal of IVF

## 2020-02-17 NOTE — CONSULTS
Physical Medicine & Rehabilitation  Consult Note      Admitting Physician: Vitor Rios, *    Primary Care Provider: Shamika Rolle PA-C     Reason for Consult:  Acute Inpatient Rehabilitation    Chief Complaint: Dysarthria mild left hemiparesis    History of Present Illness:  Referring Provider is requesting an evaluation for appropriate placement upon discharge from acute care. Mr. Igor Sarabia is a 52 y.o. right handed male who was [facility] to [facility] on 2/13/2020 with Aphasia and Numbness (left side since this morning)    29-year-old male with history of hypertension, MI status post cardiac stents and May 2019 who presented with dysarthria mild left hemiparesis. .  No history of prior strokes. Patient was found to have an undocumented heart murmur. CTA chest was obtained which showed tortuous aorta no other abnormalities. Patient has been on aspirin, Brilinta and Lipitor. 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 MRI brain with acute right pontine infarct and concern for possible basilar thrombus. Patient started on low-dose heparin. He was then noted to have some worsening left upper extremity weakness. Stat CT did not show any hemorrhagic conversion. Hem heparin infusion was resumed     Neuro- hypercoagulable test ordered. Echo within normal limits except for a sending aorta dilatation at 4.5 cm. Cardiology consulted, continue aspirin and Brilinta and Crestor blood pressure medications adjusted. Lovenox for DVT prophylaxis    Cardiology-Echo 5/24/2019 ejection fraction 40 to 45% cath 5/24/2019 single-vessel coronary artery disease. SANDEEP to mid LAD. Sees Dr. Garo Stewart. Has severe left ventricular concentric hypertrophy likely from untreated longstanding hypertension.   Continue meds for coronary disease/stent, cardiomyopathy, hypertension and hyperlipidemia    Internal medicine-aortic root dilatation stable at 4.8 cm asymptomatic joints  Neurological: negative for dizziness, headaches and weakness  Behavioral/Psych: negative for decreased appetite, depression and fatigue    Functional History:  PTA: Independent with all activities. Current:  PT:  Restrictions/Precautions: Fall Risk, Up as Tolerated, General Precautions  Other position/activity restrictions: SBP goal 140-220   Transfers  Sit to Stand: Contact guard assistance  Stand to sit: Contact guard assistance  Comment: cueing for sequencing d/t impulsiveness. LOB noted upon standing , Pt  continue L knee va with full weight bearing. Ambulation 1  Surface: level tile  Device: Rolling Walker  Assistance: Moderate assistance  Quality of Gait: Unsteady with  L knee buckling, short step length, MOd A for RW management. Distance: 25ft x2  Comments:  rest x4  d/t decrease endurance and fatigue. Demo poor safety judgement. Max cueing for safety . Transfers  Sit to Stand: Contact guard assistance  Stand to sit: Contact guard assistance  Comment: cueing for sequencing d/t impulsiveness. LOB noted upon standing , Pt  continue L knee va with full weight bearing. Ambulation  Ambulation?: Yes  More Ambulation?: No  Ambulation 1  Surface: level tile  Device: Rolling Walker  Assistance: Moderate assistance  Quality of Gait: Unsteady with  L knee buckling, short step length, MOd A for RW management. Distance: 25ft x2  Comments:  rest x4  d/t decrease endurance and fatigue. Demo poor safety judgement. Max cueing for safety . Surface: level tile  Ambulation 1  Surface: level tile  Device: Rolling Walker  Assistance: Moderate assistance  Quality of Gait: Unsteady with  L knee buckling, short step length, MOd A for RW management. Distance: 25ft x2  Comments:  rest x4  d/t decrease endurance and fatigue. Demo poor safety judgement. Max cueing for safety . OT:   ADL  Feeding: Setup; Independent  Grooming: Setup;Modified independent   UE Bathing:  Moderate assistance  LE Bathing: Maximum assistance  UE Dressing: Moderate assistance  LE Dressing: Maximum assistance  Toileting: Minimal assistance      ST:   Pt presents with mild cognitive deficits characterized by impaired immediate recall and deductive reasoning. Pt. Presents with mild dysarthria characterized by slurred speech that impacts intelligibility, no O/M deficits at this time. ST to follow up and provide treatment to address noted deficits. Education provided.      Past Medical History:        Diagnosis Date    [facility] attack ([facility]     Hyperlipidemia     Hypertension        Past Surgical History:        Procedure Laterality Date    CARDIAC SURGERY      CORONARY ANGIOPLASTY WITH STENT PLACEMENT  2019    x 1 stent for LAD    FOOT SURGERY Bilateral     KNEE SURGERY Right        Allergies:    No Known Allergies     Current Medications:   Current Facility-Administered Medications: hydrALAZINE (APRESOLINE) injection 10 mg, 10 mg, Intravenous, Q4H PRN  enoxaparin (LOVENOX) injection 30 mg, 30 mg, Subcutaneous, BID  amLODIPine (NORVASC) tablet 10 mg, 10 mg, Oral, Nightly  [START ON 2/18/2020] hydrochlorothiazide (HYDRODIURIL) tablet 12.5 mg, 12.5 mg, Oral, Daily  lisinopril (PRINIVIL;ZESTRIL) tablet 40 mg, 40 mg, Oral, Daily  rosuvastatin (CRESTOR) tablet 40 mg, 40 mg, Oral, Nightly  folic acid (FOLVITE) tablet 1 mg, 1 mg, Oral, BID  sennosides-docusate sodium (SENOKOT-S) 8.6-50 MG tablet 2 tablet, 2 tablet, Oral, Daily  aspirin EC tablet 81 mg, 81 mg, Oral, Daily  ticagrelor (BRILINTA) tablet 90 mg, 90 mg, Oral, BID  magnesium hydroxide (MILK OF MAGNESIA) 400 MG/5ML suspension 30 mL, 30 mL, Oral, Daily PRN  ondansetron (ZOFRAN) injection 4 mg, 4 mg, Intravenous, Q6H PRN  sodium chloride flush 0.9 % injection 10 mL, 10 mL, Intravenous, 2 times per day  sodium chloride flush 0.9 % injection 10 mL, 10 mL, Intravenous, PRN  acetaminophen (TYLENOL) tablet 650 mg, 650 mg, Oral, Q4H PRN  0.9 % sodium chloride infusion, , Intravenous, (Oral)   Resp 25   Ht 6' 1\" (1.854 m)   Wt 245 lb 13 oz (111.5 kg)   SpO2 98%   BMI 32.43 kg/m²     General appearance: alert, appears stated age, cooperative, and no distress  Head: Normocephalic, without obvious abnormality, atraumatic  Lungs: clear to auscultation bilaterally. Heart: regular rate and rhythm, no murmur. Abdomen: soft, non-tender; bowel sounds normal.  Extremities: extremities normal, atraumatic, no edema, normal tone. Mental status: Alert, orientedX3, thought content appropriate. CSensory: Intact inRUE and rLE, ?mild decrease LUE/LLE  Motor: Muscle tone and bulk are normal bilaterally. No pronator drift. 5/5 Right, LUE 2/5 prox, 0/5 distal, LLE 3/5 prox, trace distally      Diagnostics:  CBC   Lab Results   Component Value Date    WBC 12.4 02/17/2020    RBC 5.43 02/17/2020    HGB 15.7 02/17/2020    HCT 47.0 02/17/2020    MCV 86.6 02/17/2020    RDW 12.9 02/17/2020     02/17/2020     BMP    Lab Results   Component Value Date     02/17/2020    K 3.5 02/17/2020     02/17/2020    CO2 21 02/17/2020    BUN 12 02/17/2020     Uric Acid  No components found for: URIC  VITAMIN B12 No components found for: B12  PT/INR  No results found for: PTINR    Radiology:     Impression: Mr. Igor Sarabia is a 52 y.o. right handed male with a history of Ischemic stroke ([facility]    1. Right paramedian pontine CVA with mild left hemiparesis and dysarthria-aspirin, Brilinta, Crestor  2. Hypertension/MI status post cardiac stent/nonischemic cardiomyopathy/hypertensive cardiomyopathy with severe LVH and diastolic dysfunction- Norvasc, hydrochlorothiazide, lisinopril, Crestor, aspirin, Brilinta  3. 4.8 cm aortic root dilatation- serial testing  4. Mild leukocytosis    Recommendations:  1. Diagnosis: Right paramedian pontine CVA with mild left hemiparesis and dysarthria  2. Therapy: Has PT/OT and speech needs  3. Medical  Necessity: As above  4. Support: sig other  5.  Rehab recommendation: Would

## 2020-02-18 LAB
ABSOLUTE EOS #: <0.03 K/UL (ref 0–0.44)
ABSOLUTE IMMATURE GRANULOCYTE: 0.03 K/UL (ref 0–0.3)
ABSOLUTE LYMPH #: 1.15 K/UL (ref 1.1–3.7)
ABSOLUTE MONO #: 1.46 K/UL (ref 0.1–1.2)
ANION GAP SERPL CALCULATED.3IONS-SCNC: 16 MMOL/L (ref 9–17)
BASOPHILS # BLD: 1 % (ref 0–2)
BASOPHILS ABSOLUTE: 0.06 K/UL (ref 0–0.2)
BUN BLDV-MCNC: 17 MG/DL (ref 6–20)
BUN/CREAT BLD: ABNORMAL (ref 9–20)
CALCIUM SERPL-MCNC: 9.3 MG/DL (ref 8.6–10.4)
CHLORIDE BLD-SCNC: 99 MMOL/L (ref 98–107)
CO2: 17 MMOL/L (ref 20–31)
CREAT SERPL-MCNC: 1.15 MG/DL (ref 0.7–1.2)
DIFFERENTIAL TYPE: ABNORMAL
EOSINOPHILS RELATIVE PERCENT: 0 % (ref 1–4)
FACTOR V LEIDEN MUTATION: NORMAL
GFR AFRICAN AMERICAN: >60 ML/MIN
GFR NON-AFRICAN AMERICAN: >60 ML/MIN
GFR SERPL CREATININE-BSD FRML MDRD: ABNORMAL ML/MIN/{1.73_M2}
GFR SERPL CREATININE-BSD FRML MDRD: ABNORMAL ML/MIN/{1.73_M2}
GLUCOSE BLD-MCNC: 103 MG/DL (ref 70–99)
HCT VFR BLD CALC: 52.7 % (ref 40.7–50.3)
HEMOGLOBIN: 16.5 G/DL (ref 13–17)
IMMATURE GRANULOCYTES: 0 %
LYMPHOCYTES # BLD: 12 % (ref 24–43)
MCH RBC QN AUTO: 28.6 PG (ref 25.2–33.5)
MCHC RBC AUTO-ENTMCNC: 31.3 G/DL (ref 28.4–34.8)
MCV RBC AUTO: 91.3 FL (ref 82.6–102.9)
MONOCYTES # BLD: 16 % (ref 3–12)
MTHFR MUTATION 677T/A1298C: NORMAL
NRBC AUTOMATED: 0 PER 100 WBC
PDW BLD-RTO: 13 % (ref 11.8–14.4)
PLATELET # BLD: 193 K/UL (ref 138–453)
PLATELET ESTIMATE: ABNORMAL
PMV BLD AUTO: 10.9 FL (ref 8.1–13.5)
POTASSIUM SERPL-SCNC: 3.9 MMOL/L (ref 3.7–5.3)
PROTHROMBIN G20210A MUTATION: NORMAL
RBC # BLD: 5.77 M/UL (ref 4.21–5.77)
RBC # BLD: ABNORMAL 10*6/UL
SEG NEUTROPHILS: 71 % (ref 36–65)
SEGMENTED NEUTROPHILS ABSOLUTE COUNT: 6.62 K/UL (ref 1.5–8.1)
SODIUM BLD-SCNC: 132 MMOL/L (ref 135–144)
WBC # BLD: 9.3 K/UL (ref 3.5–11.3)
WBC # BLD: ABNORMAL 10*3/UL

## 2020-02-18 PROCEDURE — 36415 COLL VENOUS BLD VENIPUNCTURE: CPT

## 2020-02-18 PROCEDURE — 6370000000 HC RX 637 (ALT 250 FOR IP): Performed by: FAMILY MEDICINE

## 2020-02-18 PROCEDURE — 99232 SBSQ HOSP IP/OBS MODERATE 35: CPT | Performed by: INTERNAL MEDICINE

## 2020-02-18 PROCEDURE — 6360000002 HC RX W HCPCS: Performed by: STUDENT IN AN ORGANIZED HEALTH CARE EDUCATION/TRAINING PROGRAM

## 2020-02-18 PROCEDURE — 6370000000 HC RX 637 (ALT 250 FOR IP): Performed by: STUDENT IN AN ORGANIZED HEALTH CARE EDUCATION/TRAINING PROGRAM

## 2020-02-18 PROCEDURE — 97116 GAIT TRAINING THERAPY: CPT

## 2020-02-18 PROCEDURE — 80048 BASIC METABOLIC PNL TOTAL CA: CPT

## 2020-02-18 PROCEDURE — 97535 SELF CARE MNGMENT TRAINING: CPT

## 2020-02-18 PROCEDURE — 85025 COMPLETE CBC W/AUTO DIFF WBC: CPT

## 2020-02-18 PROCEDURE — 99232 SBSQ HOSP IP/OBS MODERATE 35: CPT

## 2020-02-18 PROCEDURE — 1200000000 HC SEMI PRIVATE

## 2020-02-18 PROCEDURE — 99232 SBSQ HOSP IP/OBS MODERATE 35: CPT | Performed by: PHYSICAL MEDICINE & REHABILITATION

## 2020-02-18 PROCEDURE — 6370000000 HC RX 637 (ALT 250 FOR IP): Performed by: NURSE PRACTITIONER

## 2020-02-18 PROCEDURE — 97110 THERAPEUTIC EXERCISES: CPT

## 2020-02-18 RX ORDER — HYDROCHLOROTHIAZIDE 25 MG/1
25 TABLET ORAL DAILY
Status: DISCONTINUED | OUTPATIENT
Start: 2020-02-18 | End: 2020-02-19 | Stop reason: HOSPADM

## 2020-02-18 RX ADMIN — ACETAMINOPHEN 650 MG: 325 TABLET ORAL at 01:26

## 2020-02-18 RX ADMIN — LISINOPRIL 40 MG: 20 TABLET ORAL at 09:33

## 2020-02-18 RX ADMIN — FLUOXETINE HYDROCHLORIDE 20 MG: 20 CAPSULE ORAL at 09:33

## 2020-02-18 RX ADMIN — Medication 81 MG: at 09:33

## 2020-02-18 RX ADMIN — SENNOSIDES AND DOCUSATE SODIUM 2 TABLET: 8.6; 5 TABLET ORAL at 09:33

## 2020-02-18 RX ADMIN — TICAGRELOR 90 MG: 90 TABLET ORAL at 09:33

## 2020-02-18 RX ADMIN — HYDROCHLOROTHIAZIDE 25 MG: 25 TABLET ORAL at 09:33

## 2020-02-18 RX ADMIN — HYDRALAZINE HYDROCHLORIDE 10 MG: 20 INJECTION INTRAMUSCULAR; INTRAVENOUS at 01:27

## 2020-02-18 RX ADMIN — FOLIC ACID 1 MG: 1 TABLET ORAL at 09:34

## 2020-02-18 RX ADMIN — ENOXAPARIN SODIUM 30 MG: 30 INJECTION SUBCUTANEOUS at 09:34

## 2020-02-18 ASSESSMENT — PAIN SCALES - GENERAL
PAINLEVEL_OUTOF10: 0
PAINLEVEL_OUTOF10: 3
PAINLEVEL_OUTOF10: 0
PAINLEVEL_OUTOF10: 0

## 2020-02-18 NOTE — PROGRESS NOTES
Occupational Therapy  Facility/Department: [facility] RENAL//MED SURG  Daily Treatment Note  NAME: Alfonso Alvarado  : 1970  MRN: [id number]    Date of Service: 2020    Discharge Recommendations: Further therapy recommended at discharge. The patient should be able to tolerate at least three hours of therapy per day over 5 days or 15 hours over 7 days. Assessment   Performance deficits / Impairments: Decreased functional mobility ; Decreased strength;Decreased safe awareness;Decreased ADL status; Decreased high-level IADLs;Decreased balance;Decreased fine motor control;Decreased coordination  Prognosis: Good  Patient Education: OT POC, transfer/walker safety, pursed lip breathing, hand over hand assist, incorporating LUE into daily activities, AE for independence in ADL/IADLs - pt verbalized understanding. Activity Tolerance  Activity Tolerance: Patient Tolerated treatment well  Safety Devices  Safety Devices in place: Not Applicable(Pt seated EOB working with PT at end of session.)         Patient Diagnosis(es): The primary encounter diagnosis was Numbness. A diagnosis of Cardiac murmur was also pertinent to this visit. has a past medical history of Heart attack ([address] Hyperlipidemia, and Hypertension. has a past surgical history that includes knee surgery (Right); Foot surgery (Bilateral); Coronary angioplasty with stent (2019); and Cardiac surgery. Restrictions  Restrictions/Precautions  Restrictions/Precautions: Fall Risk, Up as Tolerated, General Precautions  Required Braces or Orthoses?: No  Position Activity Restriction  Subjective   General  Patient assessed for rehabilitation services?: Yes  Family / Caregiver Present: No  Vital Signs  Patient Currently in Pain: Denies   Orientation  Orientation  Overall Orientation Status: Within Functional Limits  Objective    ADL  Grooming: Setup;Contact guard assistance(Oral care seated EOB.)  UE Bathing: Setup; Moderate assistance(max A for back seated EOB.)  LE Bathing: Setup;Contact guard assistance(Hips to knees seated EOB.)  UE Dressing: Moderate assistance(To manage gown.)  LE Dressing: Moderate assistance(To doff/yoli socks.)  Toileting: (Pericare/bottom care standing EOB.)  Additional Comments: Pt completed ADL care seated EOB. Pt educated on incorporating LUE into functional activities with good return, pt states being able to feel objects in L hand but unable to grasp objects. Pt educated on UE dressing techniques with fair return. Pt required assist with managing personal care objects (opening bottles, tubes). Balance  Sitting Balance: Stand by assistance(Seated EOB.)  Standing Balance: Contact guard assistance  Standing Balance  Time: 7 minutes  Activity: Static standing EOB using RW, pericare/bottom care standing EOB. Comment: No LOB/buckling noted. Transfers  Sit to stand: Stand by assistance  Stand to sit: Stand by assistance  Cognition  Overall Cognitive Status: [address] [place] [address]  Times per week: 4-5x  Goals  Short term goals  Time Frame for Short term goals: by discharge pt will. Wendy Hull term goal 1: demo functional mobility with SBA and use of AD prn   Short term goal 2: demo UB ADL tasks with min A  Short term goal 3: demo LB ADL tasks with min A and use of AE prn  Short term goal 4: demo functional transfers i'ly  Short term goal 5: demo good safety awareness during all mobility and functional tasks  Short term goal 6: demo increased LUE strength to 3/5       Therapy Time   Individual Concurrent Group Co-treatment   Time In 0934         Time Out 1020         Minutes 46         Time coded treatment minutes: 46    Pt seated EOB upon therapist arrival. Pleasant and agreeable to therapy. See above for LOF for all tasks. Pt retired to seated EOB working with PT at end of session.     CRISTINO Russell

## 2020-02-18 NOTE — PROGRESS NOTES
mg Oral Daily    lisinopril  40 mg Oral Daily    rosuvastatin  40 mg Oral Nightly    folic acid  1 mg Oral BID    sennosides-docusate sodium  2 tablet Oral Daily    aspirin  81 mg Oral Daily    ticagrelor  90 mg Oral BID    sodium chloride flush  10 mL Intravenous 2 times per day     Continuous Infusions:    sodium chloride 75 mL/hr at 02/14/20 1030     PRN MedicationshydrALAZINE, 10 mg, Q4H PRN  magnesium hydroxide, 30 mL, Daily PRN  ondansetron, 4 mg, Q6H PRN  sodium chloride flush, 10 mL, PRN  acetaminophen, 650 mg, Q4H PRN        Diagnostic Labs:  CBC:   Recent Labs     02/16/20  0803 02/17/20  0434 02/18/20  0645   WBC 7.6 12.4* 9.3   RBC 4.98 5.43 5.77   HGB 14.3 15.7 16.5   HCT 44.4 47.0 52.7*   MCV 89.2 86.6 91.3   RDW 12.9 12.9 13.0    240 193     BMP:   Recent Labs     02/16/20  0803 02/17/20  0434 02/18/20  0645    138 132*   K 3.9 3.5* 3.9    103 99   CO2 21 21 17*   BUN 12 12 17   CREATININE 1.05 1.17 1.15     BNP: No results for input(s): BNP in the last 72 hours. PT/INR: No results for input(s): PROTIME, INR in the last 72 hours. APTT:   Recent Labs     02/15/20  1229 02/15/20  1920 02/16/20  0010   APTT 53.9* 49.3* 33.6*     CARDIAC ENZYMES: No results for input(s): CKMB, CKMBINDEX, TROPONINI in the last 72 hours. Invalid input(s): CKTOTAL;3  FASTING LIPID PANEL:  Lab Results   Component Value Date    CHOL 140 02/14/2020    HDL 41 02/14/2020    TRIG 40 02/14/2020     LIVER PROFILE: No results for input(s): AST, ALT, ALB, BILIDIR, BILITOT, ALKPHOS in the last 72 hours. MICROBIOLOGY: No results found for: CULTURE    Imaging:    Xr Chest Standard (2 Vw)    Result Date: 2/13/2020  Indeterminate prominence of the right hilar shadow could relate to adenopathy, mass, adjacent consolidation or pulmonary artery enlargement. Correlation with IV contrast-enhanced CT chest recommended. Cardiomegaly.  RECOMMENDATION: IV contrast-enhanced CT chest     Ct Head Wo Contrast    Result Date: 2/13/2020  No evidence of hemorrhagic transformation of the right paramedian pontine infarct. Moderate sinus disease. Ct Head Wo Contrast    Result Date: 2/13/2020  No acute intracranial abnormality. Findings were discussed with Dr. Jacob Rubio At 3:41 pm on 2/13/2020. Ct Chest W Contrast    Result Date: 2/13/2020  1. No acute abnormalities seen in the chest 2. Tortuous ascending thoracic aorta accounts for the density seen on the chest radiograph 3. Coronary artery calcifications     Cta Head Neck W Contrast    Result Date: 2/15/2020  1. No new arterial abnormality in the head or neck. 2. 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 3. Moderate diffuse irregularity of the P2 and P3 segments posterior cerebral arteries, which are supplied by the posterior communicating arteries. 4. No hemodynamically significant arterial stenosis in the neck. Cta Head Neck W Contrast    Result Date: 2/13/2020  No significant stenosis or evidence for occlusion. 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 The superior cerebellar arteries also appear to arise from the posterior cerebral arteries. Mri Brain Wo Contrast    Result Date: 2/13/2020  Acute right paramedian pontine stroke. 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 Remote left posterior cerebellar stroke. Mild non-specific white matter changes favor chronic small ischemic disease. Demyelinating process may also be considered in differential although thought less likely. ASSESSMENT & PLAN     ASSESSMENT / PLAN:   1. Uncontrolled Hypertension overlap with Ischemic CVA: SBP goal <160 mmHg. Continue Lisinopril 40 mg, Amlodipine 10 mg daily. Increased HCTZ 25 mg. hydralazine IV PRN with holding parameters. 2. Ischemic Right pontine stroke: Management as per Neurology as primary. 3. CAD s/p PCI 5/19. Continue ASA, brilinta and crestor.   4. Aortic root dilation: Clinically stable. Cardiology evaluated. Aortic index 1.9 cm/m2. Goal of aortic index <2.75. Recommended serial echo monitoring every 6 months. Follow-up outpatient with Dr. Rosa M Toro. 5. Minor depression: Secondary to new onset stroke. Might benefit from Cognitive behavioral therapy.     DVT ppx: Lovenox SC 30 mg Bid. Discharge planning: As per primary. Thank you for the consultation. Please reach out with any questions or concerns. Je Zelaya MD  Internal Medicine Resident, PGY-1  [facility]. 0vm

## 2020-02-18 NOTE — PROGRESS NOTES
Physical Medicine & Rehabilitation  Progress Note    2/18/2020 4:45 PM     CC: Ambulatory and ADL dysfunction due to Right paramedian pontine CVA with mild left hemiparesis and dysarthria    Subjective:   No complaints, notes some movement of a finger on left today. ROS:  Denies fevers, chills, sweats. No chest pain, palpitations, lightheadedness. Denies coughing, wheezing or shortness of breath. Denies abdominal pain, nausea, diarrhea or constipation. No new areas of joint pain. Denies new areas of numbness or weakness. Denies new anxiety or depression issues. No new skin problems. Rehabilitation:   PT:  Restrictions/Precautions: Fall Risk, Up as Tolerated, General Precautions  Other position/activity restrictions: SBP goal 140-220   Transfers  Sit to Stand: Modified independent, Supervision  Stand to sit: Modified independent, Supervision  Comment: cueing for sequencing d/t impulsiveness. LOB noted upon standing , Pt  continue L knee va with full weight bearing. Ambulation 1  Surface: level tile  Device: Platform Walker left(pt unable to  with LUE; try lift walker next tx for better leg control)  Assistance: Moderate assistance  Quality of Gait: Narrow base of support; decreased control; intermittently dragging L foot; increased step length, cued for smaller steps for control. Gait Deviations: Decreased step height  Distance: 40ftx2  Comments: Pt requring constant cues for slow and controlled gait; pt cued for proper progression of LLE throughout ambulation; tactile cues on LLE for knee blocking and quad activation for knee extension preventing buckling. Pt requiring one seated R and several standing RBs throughout. Transfers  Sit to Stand: Modified independent, Supervision  Stand to sit: Modified independent, Supervision  Comment: cueing for sequencing d/t impulsiveness. LOB noted upon standing , Pt  continue L knee va with full weight bearing.   Ambulation  Ambulation?: Yes  More 24 hour   Intake --   Output 1700 ml   Net -1700 ml       Glu last 24 hour  Recent Labs     02/17/20  0752   POCGLU 109       No results for input(s): CLARITYU, COLORU, PHUR, SPECGRAV, PROTEINU, RBCUA, BLOODU, BACTERIA, NITRU, WBCUA, LEUKOCYTESUR, YEAST, Tico Moan in the last 72 hours. Impression: Mr. Brittany Martinez is a 52 y.o. right handed male with a history of Ischemic stroke ([facility] [address]     1. Right paramedian pontine CVA with mild left hemiparesis and dysarthria-aspirin, Brilinta, Crestor  2. Hypertension/MI status post cardiac stent/nonischemic cardiomyopathy/hypertensive cardiomyopathy with severe LVH and diastolic dysfunction- Norvasc, hydrochlorothiazide, lisinopril, Crestor, aspirin, Brilinta  3. 4.8 cm aortic root dilatation- serial testing-Follow up with echo in 6 months and CT surgery referral when necessary  4. Mild leukocytosis  5. Dep/ ? Motor recovery- Prozac     Recommendations:  1. Diagnosis: Right paramedian pontine CVA with mild left hemiparesis and dysarthria  2. Therapy: Has PT/OT and speech needs  3. Medical  Necessity: As above  4. Support: sig other  5. Rehab recommendation: Would benefit from acute inpatient rehabilitation when medically ready, await insurance approval  6. DVT proph: Daniel Zhang MD       This note is created with the assistance of a speech recognition program.  While intending to generate a document that actually reflects the content of the visit, the document can still have some errors including those of syntax and sound a like substitutions which may escape proof reading.   In such instances, actual meaning can be extrapolated by contextual diversion

## 2020-02-18 NOTE — DISCHARGE SUMMARY
130-150. 2D ECHO showed a 4.5 cm ascending aorta dilation and cardiology was consulted. Cardiology evaluated him and recommended no intervention at this time. He is to follow up with cardiology in 6 months for repeat ECHO to monitor the dilation. During the course of the hospital stay, the patient began to feel hopeless about his circumstances and endorsed some developing depression. He was started on prozac 20 mg daily. He is to be discharged to [facility] inpatient rehab.      Significant Diagnostic Studies:   Labs / Micro:  CBC:   Lab Results   Component Value Date    WBC 9.3 02/18/2020    RBC 5.77 02/18/2020    HGB 16.5 02/18/2020    HCT 52.7 02/18/2020    MCV 91.3 02/18/2020    MCH 28.6 02/18/2020    MCHC 31.3 02/18/2020    RDW 13.0 02/18/2020     02/18/2020     BMP:    Lab Results   Component Value Date    GLUCOSE 103 02/18/2020     02/18/2020    K 3.9 02/18/2020    CL 99 02/18/2020    CO2 17 02/18/2020    ANIONGAP 16 02/18/2020    BUN 17 02/18/2020    CREATININE 1.15 02/18/2020    BUNCRER NOT REPORTED 02/18/2020    CALCIUM 9.3 02/18/2020    LABGLOM >60 02/18/2020    GFRAA >60 02/18/2020    GFR      02/18/2020    GFR NOT REPORTED 02/18/2020     HFP:    Lab Results   Component Value Date    PROT 7.1 05/25/2019     CMP:    Lab Results   Component Value Date    GLUCOSE 103 02/18/2020     02/18/2020    K 3.9 02/18/2020    CL 99 02/18/2020    CO2 17 02/18/2020    BUN 17 02/18/2020    CREATININE 1.15 02/18/2020    ANIONGAP 16 02/18/2020    ALKPHOS 103 05/25/2019    ALT 57 05/25/2019    AST 29 05/25/2019    BILITOT 1.02 05/25/2019    LABALBU 4.0 05/25/2019    ALBUMIN 1.3 05/25/2019    LABGLOM >60 02/18/2020    GFRAA >60 02/18/2020    GFR      02/18/2020    GFR NOT REPORTED 02/18/2020    PROT 7.1 05/25/2019    CALCIUM 9.3 02/18/2020     PT/INR:    Lab Results   Component Value Date    PROTIME 10.9 02/13/2020    INR 1.0 02/13/2020     PTT:   Lab Results   Component Value Date    APTT 33.6 02/16/2020     FLP: arteries, which are supplied by the posterior communicating arteries. 4. No hemodynamically significant arterial stenosis in the neck. CTA Head Neck W Contrast  Result Date: 2/13/2020  EXAMINATION: CTA OF THE HEAD AND NECK WITH CONTRAST 2/13/2020 3:27 pm: TECHNIQUE: CTA of the head and neck was performed with the administration of intravenous contrast. Multiplanar reformatted images are provided for review. MIP images are provided for review. Stenosis of the internal carotid arteries measured using NASCET criteria. Dose modulation, iterative reconstruction, and/or weight based adjustment of the mA/kV was utilized to reduce the radiation dose to as low as reasonably achievable. COMPARISON: None. HISTORY: ORDERING SYSTEM PROVIDED HISTORY: left sided deficits TECHNOLOGIST PROVIDED HISTORY: left sided deficits Reason for Exam: lt sided numbness weakness slurred speech FINDINGS: CTA NECK: AORTIC ARCH/ARCH VESSELS: No dissection or arterial injury. No significant stenosis of the brachiocephalic or subclavian arteries. CAROTID ARTERIES: No dissection, arterial injury, or hemodynamically significant stenosis by NASCET criteria. VERTEBRAL ARTERIES: No dissection, arterial injury, or significant stenosis. SOFT TISSUES: The lung apices are clear. No cervical or superior mediastinal lymphadenopathy. The larynx and pharynx are unremarkable. No acute abnormality of the salivary and thyroid glands. BONES: No acute osseous abnormality. CTA HEAD: ANTERIOR CIRCULATION: No significant stenosis of the intracranial internal carotid, anterior cerebral, or middle cerebral arteries. No aneurysm. 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 There is persistent fetal origin of the posterior cerebral arteries bilaterally. The superior cerebellar arteries appear to arise from the posterior cerebral arteries. OTHER: No dural venous sinus thrombosis on this non-dedicated study. BRAIN: No mass effect or midline shift. No extra-axial fluid collection. The gray-white differentiation is maintained. No significant stenosis or evidence for occlusion. 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 The superior cerebellar arteries also appear to arise from the posterior cerebral arteries. MRI Brain Wo Contrast  Result Date: 2/13/2020  EXAMINATION: MRI OF THE BRAIN WITHOUT CONTRAST  2/13/2020 6:20 pm TECHNIQUE: Multiplanar multisequence MRI of the brain was performed without the administration of intravenous contrast. COMPARISON: CT head neck 02/13/2020 and CT head 02/13/2020 HISTORY: ORDERING SYSTEM PROVIDED HISTORY: left sided weakness TECHNOLOGIST PROVIDED HISTORY: left sided weakness FINDINGS: INTRACRANIAL STRUCTURES/VENTRICLES: There is a right paramedian pontine acute stroke. No obvious T2 prolongation. No mass effect or midline shift. No evidence of an acute intracranial hemorrhage. The ventricles and sulci are normal in size and configuration. Mild non-specific periventricular and subcortical T2 prolongation identified may be sequelae of chronic small ischemic disease versus demyelinating process. Wedge-shaped focus of T2 prolongation identified left posterior cerebellum without corresponding restricted diffusion suggestive of prior stroke. The sellar/suprasellar regions appear unremarkable. There is normal flow of the normal flow void involving the basilar artery. The Remainder of the normal signal voids within the major intracranial vessels appear maintained. Foci of hemosiderin left posterior cerebellum noted of uncertain clinical significance ORBITS: The visualized portion of the orbits demonstrate no acute abnormality. SINUSES: Moderate maxillary sinus disease. Air-fluid level right sphenoid sinus. Small air-fluid level right maxillary sinus.  BONES/SOFT TISSUES: The bone marrow signal intensity appears normal. The soft tissues demonstrate no acute abnormality. Acute right paramedian pontine stroke. 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 Remote left posterior cerebellar stroke. Mild non-specific white matter changes favor chronic small ischemic disease. Demyelinating process may also be considered in differential although thought less likely. Consultations:    Consults:     Final Specialist Recommendations/Findings:   IP CONSULT TO STROKE TEAM  IP CONSULT TO INTERNAL MEDICINE  IP CONSULT TO NEUROCRITICAL CARE  IP CONSULT TO PHYSICAL MEDICINE REHAB  IP CONSULT TO CARDIOLOGY  IP CONSULT TO INTERNAL MEDICINE      The patient was seen and examined on day of discharge and this discharge summary is in conjunction with any daily progress note from day of discharge.     Discharge plan:     Disposition: [facility] In Patient Rehab     Physician Follow Up:   Anjana Mohan PA-C  [address]  [address]  [phone]             Requiring Further Evaluation/Follow Up POST HOSPITALIZATION/Incidental Findings: ***    Diet: {diet:08718}    Activity: As tolerated***    Instructions to Patient: ***    Discharge Medications:      Medication List      ASK your doctor about these medications    amLODIPine 10 MG tablet  Commonly known as:  NORVASC  Take 1 tablet by mouth daily     aspirin 81 MG EC tablet  Take 1 tablet by mouth daily     atorvastatin 80 MG tablet  Commonly known as:  LIPITOR  Take 1 tablet by mouth nightly     carvedilol 25 MG tablet  Commonly known as:  COREG  Take 1 tablet by mouth 2 times daily (with meals)     lisinopril 40 MG tablet  Commonly known as:  PRINIVIL;ZESTRIL  Take 1 tablet by mouth daily     meloxicam 7.5 MG tablet  Commonly known as:  Mobic  Take 1 tablet by mouth 2 times daily as needed for Pain     nebivolol 5 MG tablet  Commonly known as:  BYSTOLIC  Take 2 tablets by mouth daily for 7 days     nitroGLYCERIN 0.4 MG SL tablet  Commonly known as:  NITROSTAT  up to max of 3 total doses. If no relief after 1 dose, call 911.     tadalafil 20 MG tablet  Commonly known as:  CIALIS  Take 1 tablet by mouth as needed for Erectile Dysfunction     ticagrelor 90 MG Tabs tablet  Commonly known as:  BRILINTA  Take 1 tablet by mouth 2 times daily            Time Spent on discharge is  {Blank single:52848::\"15 mins\",\"17 mins\",\"20 mins\",\"31 mins\",\"32 mins\",\"33 mins\",\"34 mins\",\"35 mins\",\"36 mins\",\"37 mins\",\"38 mins\",\"39 mins\",\"40 mins\",\"41 mins\",\"43 mins\",\"45 mins\",\"50 mins\",\"***\"} in patient examination, evaluation, counseling as well as medication reconciliation, prescriptions for required medications, discharge plan and follow up. Electronically signed by   Noah Tejeda  2/18/2020  12:39 PM      Thank you Dr. Bobo Contreras PA-C for the opportunity to be involved in this patient's care.

## 2020-02-18 NOTE — PLAN OF CARE
Problem: HEMODYNAMIC STATUS  Goal: Patient has stable vital signs and fluid balance  2/17/2020 2015 by Jc Celestin RN  Outcome: Ongoing  2/17/2020 1712 by Lillian Goode RN  Outcome: Ongoing     Problem: ACTIVITY INTOLERANCE/IMPAIRED MOBILITY  Goal: Mobility/activity is maintained at optimum level for patient  2/17/2020 2015 by Jc Celestin RN  Outcome: Ongoing  2/17/2020 1712 by Lillian Goode RN  Outcome: Ongoing     Problem: COMMUNICATION IMPAIRMENT  Goal: Ability to express needs and understand communication  2/17/2020 2015 by Jc Celestin RN  Outcome: Ongoing  2/17/2020 1712 by Lillian Goode RN  Outcome: Ongoing     Problem: Falls - Risk of:  Goal: Will remain free from falls  Description  Will remain free from falls  2/17/2020 2015 by Jc Celestin RN  Outcome: Ongoing  2/17/2020 1712 by Lillian Goode RN  Outcome: Ongoing  Goal: Absence of physical injury  Description  Absence of physical injury  2/17/2020 2015 by Jc Celestin RN  Outcome: Ongoing  2/17/2020 1712 by Lillian Goode RN  Outcome: Ongoing     Problem: Risk for Impaired Skin Integrity  Goal: Tissue integrity - skin and mucous membranes  Description  Structural intactness and normal physiological function of skin and  mucous membranes.   2/17/2020 2015 by Jc Celestin RN  Outcome: Ongoing  2/17/2020 1712 by Lillian Goode RN  Outcome: Ongoing     Problem: Musculor/Skeletal Functional Status  Goal: Highest potential functional level  2/17/2020 2015 by Jc Celestin RN  Outcome: Ongoing  2/17/2020 1712 by Lillian Goode RN  Outcome: Ongoing     Problem: Pain:  Goal: Pain level will decrease  Description  Pain level will decrease  2/17/2020 2015 by Jc Celestin RN  Outcome: Ongoing  2/17/2020 1712 by Lillian Goode RN  Outcome: Ongoing  Goal: Control of acute pain  Description  Control of acute pain  2/17/2020 2015 by Jc Celestin RN  Outcome: Ongoing  2/17/2020 1712 by Lillian Goode RN  Outcome: Ongoing  Goal: Control of chronic pain  Description  Control of chronic pain  2/17/2020 2015 by Chani Evangelista RN  Outcome: Ongoing  2/17/2020 1712 by Noel Zapata RN  Outcome: Ongoing

## 2020-02-18 NOTE — PROGRESS NOTES
Significant for good strength of grade 5/5 in proximal and distal muscle groups   LLE: Significant for good strength of grade 3/5 in proximal and distal muscle groups       Normal bulk, normal tone and no involuntary movements, no tremor   SENSORY FUNCTION:  Normal touch, normal vibration   CEREBELLAR FUNCTION:  Intact fine motor control over upper limbs and lower limbs   REFLEX FUNCTION:  Symmetric in upper and lower extremities, no Babinski sign   STATION and GAIT  Not assessed          Data:    Lab Results:   CBC:   Recent Labs     02/16/20  0803 02/17/20  0434 02/18/20  0645   WBC 7.6 12.4* 9.3   HGB 14.3 15.7 16.5    240 193     BMP:    Recent Labs     02/16/20  0803 02/17/20  0434 02/18/20  0645    138 132*   K 3.9 3.5* 3.9    103 99   CO2 21 21 17*   BUN 12 12 17   CREATININE 1.05 1.17 1.15   GLUCOSE 94 101* 103*         Lab Results   Component Value Date    CHOL 140 02/14/2020    LDLCHOLESTEROL 91 02/14/2020    HDL 41 02/14/2020    TRIG 40 02/14/2020    ALT 57 (H) 05/25/2019    AST 29 05/25/2019    INR 1.0 02/13/2020    LABA1C 5.6 02/14/2020       No results found for: PHENYTOIN, PHENYTOIN, VALPROATE, CBMZ    IMAGING  CT Head Wo Contrast  Result Date: 2/13/2020  No evidence of hemorrhagic transformation of the right paramedian pontine infarct. Moderate sinus disease.      CT Head Wo Contrast  Result Date: 2/13/2020  No acute intracranial abnormality. Findings were discussed with Dr. Maribel Covington At 3:41 pm on 2/13/2020.      CT Chest W Contrast  Result Date: 2/13/2020  1. No acute abnormalities seen in the chest 2. Tortuous ascending thoracic aorta accounts for the density seen on the chest radiograph 3. Coronary artery calcifications      CTA Head Neck W Contrast  Result Date: 2/15/2020  1. No new arterial abnormality in the head or neck. 2. 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  3. Moderate diffuse irregularity of the P2 and P3 segments posterior cerebral arteries, which are supplied by the posterior communicating arteries. 4. No hemodynamically significant arterial stenosis in the neck.      CTA Head Neck W Contrast  Result Date: 2/13/2020  No significant stenosis or evidence for occlusion. 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  The superior cerebellar arteries also appear to arise from the posterior cerebral arteries.      MRI Brain Wo Contrast  Result Date: 2/13/2020  Acute right paramedian pontine stroke. 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 Remote left posterior cerebellar stroke. Mild non-specific white matter changes favor chronic small ischemic disease. Demyelinating process may also be considered in differential although thought less likely. Assessment and Plan  This is a 72-year-old male patient with past medical history of hypertension, MI status post stent in May 2019 on aspirin implant, presented to the hospital with left-sided weakness and dysarthria. 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    Aortic dilation is being managed by cardiology. Cardiology states that he doesn't need any immediate intervention. Patient to follow up in 6 months for repeat ECHO.       Initially maintained on permissive hypertension, now BP meds have been restarted with a goal of -150.      Results of hypercoagulable tests are pending.      On ASA 81 and Brilinta 90 mg BID   On Crestor 40 mg daily, last LDL was 91   On Norvasc 10 mg daily and overnight IM added HCTZ 12.5 for uncontrolled BP   Lovenox for DVT prophylaxis      Patient started on Prozac 20 mg daily. Upon discharge, patient to go to inpatient rehab at [facility].     Kalyani Arora   2/18/2020  11:07 AM

## 2020-02-18 NOTE — PROGRESS NOTES
Physical Therapy  Facility/Department: [facility] RENAL//MED SURG  Daily Treatment Note  NAME: Robert Bravo  : 1970  MRN: [id number]    Date of Service: 2020    Discharge Recommendations:  Patient would benefit from continued therapy after discharge   PT Equipment Recommendations  Equipment Needed: Yes  Mobility Devices: Leena Erin: Platform Left    Assessment   Body structures, Functions, Activity limitations: Decreased endurance;Decreased strength;Decreased balance;Decreased safe awareness;Decreased coordination;Decreased fine motor control;Decreased functional mobility   Assessment: Pt amb 40ftx2 modA with L platform walker. Pt continues to display L side weakness. Tx limited by weakness this date. Continue physical therapy to increase strength and fucntional mobility. Pt is unsafe to return to prior living at this time. Prognosis: Good  Decision Making: Medium Complexity  PT Education: Plan of Care;General Safety;Gait Training;Equipment;Transfer Training;Goals; Home Exercise Program;Functional Mobility Training; Adaptive Device Training  Patient Education: Education on utilization of L side to continue to improve strength in extremities. Pt L hand dominant baseline  REQUIRES PT FOLLOW UP: Yes  Activity Tolerance  Activity Tolerance: Patient Tolerated treatment well;Patient limited by endurance; Patient limited by fatigue  Activity Tolerance: Pt expressing discouragement and frustration throughout tx. Patient Diagnosis(es): The primary encounter diagnosis was Numbness. A diagnosis of Cardiac murmur was also pertinent to this visit. has a past medical history of Heart attack ([address] Hyperlipidemia, and Hypertension. has a past surgical history that includes knee surgery (Right); Foot surgery (Bilateral); Coronary angioplasty with stent (2019); and Cardiac surgery.     Restrictions  Restrictions/Precautions  Restrictions/Precautions: Fall Risk, Up as Tolerated, General Precautions  Required Braces or Orthoses?: No  Position Activity Restriction  Other position/activity restrictions: SBP goal 140-220  Subjective   General  Chart Reviewed: Yes  Response To Previous Treatment: Patient with no complaints from previous session. Subjective  Subjective: RN and pt agreeable to PT. Pt seated EOB upon arrival post OT tx. General Comment  Comments: Pt left seated in recliner post tx. Pain Screening  Patient Currently in Pain: No  Vital Signs  Patient Currently in Pain: No       Orientation  Orientation  Overall Orientation Status: Within Normal Limits  Cognition      Objective   Bed mobility  Rolling to Left: Unable to assess  Rolling to Right: Unable to assess  Supine to Sit: Unable to assess  Sit to Supine: Unable to assess  Comment: Pt seated EOB upon arrival; returned to recliner post tx. Transfers  Sit to Stand: CGA/Briseida (due to LLE/LUE weakness)  Stand to sit: CGA/Briseida  Gait belt donned for safety. Pt unsteady on feet, LLE/LUE weak; Pt needing constant cues for safe sequencing and proper hand placement. Ambulation  Ambulation?: Yes  Ambulation 1  Surface: level tile  Device: Platform Walker left(pt unable to  with LUE)  Assistance: Moderate assistance  Quality of Gait: Narrow base of support; decreased control of LLE; intermittently dragging L foot; increased step length, cued for smaller steps with LLE for control. Gait Deviations: Decreased step height, LLE buckling  Distance: 40ftx2  Comments: Pt requring constant cues for slow and controlled gait; pt cued for proper progression of LLE throughout ambulation; tactile cues on LLE for knee blocking and quad activation for knee extension to address LLE buckling. Pt requiring one seated RB and several standing RBs throughout.    Stairs/Curb  Stairs?: No     Balance  Sitting - Static: Good  Sitting - Dynamic: Good  Standing - Static: Fair;-  Standing - Dynamic: Poor;+  Exercises  Comments: Seated LLE exercise program: Long Arc Quads and marches x10 LLE

## 2020-02-19 ENCOUNTER — HOSPITAL ENCOUNTER (INPATIENT)
Age: 50
LOS: 5 days | Discharge: HOME | DRG: 058 | End: 2020-02-24
Attending: PHYSICAL MEDICINE & REHABILITATION | Admitting: PHYSICAL MEDICINE & REHABILITATION
Payer: MEDICARE

## 2020-02-19 VITALS
HEART RATE: 71 BPM | RESPIRATION RATE: 16 BRPM | DIASTOLIC BLOOD PRESSURE: 79 MMHG | TEMPERATURE: 98.1 F | WEIGHT: 245.81 LBS | OXYGEN SATURATION: 99 % | SYSTOLIC BLOOD PRESSURE: 143 MMHG | HEIGHT: 73 IN | BODY MASS INDEX: 32.58 KG/M2

## 2020-02-19 PROBLEM — I63.9 ACUTE CVA (CEREBROVASCULAR ACCIDENT) (HCC): Status: ACTIVE | Noted: 2020-02-19

## 2020-02-19 LAB
ABSOLUTE EOS #: 0.14 K/UL (ref 0–0.44)
ABSOLUTE IMMATURE GRANULOCYTE: <0.03 K/UL (ref 0–0.3)
ABSOLUTE LYMPH #: 1.9 K/UL (ref 1.1–3.7)
ABSOLUTE MONO #: 1.23 K/UL (ref 0.1–1.2)
ANION GAP SERPL CALCULATED.3IONS-SCNC: 15 MMOL/L (ref 9–17)
BASOPHILS # BLD: 1 % (ref 0–2)
BASOPHILS ABSOLUTE: 0.06 K/UL (ref 0–0.2)
BUN BLDV-MCNC: 26 MG/DL (ref 6–20)
BUN/CREAT BLD: ABNORMAL (ref 9–20)
CALCIUM SERPL-MCNC: 9.1 MG/DL (ref 8.6–10.4)
CHLORIDE BLD-SCNC: 99 MMOL/L (ref 98–107)
CO2: 19 MMOL/L (ref 20–31)
CREAT SERPL-MCNC: 1.41 MG/DL (ref 0.7–1.2)
DIFFERENTIAL TYPE: ABNORMAL
EOSINOPHILS RELATIVE PERCENT: 2 % (ref 1–4)
GFR AFRICAN AMERICAN: >60 ML/MIN
GFR NON-AFRICAN AMERICAN: 54 ML/MIN
GFR SERPL CREATININE-BSD FRML MDRD: ABNORMAL ML/MIN/{1.73_M2}
GFR SERPL CREATININE-BSD FRML MDRD: ABNORMAL ML/MIN/{1.73_M2}
GLUCOSE BLD-MCNC: 99 MG/DL (ref 70–99)
HCT VFR BLD CALC: 46.7 % (ref 40.7–50.3)
HEMOGLOBIN: 15.2 G/DL (ref 13–17)
HOMOCYSTEINE: 17.8 UMOL/L
IMMATURE GRANULOCYTES: 0 %
LYMPHOCYTES # BLD: 30 % (ref 24–43)
MCH RBC QN AUTO: 28.5 PG (ref 25.2–33.5)
MCHC RBC AUTO-ENTMCNC: 32.5 G/DL (ref 28.4–34.8)
MCV RBC AUTO: 87.6 FL (ref 82.6–102.9)
MONOCYTES # BLD: 20 % (ref 3–12)
NRBC AUTOMATED: 0 PER 100 WBC
PDW BLD-RTO: 13 % (ref 11.8–14.4)
PLATELET # BLD: 209 K/UL (ref 138–453)
PLATELET ESTIMATE: ABNORMAL
PMV BLD AUTO: 12.2 FL (ref 8.1–13.5)
POTASSIUM SERPL-SCNC: 4 MMOL/L (ref 3.7–5.3)
RBC # BLD: 5.33 M/UL (ref 4.21–5.77)
RBC # BLD: ABNORMAL 10*6/UL
SEG NEUTROPHILS: 47 % (ref 36–65)
SEGMENTED NEUTROPHILS ABSOLUTE COUNT: 2.96 K/UL (ref 1.5–8.1)
SODIUM BLD-SCNC: 133 MMOL/L (ref 135–144)
WBC # BLD: 6.3 K/UL (ref 3.5–11.3)
WBC # BLD: ABNORMAL 10*3/UL

## 2020-02-19 PROCEDURE — 83090 ASSAY OF HOMOCYSTEINE: CPT

## 2020-02-19 PROCEDURE — 85610 PROTHROMBIN TIME: CPT

## 2020-02-19 PROCEDURE — 97129 THER IVNTJ 1ST 15 MIN: CPT

## 2020-02-19 PROCEDURE — 85730 THROMBOPLASTIN TIME PARTIAL: CPT

## 2020-02-19 PROCEDURE — 6360000002 HC RX W HCPCS: Performed by: STUDENT IN AN ORGANIZED HEALTH CARE EDUCATION/TRAINING PROGRAM

## 2020-02-19 PROCEDURE — 6370000000 HC RX 637 (ALT 250 FOR IP): Performed by: STUDENT IN AN ORGANIZED HEALTH CARE EDUCATION/TRAINING PROGRAM

## 2020-02-19 PROCEDURE — 36415 COLL VENOUS BLD VENIPUNCTURE: CPT

## 2020-02-19 PROCEDURE — 97535 SELF CARE MNGMENT TRAINING: CPT

## 2020-02-19 PROCEDURE — 99232 SBSQ HOSP IP/OBS MODERATE 35: CPT | Performed by: INTERNAL MEDICINE

## 2020-02-19 PROCEDURE — 1180000000 HC REHAB R&B

## 2020-02-19 PROCEDURE — 85613 RUSSELL VIPER VENOM DILUTED: CPT

## 2020-02-19 PROCEDURE — 99254 IP/OBS CNSLTJ NEW/EST MOD 60: CPT | Performed by: INTERNAL MEDICINE

## 2020-02-19 PROCEDURE — 80048 BASIC METABOLIC PNL TOTAL CA: CPT

## 2020-02-19 PROCEDURE — 86147 CARDIOLIPIN ANTIBODY EA IG: CPT

## 2020-02-19 PROCEDURE — 6370000000 HC RX 637 (ALT 250 FOR IP): Performed by: FAMILY MEDICINE

## 2020-02-19 PROCEDURE — 99239 HOSP IP/OBS DSCHRG MGMT >30: CPT | Performed by: PSYCHIATRY & NEUROLOGY

## 2020-02-19 PROCEDURE — 85025 COMPLETE CBC W/AUTO DIFF WBC: CPT

## 2020-02-19 PROCEDURE — 86146 BETA-2 GLYCOPROTEIN ANTIBODY: CPT

## 2020-02-19 RX ORDER — ACETAMINOPHEN 325 MG/1
650 TABLET ORAL EVERY 4 HOURS PRN
Status: DISCONTINUED | OUTPATIENT
Start: 2020-02-19 | End: 2020-02-24 | Stop reason: HOSPADM

## 2020-02-19 RX ORDER — AMLODIPINE BESYLATE 10 MG/1
10 TABLET ORAL NIGHTLY
Status: CANCELLED | OUTPATIENT
Start: 2020-02-19

## 2020-02-19 RX ORDER — HYDROCHLOROTHIAZIDE 25 MG/1
25 TABLET ORAL DAILY
Status: CANCELLED | OUTPATIENT
Start: 2020-02-20

## 2020-02-19 RX ORDER — SENNA AND DOCUSATE SODIUM 50; 8.6 MG/1; MG/1
2 TABLET, FILM COATED ORAL DAILY
Status: CANCELLED | OUTPATIENT
Start: 2020-02-20

## 2020-02-19 RX ORDER — BISACODYL 10 MG
10 SUPPOSITORY, RECTAL RECTAL DAILY PRN
Status: DISCONTINUED | OUTPATIENT
Start: 2020-02-19 | End: 2020-02-24 | Stop reason: HOSPADM

## 2020-02-19 RX ORDER — ASPIRIN 81 MG/1
81 TABLET ORAL DAILY
Status: CANCELLED | OUTPATIENT
Start: 2020-02-20

## 2020-02-19 RX ORDER — LISINOPRIL 20 MG/1
40 TABLET ORAL DAILY
Status: CANCELLED | OUTPATIENT
Start: 2020-02-20

## 2020-02-19 RX ORDER — FOLIC ACID 1 MG/1
1 TABLET ORAL 2 TIMES DAILY
Status: CANCELLED | OUTPATIENT
Start: 2020-02-19

## 2020-02-19 RX ORDER — CLOPIDOGREL BISULFATE 75 MG/1
75 TABLET ORAL DAILY
Status: DISCONTINUED | OUTPATIENT
Start: 2020-02-19 | End: 2020-02-19

## 2020-02-19 RX ORDER — POLYETHYLENE GLYCOL 3350 17 G/17G
17 POWDER, FOR SOLUTION ORAL DAILY
Status: DISCONTINUED | OUTPATIENT
Start: 2020-02-19 | End: 2020-02-24 | Stop reason: HOSPADM

## 2020-02-19 RX ORDER — POLYETHYLENE GLYCOL 3350 17 G/17G
17 POWDER, FOR SOLUTION ORAL DAILY
Status: DISCONTINUED | OUTPATIENT
Start: 2020-02-19 | End: 2020-02-20

## 2020-02-19 RX ORDER — AMLODIPINE BESYLATE 10 MG/1
10 TABLET ORAL NIGHTLY
Status: DISCONTINUED | OUTPATIENT
Start: 2020-02-19 | End: 2020-02-24 | Stop reason: HOSPADM

## 2020-02-19 RX ORDER — LISINOPRIL 20 MG/1
40 TABLET ORAL DAILY
Status: DISCONTINUED | OUTPATIENT
Start: 2020-02-20 | End: 2020-02-23

## 2020-02-19 RX ORDER — ROSUVASTATIN CALCIUM 20 MG/1
40 TABLET, COATED ORAL NIGHTLY
Status: CANCELLED | OUTPATIENT
Start: 2020-02-19

## 2020-02-19 RX ORDER — FLUOXETINE HYDROCHLORIDE 20 MG/1
20 CAPSULE ORAL DAILY
Status: DISCONTINUED | OUTPATIENT
Start: 2020-02-20 | End: 2020-02-23

## 2020-02-19 RX ORDER — ASPIRIN 81 MG/1
81 TABLET ORAL DAILY
Status: DISCONTINUED | OUTPATIENT
Start: 2020-02-20 | End: 2020-02-24 | Stop reason: HOSPADM

## 2020-02-19 RX ORDER — ROSUVASTATIN CALCIUM 10 MG/1
40 TABLET, COATED ORAL NIGHTLY
Status: DISCONTINUED | OUTPATIENT
Start: 2020-02-19 | End: 2020-02-23

## 2020-02-19 RX ORDER — HYDROCHLOROTHIAZIDE 25 MG/1
25 TABLET ORAL DAILY
Status: DISCONTINUED | OUTPATIENT
Start: 2020-02-20 | End: 2020-02-23

## 2020-02-19 RX ORDER — SENNA AND DOCUSATE SODIUM 50; 8.6 MG/1; MG/1
2 TABLET, FILM COATED ORAL DAILY
Status: DISCONTINUED | OUTPATIENT
Start: 2020-02-20 | End: 2020-02-20

## 2020-02-19 RX ORDER — ACETAMINOPHEN 325 MG/1
650 TABLET ORAL EVERY 4 HOURS PRN
Status: CANCELLED | OUTPATIENT
Start: 2020-02-19

## 2020-02-19 RX ORDER — FOLIC ACID 1 MG/1
1 TABLET ORAL 2 TIMES DAILY
Status: DISCONTINUED | OUTPATIENT
Start: 2020-02-19 | End: 2020-02-24 | Stop reason: HOSPADM

## 2020-02-19 RX ORDER — FLUOXETINE HYDROCHLORIDE 20 MG/1
20 CAPSULE ORAL DAILY
Status: CANCELLED | OUTPATIENT
Start: 2020-02-20

## 2020-02-19 RX ADMIN — FOLIC ACID 1 MG: 1 TABLET ORAL at 09:23

## 2020-02-19 RX ADMIN — TICAGRELOR 90 MG: 90 TABLET ORAL at 09:24

## 2020-02-19 RX ADMIN — ENOXAPARIN SODIUM 30 MG: 30 INJECTION SUBCUTANEOUS at 21:51

## 2020-02-19 RX ADMIN — ENOXAPARIN SODIUM 30 MG: 30 INJECTION SUBCUTANEOUS at 00:22

## 2020-02-19 RX ADMIN — AMLODIPINE BESYLATE 10 MG: 10 TABLET ORAL at 21:51

## 2020-02-19 RX ADMIN — TICAGRELOR 90 MG: 90 TABLET ORAL at 21:53

## 2020-02-19 RX ADMIN — ENOXAPARIN SODIUM 30 MG: 30 INJECTION SUBCUTANEOUS at 09:23

## 2020-02-19 RX ADMIN — ROSUVASTATIN CALCIUM 40 MG: 10 TABLET, FILM COATED ORAL at 21:52

## 2020-02-19 RX ADMIN — FLUOXETINE HYDROCHLORIDE 20 MG: 20 CAPSULE ORAL at 09:23

## 2020-02-19 RX ADMIN — LISINOPRIL 40 MG: 20 TABLET ORAL at 09:22

## 2020-02-19 RX ADMIN — Medication 81 MG: at 09:23

## 2020-02-19 RX ADMIN — ROSUVASTATIN CALCIUM 40 MG: 20 TABLET, FILM COATED ORAL at 00:23

## 2020-02-19 RX ADMIN — FOLIC ACID 1 MG: 1 TABLET ORAL at 00:23

## 2020-02-19 RX ADMIN — TICAGRELOR 90 MG: 90 TABLET ORAL at 00:23

## 2020-02-19 RX ADMIN — FOLIC ACID 1 MG: 1 TABLET ORAL at 21:51

## 2020-02-19 RX ADMIN — HYDROCHLOROTHIAZIDE 25 MG: 25 TABLET ORAL at 09:23

## 2020-02-19 ASSESSMENT — PAIN SCALES - GENERAL
PAINLEVEL_OUTOF10: 0
PAINLEVEL_OUTOF10: 0

## 2020-02-19 ASSESSMENT — ENCOUNTER SYMPTOMS
GASTROINTESTINAL NEGATIVE: 1
EYES NEGATIVE: 1
RESPIRATORY NEGATIVE: 1

## 2020-02-19 NOTE — PROGRESS NOTES
Rcv'd fax from [facility] with approval for ARU. ANETTE Yin CM, notified and Jim Kee states pt is medically ready for ARU. Writer requested d/c readmit be completed, DVT prophylaxis be continued, and [place] called to [id number]. IV BP meds needs d/c'd and pt's BP needs to be stable prior to ARU. Admission Assessment completed and Dr Alonzo Perry notified. Annamaria notified writer pt has hem/onc consult re:  hypercoag. Writer notified Jim Kee that hem/onc needs to clear pt for ARU. If pt needs further work up prior to ARU d/c as opposed to outpatient after ARU, pt needs to have work up completed prior to ARU admit. Spoke with Jim Kee who states hem/onc just saw pt and shared that pt is cleared for ARU, and pt will not need any further testing, work up, or treatment while in ARU as any further testing, work up, or treatment will occur outpatient after ARU d/c. Writer discussed aforementioned info with Dr Alonzo Perry, and notified Jim Kee that per Dr Alonzo Perry pt is approved to admit to ARU today.

## 2020-02-19 NOTE — PROGRESS NOTES
[facility]  Internal Medicine Teaching Residency Program  Inpatient Daily Progress Note  ______________________________________________________________________________    Patient: Marius Cooks  YOB: 1970   GNZ:[id number]    Acct: [de-identified]     Room: [facility]0331-01  Admit date: 2/13/2020  Today's date: 02/19/20  Number of days in the hospital: 6    SUBJECTIVE   Admitting Diagnosis: Ischemic stroke [facility])  Consult Reason: Management of blood pressure  Pt examined at bedside. Chart & results reviewed. Blood pressure at goal.  On Lisinopril, HCTZ, Norvasc. Discussed with family and answered their questions. ROS:  Constitutional:  negative for chills, fevers, sweats  Respiratory:  negative for cough, dyspnea on exertion, hemoptysis, shortness of breath, wheezing  Cardiovascular:  negative for chest pain, chest pressure/discomfort, lower extremity edema, palpitations  Gastrointestinal:  negative for abdominal pain, constipation, diarrhea, nausea, vomiting  Neurological:  negative for dizziness, headache  BRIEF HISTORY   The patient is a pleasant 52 y.o. male presents with a chief complaint of dizziness, dysarthria and left side weakness on 2/13. CTA head/neck:  revealed hypoplastic appearing basilar artery with bilateral fetal PCAs. MRI brain with acute right pontine infarct and concern for possible basilar thrombus. Out of tPA window. Treated with heparin gtt.     Permissive HTN maintained for initial ischemic stroke. Currently BP goal of <160 mmHg. Cardiology consulted for aortic root dilation. Echo 2/14, severe LVH, EF 55%, moderate AR with aortic root dilatation of >4.5 cm. Echo 5/19, shown aortic dilation of 4.8 cm. No significant change.     Pt seen at bedside, resting well. Denies any chest pain, difficulty swallowing, SOB, cough. Slurring of speech improved with speech therapy. Getting PT/OT for left sided weakness. BP repeat shown 190/62 mmHg. Labs reviewed. As per the nurse, pt having low mood, tearful. First episode of stroke. OBJECTIVE     Vital Signs:  /71   Pulse 62   Temp 98.6 °F (37 °C) (Oral)   Resp 16   Ht 6' 1\" (1.854 m)   Wt 245 lb 13 oz (111.5 kg)   SpO2 95%   BMI 32.43 kg/m²     Temp (24hrs), Av.3 °F (37.4 °C), Min:98.6 °F (37 °C), Max:99.9 °F (37.7 °C)    In: 1322   Out: 350 [Urine:350]    Physical Exam:  Constitutional: This is a well developed, well nourished, 30-34.9 - Obesity Grade I 52y.o. year old male who is alert, oriented, cooperative and in no apparent distress. Head:normocephalic and atraumatic. EENT:  Oral mucosa was without erythema, exudates or cobblestoning. No thrush was noted. Neck: Supple without thyromegaly. No elevated JVP. Trachea was midline. Respiratory: Chest was symmetrical.  Breath sounds bilaterally were clear to auscultation. There were no wheezes, rhonchi or rales. There is no intercostal retraction or use of accessory muscles. Cardiovascular: Regular without murmur, or clicks. No added sounds. Abdomen: Slightly rounded and soft without organomegaly. No rebound, rigidity or guarding was appreciated. Lymphatic: No lymphadenopathy. Musculoskeletal: Normal curvature of the spine. No gross muscle weakness. Extremities:  No lower extremity edema. No ulcerations, tenderness, varicosities or erythema. Muscle size, tone and strength are normal.  No involuntary movements are noted. Skin:  Warm and dry. Good color, turgor and pigmentation. No cyanosis or clubbing.   Neurological/Psychiatric: The patient's general behavior, level of consciousness, thought content and emotional status is normal.        Medications:  Scheduled Medications:    hydroCHLOROthiazide  25 mg Oral Daily    enoxaparin  30 mg Subcutaneous BID    amLODIPine  10 mg Oral Nightly    FLUoxetine  20 mg Oral Daily    lisinopril  40 mg Oral Daily    rosuvastatin  40 mg Oral Nightly    folic acid  1 mg Oral BID Contrast    Result Date: 2/13/2020  No acute intracranial abnormality. Findings were discussed with Dr. Maude Castillo At 3:41 pm on 2/13/2020. Ct Chest W Contrast    Result Date: 2/13/2020  1. No acute abnormalities seen in the chest 2. Tortuous ascending thoracic aorta accounts for the density seen on the chest radiograph 3. Coronary artery calcifications     Cta Head Neck W Contrast    Result Date: 2/15/2020  1. No new arterial abnormality in the head or neck. 2. 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 3. Moderate diffuse irregularity of the P2 and P3 segments posterior cerebral arteries, which are supplied by the posterior communicating arteries. 4. No hemodynamically significant arterial stenosis in the neck. Cta Head Neck W Contrast    Result Date: 2/13/2020  No significant stenosis or evidence for occlusion. 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 The superior cerebellar arteries also appear to arise from the posterior cerebral arteries. Mri Brain Wo Contrast    Result Date: 2/13/2020  Acute right paramedian pontine stroke. 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 Remote left posterior cerebellar stroke. Mild non-specific white matter changes favor chronic small ischemic disease. Demyelinating process may also be considered in differential although thought less likely. ASSESSMENT & PLAN     ASSESSMENT / PLAN:   1. Uncontrolled Hypertension overlap with Ischemic CVA: SBP goal <160 mmHg. Continue Lisinopril 40 mg, Amlodipine 10 mg daily. Increased HCTZ 25 mg. hydralazine IV PRN with holding parameters. 2. Ischemic Right pontine stroke: Management as per Neurology as primary. 3. CAD s/p PCI 5/19. Continue ASA, brilinta and crestor. 4. Aortic root dilation: Clinically stable. Cardiology evaluated. Aortic index 1.9 cm/m2. Goal of aortic index <2.75.   Recommended serial echo monitoring

## 2020-02-19 NOTE — PROGRESS NOTES
Orientation Status: Within Functional Limits  Objective    ADL  Grooming: Setup; Increased time to complete;Minimal assistance(oral care completed seated EOB req assistance with utensils)  UE Bathing: Minimal assistance;Setup; Increased time to complete(req assistance with back)  LE Bathing: Moderate assistance;Setup; Increased time to complete(req assistance with distal BLE)  UE Dressing: Minimal assistance;Setup; Increased time to complete(to doff/don gown req assistance threading LUE in sleeve)  LE Dressing: Moderate assistance;Setup; Increased time to complete(to doff/don socks pt able to doff req assistance with donning)  Toileting: Contact guard assistance;Setup; Increased time to complete(utilizing urinal standing)  Additional Comments: dressing, bathing and grooming completed seated/standing at EOB; pt became increasingly depressed req increased assistance  Balance  Sitting Balance: Supervision  Standing Balance: Contact guard assistance  Standing Balance  Time: pt tolerated approx 7-9 min  Activity: during ADLs and mobility  Comment: CGA without AD  Functional Mobility  Functional - Mobility Device: Other(HHA)  Activity: Other  Assist Level: Contact guard assistance  Functional Mobility Comments: taking small steps forward and backwards  Bed mobility  Rolling to Left: Stand by assistance  Rolling to Right: Stand by assistance  Supine to Sit: Stand by assistance  Sit to Supine: Stand by assistance  Scooting: Stand by assistance  Comment: HOB elevated  Transfers  Sit to stand: Contact guard assistance  Stand to sit: Contact guard assistance  Cognition  Overall Cognitive Status: WFL     Pt and RN agreeable to therapy this day. ADL tasks of dressing, bathing, toileting and grooming completed see above for LOF. Therapeutic use of self utilized throughout session in attempt to increase pt participation and spirit. Pt seated EOB at session end with family present and call light in reach.    Plan   Plan  Times per week:

## 2020-02-19 NOTE — PROGRESS NOTES
Speech Language Pathology  Speech Language Pathology  Tessie Mcgovern    Cognitive Treatment Note    Date: 2/19/2020  Patients Name: Ruchi Abrams  MRN: [id number]  Patient Active Problem List   Diagnosis Code    STEMI (ST elevation myocardial infarction) ([facility] [address] I21.3    Coronary artery disease involving native coronary artery of native heart without angina pectoris I25.10    Acute ST segment elevation myocardial infarction ([facility] [address] I21.3    Essential hypertension I10    Class 1 obesity due to excess calories with body mass index (BMI) of 32.0 to 32.9 in adult E66.09, Z68.32    Stroke-like symptoms R29.90    Ischemic stroke ([facility] [address] I63.9    Hypertensive emergency without congestive heart failure I16.1    Numbness R20.0    Basilar artery stenosis/occlusion with infarction (HCC) I63.22    Right arm weakness R29.898    Acute ischemic stroke ([facility] [address] I63.9    Left-sided weakness P22.0    Systolic murmur R68.1       Pain: 0/10    Cognitive Treatment    Treatment time: 9:00-9:16      Subjective: [x] Alert [x] Cooperative     [] Confused     [] Agitated    [] Lethargic      Objective/Assessment:    Recall: Word List Retention, Category Exclusion: 6/10 increased to 10/10 with repetitions and minimal verbal cues   Word List Retention, Word Placement: 6/10 increased to 10/10 with repetitions and minimal verbal cues   Functional Memory Tasks, Specific Information Retention: 14/16 increased to 16/16 with repetitions     Problem Solving/Reasoning:    Making Word Deductions: 9/10 increased to 10/10 with minimal verbal cue      Plan:  [x] Continue ST services    [] Discharge from ST:      Discharge recommendations: [] Inpatient Rehab   [] [place]   [] Outpatient Therapy  [] Follow up at trauma clinic   [x] Other: Further therapy recommended at discharge. Treatment completed by: Completed by: Violetta Hampton,  Clinician    Cosigned By: Beverly PATINO

## 2020-02-19 NOTE — PROGRESS NOTES
Patient transported to [facility] rehab, discharge paperwork completed and signed per patient and family, report called to nurse and patient belongings sent with patient, patient transported per wheelchair by Alejandro Leong

## 2020-02-19 NOTE — PROGRESS NOTES
[facility]   Acute Inpatient Rehab Preadmission Assessment    Patient Name: Rose Cruz        MRN: [id number]    : 1970  (52 y.o.)  Gender: male     Admitted from:   [facility]  [x][facility]   []Outside Admission - Location:                                 [x]Initial         []Updated    Date of Onset / admission to the acute hospital:  20    Impairment group:  1.1 CVA    Did patient have surgery?   [x]No  []Yes:      Physicians: Lynda Jones for clinical complications:  High    Co-morbidities:     Heart attack ([facility] [address]      Hyperlipidemia      Hypertension          Financial Information  Primary insurance:  []Medicare [] Medicare HMO  []Commercial insurance    []Medicaid   [x] Medicaid HMO []Workers Compensation   []Personal Pay    Secondary Insurance:  []Medicare [] Medicare HMO  []Commercial insurance    []Medicaid   []Workers Compensation [x]None    Precautions:   []Cardiac Precautions    []Total hip precautions    []Weight Bearing status:  [x]Safety Precautions/Concerns  []Visually impaired     []Hard of Hearing     Isolation Precautions:         []Yes  [x]No  If Yes:  [] Droplet  []Contact []Airborne     []VRE     []MRSA     []C-diff         [] TB       [] Other:        Physiatrist:  [x]        []   Dr. Perri Casiano  []   Dr. Chester Solid    Patients Occupation: Employed full time    Reviewed Lab and Diagnostic reports from Current Admission: Yes    Patients Prior Functional  Level: Prior Function  Receives Help From: Family, Friend(s)  ADL Assistance: Independent  Homemaking Assistance: Independent  Ambulation Assistance: Independent  Transfer Assistance: Independent    History of current illness:  Mr. Rose Cruz is a 52 y.o. right handed male who was admitted to [facility] on 2020 with Aphasia and Numbness (left side since this morning)     79-year-old male with history of hypertension, MI status post cardiac stents and May 2019 who presented d/t impulsiveness. LOB noted upon standing , Pt  continue L knee va with full weight bearing. Current functional status for toilet transfers: Moderate assistance       Current functional status for locomotion:  Ambulation  Ambulation?: Yes  More Ambulation?: No  Ambulation 1  Surface: level tile  Device: Platform Walker left(pt unable to  with LUE; try lift walker next tx for better leg control)  Assistance: Moderate assistance  Quality of Gait: Narrow base of support; decreased control; intermittently dragging L foot; increased step length, cued for smaller steps for control. Gait Deviations: Decreased step height  Distance: 40ftx2  Comments: Pt requring constant cues for slow and controlled gait; pt cued for proper progression of LLE throughout ambulation; tactile cues on LLE for knee blocking and quad activation for knee extension preventing buckling. Pt requiring one seated R and several standing RBs throughout.     Current functional status for comprehension: Complete independence    Current functional status for expression: Complete independence    Current functional status for social interaction: Complete independence    Current functional status for problem solving: Minimal contact assistance    Current functional status for memory: Minimal contact assistance    Current Deficits R/T Impairment: Impaired Functional Mobility and Decreased ADLs    Required Therapy:   [x] Physical Therapy  [x] Occupational Therapy   [x] Speech Therapy    Additional Services:  [x]   [x] Recreational Therapy  [x] Nutrition  [] Dialysis  [] Other:     Rehab Justification:  Needs 3 hrs therapy per day or 15 hours per week:  Yes  Identified Rehab Nursing needs: Yes  Intense Interdisciplinary need:  Yes  Need for 24 hr physician supervision:  Yes  Measurable improved quality of life:  Yes  Willingness to participate:  Yes  Medical Necessity:  Yes  Patient able to tolerate care proposed:  Yes    Expected

## 2020-02-19 NOTE — PROGRESS NOTES
NEUROLOGY INPATIENT PROGRESS NOTE    2/19/2020         Subjective: Igor Sarabia is a  52 y.o. male admitted on 2/13/2020 with rocklike symptoms and was found to have acute ischemic stroke involving the right mayuri and stenotic versus hypoplastic basilar artery. NIH of 3 on presentation due to mild dysarthria, left upper and lower extremity drift. Today he was seen and examined at bedside, no acute events overnight. Planning for discharge to inpatient rehab today. Current Facility-Administered Medications on File Prior to Encounter   Medication Dose Route Frequency Provider Last Rate Last Dose    sodium chloride flush 0.9 % injection 10 mL  10 mL Intravenous 2 times per day Maciej Romano MD        sodium chloride flush 0.9 % injection 10 mL  10 mL Intravenous PRN Maciej Romano MD         Current Outpatient Medications on File Prior to Encounter   Medication Sig Dispense Refill    meloxicam (MOBIC) 7.5 MG tablet Take 1 tablet by mouth 2 times daily as needed for Pain 14 tablet 0    nebivolol (BYSTOLIC) 5 MG tablet Take 2 tablets by mouth daily for 7 days 14 tablet 0    tadalafil (CIALIS) 20 MG tablet Take 1 tablet by mouth as needed for Erectile Dysfunction 6 tablet 3    lisinopril (PRINIVIL;ZESTRIL) 40 MG tablet Take 1 tablet by mouth daily 30 tablet 2    carvedilol (COREG) 25 MG tablet Take 1 tablet by mouth 2 times daily (with meals) 60 tablet 2    aspirin 81 MG EC tablet Take 1 tablet by mouth daily 30 tablet 3    nitroGLYCERIN (NITROSTAT) 0.4 MG SL tablet up to max of 3 total doses. If no relief after 1 dose, call 911. 25 tablet 3    atorvastatin (LIPITOR) 80 MG tablet Take 1 tablet by mouth nightly 30 tablet 3    amLODIPine (NORVASC) 10 MG tablet Take 1 tablet by mouth daily 30 tablet 3    ticagrelor (BRILINTA) 90 MG TABS tablet Take 1 tablet by mouth 2 times daily 60 tablet 11       Allergies: Igor Sarabia has No Known Allergies.     Past Medical History:   Diagnosis Date    Heart attack ([facility] [address]     Hyperlipidemia     Hypertension        Past Surgical History:   Procedure Laterality Date    CARDIAC SURGERY      CORONARY ANGIOPLASTY WITH STENT PLACEMENT  2019    x 1 stent for LAD    FOOT SURGERY Bilateral     KNEE SURGERY Right        Medications:     hydroCHLOROthiazide  25 mg Oral Daily    enoxaparin  30 mg Subcutaneous BID    amLODIPine  10 mg Oral Nightly    FLUoxetine  20 mg Oral Daily    lisinopril  40 mg Oral Daily    rosuvastatin  40 mg Oral Nightly    folic acid  1 mg Oral BID    sennosides-docusate sodium  2 tablet Oral Daily    aspirin  81 mg Oral Daily    ticagrelor  90 mg Oral BID    sodium chloride flush  10 mL Intravenous 2 times per day     PRN Meds include: hydrALAZINE, magnesium hydroxide, ondansetron, sodium chloride flush, acetaminophen    Objective:   /71   Pulse 62   Temp 98.6 °F (37 °C) (Oral)   Resp 16   Ht 6' 1\" (1.854 m)   Wt 245 lb 13 oz (111.5 kg)   SpO2 95%   BMI 32.43 kg/m²     Blood pressure range: Systolic (36UTO), KSS:102 , Min:124 , XCV:873   ; Diastolic (68SZW), HGI:19, Min:71, Max:81      ROS:  Review of Systems   Constitutional: Negative. HENT: Negative. Eyes: Negative. Respiratory: Negative. Cardiovascular: Negative. Gastrointestinal: Negative. Musculoskeletal: Negative. Neurological: Positive for weakness. Negative for dizziness, seizures, facial asymmetry, light-headedness, numbness and headaches. Hematological: Negative. Psychiatric/Behavioral: Negative. NEUROLOGIC EXAMINATION  Physical Exam  HENT:      Head: Normocephalic and atraumatic. Mouth/Throat:      Mouth: Mucous membranes are moist.   Eyes:      Extraocular Movements: Extraocular movements intact. Pupils: Pupils are equal, round, and reactive to light. Neck:      Musculoskeletal: Normal range of motion and neck supple. Cardiovascular:      Rate and Rhythm: Normal rate and regular rhythm.    Abdominal:      General: Abdomen is flat.      Palpations: Abdomen is soft. Musculoskeletal: Normal range of motion. Neurological:      Mental Status: He is alert. Sensory: No sensory deficit. Motor: Weakness present. No tremor, atrophy, abnormal muscle tone or seizure activity. Deep Tendon Reflexes:      Reflex Scores:       Bicep reflexes are 2+ on the right side and 2+ on the left side. Patellar reflexes are 2+ on the right side and 2+ on the left side. Comments: Left sided facial droop  Left upper extremity and left lower extremity weakness  Left upper extremity 3 out of 5  Left lower extremity 4- out of 5       . Neurologic Exam     Cranial Nerves     CN III, IV, VI   Pupils are equal, round, and reactive to light. Gait, Coordination, and Reflexes     Reflexes   Right biceps: 2+  Left biceps: 2+  Right patellar: 2+  Left patellar: 2+       Lab Results:   CBC:   Recent Labs     02/17/20  0434 02/18/20  0645 02/19/20  0512   WBC 12.4* 9.3 6.3   HGB 15.7 16.5 15.2    193 209     BMP:    Recent Labs     02/17/20  0434 02/18/20  0645 02/19/20  0512    132* 133*   K 3.5* 3.9 4.0    99 99   CO2 21 17* 19*   BUN 12 17 26*   CREATININE 1.17 1.15 1.41*   GLUCOSE 101* 103* 99         Lab Results   Component Value Date    CHOL 140 02/14/2020    LDLCHOLESTEROL 91 02/14/2020    HDL 41 02/14/2020    TRIG 40 02/14/2020    ALT 57 (H) 05/25/2019    AST 29 05/25/2019    INR 1.0 02/13/2020    LABA1C 5.6 02/14/2020      MOLECULAR GENETIC DIAGNOSIS:     **Compound Heterozygote for   MTHFR C677T and      Mutations**       No results found for: PHENYTOIN, PHENYTOIN, VALPROATE, CBMZ    IMAGING  Xr Chest Standard (2 Vw)  Result Date: 2/13/2020  Indeterminate prominence of the right hilar shadow could relate to adenopathy, mass, adjacent consolidation or pulmonary artery enlargement. Correlation with IV contrast-enhanced CT chest recommended. Cardiomegaly.  RECOMMENDATION: IV contrast-enhanced CT chest      Ct Head Wo Contrast  Result Date: 2/13/2020  No evidence of hemorrhagic transformation of the right paramedian pontine infarct. Moderate sinus disease. Ct Head Wo Contrast  Result Date: 2/13/2020  No acute intracranial abnormality. Findings were discussed with Dr. Cristian Lazo At 3:41 pm on 2/13/2020. Ct Chest W Contrast  Result Date: 2/13/2020  1. No acute abnormalities seen in the chest 2. Tortuous ascending thoracic aorta accounts for the density seen on the chest radiograph 3. Coronary artery calcifications      Cta Head Neck W Contrast  Result Date: 2/13/2020  No significant stenosis or evidence for occlusion. 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 The superior cerebellar arteries also appear to arise from the posterior cerebral arteries. Mri Brain Wo Contrast  Result Date: 2/13/2020  Acute right paramedian pontine stroke. 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 Remote left posterior cerebellar stroke. Mild non-specific white matter changes favor chronic small ischemic disease. Demyelinating process may also be considered in differential although thought less likely. ASSESSMENT  Ischemic stroke involving the right mayuri  Hypoplastic basilar artery  Ascending aortic dilatation 4.5 cm  Possible basilar artery thrombus, rule out thrombophilia  Compound heterzygote for MHTFR C677T and . Will consult hematology   Hypertension  Coronary artery disease status post stenting 5/2019    PLAN  1.)  Continue aspirin 81 mg daily, Brilinta 90 mg twice daily. 2.)  Continue Crestor 40 mg daily, LDL goal less than 70   3.) Hematology consult, by hematology. Follow-up as outpatient in 1 week. Continue aspirin and Brilinta  4.) Follow up with Dr. Ambreen Sarabia outpatient with Echo in 6 months. CT surgery referral when necessary.            Josh Sanders MD  PGY-3 Neurology Resident

## 2020-02-19 NOTE — PROGRESS NOTES
Neurology attending Note    Detail see resident note. 52year old LH AAEVIE woke up on 2/13 with dizziness, left side weakness (arm and leg), slurred speech, he has risk factors for stroke including HTN (he said his HTN medications were changed last Wednesday), history of MI, stroke workup found acute right potine infarct, suspected basilar thrombus, so started low dose heparin drip but his left arm weakness got worse, so heparin drip stopped. CTH no new ischemic stroke or hemorrhage. CTA showed hypoplastic basilar artery and persistent fetal PCAs. A1c 5.6; LDL 91; Echo showed aortic dilation. Hypercoagulable workup is undergoing, MTHFR mutation. He drinks on weekends (liquor), no smoking       A/P:   Acute pontine ischemic stroke  - left hemibody weakness, arm more than leg  - stroke workup showed MTHFR mutation, per hem/onc, it associated with high homocystine level, need further workup, can continue current ASA, and brilinta, follow up with hem/onc in 2 weeks.    - continue prozac 20mg daily for motor improvement per Flaming trial   -continue statin   - go to inpt rehab today     // Hypoplastic basilar artery  - suspected basilar artery thrombus but could not tolerate heparin drip  - normotenive, avoid hypotension     Pt is discharge to inpt rehab today     Radha Byrne MD, [address]  Neurology Attending  [address] Neurology

## 2020-02-19 NOTE — CARE COORDINATION
Care Transition  0840 Called Avis at [facility] she has precert. She will have a bed later today. She will need the IV hydralazine discontinued. Dr. Susan Stoner states patient will need hemonc consult. Hemonc in unit and will need to order more lab work. Called Avis and informed her of lab work. All lab work must be drawn prior to coming. She will call Dr. Naveen Tierney to see if he can come before it results. Avis return call and he is ok patient is admitted to night. Faxed Medical necessity to [phone] [facility]. [facility] unable to transport, will have [facility] come. Moustapha Lawton RN to call report.      Discharge [facility]  Written by: Kirk Wheeler RN    Patient Name: Maggie Harris  Attending Provider: No att. providers found  Admit Date: 2020  2:54 PM  MRN: [id number]  Account: [de-identified]                     : 1970  Discharge Date: 2020      Disposition: Oliverio Estrada RN

## 2020-02-19 NOTE — CONSULTS
by mouth as needed for Erectile Dysfunction 2/10/20   Kamille Bryant PA-C   lisinopril (PRINIVIL;ZESTRIL) 40 MG tablet Take 1 tablet by mouth daily 2/10/20   Kamille Bryant PA-C   carvedilol (COREG) 25 MG tablet Take 1 tablet by mouth 2 times daily (with meals) 10/18/19   Kamille Bryant PA-C   aspirin 81 MG EC tablet Take 1 tablet by mouth daily 5/26/19   Rosales Wright MD   nitroGLYCERIN (NITROSTAT) 0.4 MG SL tablet up to max of 3 total doses.  If no relief after 1 dose, call 911. 5/26/19   Rosales Wright MD   atorvastatin (LIPITOR) 80 MG tablet Take 1 tablet by mouth nightly 5/26/19   Rosales Wright MD   amLODIPine (NORVASC) 10 MG tablet Take 1 tablet by mouth daily 5/26/19   Rosales Wright MD   ticagrelor (BRILINTA) 90 MG TABS tablet Take 1 tablet by mouth 2 times daily 5/26/19   Rosales Wright MD     Current Facility-Administered Medications   Medication Dose Route Frequency Provider Last Rate Last Dose    hydrochlorothiazide (HYDRODIURIL) tablet 25 mg  25 mg Oral Daily Keena Houston MD   25 mg at 02/19/20 6760    hydrALAZINE (APRESOLINE) injection 10 mg  10 mg Intravenous Q4H PRN Keena Houston MD   10 mg at 02/18/20 0127    enoxaparin (LOVENOX) injection 30 mg  30 mg Subcutaneous BID Keena Houston MD   30 mg at 02/19/20 0923    amLODIPine (NORVASC) tablet 10 mg  10 mg Oral Nightly Keena Houston MD   10 mg at 02/17/20 2157    FLUoxetine (PROZAC) capsule 20 mg  20 mg Oral Daily Dora Pantoja MD   20 mg at 02/19/20 0923    lisinopril (PRINIVIL;ZESTRIL) tablet 40 mg  40 mg Oral Daily Keena Houston MD   40 mg at 02/19/20 0602    rosuvastatin (CRESTOR) tablet 40 mg  40 mg Oral Nightly Ernesto Donaldson MD   40 mg at 43/09/61 7015    folic acid (FOLVITE) tablet 1 mg  1 mg Oral BID Ernesto Donaldson MD   1 mg at 02/19/20 0923    sennosides-docusate sodium (SENOKOT-S) 8.6-50 MG tablet 2 tablet  2 tablet Oral Daily BALTAZAR Henderson - CNP   2 tablet at 02/18/20 0933    aspirin EC tablet 81 mg  81 mg Oral Daily Sukhdev Mora MD   81 mg at 02/19/20 8191    ticagrelor (BRILINTA) tablet 90 mg  90 mg Oral BID Sukhdev Mora MD   90 mg at 02/19/20 0924    magnesium hydroxide (MILK OF MAGNESIA) 400 MG/5ML suspension 30 mL  30 mL Oral Daily PRN Sukhdev Mora MD        ondansetron [facility] injection 4 mg  4 mg Intravenous Q6H PRN Sukhdev Mora MD        sodium chloride flush 0.9 % injection 10 mL  10 mL Intravenous 2 times per day Pearletha Flicker, DO   10 mL at 02/17/20 2201    sodium chloride flush 0.9 % injection 10 mL  10 mL Intravenous PRN Pearletha Flicker, DO        acetaminophen (TYLENOL) tablet 650 mg  650 mg Oral Q4H PRN Pearletha Flicker, DO   650 mg at 02/18/20 0126    0.9 % sodium chloride infusion   Intravenous Continuous Byron Ramos MD 75 mL/hr at 02/14/20 1030       Facility-Administered Medications Ordered in Other Encounters   Medication Dose Route Frequency Provider Last Rate Last Dose    sodium chloride flush 0.9 % injection 10 mL  10 mL Intravenous 2 times per day Bob Leon MD        sodium chloride flush 0.9 % injection 10 mL  10 mL Intravenous PRN Bob Leon MD           Allergies:  Patient has no known allergies. Social History:   reports that he has never smoked. He has never used smokeless tobacco. He reports current alcohol use. He reports that he does not use drugs. Family History: family history includes High Blood Pressure in his father and mother; Hypertension in his brother and sister. REVIEW OF SYSTEMS:    Constitutional: No fever or chills.  No night sweats, no weight loss   Eyes: No eye discharge, double vision, or eye pain   HEENT: negative for sore mouth, sore throat, hoarseness and voice change   Respiratory: negative for cough , sputum, dyspnea, wheezing, hemoptysis, chest pain   Cardiovascular: negative for chest pain, dyspnea, palpitations, orthopnea, PND   Gastrointestinal: negative for nausea, vomiting, diarrhea, constipation, abdominal pain, Dysphagia, hematemesis and hematochezia   Genitourinary: negative for frequency, dysuria, nocturia, urinary incontinence, and hematuria   Integument: negative for rash, skin lesions, bruises.    Hematologic/Lymphatic: negative for easy bruising, bleeding, lymphadenopathy, or petechiae   Endocrine: negative for heat or cold intolerance,weight changes, change in bowel habits and hair loss   Musculoskeletal: negative for myalgias, arthralgias, pain, joint swelling,and bone pain   Neurological: negative for headaches, dizziness, seizures, weakness, left-sided weakness    PHYSICAL EXAM:      /71   Pulse 62   Temp 98.6 °F (37 °C) (Oral)   Resp 16   Ht 6' 1\" (1.854 m)   Wt 245 lb 13 oz (111.5 kg)   SpO2 95%   BMI 32.43 kg/m²    Temp (24hrs), Av.3 °F (37.4 °C), Min:98.6 °F (37 °C), Max:99.9 °F (37.7 °C)    General appearance - well appearing, no in pain or distress   Mental status - alert and cooperative   Eyes - pupils equal and reactive, extraocular eye movements intact   Ears - bilateral TM's and external ear canals normal   Mouth - mucous membranes moist, pharynx normal without lesions   Neck - supple, no significant adenopathy   Lymphatics - no palpable lymphadenopathy, no hepatosplenomegaly   Chest - clear to auscultation, no wheezes, rales or rhonchi, symmetric air entry   Heart - normal rate, regular rhythm, normal S1, S2, no murmurs  Abdomen - soft, nontender, nondistended, no masses or organomegaly   Neurological -alert and oriented , decreased power in the left upper extremity and lower extremity   musculoskeletal - no joint tenderness, deformity or swelling   Extremities - peripheral pulses normal, no pedal edema, no clubbing or cyanosis   Skin - normal coloration and turgor, no rashes, no suspicious skin lesions noted ,    DATA:    Labs:   CBC:   Recent Labs     20  0645 20  0512   WBC 9.3 6.3   HGB 16.5 15.2   HCT 52.7* 46.7    209     BMP:   Recent Labs 02/18/20  0645 02/19/20  0512   * 133*   K 3.9 4.0   CO2 17* 19*   BUN 17 26*   CREATININE 1.15 1.41*   LABGLOM >60 54*   GLUCOSE 103* 99     PT/INR: No results for input(s): PROTIME, INR in the last 72 hours. RESULTS:     MOLECULAR GENETIC DIAGNOSIS:     Compound Heterozygote for   MTHFR C677T and      Mutations   MOLECULAR GENETIC DIAGNOSIS:     Negative for Factor V Leiden Mutation   MOLECULAR GENETIC DIAGNOSIS:          Negative for Prothrombin 38694I   Mutation   IMAGING DATA:  reviewed    Primary Problem  Ischemic stroke [facility])    Active Hospital Problems    Diagnosis Date Noted    Acute ischemic stroke ([facility] [address] [I63.9]     Left-sided weakness [R53.1]     Basilar artery stenosis/occlusion with infarction ([facility] [address] [I63.22]     Right arm weakness [R29.898]     Numbness [R20.0]     Stroke-like symptoms [R29.90] 02/13/2020    [facility] [I63.9] 02/13/2020    Hypertensive emergency without congestive heart failure [I16.1] 02/13/2020    Class 1 obesity due to excess calories with body mass index (BMI) of 32.0 to 32.9 in adult [E66.09, Z68.32] 10/13/2019    Coronary artery disease involving native coronary artery of native heart without angina pectoris [I25.10] 07/13/2019         IMPRESSION:   1. Acute ischemic stroke  2. CAD  3. Hypercoagulable state  4. Compound heterozygous MTHFR gene mutation    RECOMMENDATIONS:  1. I reviewed the laboratory data, imaging studies, diagnosis and treatment options with patient  2. He does not have strong family history of venous thromboembolism and stroke and heart attack at young age in family members. 3. I explained that MTHFR gene mutation can be associated with high homocystine level and can increase risk of arterials thrombosis. 4. He will need complete hypercoagulable work-up including antiphospholipid antibody work-up  5. Currently he is on aspirin and Brilinta  6. I would recommend continuing current anticoagulation as per neurology  7.  Will

## 2020-02-19 NOTE — DISCHARGE SUMMARY
Physician Discharge Summary     Patient ID:  Michael Staton  [id number]  52 y.o.  1970    Admit date: 2/13/2020    Discharge date and time: No discharge date for patient encounter. Admitting Physician: Nnamdi Saxena MD     Discharge Physician: Cb Luna MD    Admission Diagnoses: Stroke-like symptoms [R29.90]  Ischemic stroke [facility]) [I63.9]    Discharge Diagnoses: Acute ischemic stroke, right pontine, MTHFR mutation    Admission Condition: critical    Discharged Condition: stable    Indication for Admission: acute ischemic stroke. Hospital Course:   Michael tSaton is a 52 y.o. male with history of HTN and MI s/p cardiac stent x 1 05/2019 who presents with dysarthria and mild left hemiparesis. LKW 2/13/20 @ 0530AM. Patient states he awoke from sleep around 330 AM without symptoms, falling back to sleep around 0530 - he then reports he awoke around 0700 with dizziness and then shortly thereafter developed dysarthria and left sided weakness. On exam patient is awake, alert, and oriented. Mild dysarthria and left upper and lower drift noted. NIH 3. Out of tPA window. Patient states he is on Aspirin 81 mg and Brilinta 90 mg BID currently along with Lipitor 80 mg QHS and his home antihypertensives. Stents placed by Dr. Balaji Galvez 5/2019. 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 MRI brain with acute right pontine infarct and concern for possible basilar thrombus. Low-dose heparin was started without bolus. Hypercoagulable test was ordered. Echo  within normal limits except for ascending aorta dilatation at 4.5 cm, cardiology consulted and recommended follow-up as outpatient with repeat echocardiogram in 6 months. Repeated CTA showed the same finding, heparin infusion was discontinued. Resumed 10 mg of Norvasc for hypertension. Systolic blood pressure goal less than 160. LDL 91, on Crestor 40 mg. Hemoglobin A1c 5.6. On DVT prophylaxis.   He was also found to have heterozygote MTHFR mutations for which he met oncology saw the patient and recommended follow-up as outpatient with antiphospholipid antibody test after which decision on starting anticoagulation will be considered. Consults: Cardiology, hemato-oncology    Significant Diagnostic Studies:   Xr Chest Standard (2 Vw)  Result Date: 2/13/2020  Indeterminate prominence of the right hilar shadow could relate to adenopathy, mass, adjacent consolidation or pulmonary artery enlargement. Correlation with IV contrast-enhanced CT chest recommended. Cardiomegaly. RECOMMENDATION: IV contrast-enhanced CT chest      Ct Head Wo Contrast  Result Date: 2/13/2020  No evidence of hemorrhagic transformation of the right paramedian pontine infarct. Moderate sinus disease. Ct Head Wo Contrast  Result Date: 2/13/2020  No acute intracranial abnormality. Findings were discussed with Dr. Luisana Rg At 3:41 pm on 2/13/2020. Ct Chest W Contrast  Result Date: 2/13/2020  1. No acute abnormalities seen in the chest 2. Tortuous ascending thoracic aorta accounts for the density seen on the chest radiograph 3. Coronary artery calcifications      Cta Head Neck W Contrast  Result Date: 2/13/2020  No significant stenosis or evidence for occlusion. 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 The superior cerebellar arteries also appear to arise from the posterior cerebral arteries. Mri Brain Wo Contrast  Result Date: 2/13/2020  Acute right paramedian pontine stroke. 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 Remote left posterior cerebellar stroke. Mild non-specific white matter changes favor chronic small ischemic disease. Demyelinating process may also be considered in differential although thought less likely.        Negative for Prothrombin 53658P   Mutation       Negative for Factor V Leiden Mutation        MOLECULAR GENETIC DIAGNOSIS: **Compound Heterozygote for   MTHFR C677T and      Mutations**     Treatments: The patient was treated in the neuro ICU as above he was started on heparin infusion and phenylephrine to keep the blood pressure more than 140. He had physical and occupational therapy. He underwent thrombophilia work-up as above. Discharge Exam:  NEUROLOGIC EXAMINATION  Physical Exam  HENT:      Head: Normocephalic and atraumatic. Mouth/Throat:      Mouth: Mucous membranes are moist.   Eyes:      Extraocular Movements: Extraocular movements intact. Pupils: Pupils are equal, round, and reactive to light. Neck:      Musculoskeletal: Normal range of motion and neck supple. Cardiovascular:      Rate and Rhythm: Normal rate and regular rhythm. Abdominal:      General: Abdomen is flat. Palpations: Abdomen is soft. Musculoskeletal: Normal range of motion. Neurological:      Mental Status: He is alert. Sensory: No sensory deficit. Motor: Weakness present. No tremor, atrophy, abnormal muscle tone or seizure activity. Deep Tendon Reflexes:      Reflex Scores:       Bicep reflexes are 2+ on the right side and 2+ on the left side. Patellar reflexes are 2+ on the right side and 2+ on the left side. Comments: Left sided facial droop  Left upper extremity and left lower extremity weakness  Left upper extremity 3 out of 5  Left lower extremity 4- out of 5      . Neurologic Exam      Cranial Nerves      CN III, IV, VI   Pupils are equal, round, and reactive to light. Gait, Coordination, and Reflexes      Reflexes   Right biceps: 2+  Left biceps: 2+  Right patellar: 2+  Left patellar: 2+      Disposition: Inpatient rehabilitation facility    In process/preliminary results:  Outstanding Order Results     No orders found from 1/15/2020 to 2/14/2020.           Patient Instructions:   Current Discharge Medication List      CONTINUE these medications which have NOT CHANGED    Details   meloxicam (MOBIC) 7.5 MG tablet Take 1 tablet by mouth 2 times daily as needed for Pain  Qty: 14 tablet, Refills: 0    Associated Diagnoses: Low back pain without sciatica, unspecified back pain laterality, unspecified chronicity      nebivolol (BYSTOLIC) 5 MG tablet Take 2 tablets by mouth daily for 7 days  Qty: 14 tablet, Refills: 0    Comments: Lot # [id number]  Exp: 10/2020  Associated Diagnoses: Essential hypertension      tadalafil (CIALIS) 20 MG tablet Take 1 tablet by mouth as needed for Erectile Dysfunction  Qty: 6 tablet, Refills: 3    Associated Diagnoses: Erectile dysfunction due to diseases classified elsewhere      lisinopril (PRINIVIL;ZESTRIL) 40 MG tablet Take 1 tablet by mouth daily  Qty: 30 tablet, Refills: 2    Associated Diagnoses: Essential hypertension      carvedilol (COREG) 25 MG tablet Take 1 tablet by mouth 2 times daily (with meals)  Qty: 60 tablet, Refills: 2      aspirin 81 MG EC tablet Take 1 tablet by mouth daily  Qty: 30 tablet, Refills: 3      nitroGLYCERIN (NITROSTAT) 0.4 MG SL tablet up to max of 3 total doses. If no relief after 1 dose, call 911.   Qty: 25 tablet, Refills: 3      atorvastatin (LIPITOR) 80 MG tablet Take 1 tablet by mouth nightly  Qty: 30 tablet, Refills: 3      amLODIPine (NORVASC) 10 MG tablet Take 1 tablet by mouth daily  Qty: 30 tablet, Refills: 3      ticagrelor (BRILINTA) 90 MG TABS tablet Take 1 tablet by mouth 2 times daily  Qty: 60 tablet, Refills: 11           Activity: activity as tolerated  Diet: regular diet  Wound Care: none needed    Follow-up with me in 1 month,  Dr. Oxana Lowry in hematology clinic     Signed:  Electronically signed by Andrew Ramirez MD on 2/21/2020 at 5:24 PM    2/19/2020  4:23 PM

## 2020-02-20 LAB
ANION GAP SERPL CALCULATED.3IONS-SCNC: 13 MMOL/L (ref 9–17)
ANTICARDIOLIPIN IGA ANTIBODY: 1.6 APU
ANTICARDIOLIPIN IGG ANTIBODY: 2.1 GPU
BUN BLDV-MCNC: 31 MG/DL (ref 6–20)
BUN/CREAT BLD: ABNORMAL (ref 9–20)
CALCIUM SERPL-MCNC: 9 MG/DL (ref 8.6–10.4)
CARDIOLIPIN AB IGM: 11.8 MPU
CHLORIDE BLD-SCNC: 100 MMOL/L (ref 98–107)
CO2: 22 MMOL/L (ref 20–31)
CREAT SERPL-MCNC: 1.33 MG/DL (ref 0.7–1.2)
DILUTE RUSSELL VIPER VENOM TIME: NORMAL
GFR AFRICAN AMERICAN: >60 ML/MIN
GFR NON-AFRICAN AMERICAN: 57 ML/MIN
GFR SERPL CREATININE-BSD FRML MDRD: ABNORMAL ML/MIN/{1.73_M2}
GFR SERPL CREATININE-BSD FRML MDRD: ABNORMAL ML/MIN/{1.73_M2}
GLUCOSE BLD-MCNC: 96 MG/DL (ref 70–99)
HCT VFR BLD CALC: 45 % (ref 41–53)
HEMOGLOBIN: 15.1 G/DL (ref 13.5–17.5)
INR BLD: 1.1
LUPUS ANTICOAG: NORMAL
MAGNESIUM: 2.7 MG/DL (ref 1.6–2.6)
MCH RBC QN AUTO: 28.8 PG (ref 26–34)
MCHC RBC AUTO-ENTMCNC: 33.5 G/DL (ref 31–37)
MCV RBC AUTO: 86 FL (ref 80–100)
NRBC AUTOMATED: NORMAL PER 100 WBC
PARTIAL THROMBOPLASTIN TIME: 27.3 SEC (ref 20.5–30.5)
PDW BLD-RTO: 14.1 % (ref 11.5–14.9)
PLATELET # BLD: 185 K/UL (ref 150–450)
PMV BLD AUTO: 9.7 FL (ref 6–12)
POTASSIUM SERPL-SCNC: 3.5 MMOL/L (ref 3.7–5.3)
PROTHROMBIN TIME: 11.1 SEC (ref 9–12)
RBC # BLD: 5.23 M/UL (ref 4.5–5.9)
SODIUM BLD-SCNC: 135 MMOL/L (ref 135–144)
WBC # BLD: 6.9 K/UL (ref 3.5–11)

## 2020-02-20 PROCEDURE — 36415 COLL VENOUS BLD VENIPUNCTURE: CPT

## 2020-02-20 PROCEDURE — 85027 COMPLETE CBC AUTOMATED: CPT

## 2020-02-20 PROCEDURE — 6370000000 HC RX 637 (ALT 250 FOR IP): Performed by: STUDENT IN AN ORGANIZED HEALTH CARE EDUCATION/TRAINING PROGRAM

## 2020-02-20 PROCEDURE — 99222 1ST HOSP IP/OBS MODERATE 55: CPT | Performed by: INTERNAL MEDICINE

## 2020-02-20 PROCEDURE — 97116 GAIT TRAINING THERAPY: CPT

## 2020-02-20 PROCEDURE — 97162 PT EVAL MOD COMPLEX 30 MIN: CPT

## 2020-02-20 PROCEDURE — 92523 SPEECH SOUND LANG COMPREHEN: CPT

## 2020-02-20 PROCEDURE — 80048 BASIC METABOLIC PNL TOTAL CA: CPT

## 2020-02-20 PROCEDURE — 97535 SELF CARE MNGMENT TRAINING: CPT

## 2020-02-20 PROCEDURE — 99223 1ST HOSP IP/OBS HIGH 75: CPT | Performed by: PHYSICAL MEDICINE & REHABILITATION

## 2020-02-20 PROCEDURE — 1180000000 HC REHAB R&B

## 2020-02-20 PROCEDURE — 97167 OT EVAL HIGH COMPLEX 60 MIN: CPT

## 2020-02-20 PROCEDURE — 97110 THERAPEUTIC EXERCISES: CPT

## 2020-02-20 PROCEDURE — 97112 NEUROMUSCULAR REEDUCATION: CPT

## 2020-02-20 PROCEDURE — 83735 ASSAY OF MAGNESIUM: CPT

## 2020-02-20 PROCEDURE — 97530 THERAPEUTIC ACTIVITIES: CPT

## 2020-02-20 PROCEDURE — 6360000002 HC RX W HCPCS: Performed by: STUDENT IN AN ORGANIZED HEALTH CARE EDUCATION/TRAINING PROGRAM

## 2020-02-20 RX ORDER — SENNA PLUS 8.6 MG/1
2 TABLET ORAL NIGHTLY PRN
Status: DISCONTINUED | OUTPATIENT
Start: 2020-02-20 | End: 2020-02-24 | Stop reason: HOSPADM

## 2020-02-20 RX ADMIN — FOLIC ACID 1 MG: 1 TABLET ORAL at 07:32

## 2020-02-20 RX ADMIN — ENOXAPARIN SODIUM 30 MG: 30 INJECTION SUBCUTANEOUS at 07:32

## 2020-02-20 RX ADMIN — ASPIRIN 81 MG: 81 TABLET, COATED ORAL at 07:33

## 2020-02-20 RX ADMIN — ENOXAPARIN SODIUM 30 MG: 30 INJECTION SUBCUTANEOUS at 21:17

## 2020-02-20 RX ADMIN — TICAGRELOR 90 MG: 90 TABLET ORAL at 07:35

## 2020-02-20 RX ADMIN — FLUOXETINE HYDROCHLORIDE 20 MG: 20 CAPSULE ORAL at 07:34

## 2020-02-20 RX ADMIN — ROSUVASTATIN CALCIUM 40 MG: 10 TABLET, FILM COATED ORAL at 21:18

## 2020-02-20 RX ADMIN — FOLIC ACID 1 MG: 1 TABLET ORAL at 21:17

## 2020-02-20 RX ADMIN — LISINOPRIL 40 MG: 20 TABLET ORAL at 07:33

## 2020-02-20 RX ADMIN — HYDROCHLOROTHIAZIDE 25 MG: 25 TABLET ORAL at 07:34

## 2020-02-20 RX ADMIN — AMLODIPINE BESYLATE 10 MG: 10 TABLET ORAL at 21:17

## 2020-02-20 RX ADMIN — TICAGRELOR 90 MG: 90 TABLET ORAL at 21:18

## 2020-02-20 ASSESSMENT — PAIN SCALES - GENERAL
PAINLEVEL_OUTOF10: 0

## 2020-02-20 NOTE — PROGRESS NOTES
Pt arrives to floor at this time, with s/o and transport. Pt transfers from wheelchair to bed with the help of staff and transport staff. S/O at bedside. Pt is A&O X4. Vitals are stable, see vitals flow sheet for vitals. Pt and s/o educated on Acute Rehab policy and procedures, call light, bed alarm, and 1st day therapies. Admission questions started at this time, admission papers explained to pt and s/o, and assessment started at this time.

## 2020-02-20 NOTE — PROGRESS NOTES
Writer made a call to Dr. Riccardo Díaz to go over signed and held orders. Dr. Riccardo Díaz agrees with medications listed.

## 2020-02-20 NOTE — FLOWSHEET NOTE
*Patient stated to writer he is taking one day at a time in his health progress  *Patient welcomed prayer and was tearful       02/20/20 1117   Encounter Summary   Services provided to: Patient   Referral/Consult From: Erika   Continue Visiting   (2/20/20)   Complexity of Encounter Moderate   Length of Encounter 15 minutes   Spiritual Assessment Completed Yes   Spiritual/Gnosticism   Type Spiritual support   Assessment Calm; Approachable;Tearful   Intervention Sustaining presence/ Ministry of presence;Provided reading materials/devotional materials;Prayer; Active listening   Outcome Receptive; Tearful;Engaged in conversation;Expressed gratitude

## 2020-02-20 NOTE — CONSULTS
Nutrition Assessment    Type and Reason for Visit: Initial, Consult(Evaluate and Treat)    Nutrition Recommendations: Continue Cardiac Diet; discontinue Carbohydrate Control. Nutrition Assessment: Pt appears to be eating fairly well and indicates he is familiar with the cardiac diet. No hx of diabetes; carbohydrate control diet discontinued. No swallowing difficulty- regular diet consistency appears appropriate. Malnutrition Assessment:  · Malnutrition Status: No malnutrition  · Context: Acute illness or injury  · Findings of the 6 clinical characteristics of malnutrition (Minimum of 2 out of 6 clinical characteristics is required to make the diagnosis of moderate or severe Protein Calorie Malnutrition based on AND/ASPEN Guidelines):  1. Energy Intake-Greater than 75% of estimated energy requirement, Greater than or equal to 5 days    2. Weight Loss-No significant weight loss,    3. Fat Loss-No significant subcutaneous fat loss,    4. Muscle Loss-No significant muscle mass loss,    5. Fluid Accumulation-No significant fluid accumulation, Extremities  6.  Strength-Not measured    Nutrition Risk Level: Moderate    Nutrient Needs:  · Estimated Daily Total Kcal: [id number] based on [employer] with 1.1-1.2 factor using admission wt  · Estimated Daily Protein (g): 100 based on 1.3 gm protein per kg Ideal Body Wt    Nutrition Diagnosis:   · Problem: Overweight/Obese  · Etiology: related to Excessive energy intake     Signs and symptoms:  as evidenced by BMI    Objective Information:  · Nutrition-Focused Physical Findings: No edema.    · Current Nutrition Therapies:  · Oral Diet Orders: Cardiac, Carb Control 4 Carbs/Meal   · Oral Diet intake: %, 51-75%  · Anthropometric Measures:  · Ht: 6' 1\" (185.4 cm)   · Current Body Wt: 244 lb 14.9 oz (111.1 kg)  · Admission Body Wt: 244 lb 14.9 oz (111.1 kg)  · Ideal Body Wt: 184 lb (83.5 kg), % Ideal Body 133%  · BMI Classification: BMI 30.0 - 34.9 Obese Class I    Nutrition Interventions:   Modify current diet  Continued Inpatient Monitoring, Education Not Indicated    Nutrition Evaluation:   · Evaluation: Goals set   · Goals: PO intake % of most meals with avoidance of wt gain. · Monitoring: Meal Intake, Diet Tolerance, Weight, Pertinent Labs, Monitor Bowel Function    Bhanu Michelle R.D., LBLANCA.   Phone: [phone]

## 2020-02-20 NOTE — PROGRESS NOTES
reps  Other exercises 3: sit to stands x 5                        G-Code     OutComes Score                                                     AM-PAC Score             Goals  Short term goals  Time Frame for Short term goals: 10 days  Short term goal 1: Complete bed mobiltiy independently from flat position without bedrails for return to prior level  Short term goal 2: Transfer SBA with least restrictive device for greater independence  Short term goal 3: Amb 150ft CGA  least restrictive device for ease of home navigation  Short term goal 4: Complete 10 stairs min A with1 handrail for safe home entry/exit  Short term goal 5: Improve standing static and dynamic balance to good/fair to reduce risk of falls and injury  Long term goals  Time Frame for Long term goals : By DC  Long term goal 1: Pt able to transfer from varies surfaces at mod-I  Long term goal 2:  Pt able to ambulate with appropriate assistive device dist of 150 ft , level surfaces, mod-I   Long term goal 3:  Pt able to ambulate on outside/uneven surface with appropriate device, at SBA/[facility]. Long term goal 4: Pt able go up adn down 12 steps with 1 UE support, at SBA  Long term goal 5: Determine appropraite device for mobility and assess need for L LE bracing for safe mobility/steps. Long term goal 6: Improve PASS score from 10/36 to29/36  to improve fucntion and reduce fall risk. Long term goal 7: Improve 2MWT to atleast 120 ft with assistive device, mod-I    Plan    Plan  Times per week: 1.5 hr/day, 5 to 7 days/week.   Current Treatment Recommendations: Functional Mobility Training, Safety Education & Training, Transfer Training, Endurance Training, Home Exercise Program, Balance Training, Equipment Evaluation, Education, & procurement, Stair training, ROM, Strengthening, Gait Training, Patient/Caregiver Education & Training, Neuromuscular Re-education, Modalities, Positioning  Restraints  Initially in place: No     Therapy Time   Individual Concurrent Group Co-treatment   Time In 1300         Time Out 1353         Minutes 53         Timed Code Treatment Minutes: [address] JAYDE Conenr

## 2020-02-20 NOTE — H&P
Physical Medicine & Rehabilitation History and Physical  [facility] Acute Rehabilitation Unit     Primary care provider: Manuel Bauer PA-C     Chief Complaint and Reason for Rehabilitation Admission:   ADL and Mobility deficits secondary to CVA    History of Present Illness:  Ely Ca  is a 52 y.o. left-handed co-habitating    male admitted to the [address] [place] [address] on 2/19/2020. He was originally admitted to [facility] on 2/13/2020 for aphasia and numbness L side. History of hypertension, MI status post cardiac stents and May 2019 who presented with dysarthria mild left hemiparesis. .  No history of prior strokes. Patient was found to have an undocumented heart murmur. CTA chest was obtained which showed tortuous aorta no other abnormalities. Patient has been on aspirin, Brilinta and Lipitor. 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 MRI brain with acute right pontine infarct and concern for possible basilar thrombus. Patient started on low-dose heparin. He was then noted to have some worsening left upper extremity weakness. Stat CT did not show any hemorrhagic conversion. Hem heparin infusion was resumed      Neuro- hypercoagulable test ordered. Echo within normal limits except for a sending aorta dilatation at 4.5 cm. Cardiology consulted, continue aspirin and Brilinta and Crestor blood pressure medications adjusted. Lovenox for DVT prophylaxis     Cardiology-Echo 5/24/2019 ejection fraction 40 to 45% cath 5/24/2019 single-vessel coronary artery disease. SANDEEP to mid LAD. Sees Dr. Macy Galvez. Has severe left ventricular concentric hypertrophy likely from untreated longstanding hypertension. Continue meds for coronary disease/stent, cardiomyopathy, hypertension and hyperlipidemia     Internal medicine-aortic root dilatation stable at 4.8 cm asymptomatic cardiology consulted.   Serial monitoring as outpatient required. Notes mild depression. He is currently requiring assistance for self-care activities and mobility prompting this admission. Premorbid function:  Independent    Current Function:  PT:  Restrictions/Precautions: Fall Risk, Up as Tolerated, General Precautions  Other position/activity restrictions: SBP goal 130-150. Transfers  Sit to Stand: Minimal Assistance, Moderate Assistance  Stand to sit: Minimal Assistance, Moderate Assistance  Bed to Chair: (min assist on strong side, mod ax 2 on weak side)  Comment: cueing for sequencing d/t impulsiveness. LOB noted upon standing , Pt  continue L knee va with full weight bearing. Ambulation 1  Surface: level tile  Device: Platform Walker left  Other Apparatus: Wheelchair follow  Assistance: Moderate assistance, 2 Person assistance(2nd person CGA for safety.)  Quality of Gait: NBOS, drags left foot, decreased step length L LE, Left knee hyperextends. Decreased stability L knee with WB. Gait Deviations: Decreased step length, Decreased step height(NBOS)  Distance: 35 ft, with 2 standing break ~ 5 to 7 secs each time. Transfers  Sit to Stand: Minimal Assistance, Moderate Assistance  Stand to sit: Minimal Assistance, Moderate Assistance  Bed to Chair: (min assist on strong side, mod ax 2 on weak side)  Comment: cueing for sequencing d/t impulsiveness. LOB noted upon standing , Pt  continue L knee va with full weight bearing. Ambulation  Ambulation?: Yes  Ambulation 1  Surface: level tile  Device: Platform Walker left  Other Apparatus: Wheelchair follow  Assistance: Moderate assistance, 2 Person assistance(2nd person CGA for safety.)  Quality of Gait: NBOS, drags left foot, decreased step length L LE, Left knee hyperextends. Decreased stability L knee with WB. Gait Deviations: Decreased step length, Decreased step height(NBOS)  Distance: 35 ft, with 2 standing break ~ 5 to 7 secs each time.     Surface: level tile  Ambulation 1  Surface: level tile  Device: Platform Walker left  Other Apparatus: Wheelchair follow  Assistance: Moderate assistance, 2 Person assistance(2nd person CGA for safety.)  Quality of Gait: NBOS, drags left foot, decreased step length L LE, Left knee hyperextends. Decreased stability L knee with WB. Gait Deviations: Decreased step length, Decreased step height(NBOS)  Distance: 35 ft, with 2 standing break ~ 5 to 7 secs each time. OT:   ADL  Feeding: Setup(Per pt report)  Grooming: Setup(OT opened toothpaste -pt brushes his teeth,washes face & head w washcloth,washes lt hand & wipes rt hand over washcloth)  UE Bathing: Minimal assistance, Increased time to complete, Verbal cueing(Pt washes abdomen,chest, rt UE while OT supports arm. OT washes lt arm & back)  LE Bathing: Moderate assistance(Pt stands w assist x 2 using platform walker while he washes rose marie & front thighs. Assist w butt. Pt sits in w/c to wash LEs w assist for rt foot)  UE Dressing: Minimal assistance, Moderate assistance, Verbal cueing(Pt doff pullover short sleeve shirt w min assist, yoli shirt w rosalind techniques(OT supports rt arm) mod assist)  LE Dressing: Verbal cueing, Dependent/Total, Increased time to complete(Pt dependent to yoli knee hi venkat hose. max pull up/down shorts /underpants to his knees. Dependent to doff footies & shoes, max assist to yoli shoes)  Toileting: (not done in OT.)  Additional Comments: ADL done sinkside. OT instructed pt in rosalind (1 handed techniques for ADLs). Balance  Standing Balance: Dependent/Total(Mod x2 during ADL)   Standing Balance  Time: few minutes  Activity: Pt stood using platform walker for part of ADL (rose marie care, LE clothing management - pulling shorts /underwear down to knees and back up)  Comment: Assist x2 w standing  Functional Mobility  Functional Mobility Comments: Pt uses w/c in room.      Bed mobility  Rolling to Left: (TBA)  Rolling to Right: (TBA)  Supine to Sit: (TBA)  Sit to Supine: (TBA)  Scooting: (TBA)  Comment: Pt in w/c upon OT arrival  Transfers  Sit to stand: 2 Person assistance, Moderate assistance(using platform walker (OT and RN))  Stand to sit: 2 Person assistance, Moderate assistance(using platform walker (OT and RN))   Toilet Transfers  Toilet Transfer: (TBA)             SPEECH:      Past Medical History:      Diagnosis Date    Heart attack ([address]     Hyperlipidemia     Hypertension        Past Surgical History:      Procedure Laterality Date    CARDIAC SURGERY      CORONARY ANGIOPLASTY WITH STENT PLACEMENT  2019    x 1 stent for LAD    FOOT SURGERY Bilateral     KNEE SURGERY Right        Allergies:    Patient has no known allergies. Medications   Scheduled Meds:   aspirin  81 mg Oral Daily    enoxaparin  30 mg Subcutaneous BID    FLUoxetine  20 mg Oral Daily    folic acid  1 mg Oral BID    hydroCHLOROthiazide  25 mg Oral Daily    lisinopril  40 mg Oral Daily    rosuvastatin  40 mg Oral Nightly    ticagrelor  90 mg Oral BID    amLODIPine  10 mg Oral Nightly    polyethylene glycol  17 g Oral Daily     Continuous Infusions:  PRN Meds:.senna, acetaminophen, magnesium hydroxide, bisacodyl     Diagnostics:     CT HEAD WO CONTRAST 2/13   Final Result   No evidence of hemorrhagic transformation of the right paramedian pontine   infarct.       Moderate sinus disease.           MRI BRAIN WO CONTRAST 2/13   Final Result   Acute right paramedian pontine stroke.       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       Remote left posterior cerebellar stroke.       Mild non-specific white matter changes favor chronic small ischemic disease. Demyelinating process may also be considered in differential although thought   less likely.          CT CHEST W CONTRAST 2/13   Final Result   1. No acute abnormalities seen in the chest   2. Tortuous ascending thoracic aorta accounts for the density seen on the   chest radiograph   3.  Coronary artery calcifications          CTA HEAD NECK W CONTRAST 2/15   Final Result   No significant stenosis or evidence for occlusion.       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  The superior cerebellar arteries   also appear to arise from the posterior cerebral arteries.           CT HEAD WO CONTRAST    Final Result   No acute intracranial abnormality. CBC:   Recent Labs     02/18/20  0645 02/19/20  0512 02/20/20  0630   WBC 9.3 6.3 6.9   RBC 5.77 5.33 5.23   HGB 16.5 15.2 15.1   HCT 52.7* 46.7 45.0   MCV 91.3 87.6 86.0   RDW 13.0 13.0 14.1    209 185     BMP:   Recent Labs     02/18/20  0645 02/19/20  0512 02/20/20  0630   * 133* 135   K 3.9 4.0 3.5*   CL 99 99 100   CO2 17* 19* 22   BUN 17 26* 31*   CREATININE 1.15 1.41* 1.33*   GLUCOSE 103* 99 96     HbA1c:   Lab Results   Component Value Date    LABA1C 5.6 02/14/2020     BNP: No results for input(s): BNP in the last 72 hours. PT/INR:   Recent Labs     02/19/20  1707   PROTIME 11.1   INR 1.1     APTT:   Recent Labs     02/19/20  1707   APTT 27.3     CARDIAC ENZYMES: No results for input(s): CKMB, CKMBINDEX, TROPONINT in the last 72 hours. Invalid input(s): CKTOTAL;3  FASTING LIPID PANEL:  Lab Results   Component Value Date    CHOL 140 02/14/2020    HDL 41 02/14/2020    TRIG 40 02/14/2020     LIVER PROFILE: No results for input(s): AST, ALT, ALB, BILIDIR, BILITOT, ALKPHOS in the last 72 hours. Social History:  Dwelling Tri-level. Steps to enter:  2,  Bed/bathroom level:  1.   Lives with: Significant Other  Devices: None  Activity level:  normal activities of daily living  Social History     Socioeconomic History    Marital status: Single     Spouse name: Not on file    Number of children: Not on file    Years of education: Not on file    Highest education level: Not on file   Occupational History    Not on file   Social Needs    Financial resource strain: Not on file    Food insecurity: bruises. Physical Exam:  /81   Pulse 63   Temp 98.1 °F (36.7 °C) (Oral)   Resp 20   Ht 6' 1\" (1.854 m)   Wt 245 lb (111.1 kg)   SpO2 97%   BMI 32.32 kg/m²     GEN: Well developed, well nourished, in NAD  HEENT:  NCAT. PERRL. EOMI. Mucous membranes pink and moist.   PULM:  Clear to ausculation. No rales or rhonchi. Respirations WNL and unlabored. CV:  Regular rate rhythm. No murmurs or gallops. GI:  Abdomen soft. Nontender. Non-distended. BS + and equal.    NEUROLOGICAL: A&O x3. Sensation intact to light touch. DTRs 2+. MSK:  Functional ROM RUE and RLE. Weakness limits AROM LLE. No AROM LUE. Motor testing 5/5 key muscles RUE and RLE. L shoulder retraction 1/5. L elbow, wrist and hand 0/5. L hip flexion 3/5, L knee extension 3/5. SKIN: Warm dry and intact. Good turgor. EXTREMITIES:  No calf tenderness to palpation. No edema BLEs. Osie Ra PSYCH: Mood WNL. Appropriately interactive. Affect WNL. Principal Diagnosis/plan:  The patient is a 52y.o. year old with ADL and Mobility deficits secondary to ischemic CVA    He will require close medical monitoring for the comorbidities listed below. He will benefit from intensive interdisciplinary therapies and rehab nursing care and is appropriate for inpatient rehabilitation. The post admission physician evaluation (SON) is consistent with the pre-admission assessment. See above findings to reflect the elements required in the SON. Patient's admitting condition is consistent with the findings of the preadmission assessment by the rehabilitation admissions coordinator. Diagnoses/plan:    1. R pontine ischemic CVA with L nondominant hemiparesis and dysarthria:  PT/OT for gait, mobility, strengthening, endurance, ADLs, and self care. 2. Insomnia: Will add Trazodone. 3. HTN/cardiomyopathy: on amlodipine, HCTZ, lisinopril, Crestor, ASA, Brilinta  4. Aortic root dilatation: cardiology doing serial testing.  Follow up with echo in 6

## 2020-02-20 NOTE — PROGRESS NOTES
[address] [place] [address] [address]   Acute Rehabilitation OT Evaluation  Date: 20  Patient Name: Marline Rodriguez       Room: 8891/3548-56  MRN: [id number]  Account: [de-identified]   : 1970  (52 y.o.) Gender: male     Referring Practitioner: Dr Robin Lobo  Diagnosis: rt paramedian pontine CVA w lt hemiparesis and dysarthria       Treatment Diagnosis: impaired ADL status, impaired mobility, lt UE impaired motion,coordination,strength   Past Medical History:  has a past medical history of Heart attack ([address] Hyperlipidemia, and Hypertension. Past Surgical History:   has a past surgical history that includes knee surgery (Right); Foot surgery (Bilateral); Coronary angioplasty with stent (2019); and Cardiac surgery. Restrictions  Restrictions/Precautions: Fall Risk, Up as Tolerated, General Precautions  Other position/activity restrictions: SBP goal 130-150. Required Braces or Orthoses?: No    Vitals  Temp: 98.3 °F (36.8 °C)  Pulse: 51  Resp: 20  BP: (!) 147/66  Height: 6' 1\" (185.4 cm)  Weight: 245 lb (111.1 kg)  BMI (Calculated): 32.4  Oxygen Therapy  SpO2: 98 %  O2 Device: None (Room air)  Level of Consciousness: Alert    Subjective  Subjective:Pt reports no numbness. Comments: Pt seen for OT eval.   Orientation  Overall Orientation Status: Within Functional Limits  Vision  Vision: Within Functional Limits  Hearing  Hearing: Within functional limits  Social/Functional History  Lives With: Significant other, Son(Son -17)  Type of Home: House  Home Layout: Two level, Bed/Bath upstairs, Able to Live on Main level with bedroom/bathroom( (bathroom on main level, walk in shower  in basement))  Home Access: Stairs to enter without rails, Stairs to enter with rails  Entrance Stairs - Number of Steps: 1 step at entrance with no rail, 10 steps to 2nd floor. 10 steps to basement with L rail desending, pt has walk in shower int he basement.   Entrance Stairs - Rails: Right((Right rail 5/5   Right Hand Strength -  (lbs)  Handle Setting 3: 80,100 average 90#   RUE Tone: Normotonic     RUE AROM (degrees)  RUE AROM : WFL     Right Hand AROM (degrees)  Right Hand AROM: WFL                     Fine Motor Skills  Coordination  Movements Are Fluid And Coordinated: No  Coordination and Movement description: Left UE, Gross motor impairments, Fine motor impairments, Decreased speed             Right Hand Strength - Pinch (lbs)  Lateral: 18,20  Tip: 12,10  Palmar 3 point: 12,14             Mobility  Balance   Standing Balance Dependent/Total  (Mod x2 during ADL)   Standing Balance   Time few minutes   Activity Pt stood using platform walker for part of ADL (rose marie care, LE clothing management - pulling shorts /underwear down to knees and back up)   Comment Assist x2 w standing   Functional Mobility   Functional Mobility Comments Pt uses w/c in room. Supine to Sit: (TBA)  Sit to Supine: (TBA)         Functional Mobility  Functional Mobility Comments: Pt uses w/c in room. 02/20/20 0935   Transfers   Sit to stand 2 Person assistance; Moderate assistance  (using platform walker (OT and RN))   Stand to sit 2 Person assistance; Moderate assistance  (using platform walker (OT and RN))   Toilet Transfers   Toilet Transfer   (TBA)                       02/20/20 0935   ADL   Feeding Setup  (Per pt report)   Grooming Setup  (OT opened toothpaste -pt brushes his teeth,washes face & head w washcloth,washes lt hand & wipes rt hand over washcloth)   UE Bathing Minimal assistance; Increased time to complete;Verbal cueing  (Pt washes abdomen,chest, lt UE while OT supports arm. OT washes rt arm & back)   LE Bathing Moderate assistance  (Pt stands w assist x 2 using platform walker while he washes rose marie & front thighs. Assist w butt. Pt sits in w/c to wash LEs w assist for rt foot)   UE Dressing Minimal assistance; Moderate assistance;Verbal cueing  (Pt doff pullover short sleeve shirt w min assist, yoli shirt w rosalind techniques(OT supports lt arm) mod assist)   LE Dressing Verbal cueing;Dependent/Total;Increased time to complete  (Pt dependent to yoli knee hi venkat hose. max pull up/down shorts /underpants to his knees. Dependent to doff footies & shoes, max assist to yoli shoes)   Toileting   (not done in OT.)   Additional Comments ADL done sinkside. OT instructed pt in rosalind (1 handed techniques for ADLs). Additional Comments: ADL done sinkside. IRF-VANNESSA Items   Eating   5  Setup or clean-up assistance     Oral Hygiene   5  Assistance Needed: Setup or clean-up assistance     Shower/Bathe Self   3  Assistance Needed: Partial/moderate assistance     Upper Body Dressing   3  Assistance Needed: Partial/moderate assistance     Lower Body Dressing   1 1 Dependent, Substantial/maximal assistance     Putting On/Taking Off Footwear   1  Assistance Needed: Dependent  Pt dependent tedhose,to doff footies, to doff shoes,max assist to yoli shoes  Toilet Transfer   7     Pt didn't have to use the toilet during a.m OT  Toileting Hygiene   7     Not done during OT       Goals  Patient Goals   Patient goals : To be independent  Short term goals  Time Frame for Short term goals: 1 week  Short term goal 1: pt will demo set up/SBA for UB ADLs (dressing/bathing) & modified independence w grooming using rosalind techniques & AE  Short term goal 2: pt will need min/mod assist w lower body ADLs(dressing/bathing) using AE,compensatory technique, 1 handed techniques  Short term goal 3: Increase stand tolerance to 5-7 minutes w mod assist x1  Short term goal 4: pt will safely complete ADL transfers w mod assist x1  Short term goal 5: pt will participate in 30 minutes bilateral UE therapeutic & functional activities to improve/facilitate increase lt UE movement,coordination, & strength.   Short term goal 6: --  Long term goals  Time Frame for Long term goals : by discharge  Long term goal 1: Pt will be modified independent w grooming, UB dressing, set up/SBA w bathing,toileting & LE dressing using rosalind techniques & AE as needed  Long term goal 2: Pt will safely complete ADL transfers at [address] [facility] term goal 3: Pt will participate in light IADLs and kitchen mobility at SBA  Long term goal 4: Increase stand tolerance 7-10 minutes SBA during ADLs, UE functional and therapeutic exercises  Long term goal 5: pt will demo independence w lt UE HEP    Assessment  Performance deficits / Impairments: Decreased functional mobility , Decreased ADL status, Decreased ROM, Decreased strength, Decreased balance, Decreased high-level IADLs, Decreased coordination, Decreased fine motor control  Assessment: O  Pt will benefit from OT to improve selfcare independence, education on 1 handed/compensatory techniques,safety w ADL transfers, facilitation for lt UE movement. OT positioned lt arm on pillow in w/c. Treatment Diagnosis: impaired ADL status, impaired mobility, lt UE impaired motion,coordination,strength  Prognosis: Good  Decision Making: High Complexity  History: Pt presented w numbness lt leg/arm, weakness to [facility] hospital. Pt seen by therapy at [facility] acute rehab unit on 2-  Exam: 8 performance deficits  Assistance / Modification: Pt needs assist x2 using platform walker to stand. Pt instructed in rosalind techniques for ADLS. Pt needs increase assist w selfcare.   REQUIRES OT FOLLOW UP: Yes  Treatment Initiated : ADLs  Discharge Recommendations: Patient would benefit from continued therapy after discharge  Plan  Times per week: 5-7  Times per day: Twice a day  Current Treatment Recommendations: Strengthening, ROM, Balance Training, Functional Mobility Training, Neuromuscular Re-education, Safety Education & Training, Patient/Caregiver Education & Training, Equipment Evaluation, Education, & procurement, Self-Care / ADL, Positioning  Plan Comment: continue OT  OT Education  OT Education: OT Role, Plan of Care, Transfer Training, Equipment  Patient Education: rosalind rehab techniques   Barriers to Learning: none     02/20/20 0935 02/20/20 1049   OT Individual Minutes   Time In [address]   Time Out [id number]   Minutes 76 13   Time Code Minutes    Timed Code Treatment Minutes 52 Minutes 13 Minutes      Electronically signed by Lina Wiley OT on 2/20/20 at 12:15 PM

## 2020-02-20 NOTE — PROGRESS NOTES
associated with the dizziness episode in the past.  Patient denies headache, h/o seizure, chest pain, SOB, palpitation, fever with chills, nausea, vomiting, bowel/bladder dysfunction.      On arrival to the ED, /80, MS 63, RR 18, afebrile, sating on RA. Labs drawn unremarkable except for myoglobin 73 and troponin 25-23. Rest of the Ix unremarkable. Pain:  Pain Assessment  Pain Assessment: 0-10  Pain Level: 0    Assessment:  Cognitive Diagnosis: Mild cognitive deficit   Speech Diagnosis: Mild dysarthria    Diagnosis: Pt presents c mild cognitive deficit -- deficit areas including word finding, divergent naming, problem solving, sequencing, short-term and and working memory, and deductive reasoning. Pt presents c mild dysarthria and judged to be mildy unintelligible. Pt reporting frustration c word finding deficits and decreased speech intelligibility. Recommendations:  Requires SLP Intervention: Yes             Plan:   Goals:  Short-term Goals  Goal 1: Pt will recall 3-5 units of information without distractions with 90% accuracy  Goal 2: Pt will utilize memory compensatory strategies to aid in recall  Goal 3: Pt will complete deductuve reasoning tasks with 90% accuracy  Goal 4: Pt will complete thought organization (e.g., convergent naming, divergent naming, sequencing) tasks with 90% accuracy  Goal 5: Pt will utilize speech compensatory strategies to improve intelligibility    Patient/family involved in developing goals and treatment plan: Yes -- pt and pt's significant other     Subjective:   Previous level of function and limitations: Independent   General  Chart Reviewed: Yes  Patient assessed for rehabilitation services?: Yes  Family / Caregiver Present: Yes     Vision  Vision: Within Functional Limits  Hearing  Hearing: Within functional limits           Objective:     Oral/Motor  Oral Motor:  Within functional limits    Auditory Comprehension  Comprehension: Exceptions  Complex Questions: Mild  Two Step Basic Commands: Mild  Effective Techniques: Extra processing time;Repetition         Expression  Primary Mode of Expression: Verbal    Verbal Expression  Verbal Expression: Exceptions to functional limits  Divergent: Mild  Conversation: Mild  Effective Techniques: Provide extra time         Motor Speech  Motor Speech: Exceptions to [facility]  Intelligibility: Mild  Dysarthria : Mild    Pragmatics/Social Functioning  Pragmatics: Within functional limits    Cognition:      Orientation  Overall Orientation Status: Within Normal Limits  Attention  Attention: Within Functional Limits  Memory  Memory: Exceptions to [facility]  Short-term Memory: Mild  Working Memory: Mild  Problem Solving  Problem Solving: Exceptions to [facility]  Complex Functional Tasks: Mild  Verbal Reasoning Skills: Mild  Numeric Reasoning  Numeric Reasoning: Within Functional Limits  Abstract Reasoning  Abstract Reasoning: Exceptions to [facility]  Divergent Thinking: Mild  Safety/Judgement  Safety/Judgement: Within Functional Limits      Prognosis:  Individuals consulted  Consulted and agree with results and recommendations: Patient; Family member  Family member consulted: Significant other     Education:  Patient Education Response: Verbalizes understanding          Therapy Time:   Individual Concurrent Group Co-treatment   Time In 2516         Time Out 1520         Minutes 23                 Brenda LEONEY-SLP2/20/2020 3:38 PM

## 2020-02-20 NOTE — PROGRESS NOTES
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 Required Braces or Orthoses?: No    Subjective \"No I want to keep on\"   Patient Currently in Pain: No     Overall Orientation Status: Within Functional Limits          Objective Patient was emotional for PM session. Good family support. Participated in w/b using aircast while patient stood crossing midline. Dr Dio Sheridan for future treats. Arm tray provided for LUE. Ed on safety with sit/stand and stand/sit as well as SROM. Questionable carryover due to emotional state. Continue POC      Balance  Standing Balance: Moderate assistance(static stand 2 to 3 min )  Transfers  Sit to stand: Moderate assistance  Stand to sit: Moderate assistance           Type of ROM/Therapeutic Exercise  Type of ROM/Therapeutic Exercise: AAROM; Self PROM  Exercises  Shoulder Flexion: x  Shoulder Extension: x  Elbow Flexion: x  Elbow Extension: x  Wrist Flexion: x  Wrist Extension: x  Finger Flexion: x  Finger Extension: x  Grasp/Release: x  Other: skateboard and self range for LUE   Weight Bearing  LUE Weight Bearing: Extended arm standing        Weight Bearing  LUE Weight Bearing: Extended arm standing                      Assessment  Performance deficits / Impairments: Decreased functional mobility ; Decreased ADL status; Decreased ROM; Decreased strength;Decreased balance;Decreased high-level IADLs;Decreased coordination;Decreased fine motor control  Assessment: Pt will benefit from OT to improve selfcare independence, education on 1 handed/compensatory techniques,safety w ADL transfers, facilitation for lt UE movement. OT positioned lt arm on pillow in w/c. Prognosis: Good  Activity Tolerance: Patient limited by fatigue(pt emotional during PM session )  Safety Devices in place: Yes  Type of devices: Patient at risk for falls; Left in chair;Call light within reach;Gait belt          Patient Education:  Patient Goals   Patient goals : To be independent  Learner:family and patient  Method: explanation       Outcome: demonstrated understanding   OT Education  OT Education: OT Role;Plan of Care;Transfer Training;Equipment  Patient Education: OTR/L explained OT role and POC. OT instructed pt in rosalind (1 handed techniques for ADLs -bathing, dressing). Barriers to Learning: none    Plan  Plan  Times per week: 5-7  Times per day: Twice a day  Current Treatment Recommendations: Strengthening, ROM, Balance Training, Functional Mobility Training, Neuromuscular Re-education, Safety Education & Training, Patient/Caregiver Education & Training, Equipment Evaluation, Education, & procurement, Self-Care / ADL, Positioning  Plan Comment: continue OT  Patient Goals   Patient goals : To be independent  Short term goals  Time Frame for Short term goals: 1 week  Short term goal 1: pt will demo set up/SBA for UB ADLs (dressing/bathing) & modified independence w grooming using rosalind techniques & AE  Short term goal 2: pt will need min/mod assist w lower body ADLs(dressing/bathing) using AE,compensatory technique, 1 handed techniques  Short term goal 3: Increase stand tolerance to 5-7 minutes w mod assist x1  Short term goal 4: pt will safely complete ADL transfers w mod assist x1  Short term goal 5: pt will participate in 30 minutes bilateral UE therapeutic & functional activities to improve/facilitate increase lt UE movement,coordination, & strength.   Short term goal 6: --  Long term goals  Time Frame for Long term goals : by discharge  Long term goal 1: Pt will be modified independent w grooming, UB dressing, set up/SBA w bathing,toileting & LE dressing using rosalind techniques & AE as needed  Long term goal 2: Pt will safely complete ADL transfers at SBA   Long term goal 3: Pt will participate in light IADLs and kitchen mobility at SBA  Long term goal 4:  Increase stand tolerance 7-10 minutes SBA during ADLs, UE functional and therapeutic exercises  Long term goal 5: pt will demo independence w lt UE HEP       Electronically signed by RHIANNA Romero on 2/20/20 at 3:25 PM

## 2020-02-20 NOTE — PLAN OF CARE
Nutrition Problem: Overweight/Obese  Intervention: Food and/or Nutrient Delivery: Modify current diet  Nutritional Goals: PO intake % of most meals with avoidance of wt gain.

## 2020-02-20 NOTE — PROGRESS NOTES
Physical Therapy  [facility]   Acute Rehabilitation Physical Therapy Evaluation     Date: 20  Patient Name: Claudette Kapoor       Room: 9935/8817-98  MRN: [id number]   Account: [de-identified]   : 1970  (52 y.o.)   Gender: male   Referring Practitioner: Dr Nina Shepherd  Diagnosis: CVA    Treatment Diagnosis: Impaired mobility  Additional Pertinent Hx: Mr. Claudette Kapoor is a 52 y.o. right handed male who was admitted to [facility] on 2020 with Aphasia, Left hemiparesis and Numbness. MRI brain with acute right pontine infarct and concern for possible basilar thrombus. Patient started on low-dose heparin. He was then noted to have some worsening left upper extremity weakness. Stat CT did not show any hemorrhagic conversion. Pt has recent cardiac catherization with stens placement 2019. Pt admitted to rehab unit on 2020. Restrictions/Precautions: Fall Risk;Up as Tolerated;General Precautions  Required Braces or Orthoses?: No     Other position/activity restrictions: SBP goal 130-150. Past Medical History:  has a past medical history of Heart attack ([address] Hyperlipidemia, and Hypertension. Past Surgical History:   has a past surgical history that includes knee surgery (Right); Foot surgery (Bilateral); Coronary angioplasty with stent (2019); and Cardiac surgery. Vital Signs  BP Location: Right Arm  Level of Consciousness: Alert  Patient Currently in Pain: Denies   Pain Screening  Patient Currently in Pain: Denies     Oxygen Therapy  O2 Device: None (Room air)          Lives With: Significant other;Son(son is 17)  Type of Home: House  Home Layout: Two level;Bed/Bath upstairs; Able to Live on Main level with bedroom/bathroom(bathroom on main level, walk in shower  in basement)  Home Access: Stairs to enter without rails;Stairs to enter with rails  Entrance Stairs - Number of Steps: 1 step at entrance with no rail, 10 steps to 2nd floor.  10 steps to basement with L rail desending, pt has walk in shower int he basement. Entrance Stairs - Rails: Right(Right rail ascending steps)  Bathroom Shower/Tub: Walk-in shower; Tub/Shower unit(tub/shower on main floor, walk in shower in basement)  Bathroom Toilet: Handicap height  Bathroom Accessibility: Accessible  Home Equipment: (pt reports no DME)  Receives Help From: Family;Friend(s)  ADL Assistance: Independent  Homemaking Assistance: Independent  Homemaking Responsibilities: Yes  Ambulation Assistance: Independent  Transfer Assistance: Independent  Active : Yes  Mode of Transportation: [employer]  Occupation: Full time employment  Type of occupation: self employed  Leisure & Hobbies: watch basketball  Additional Comments: SO will be able to assit pt , she works 4:00 a:m to 2:00 p:m. Pt did laundry and SO primary for other household chores. Overall Orientation Status: Within Normal Limits  Vision  Vision: Within Functional Limits  Hearing  Hearing: Within functional limits          Objective:   AROM RLE (degrees)  RLE AROM: WFL  AROM LLE (degrees)  LLE General AROM: AAROM at Hip/Knee WFL, PROM at foot/ankle [facility]  Strength RLE  Strength RLE: WFL  Strength LLE  Comment: Hip flexors 2+/5, Hip extensors 3-/5, Hip Abd 2/5, Add 2+/5, Knee flexion/extension 2+ to 3-/5, DF 0/5, Trace PT noted. AROM RUE (degrees)  RUE AROM : WFL  Strength RUE  Strength RUE: WFL  Strength LUE  Comment: See OT eval for UE assess,ment, trace muscle contraction felt for shoulder elevation, shoulder extension, biceps and triceps. Gross Motor?: WFL  Overall Sensation Status: Impaired  Additional Comments: Reports has light touch sesation to left side, but decreased propioception. Bed Mobility:   Bed mobility  Rolling to Right: Moderate assistance(Needs assist for trunk and LE to come along)  Supine to Sit: Moderate assistance(Flat bed, no rail)  Sit to Supine:  Moderate assistance(Assist needed for eccentric trunk control and lifting left leg up into bed.)  Scooting: Stand by assistance  Comment: Pt impulsive, attempts to move to fast. Education provided for safety and wait for assistance and helper to be ready to help him. Bed Mobility  Scooting: Stand by assistance    Transfers:  Sit to Stand: Minimal Assistance, Moderate Assistance  Stand to sit: Minimal Assistance, Moderate Assistance  Bed to Chair: (min assist on strong side, mod ax 2 on weak side)  Comment: cueing for sequencing d/t impulsiveness. LOB noted upon standing , Pt  continue L knee va with full weight bearing. Ambulation 1  Surface: level tile  Device: Platform Walker left  Other Apparatus: Wheelchair follow  Assistance: Moderate assistance;2 Person assistance(2nd person CGA for safety.)  Quality of Gait: NBOS, drags left foot, decreased step length L LE, Left knee hyperextends. Decreased stability L knee with WB. Gait Deviations: Decreased step length;Decreased step height(NBOS)  Distance: 35 ft, with 2 standing break ~ 5 to 7 secs each time. Stairs/Curb  Stairs?: No                                                     Balance  Posture: Good  Sitting - Static: Fair;+  Sitting - Dynamic: Fair  Standing - Static: Fair;-  Standing - Dynamic: Poor;+  Comments: standing balance assessed with Left platform rolling walker. PT Equipment Recommendations  Vendor Name: ongoing assessment for AD need       Functional Test:  Postural Assessment Scale for Stroke Patients   (PASS) Scoring Form     Give the subject instructions for each item as written below. When scoring the item, record the lowest response category that applies for each item. Maintaining a Posture  1. Sitting Without Support  Examiner: Have the subject sit on a bench/mat without support and with feet flat on the floor. 1 Can sit with slight support (for example, by 1 hand)  2. Standing with Support Examiner: Have the subject stand, providing support as needed.  Evaluate only the ability to stand with or without support. Do not consider the quality of the stance. 2     Can stand with moderate support of 1 person  3. Standing Without Support  Examiner:  Have the subject stand without support. Evaluate only the ability to stand with or without support. Do not consider the quality of the stance. 0  Cannot stand without support  4. Standing on Non-paretic Leg  Examiner: Have the subject stand on the non-paretic leg. Evaluate only the ability to bear weight entirely on the non-paretic leg. Do not consider how the subject accomplishes the task. 0  Cannot stand on non-paretic leg    5. Standing on Paretic Leg  Examiner: Have the subject stand on the paretic leg. Evaluate only the ability to bear weight entirely on the paretic leg. Do not consider how the subject accomplishes the task. 0  Cannot stand of paretic leg     Maintaining Posture SUBTOTAL     3/15    Changing a Posture  6. Supine to Paretic Side Lateral  Examiner:  Begin with the subject in supine on a treatment mat. Instruct the subject to roll to the paretic side (lateral movement). Assist as necessary. Evaluated the subject's performance on the amount of help required. Do not consider the quality of performance. 2  Can perform with little help  7. Supine to Non-paretic Side Lateral  Examiner:  Begin with the subject in supine on a treatment mat. Instruct the subject to roll to the non-paretic side (lateral movement). Assist as necessary. Evaluate the subject's Performance on the amount of help required. Do not consider the quality of performance. 1  Can perform with much help   8. Supine to Sitting up on the Edge of the Mat   Examiner:  Begin with the subject in supine on a treatment mat. Instruct the subject to come to sitting on the edge of the mat. Assist as necessary. Evaluate the subject's performance on the amount of help required. Do not considered the quality performance.    1  Can perform with much help 9.  Sitting on the Edge of the Mat to Supine  Examiner: Begin with the subject sitting on the edge of a treatment mat. Instruct the subject to return to supine. Assist as necessary. Evaluate the subjects performance on the amount of help required. Co not consider the quality of performance. 1  Can perform with much help   10. Sitting to Standing Up  Examiner:  Begin with the subject sitting on the edge of a treatment mat. Instruct the subject to stand up without support. Assist if necessary. Evaluated the subject's performance on the amount ot help required. Do not consider the quality of the performance. 1  Can perform with much help   11. Standing Up to Sitting Down  Examiner:  Begin with the subject standing by the edge of a treatment mat. Instruct the subject to sit on the edge of mat without support. Assist if necessary. Evaluated the subject's performance on the amount of help required. Do not consider the quality of the performance. 1  Can perform with much help   12 . Standing, Picking up a Pencil from the Floor  Examiner:  Begin with the subject standing. Instruct the subject to  a pencil from the floor without support. Assist if necessary. Evaluate the subject's performance on the amount of help required. Do Not consider the quality of the performance. 0  Cannot perform    Changing Posture SUBTOTAL   7/21    PASS TOTAL   10/36                  Assessment  Body structures, Functions, Activity limitations: Decreased endurance, Decreased strength, Decreased balance, Decreased safe awareness, Decreased coordination, Decreased fine motor control, Decreased functional mobility   Assessment: Pt present with Left side hemiparesis from CVA, affecting his mobility and safety awarensss. Pt has left neglect too. Continue POC to address deficits for safe discharge home.   Prognosis: Good  Discharge Recommendations: Home with assist PRN, Patient would benefit from continued therapy after discharge  Activity Tolerance: Patient Tolerated treatment well     Restraints  Initially in place: No     Plan  Times per week: 1.5 hr/day, 5 to 7 days/week.   Functional Mobility Training, Safety Education & Training, Transfer Training, Endurance Training, Home Exercise Program, Balance Training, Equipment Evaluation, Education, & procurement, Stair training, ROM, Strengthening, Gait Training, Patient/Caregiver Education & Training, Neuromuscular Re-education, Modalities, Positioning    G-Codes       Patient Education  New Education Provided:  PT POC,GOAL, stroke protocol, up with assist, education in deficits and awareness  Learner:patient  Method: demonstration and explanation       Outcome: verbalized concerns and needs reinforcement     Current Treatment Recommendations: Functional Mobility Training, Safety Education & Training, Transfer Training, Endurance Training, Home Exercise Program, Balance Training, Equipment Evaluation, Education, & procurement, Stair training, ROM, Strengthening, Gait Training, Patient/Caregiver Education & Training, Neuromuscular Re-education, Modalities, Positioning    Goals  Short term goals  Time Frame for Short term goals: 10 days  Short term goal 1: Complete bed mobiltiy independently from flat position without bedrails for return to prior level  Short term goal 2: Transfer SBA with least restrictive device for greater independence  Short term goal 3: Amb 150ft CGA  least restrictive device for ease of home navigation  Short term goal 4: Complete 10 stairs min A with1 handrail for safe home entry/exit  Short term goal 5: Improve standing static and dynamic balance to good/fair to reduce risk of falls and injury  Long term goals  Time Frame for Long term goals : By DC  Long term goal 1: Pt able to transfer from varies surfaces at mod-I  Long term goal 2:  Pt able to ambulate with appropriate assistive device dist of 150 ft , level surfaces, mod-I   Long term goal 3:  Pt able to ambulate on outside/uneven surface with appropriate device, at SBA/CGA. Long term goal 4: Pt able go up adn down 12 steps with 1 UE support, at SBA  Long term goal 5: Determine appropraite device for mobility and assess need for L LE bracing for safe mobility/steps. Long term goal 6: Improve PASS score from 10/36 to29/36  to improve fucntion and reduce fall risk.   Long term goal 7: Improve 2MWT to atleast 120 ft with assistive device, mod-I           02/20/20 0812   PT Individual Minutes   Time In 0812   Time Out 0924   Minutes 72   Time Code Minutes   Timed Code Treatment Minutes 45 Minutes       Electronically signed by Katie Smith, PT on 2/20/20 at 11:41 AM

## 2020-02-20 NOTE — PROGRESS NOTES
Perfect Serve to Daniel Horvath CNP for Internal Medicine regarding new consult for 2/20/2020. Daniel Horvath CNP returned perfect serve @ [phone]: \"Thank you. The patient is on our list, so someone should see him tomorrow.  Thanks\"

## 2020-02-20 NOTE — PROGRESS NOTES
Leta Velásquez, RN   Registered Nurse   Case Management   Progress Notes   Signed   Date of Service:  2020  9:35 AM          Related encounter: ED to Hosp-Admission (Discharged) from 2020 in [facility] Renal//Med Surg         Signed             Show:Clear all  [x]Manual[x]Template[x]Copied    Added by:  [x]Avis Kohli RN    []Nataliia for details  [address]   Acute Inpatient Rehab Preadmission Assessment     Patient Name: Shashank Lambert        MRN:   [id number]    : 1970  (52 y.o.)  Gender: male      Admitted from:   []?[facility]  [x]? [facility]   []? [place] [address]   []? Outside Admission - Location:                                 [x]? Initial         []? Updated     Date of Onset / admission to the acute hospital:  20     Impairment group:  1.1 CVA     Did patient have surgery? [x]? No  []? Yes:       Physicians: Edenilson Mcmillan     Risk for clinical complications:  High     Co-morbidities:     Heart attack ([facility] [address]      Hyperlipidemia      Hypertension              Financial Information  Primary insurance:  []? Medicare     []? Medicare HMO      []? [employer]    []? Medicaid      [x]? Medicaid HMO       []? Workers Compensation        []? Personal Pay     Secondary Insurance:  []? Medicare     []? Medicare HMO      []? [employer]    []? Medicaid      []? Workers Compensation      [x]? None     Precautions:   []? Cardiac Precautions             []?Total hip precautions            []? Weight Bearing status:  [x]? Safety Precautions/Concerns  []? Visually impaired                  []?Hard of Hearing      Isolation Precautions:         []? Yes              [x]? No  If Yes:   []? Droplet  []? Contact           []? Airborne     []? VRE     []? MRSA        []? C-diff         []? TB                  []? Other:                     Physiatrist:  [x]?      []? Dr. Best Miller  []?    Dr. Gracie Blanton     Patients Occupation: Employed full time     Reviewed Lab and Diagnostic reports from status for upper extremity ADLs:  UE Bathing: Setup, Moderate assistance(max A for back seated EOB.)  UE Dressing: Moderate assistance(To manage gown.)     Current functional status for lower extremity ADLs:  LE Bathing: Setup, Contact guard assistance(Hips to knees seated EOB.)  LE Dressing: Moderate assistance(To doff/yoli socks.)     Current functional status for bed, chair, wheelchair transfers:  Transfers  Sit to Stand: Modified independent, Supervision  Stand to sit: Modified independent, Supervision  Comment: cueing for sequencing d/t impulsiveness. LOB noted upon standing , Pt  continue L knee va with full weight bearing.     Current functional status for toilet transfers: Moderate assistance     Current functional status for locomotion:  Ambulation  Ambulation?: Yes  More Ambulation?: No  Ambulation 1  Surface: level tile  Device: Platform Walker left(pt unable to  with LUE; try lift walker next tx for better leg control)  Assistance: Moderate assistance  Quality of Gait: Narrow base of support; decreased control; intermittently dragging L foot; increased step length, cued for smaller steps for control. Gait Deviations: Decreased step height  Distance: 40ftx2  Comments: Pt requring constant cues for slow and controlled gait; pt cued for proper progression of LLE throughout ambulation; tactile cues on LLE for knee blocking and quad activation for knee extension preventing buckling.  Pt requiring one seated R and several standing RBs throughout.     Current functional status for comprehension: Complete independence     Current functional status for expression: Complete independence     Current functional status for social interaction: Complete independence     Current functional status for problem solving: Minimal contact assistance     Current functional status for memory: Minimal contact assistance     Current Deficits R/T Impairment: Impaired Functional Mobility and Decreased ADLs     Required Therapy:   [x]? Physical Therapy  [x]? Occupational Therapy   [x]? Speech Therapy     Additional Services:  [x]?   [x]? Recreational Therapy  [x]? Nutrition  []? Dialysis  []? Other:      Rehab Justification:  Needs 3 hrs therapy per day or 15 hours per week:  Yes  Identified Rehab Nursing needs: Yes  Intense Interdisciplinary need:  Yes  Need for 24 hr physician supervision:  Yes  Measurable improved quality of life:  Yes  Willingness to participate:  Yes  Medical Necessity:  Yes  Patient able to tolerate care proposed: Yes     Expected Discharge Destination/Functional Level:  Home with assist  Expected length of time to achieve that level of improvement: 1-2 weeks  Expected Post Discharge Treatments: Home with possible Home Care     Other information relevant to the care needs:  n/a     Acute Inpatient Rehabilitation Disclosure Statement provided to patient. Patient verbalized understanding.   Copy placed on patient's light chart.     I have reviewed and concur with the findings and results of the pre-admission screening assessment completed by the Inpatient Rehabilitation Admissions Coordinator.                  Cosigned by: Zainab Borja MD at 2/19/2020 10:09 AM   Revision History

## 2020-02-21 LAB
ANTI B2-GLYCOPROTEIN IGG: 0 SGU (ref 0–20)
ANTI B2-GLYCOPROTEIN IGM: 16 SMU (ref 0–20)

## 2020-02-21 PROCEDURE — 97530 THERAPEUTIC ACTIVITIES: CPT

## 2020-02-21 PROCEDURE — 97535 SELF CARE MNGMENT TRAINING: CPT

## 2020-02-21 PROCEDURE — 99232 SBSQ HOSP IP/OBS MODERATE 35: CPT | Performed by: PHYSICAL MEDICINE & REHABILITATION

## 2020-02-21 PROCEDURE — 97542 WHEELCHAIR MNGMENT TRAINING: CPT

## 2020-02-21 PROCEDURE — 6360000002 HC RX W HCPCS: Performed by: STUDENT IN AN ORGANIZED HEALTH CARE EDUCATION/TRAINING PROGRAM

## 2020-02-21 PROCEDURE — 6370000000 HC RX 637 (ALT 250 FOR IP): Performed by: PHYSICAL MEDICINE & REHABILITATION

## 2020-02-21 PROCEDURE — 1180000000 HC REHAB R&B

## 2020-02-21 PROCEDURE — 97116 GAIT TRAINING THERAPY: CPT

## 2020-02-21 PROCEDURE — 97110 THERAPEUTIC EXERCISES: CPT

## 2020-02-21 PROCEDURE — 6370000000 HC RX 637 (ALT 250 FOR IP): Performed by: STUDENT IN AN ORGANIZED HEALTH CARE EDUCATION/TRAINING PROGRAM

## 2020-02-21 PROCEDURE — 97112 NEUROMUSCULAR REEDUCATION: CPT

## 2020-02-21 PROCEDURE — 99231 SBSQ HOSP IP/OBS SF/LOW 25: CPT | Performed by: INTERNAL MEDICINE

## 2020-02-21 PROCEDURE — 92507 TX SP LANG VOICE COMM INDIV: CPT

## 2020-02-21 RX ADMIN — FOLIC ACID 1 MG: 1 TABLET ORAL at 20:45

## 2020-02-21 RX ADMIN — ENOXAPARIN SODIUM 30 MG: 30 INJECTION SUBCUTANEOUS at 08:46

## 2020-02-21 RX ADMIN — FOLIC ACID 1 MG: 1 TABLET ORAL at 08:46

## 2020-02-21 RX ADMIN — FLUOXETINE HYDROCHLORIDE 20 MG: 20 CAPSULE ORAL at 08:46

## 2020-02-21 RX ADMIN — ENOXAPARIN SODIUM 30 MG: 30 INJECTION SUBCUTANEOUS at 20:45

## 2020-02-21 RX ADMIN — ASPIRIN 81 MG: 81 TABLET, COATED ORAL at 08:46

## 2020-02-21 RX ADMIN — TICAGRELOR 90 MG: 90 TABLET ORAL at 20:45

## 2020-02-21 RX ADMIN — HYDROCHLOROTHIAZIDE 25 MG: 25 TABLET ORAL at 08:46

## 2020-02-21 RX ADMIN — POLYETHYLENE GLYCOL 3350 17 G: 17 POWDER, FOR SOLUTION ORAL at 08:47

## 2020-02-21 RX ADMIN — ROSUVASTATIN CALCIUM 40 MG: 10 TABLET, FILM COATED ORAL at 22:16

## 2020-02-21 RX ADMIN — AMLODIPINE BESYLATE 10 MG: 10 TABLET ORAL at 20:45

## 2020-02-21 RX ADMIN — LISINOPRIL 40 MG: 20 TABLET ORAL at 08:46

## 2020-02-21 RX ADMIN — TICAGRELOR 90 MG: 90 TABLET ORAL at 08:48

## 2020-02-21 ASSESSMENT — PAIN SCALES - GENERAL
PAINLEVEL_OUTOF10: 0

## 2020-02-21 NOTE — PROGRESS NOTES
Physical Therapy  Facility/Department: [facility] ACUTE REHAB  Daily Treatment Note  NAME: Cuca Jo  : 1970  MRN: [id number]    Date of Service: 2020    Discharge Recommendations:  Home with assist PRN, Patient would benefit from continued therapy after discharge        Assessment      Activity Tolerance  Activity Tolerance: Patient Tolerated treatment well     Patient Diagnosis(es): There were no encounter diagnoses. has a past medical history of Heart attack ([address] Hyperlipidemia, and Hypertension. has a past surgical history that includes knee surgery (Right); Foot surgery (Bilateral); Coronary angioplasty with stent (2019); and Cardiac surgery. Restrictions  Restrictions/Precautions  Restrictions/Precautions: Fall Risk, Up as Tolerated, General Precautions  Required Braces or Orthoses?: No  Position Activity Restriction  Other position/activity restrictions: SBP goal 130-150. Subjective   General  Chart Reviewed: Yes  Additional Pertinent Hx: Mr. Cuca Jo is a 52 y.o. right handed male who was admitted to [facility] on 2020 with Aphasia, Left hemiparesis and Numbness. MRI brain with acute right pontine infarct and concern for possible basilar thrombus. Patient started on low-dose heparin. He was then noted to have some worsening left upper extremity weakness. Stat CT did not show any hemorrhagic conversion. Pt has recent cardiac catherization with stens placement 2019. Pt admitted to rehab unit on 2020. Family / Caregiver Present: Yes(spouse am and pm )  Subjective  Subjective: patient slept ok; Spouse reported patient not drinking much and has noted coughing with drinking, notified Angela Ford regarding issue and will follow up with patient.    Pain Screening  Patient Currently in Pain: Denies  Vital Signs  Patient Currently in Pain: Denies       Orientation     Cognition      Objective   Bed mobility  Rolling to Left: Minimal assistance  Rolling to Right: Minimal Complete 10 stairs min A with1 handrail for safe home entry/exit  Short term goal 5: Improve standing static and dynamic balance to good/fair to reduce risk of falls and injury  Long term goals  Time Frame for Long term goals : By DC  Long term goal 1: Pt able to transfer from varies surfaces at mod-I  Long term goal 2:  Pt able to ambulate with appropriate assistive device dist of 150 ft , level surfaces, mod-I   Long term goal 3:  Pt able to ambulate on outside/uneven surface with appropriate device, at SBA/CGA. Long term goal 4: Pt able go up adn down 12 steps with 1 UE support, at SBA  Long term goal 5: Determine appropraite device for mobility and assess need for L LE bracing for safe mobility/steps. Long term goal 6: Improve PASS score from 10/36 to29/36  to improve fucntion and reduce fall risk. Long term goal 7: Improve 2MWT to atleast 120 ft with assistive device, mod-I    Plan    Plan  Times per week: 1.5 hr/day, 5 to 7 days/week.   Current Treatment Recommendations: Functional Mobility Training, Safety Education & Training, Transfer Training, Endurance Training, Home Exercise Program, Balance Training, Equipment Evaluation, Education, & procurement, Stair training, ROM, Strengthening, Gait Training, Patient/Caregiver Education & Training, Neuromuscular Re-education, Modalities, Positioning  Restraints  Initially in place: No     Therapy Time     02/21/20 1023 02/21/20 1301   PT Individual Minutes   Time In [id number]   Time Out [id number]   [facility] 22 [phone]     Sharla Conner, PTA

## 2020-02-21 NOTE — PROGRESS NOTES
Speech Language Pathology  [facility]  Speech Language Treatment Note    Date: 2/21/2020  Patients Name: Nubia Olivarez  MRN: [id number]  Diagnosis:   Patient Active Problem List   Diagnosis Code    STEMI (ST elevation myocardial infarction) ([address] I21.3    Coronary artery disease involving native coronary artery of native heart without angina pectoris I25.10    Acute ST segment elevation myocardial infarction ([address] I21.3    Essential hypertension I10    Class 1 obesity due to excess calories with body mass index (BMI) of 32.0 to 32.9 in adult E66.09, Z68.32    Stroke-like symptoms R29.90    Ischemic stroke ([address] I63.9    Hypertensive emergency without congestive heart failure I16.1    Numbness R20.0    Basilar artery stenosis/occlusion with infarction (HCC) I63.22    Right arm weakness R29.898    Acute ischemic stroke (HCC) I63.9    Left-sided weakness M83.9    Systolic murmur L80.6    Acute CVA (cerebrovascular accident) ([address] I63.9       Pain: 0/10    Speech and Language Treatment  Treatment time: 9385-4187    Subjective: [x] Alert [x] Cooperative     [] Confused     [] Agitated    [] Lethargic      Objective/Assessment:  Auditory Comprehension:     Pt able to respond to simple open-ended questions and engage in conversation c SLP and family present in room during session. Verbal Expression:     Pt utilizing simple and complex sentences communicate. When tired, pt utilizing gestures, head nods and head shakes. Categorical organization- 80% dahlia, 100% c cueing. Reading Comprehension: n/a    Written Language: n/a    Speech: Pt presents c mild dysarthria. Pt noted to decrease volume/voicing and begin to mouth words to communicate, decreasing pt's speech intelligibility. Provided education re: dysarthria strategies to increase intelligibility level. Pt verbalized understanding and able to generalize strategies c future communication attempts/structured tasks.       OMEs completed in sets of 10 c 75% demo repetition. Will continue to address in plan of care. Voice: n/a    Other: Pt's family present in room and engaging during session. Pt observed c sips of thin liquid via straw while at a ~50 degree angle in bed.  x2 immediate cough observed following trials. Repositioned pt in bed, sitting pt more upright to a 90 degree angle. Following repositioning, no additional overt s/s of aspiration observed c liquid and trials of pudding. Provided education to pt and pt's family re: importance of sitting upright c all PO to promote safety of swallow. Pt and pt's family agreeable and verbalized understanding. ST to continue to monitor. Plan:  [x] Continue ST services    [] Discharge from ST:      Discharge recommendations: [] Inpatient Rehab   [] [place]   [] Outpatient Therapy  [] Follow up at trauma clinic   [] Other:       Treatment completed by:  Brenda LEONEY-SLP

## 2020-02-21 NOTE — PROGRESS NOTES
[address]   ACUTE REHABILITATION OCCUPATIONAL THERAPY  DAILY NOTE    Date: 20  Patient Name: Nubia Olivarez      Room: 4812/2440-92    MRN: [id number]   : 1970  (52 y.o.)  Gender: male   Referring Practitioner: Dr Sania Calvo  Diagnosis: rt paramedian pontine CVA w lt hemiparesis and dysarthria       Restrictions  Restrictions/Precautions: Fall Risk, Up as Tolerated, General Precautions  Other position/activity restrictions: SBP goal 130-150. Required Braces or Orthoses?: No    Subjective   Mood appears better with positive comments noted during session. Affect is still somewhat flat, but he voiced disappointment in being effected by a stroke. Objective  Cognition  Cognition Comment: pt's mood improved wiht recognition of movement with his left UE and increased control      Transfers  Sit to stand: Minimal assistance; Moderate assistance  Transfer Comments: Cuing and assist to position Left foot for standing tasks            Type of ROM/Therapeutic Exercise  Type of ROM/Therapeutic Exercise: AROM;AAROM  Comment: Gravity eliminated exercises using skateboard on tabletop with good response for shoulder movment, fair response for elbow motions   Exercises  Horizontal ABduction: XX  Horizontal ADduction: XX  Wrist Flexion: XX  Wrist Extension: XX           Sensory Integration  Proprioceptive Input: Left Arm - shoulder, elbow and forearm   Kinesthesia: Left Arm - shoulder, elbow and forearm Sakteboard   Tactile Stimulation: Left Arm - shoulder, elbow and forearm - Vibration, touch and intermittent icing   Other Sensory Integration: Visual feedback via mirror    Neuromuscular Education  Vibration: Utilized to facilitate lowered action potential in targeted UE muscles   Deep Pressure: Bilateral tasks with resistance   Functional Movement Patterns: Bilateral tsks and movements within synergy patterns    Response to Techniques:  At end of session was able to initiate motions atshoulder and elbow on demand;  Trace hand motion noted in thumb and 3rd digit             ADL  Feeding: Setup(Tray prep and accessible to patient )  Grooming: Contact guard assistance  UE Bathing: Minimal assistance; Increased time to complete;Verbal cueing  LE Bathing: Moderate assistance(Assist for buttocks and feet )  UE Dressing: Minimal assistance;Verbal cueing(Cuing for one-handed technique )  LE Dressing: Moderate assistance(assist over feet and hiking clothing )  Toileting: Other (Comment)(did not occur durin OT session )  Additional Comments: ADL done sinkside. OT reviewed instructions for 1 handed techniques for ADLs. Positioning  Bed Positioning Type: Upper extremity  Wheelchair Position Type: 1/2 lap tray       Assessment     Activity Tolerance: Patient Tolerated treatment well  Safety Devices in place: Yes  Type of devices: Left in chair;Call light within reach;Nurse notified          Patient Education:  Patient Goals   Patient goals : To be independent  Learner:patient  Method: demonstration and explanation       Outcome: verbalized concerns and needs reinforcement        Plan  Plan  Times per week: 5-7  Times per day: Twice a day  Current Treatment Recommendations: Strengthening, ROM, Balance Training, Functional Mobility Training, Neuromuscular Re-education, Safety Education & Training, Patient/Caregiver Education & Training, Equipment Evaluation, Education, & procurement, Self-Care / ADL, Positioning  Plan Comment: continue OT  Patient Goals   Patient goals : To be independent  Short term goals  Time Frame for Short term goals: 1 week  Short term goal 1: pt will demo set up/SBA for UB ADLs (dressing/bathing) & modified independence w grooming using rosalind techniques & AE  Short term goal 2: pt will need min/mod assist w lower body ADLs(dressing/bathing) using AE,compensatory technique, 1 handed techniques  Short term goal 3:  Increase stand tolerance to 5-7 minutes w mod assist x1  Short term goal 4: pt will safely complete ADL transfers w mod assist x1  Short term goal 5: pt will participate in 30 minutes bilateral UE therapeutic & functional activities to improve/facilitate increase lt UE movement,coordination, & strength. Short term goal 6: --  Long term goals  Time Frame for Long term goals : by discharge  Long term goal 1: Pt will be modified independent w grooming, UB dressing, set up/SBA w bathing,toileting & LE dressing using rosalind techniques & AE as needed  Long term goal 2: Pt will safely complete ADL transfers at SBA   Long term goal 3: Pt will participate in light IADLs and kitchen mobility at SBA  Long term goal 4:  Increase stand tolerance 7-10 minutes SBA during ADLs, UE functional and therapeutic exercises  Long term goal 5: pt will demo independence w lt UE HEP        02/21/20 1030 02/21/20 1230   OT Individual Minutes   Time In 1030 1230   Time Out 1130 1300   Minutes 60 30     Electronically signed by Estiven Aponte OT on 2/21/20 at 4:30 PM

## 2020-02-21 NOTE — PROGRESS NOTES
knee va with full weight bearing. Ambulation  Ambulation?: Yes  Ambulation 1  Surface: level tile  Device: Platform Walker left  Other Apparatus: Wheelchair follow  Assistance: Moderate assistance, 2 Person assistance(2nd person CGA for safety.)  Quality of Gait: NBOS, drags left foot, decreased step length L LE, Left knee hyperextends. Decreased stability L knee with WB. Gait Deviations: Decreased step length, Decreased step height(NBOS)  Distance: 35 ft, with 2 standing break ~ 5 to 7 secs each time. Surface: level tile  Ambulation 1  Surface: level tile  Device: Platform Walker left  Other Apparatus: Wheelchair follow  Assistance: Moderate assistance, 2 Person assistance(2nd person CGA for safety.)  Quality of Gait: NBOS, drags left foot, decreased step length L LE, Left knee hyperextends. Decreased stability L knee with WB. Gait Deviations: Decreased step length, Decreased step height(NBOS)  Distance: 35 ft, with 2 standing break ~ 5 to 7 secs each time. OT:  ADL  Feeding: Setup(Per pt report)  Grooming: Setup(OT opened toothpaste -pt brushes his teeth,washes face & head w washcloth,washes lt hand & wipes rt hand over washcloth)  UE Bathing: Minimal assistance, Increased time to complete, Verbal cueing(Pt washes abdomen,chest, rt UE while OT supports arm. OT washes lt arm & back)  LE Bathing: Moderate assistance(Pt stands w assist x 2 using platform walker while he washes rose marie & front thighs. Assist w butt. Pt sits in w/c to wash LEs w assist for rt foot)  UE Dressing: Minimal assistance, Moderate assistance, Verbal cueing(Pt doff pullover short sleeve shirt w min assist, yoli shirt w rosalind techniques(OT supports rt arm) mod assist)  LE Dressing: Verbal cueing, Dependent/Total, Increased time to complete(Pt dependent to yoli knee hi venkat hose. max pull up/down shorts /underpants to his knees. Dependent to doff footies & shoes, max assist to yoli shoes)  Toileting: (not done in OT.)  Additional Comments: ADL done sinkside. OT instructed pt in rosalind (1 handed techniques for ADLs). Balance  Standing Balance: Moderate assistance(static stand 2 to 3 min )   Standing Balance  Time: few minutes  Activity: Pt stood using platform walker for part of ADL (rose marie care, LE clothing management - pulling shorts /underwear down to knees and back up)  Comment: Assist x2 w standing  Functional Mobility  Functional Mobility Comments: Pt uses w/c in room. Bed mobility  Rolling to Left: (TBA)  Rolling to Right: (TBA)  Supine to Sit: (TBA)  Sit to Supine: (TBA)  Scooting: (TBA)  Comment: Pt in w/c upon OT arrival  Transfers  Sit to stand: Moderate assistance  Stand to sit: Moderate assistance   Toilet Transfers  Toilet Transfer: (TBA)             SPEECH:  Assessment:  Cognitive Diagnosis: Mild cognitive deficit   Speech Diagnosis: Mild dysarthria    Diagnosis: Pt presents c mild cognitive deficit -- deficit areas including word finding, divergent naming, problem solving, sequencing, short-term and and working memory, and deductive reasoning. Pt presents c mild dysarthria and judged to be mildy unintelligible. Pt reporting frustration c word finding deficits and decreased speech intelligibility. Objective:  BP (!) 155/99   Pulse 73   Temp 98.2 °F (36.8 °C) (Oral)   Resp 20   Ht 6' 1\" (1.854 m)   Wt 245 lb (111.1 kg)   SpO2 100%   BMI 32.32 kg/m²       GEN: Well developed, well nourished, in NAD  HEENT:  NCAT. PERRL. EOMI. Mucous membranes pink and moist.   PULM:  Clear to ausculation. No rales or rhonchi. Respirations WNL and unlabored. CV:  Regular rate rhythm. No murmurs or gallops. GI:  Abdomen soft. Nontender. Non-distended. BS + and equal.    NEUROLOGICAL: A&O x3. Sensation intact to light touch. MSK:  Functional ROM RUE and RLE. Weakness limits AROM LLE. No AROM LUE. Motor testing 5/5 key muscles RUE and RLE. L shoulder retraction 1/5. L elbow 2/5, wrist and hand 0/5.  L hip flexion 3/5, L Daily  bisacodyl (DULCOLAX) suppository 10 mg, 10 mg, Rectal, Daily PRN  Facility-Administered Medications Ordered in Other Encounters: sodium chloride flush 0.9 % injection 10 mL, 10 mL, Intravenous, 2 times per day  sodium chloride flush 0.9 % injection 10 mL, 10 mL, Intravenous, PRN      Impression/Plan:   Impaired ADLs, gait, and mobility due to:      1. R pontine ischemic CVA with L nondominant hemiparesis and dysarthria:  PT/OT for gait, mobility, strengthening, endurance, ADLs, and self care. 2. Insomnia: On Trazodone. Will monitor again tonight - he typically works 2nd/3rd shift and is up late. May need to increase Trazodone dose if no improvement tonight. 3. HTN/cardiomyopathy: on amlodipine, HCTZ, lisinopril, Crestor, ASA, Brilinta  4. Aortic root dilatation: cardiology doing serial testing. Follow up with echo in 6 months with Dr. Augustina Younger as outpatient  5. Hypercoagulable/MTHFR mutation: on ASA and Brilinta, APA workup pending. Follow up with hematology 2 weeks after discharge from rehab. 6. Depression: on fluoxetine for mood and motor return. Discussed with patient possibility of a peer visit from stroke support group - he is interested and approves a visit to be arranged by . Bowel management: dulcolax prn, milk of magnesia prn. miralax daily, senna-docusate - will discontinue for duplicate and change to   7. DVT Prophylaxis:  low molecular weight heparin, SCD's while in bed and HIEU's while in bed  8. Internal medicine for medical management    Electronically signed by Carl Ledezma MD on 2/21/2020 at 9:48 AM      This note is created with the assistance of a speech recognition program.  While intending to generate a document that actually reflects the content of the visit, the document can still have some errors including those of syntax and sound a like substitutions which may escape proof reading. In such instances, actual meaning can be extrapolated by contextual diversion.

## 2020-02-21 NOTE — PLAN OF CARE
Problem: Falls - Risk of:  Goal: Will remain free from falls  Description  Will remain free from falls  Outcome: Met This Shift  No falls or injuries sustained at this time. No attempts to get out of bed without nursing assistance. Call light within reach and pt. uses appropriately for assistance. Siderails up x 2. Nonskid footwear remains on. Bed in low and locked position. Hourly nursing rounds made. Pt. Alert and oriented, aware of limitations, and exhibits good safety judgement. Pt. uses assistive devices appropriately. Pt. understands individual fall risk factors.     Pt. reminded to use call light with each nurse/patient interaction.     Bed alarm remains engaged throughout the shift as a precaution.     S/O remains at bedside throughout the shift

## 2020-02-21 NOTE — PLAN OF CARE
Individualized Plan of [address]  Inpatient Rehabilitation Unit    Rehabilitation physician: Dr Rupali Bryant Date: 2/19/2020     Rehabilitation Diagnosis: Acute CVA (cerebrovascular accident) [facility]) [I63.9]     Rehabilitation impairments: self care, mobility, motor dysfunction and safety    Factors facilitating achievement of predicted outcomes: Family support, Motivated, Cooperative, Pleasant, Sense of humor and Good insight into deficits  Barriers to the achievement of predicted outcomes: Upper extremity weakness, Lower extremity weakness and Medication managment    Patient Goals: Improve independence with mobility, Increase overall strength and endurance, Increase balance, Increase independence with activities of daily living, Functional communication with caregivers, Assure adequate nutritional option for discharge and Provide appropriate patient and family education      NURSING:  Nursing goals for Shashank Lambert while on the rehabilitation unit will include:  Adequate number of bowel movements, Urinate with no urinary retention >300ml in bladder, Maintain O2 SATs at an acceptable level during stay, Absence of skin breakdown while on the rehabilitation unit, Avoidance of any hospital acquired infections, Freedom from injury during hospitalization and Complete education with patient/family with understanding demonstrated regarding disease process and resultant impairment     In order to achieve these goals, nursing interventions may include bowel/bladder training, education for medical assistive devices, medication education, O2 saturation management, energy conservation, stress management techniques, fall prevention, alarms protocol, seating and positioning, skin/wound care, pressure relief instruction, dressing changes, infection protection, DVT prophylaxis, assistance with safe transfers , and/or assistance with bathroom activities and hygiene.        PHYSICAL THERAPY:  Goals:        Short term term goal 3: Increase stand tolerance to 5-7 minutes w mod assist x1  Short term goal 4: pt will safely complete ADL transfers w mod assist x1  Short term goal 5: pt will participate in 30 minutes bilateral UE therapeutic & functional activities to improve/facilitate increase lt UE movement,coordination, & strength. Long term goals  Time Frame for Long term goals : by discharge  Long term goal 1: Pt will be modified independent w grooming, UB dressing, set up/SBA w bathing,toileting & LE dressing using rosalind techniques & AE as needed  Long term goal 2: Pt will safely complete ADL transfers at SBA   Long term goal 3: Pt will participate in light IADLs and kitchen mobility at SBA  Long term goal 4: Increase stand tolerance 7-10 minutes SBA during ADLs, UE functional and therapeutic exercises  Long term goal 5: pt will demo independence w lt UE HEP    Plan of Care: Patient to be seen by occupational therapy services 1 Hour 30 Minutes per day at least 5 out of 7 days per week     Anticipated interventions may include ADL and IADL retraining, strengthening, safety education and training, patient/caregiver education and training, equipment evaluation/ training/procurement, neuromuscular reeducation, wheelchair mobility training.       SPEECH THERAPY:   Goals:             Short-term Goals  Goal 1: Pt will recall 3-5 units of information without distractions with 90% accuracy  Goal 2: Pt will utilize memory compensatory strategies to aid in recall  Goal 3: Pt will complete deductuve reasoning tasks with 90% accuracy  Goal 4: Pt will complete thought organization (e.g., convergent naming, divergent naming, sequencing) tasks with 90% accuracy  Goal 5: Pt will utilize speech compensatory strategies to improve intelligibility         Plan of Care: Pt to be seen by speech therapy services 1 Hour per day at least 5 out of 7 days per week    Anticipated interventions may include speech/language/communication therapy, cognitive training, group therapy, education, and/or dysphagia therapy based on the above goals. CASE MANAGEMENT:  Goals:   Assist patient/family with discharge planning, patient/family counseling,  and coordination with insurance during the inpatient rehabilitation stay. Other members of the multidisciplinary rehabilitation team that will be involved in the patient's plan of care include recreational therapy, dietary, respiratory therapy, and neuropsychology. Medical issues being managed closely and that require 24 hour availability of a physician:  Infection protection, DVT prophylaxis, Fall precautions, Fluid/Electrolyte balance, Nutritional status and History of heart disease                                           Physician anticipated functional outcomes: Improved independence with functional measures   Estimated length of stay for this admission 2 weeks  Medical Prognosis: Good  Anticipated disposition: Home. The potential to achieve the above medical and rehabilitative goals is good. This plan of care has been developed with the assistance and input of the multidisciplinary rehabilitation team.  The plan was reviewed with the patient on 2/21/2020. The patient has had the opportunity to provide input to the therapy team.    I have reviewed this Individualized Plan of Care and agree with its contents. Above documentation has been expanded, modified, adjusted to reflect the findings of my evaluations and goals for the patient.     Physician:  Electronically signed by Honorio Rodriguez MD on 2/21/20 at 9:51 AM

## 2020-02-22 ENCOUNTER — APPOINTMENT (OUTPATIENT)
Dept: CT IMAGING | Age: 50
DRG: 058 | End: 2020-02-22
Attending: PHYSICAL MEDICINE & REHABILITATION
Payer: MEDICARE

## 2020-02-22 PROCEDURE — 97535 SELF CARE MNGMENT TRAINING: CPT

## 2020-02-22 PROCEDURE — 99231 SBSQ HOSP IP/OBS SF/LOW 25: CPT | Performed by: PHYSICAL MEDICINE & REHABILITATION

## 2020-02-22 PROCEDURE — 97530 THERAPEUTIC ACTIVITIES: CPT

## 2020-02-22 PROCEDURE — 1180000000 HC REHAB R&B

## 2020-02-22 PROCEDURE — 6370000000 HC RX 637 (ALT 250 FOR IP): Performed by: PHYSICAL MEDICINE & REHABILITATION

## 2020-02-22 PROCEDURE — 70450 CT HEAD/BRAIN W/O DYE: CPT

## 2020-02-22 PROCEDURE — 6370000000 HC RX 637 (ALT 250 FOR IP): Performed by: STUDENT IN AN ORGANIZED HEALTH CARE EDUCATION/TRAINING PROGRAM

## 2020-02-22 PROCEDURE — 6360000002 HC RX W HCPCS: Performed by: STUDENT IN AN ORGANIZED HEALTH CARE EDUCATION/TRAINING PROGRAM

## 2020-02-22 PROCEDURE — 97116 GAIT TRAINING THERAPY: CPT

## 2020-02-22 PROCEDURE — 99232 SBSQ HOSP IP/OBS MODERATE 35: CPT | Performed by: INTERNAL MEDICINE

## 2020-02-22 PROCEDURE — 2580000003 HC RX 258: Performed by: INTERNAL MEDICINE

## 2020-02-22 PROCEDURE — 97110 THERAPEUTIC EXERCISES: CPT

## 2020-02-22 PROCEDURE — 97112 NEUROMUSCULAR REEDUCATION: CPT

## 2020-02-22 RX ORDER — SODIUM CHLORIDE 9 MG/ML
INJECTION, SOLUTION INTRAVENOUS CONTINUOUS
Status: DISCONTINUED | OUTPATIENT
Start: 2020-02-22 | End: 2020-02-24 | Stop reason: HOSPADM

## 2020-02-22 RX ADMIN — ASPIRIN 81 MG: 81 TABLET, COATED ORAL at 08:04

## 2020-02-22 RX ADMIN — TICAGRELOR 90 MG: 90 TABLET ORAL at 08:05

## 2020-02-22 RX ADMIN — FOLIC ACID 1 MG: 1 TABLET ORAL at 08:04

## 2020-02-22 RX ADMIN — FOLIC ACID 1 MG: 1 TABLET ORAL at 21:06

## 2020-02-22 RX ADMIN — POLYETHYLENE GLYCOL 3350 17 G: 17 POWDER, FOR SOLUTION ORAL at 08:04

## 2020-02-22 RX ADMIN — HYDROCHLOROTHIAZIDE 25 MG: 25 TABLET ORAL at 08:04

## 2020-02-22 RX ADMIN — FLUOXETINE HYDROCHLORIDE 20 MG: 20 CAPSULE ORAL at 08:04

## 2020-02-22 RX ADMIN — ENOXAPARIN SODIUM 30 MG: 30 INJECTION SUBCUTANEOUS at 08:04

## 2020-02-22 RX ADMIN — AMLODIPINE BESYLATE 10 MG: 10 TABLET ORAL at 21:06

## 2020-02-22 RX ADMIN — SODIUM CHLORIDE: 9 INJECTION, SOLUTION INTRAVENOUS at 17:27

## 2020-02-22 RX ADMIN — LISINOPRIL 40 MG: 20 TABLET ORAL at 08:04

## 2020-02-22 RX ADMIN — TICAGRELOR 90 MG: 90 TABLET ORAL at 21:25

## 2020-02-22 RX ADMIN — ENOXAPARIN SODIUM 30 MG: 30 INJECTION SUBCUTANEOUS at 21:05

## 2020-02-22 ASSESSMENT — PAIN SCALES - GENERAL: PAINLEVEL_OUTOF10: 0

## 2020-02-22 NOTE — PROGRESS NOTES
head. Pt using lt hand to wash rt hand)  UE Bathing: Verbal cueing; Increased time to complete;Minimal assistance(OT supports lt arm while he washes it. Pt washes chest/abdomen. OT washed rt arm)  LE Bathing: Moderate assistance(OT instructed pt in [facility] to bathe his legs. Pt stands in [address] [facility] w assist x2 while he washes rose marie & front thighs. Pt needs assist w lt foot. , thighs, bottom)  UE Dressing: Minimal assistance(rosalind techniques to get lt arm in sleeve when doff/yoli shirt (OT supporting lt arm), min assist to pull down back of shirt)  LE Dressing: (Pt dependent to yoli footies,pt doff/yoli rt shoe,dependent to doff lt shoe, min assist to yoli lt shoe,mod assist to yoli shorts/underpants - rosalind techniques)  Toileting: (Pt didn't have to use bathroom during OT)  Additional Comments: ADL done sinkside. Pt stands in [address] [facility] w assist x2 for pulling up/pulling down shorts/underpants, rose marie/bottom,thigh washing. Positioning  Bed Positioning Type: Upper extremity  Wheelchair Position Type: 1/2 lap tray  Wheelchair Postion Comment: Rt arm positioned on lap tray. Assessment  Performance deficits / Impairments: Decreased functional mobility ; Decreased ADL status; Decreased ROM; Decreased strength;Decreased balance;Decreased high-level IADLs;Decreased coordination;Decreased fine motor control  Assessment: OT continues rosalind dressing education w pt for UB/lower body. Pt needs encouragement and positive feedback. Min Assist x2 to stand w victoriano stedy. Pt needs verbal cues & mirror feedback (ie look in mirror to see how he is leaning to lt side, when sitting in w/c during ADL or standing w victoriano stedy then pt corrects his posture. Prognosis: Good  Discharge Recommendations: Patient would benefit from continued therapy after discharge  Activity Tolerance: Patient limited by fatigue  Activity Tolerance: Pt needs periodic rest breaks during ADL. Pt tired after standing in victoriano stedy.   Safety Devices in place: Yes  Type of

## 2020-02-22 NOTE — PROGRESS NOTES
Physical Medicine & Rehabilitation  Progress Note      Subjective:      52year-old gentleman with R pontine ischemic CVA and L nondominant hemiparesis. Patient is well, but has had some minor complaints of fatigue today causing worsening of his symptoms including depression. ROS:  Denies fevers, chills, sweats. No chest pain, palpitations, lightheadedness. Denies coughing, wheezing or shortness of breath. Denies abdominal pain, nausea, diarrhea or constipation. No new areas of joint pain. Denies new areas of numbness or weakness. Denies new anxiety or depression issues. No new skin problems. Rehabilitation:   Progressing in therapies. PT:  Restrictions/Precautions: Fall Risk, Up as Tolerated, General Precautions  Other position/activity restrictions: SBP goal 130-150. Transfers  Sit to Stand: Moderate Assistance  Stand to sit: Moderate Assistance  Bed to Chair: Moderate assistance(to left deficit side)  Stand Pivot Transfers: 2 Person Assistance, Moderate Assistance  Squat Pivot Transfers: Moderate Assistance  Comment: transfers to walker lift; bed<>  Ambulation 1  Surface: level tile  Device: (walker lift)  Other Apparatus: Wheelchair follow  Assistance: Moderate assistance, 2 Person assistance(2nd person CGA for safety.)  Quality of Gait: NBOS,  Left knee hyperextends intermittently Decreased stability L knee with WB. Gait Deviations: Decreased step length, Decreased step height, Shuffles  Distance: 75 feet x 2 standing rest break  Comments: I/S to maintain slightly flexed left knee    Transfers  Sit to Stand: Moderate Assistance  Stand to sit: Moderate Assistance  Bed to Chair: Moderate assistance(to left deficit side)  Stand Pivot Transfers: 2 Person Assistance, Moderate Assistance  Squat Pivot Transfers:  Moderate Assistance  Comment: transfers to walker lift; bed<>  Ambulation  Ambulation?: Yes  Ambulation 1  Surface: level tile  Device: (walker lift)  Other Apparatus: Wheelchair follow  Assistance: Moderate assistance, 2 Person assistance(2nd person CGA for safety.)  Quality of Gait: NBOS,  Left knee hyperextends intermittently Decreased stability L knee with WB. Gait Deviations: Decreased step length, Decreased step height, Shuffles  Distance: 75 feet x 2 standing rest break  Comments: I/S to maintain slightly flexed left knee    Surface: level tile  Ambulation 1  Surface: level tile  Device: (walker lift)  Other Apparatus: Wheelchair follow  Assistance: Moderate assistance, 2 Person assistance(2nd person CGA for safety.)  Quality of Gait: NBOS,  Left knee hyperextends intermittently Decreased stability L knee with WB. Gait Deviations: Decreased step length, Decreased step height, Shuffles  Distance: 75 feet x 2 standing rest break  Comments: I/S to maintain slightly flexed left knee    OT:  ADL  Feeding: Setup(Tray prep and accessible to patient )  Grooming: Contact guard assistance  UE Bathing: Minimal assistance, Increased time to complete, Verbal cueing  LE Bathing: Moderate assistance(Assist for buttocks and feet )  UE Dressing: Minimal assistance, Verbal cueing(Cuing for one-handed technique )  LE Dressing: Moderate assistance(assist over feet and hiking clothing )  Toileting: Other (Comment)(did not occur durin OT session )  Additional Comments: ADL done sinkside. OT reviewed instructions for 1 handed techniques for ADLs. Balance  Standing Balance: Moderate assistance(static stand 2 to 3 min )   Standing Balance  Time: few minutes  Activity: Pt stood using platform walker for part of ADL (rose marie care, LE clothing management - pulling shorts /underwear down to knees and back up)  Comment: Assist x2 w standing  Functional Mobility  Functional Mobility Comments: Pt uses w/c in room.      Bed mobility  Rolling to Left: Minimal assistance  Rolling to Right: Minimal assistance  Supine to Sit: Contact guard assistance  Sit to Supine: Contact guard assistance  Scooting: Contact guard assistance  Comment: supine to prone Briseida to manage Left UE/LE  Transfers  Sit to stand: Minimal assistance, Moderate assistance  Stand to sit: Moderate assistance  Transfer Comments: Cuing and assist to position Left foot for standing tasks    Toilet Transfers  Toilet Transfer: (TBA)             SPEECH:  Subjective: [x]? Alert     [x]? Cooperative     []? Confused     []? Agitated    []? Lethargic        Objective/Assessment:  Auditory Comprehension:      Pt able to respond to simple open-ended questions and engage in conversation c SLP and family present in room during session.      Verbal Expression:      Pt utilizing simple and complex sentences communicate. When tired, pt utilizing gestures, head nods and head shakes.      Categorical organization- 80% dahlia, 100% c cueing.      Reading Comprehension: n/a     Written Language: n/a     Speech: Pt presents c mild dysarthria. Pt noted to decrease volume/voicing and begin to mouth words to communicate, decreasing pt's speech intelligibility. Provided education re: dysarthria strategies to increase intelligibility level. Pt verbalized understanding and able to generalize strategies c future communication attempts/structured tasks.       OMEs completed in sets of 10 c 75% demo repetition. Will continue to address in plan of care.      Voice: n/a     Other: Pt's family present in room and engaging during session. Pt observed c sips of thin liquid via straw while at a ~50 degree angle in bed.  x2 immediate cough observed following trials. Repositioned pt in bed, sitting pt more upright to a 90 degree angle. Following repositioning, no additional overt s/s of aspiration observed c liquid and trials of pudding. Provided education to pt and pt's family re: importance of sitting upright c all PO to promote safety of swallow. Pt and pt's family agreeable and verbalized understanding. ST to continue to monitor.      Objective:  BP (!) 147/66   Pulse 51   Temp 98.3 °F (36.8 °C) (Oral)   Resp 20   Ht 6' 1\" (1.854 m)   Wt 245 lb (111.1 kg)   SpO2 98%   BMI 32.32 kg/m²       GEN: Well developed, well nourished, in NAD  HEENT:  NCAT.  PERRL.  EOMI.  Mucous membranes pink and moist.   PULM:  Clear to ausculation. No rales or rhonchi. Respirations WNL and unlabored. CV:  Bradycardic rate regular rhythm.  No murmurs or gallops. GI:  Abdomen soft. Nontender. Non-distended.  BS + and equal.    NEUROLOGICAL: A&O x3. Sensation intact to light touch. MSK:  Functional ROM RUE and RLE. Weakness limits AROM LLE. No AROM LUE. Motor testing 5/5 key muscles RUE and RLE. L shoulder retraction 1/5. L elbow 2/5, wrist and hand 0/5. L hip flexion 3/5, L knee extension 3/5.   SKIN: Warm dry and intact. Good turgor. EXTREMITIES:  No calf tenderness to palpation. No edema BLEs. .    PSYCH: Mood depressed. Appropriately interactive. Affect flat. Diagnostics:     CBC:   Recent Labs     02/20/20  0630   WBC 6.9   RBC 5.23   HGB 15.1   HCT 45.0   MCV 86.0   RDW 14.1        BMP:   Recent Labs     02/20/20  0630      K 3.5*      CO2 22   BUN 31*   CREATININE 1.33*   GLUCOSE 96     BNP: No results for input(s): BNP in the last 72 hours. PT/INR:   Recent Labs     02/19/20  1707   PROTIME 11.1   INR 1.1     APTT:   Recent Labs     02/19/20  1707   APTT 27.3     CARDIAC ENZYMES: No results for input(s): CKMB, CKMBINDEX, TROPONINT in the last 72 hours. Invalid input(s): CKTOTAL;3  FASTING LIPID PANEL:  Lab Results   Component Value Date    CHOL 140 02/14/2020    HDL 41 02/14/2020    TRIG 40 02/14/2020     LIVER PROFILE: No results for input(s): AST, ALT, ALB, BILIDIR, BILITOT, ALKPHOS in the last 72 hours.      Current Medications:   Current Facility-Administered Medications: senna (SENOKOT) tablet 17.2 mg, 2 tablet, Oral, Nightly PRN  acetaminophen (TYLENOL) tablet 650 mg, 650 mg, Oral, Q4H PRN  magnesium hydroxide (MILK OF MAGNESIA) 400 MG/5ML suspension 30 mL, 30 mL, Oral, Daily PRN  aspirin EC tablet 81 mg, 81 mg, Oral, Daily  enoxaparin (LOVENOX) injection 30 mg, 30 mg, Subcutaneous, BID  FLUoxetine (PROZAC) capsule 20 mg, 20 mg, Oral, Daily  folic acid (FOLVITE) tablet 1 mg, 1 mg, Oral, BID  hydroCHLOROthiazide (HYDRODIURIL) tablet 25 mg, 25 mg, Oral, Daily  lisinopril (PRINIVIL;ZESTRIL) tablet 40 mg, 40 mg, Oral, Daily  rosuvastatin (CRESTOR) tablet 40 mg, 40 mg, Oral, Nightly  ticagrelor (BRILINTA) tablet 90 mg, 90 mg, Oral, BID  amLODIPine (NORVASC) tablet 10 mg, 10 mg, Oral, Nightly  polyethylene glycol (GLYCOLAX) packet 17 g, 17 g, Oral, Daily  bisacodyl (DULCOLAX) suppository 10 mg, 10 mg, Rectal, Daily PRN  Facility-Administered Medications Ordered in Other Encounters: sodium chloride flush 0.9 % injection 10 mL, 10 mL, Intravenous, 2 times per day  sodium chloride flush 0.9 % injection 10 mL, 10 mL, Intravenous, PRN      Impression/Plan:   Impaired ADLs, gait, and mobility due to:      1. R pontine ischemic CVA with L nondominant hemiparesis and dysarthria:  PT/OT for gait, mobility, strengthening, endurance, ADLs, and self care. Educated patient and family on fatigue's impact on stroke sequelae. Advised him to take 1 hour nap daily if needed. 2. Insomnia: On Trazodone. Monitor for possible increase in dose. 3. HTN/cardiomyopathy: on amlodipine, HCTZ, lisinopril, Crestor, ASA, Brilinta  4. Aortic root dilatation: cardiology doing serial testing. Follow up with echo in 6 months with Dr. Minnie Durant as outpatient  5. Hypercoagulable/MTHFR mutation: on ASA and Brilinta, APA workup pending. Follow up with hematology 2 weeks after discharge from rehab. 6. Depression: on fluoxetine for mood and motor return. Discussed with patient possibility of a peer visit from stroke support group - he is interested and approves a visit to be arranged by .    7. Bowel management: dulcolax prn, milk of magnesia prn. miralax daily, senna-docusate - will discontinue for duplicate and change to   8. DVT Prophylaxis:  low molecular weight heparin, SCD's while in bed and HIEU's while in bed  9. Internal medicine for medical management    Electronically signed by Aris Monroe MD on 2/22/2020 at 11:15 AM      This note is created with the assistance of a speech recognition program.  While intending to generate a document that actually reflects the content of the visit, the document can still have some errors including those of syntax and sound a like substitutions which may escape proof reading. In such instances, actual meaning can be extrapolated by contextual diversion.

## 2020-02-22 NOTE — PROGRESS NOTES
Physical Therapy  [address]  Acute Rehabilitation Physical Therapy Progress Note    Date: 20  Patient Name: Gm Cosme       Room: 5262/7312-95  MRN: [id number]   Account: [de-identified]   : 1970  (52 y.o.) Gender: male     Referring Practitioner: Dr Josue Alarcon  Diagnosis: rt paramedian pontine CVA w lt hemiparesis and dysarthria  Past Medical History:  has a past medical history of Heart attack ([address] Hyperlipidemia, and Hypertension. Past Surgical History:   has a past surgical history that includes knee surgery (Right); Foot surgery (Bilateral); Coronary angioplasty with stent (2019); and Cardiac surgery. Additional Pertinent Hx: Mr. Gm Cosme is a 52 y.o. right handed male who was admitted to [facility] on 2020 with Aphasia, Left hemiparesis and Numbness. MRI brain with acute right pontine infarct and concern for possible basilar thrombus. Patient started on low-dose heparin. He was then noted to have some worsening left upper extremity weakness. Stat CT did not show any hemorrhagic conversion. Pt has recent cardiac catherization with stens placement 2019. Pt admitted to rehab unit on 2020. Restrictions/Precautions  Restrictions/Precautions: Fall Risk;Up as Tolerated;General Precautions  Required Braces or Orthoses?: No  Position Activity Restriction  Other position/activity restrictions: SBP goal 130-150. Subjective: pt demonstrates decreased drive to participate, spouse reports increased depression and decreased food intake due to swallowing barriers nursing notified  Comments: improved demeanor PM    Vital Signs  Patient Currently in Pain: Denies        Bed Mobility:   Bed Mobility  Rolling: Moderate assistance  Supine to Sit: Moderate assistance(head of bed elevated)  Comment: 1 step instruction for technique and sequencing    Transfers:  Sit to Stand:  Moderate Assistance(assist left UE to walker lift)  Stand to sit: Moderate Assistance  Bed to Chair: Moderate assistance;2 Person Assistance(to left , platform RW)      Ambulation 1  Surface: level tile  Device: (green walker lift)  Other Apparatus: Wheelchair follow  Assistance: Moderate assistance;2 Person assistance  Quality of Gait: NBOS,  Left knee hyperextends intermittently Decreased stability L knee with WB. Gait Deviations: Decreased step length;Decreased step height;Shuffles  Distance: 45 ft  Comments: mod /max assist left LE advancement and knee ext stance phase   Ambulation 2  Surface - 2: level tile  Device 2: (green walker lift)  Other Apparatus 2: AFO(left trialing)  Assistance 2: Moderate assistance;2 Person assistance  Quality of Gait 2: improved left foot clearance , assist hip flexion and stabilizing left knee extension stance phase attempting to avoid hyperextension  Gait Deviations: Decreased step length;Decreased step height(decreased ANEUDY )  Distance: 100 ft  Comments: 1 step instruction ,1 person , increased step length , clearance, knee extension and weight shift left, ANEUDY right LE advancement     Stairs/Curb  Stairs?: No     Wheelchair Activities  Propulsion: No    FIMS:        BALANCE Posture: Good  Sitting - Static: Fair;+  Sitting - Dynamic: Fair  Standing - Static: Fair;-  Standing - Dynamic: Poor;+  Comments: standing balance assessed with Left platform rolling walker.     EXERCISES    Other exercises?: Yes  Other exercises 1: seated right 2.5# left AAROM x 15 recipriocal pattern  Other exercises 2: standing parallel bars left weight shift , assist to stabilize left knee tactile cues to facilitate left gliuts  Other exercises 3: NUStep L1 5 min assist left  and decreased hip external rotation  Other Activities  Comment: during nustep 1 episode yawning followed by choking on salivia , increased time to clear airway           PT Equipment Recommendations  Equipment Needed: Yes  Vendor Name: ongoing assessment for AD need  Mobility Devices: Lacibryson ZengUnruly: Platform Left  Other Comments  Comments: subtle episodes SOB , post NuStep /79 HR 79 SpO2 96%    Patient Education  New Education Provided:    Learner:patient  Method: explanation       Outcome: needs reinforcement     Current Treatment Recommendations: Functional Mobility Training, Safety Education & Training, Transfer Training, Endurance Training, Home Exercise Program, Balance Training, Equipment Evaluation, Education, & procurement, Stair training, ROM, Strengthening, Gait Training, Patient/Caregiver Education & Training, Neuromuscular Re-education, Modalities, Positioning    Conditions Requiring Skilled Therapeutic Intervention  Body structures, Functions, Activity limitations: Decreased endurance;Decreased strength;Decreased balance;Decreased safe awareness;Decreased coordination;Decreased fine motor control;Decreased functional mobility   Assessment: Pt present with Left side hemiparesis from CVA, affecting his mobility and safety awarensss. Pt has left neglect too.   Treatment Diagnosis: Impaired mobility  Prognosis: Good  Decision Making: Medium Complexity  REQUIRES PT FOLLOW UP: Yes  Discharge Recommendations: Home with assist PRN;Patient would benefit from continued therapy after discharge    Goals  Short term goals  Time Frame for Short term goals: 10 days  Short term goal 1: Complete bed mobiltiy independently from flat position without bedrails for return to prior level  Short term goal 2: Transfer SBA with least restrictive device for greater independence  Short term goal 3: Amb 150ft CGA  least restrictive device for ease of home navigation  Short term goal 4: Complete 10 stairs min A with1 handrail for safe home entry/exit  Short term goal 5: Improve standing static and dynamic balance to good/fair to reduce risk of falls and injury  Long term goals  Time Frame for Long term goals : By DC  Long term goal 1: Pt able to transfer from varies surfaces at mod-I  Long term goal 2:  Pt able to

## 2020-02-22 NOTE — CONSULTS
[facility] Internal Medicine    CONSULTATION / HISTORY AND PHYSICAL EXAMINATION            Date:   2/22/2020  Patient name:  Johnny Ruff  Date of admission:  2/19/2020  7:20 PM  MRN:   [id number]  Account:  [de-identified]  YOB: 1970  PCP:    Marina Harris PA-C  Room:   8603/4119-97  Code Status:    Full Code    Physician Requesting Consult: Tamela Winter MD    Reason for Consult:  Med management    Chief Complaint:     No chief complaint on file. cva  History Obtained From:     History obtained from patient medical record nursing staff    History of Present Illness:   52year old admittedwith speech difficulty and left side numbness  At [facility] on 2.13  Diagnosed with pontine cva  Hx of cad and stent  Combined diastlic and sytolic dysfution        Past Medical History:     Past Medical History:   Diagnosis Date    Heart attack ([address]     Hyperlipidemia     Hypertension         Past Surgical History:     Past Surgical History:   Procedure Laterality Date    CARDIAC SURGERY      CORONARY ANGIOPLASTY WITH STENT PLACEMENT  2019    x 1 stent for LAD    FOOT SURGERY Bilateral     KNEE SURGERY Right         Medications Prior to Admission:     Prior to Admission medications    Medication Sig Start Date End Date Taking?  Authorizing Provider   lisinopril (PRINIVIL;ZESTRIL) 40 MG tablet Take 1 tablet by mouth daily 2/10/20  Yes Marina Harris PA-C   aspirin 81 MG EC tablet Take 1 tablet by mouth daily 5/26/19  Yes Sissy Bernardo MD   amLODIPine (NORVASC) 10 MG tablet Take 1 tablet by mouth daily 5/26/19  Yes Sissy Bernardo MD   ticagrelor (BRILINTA) 90 MG TABS tablet Take 1 tablet by mouth 2 times daily 5/26/19  Yes Sissy Bernardo MD   meloxicam (MOBIC) 7.5 MG tablet Take 1 tablet by mouth 2 times daily as needed for Pain 2/10/20 2/17/20  Marina Harris PA-C   nebivolol (BYSTOLIC) 5 MG tablet Take 2 tablets by mouth daily for 7 days 2/10/20 2/17/20 John Villarreal PA-C   tadalafil (CIALIS) 20 MG tablet Take 1 tablet by mouth as needed for Erectile Dysfunction 2/10/20   John Villarreal PA-C   carvedilol (COREG) 25 MG tablet Take 1 tablet by mouth 2 times daily (with meals) 10/18/19   John Villarreal PA-C   nitroGLYCERIN (NITROSTAT) 0.4 MG SL tablet up to max of 3 total doses. If no relief after 1 dose, call 911. 5/26/19   Landen Zacarias MD   atorvastatin (LIPITOR) 80 MG tablet Take 1 tablet by mouth nightly 5/26/19   Landen Zacarias MD        Allergies:     Patient has no known allergies. Social History:     Tobacco:    reports that he has never smoked. He has never used smokeless tobacco.  Alcohol:      reports current alcohol use. Drug Use:  reports no history of drug use. Family History:     Family History   Problem Relation Age of Onset    High Blood Pressure Mother     High Blood Pressure Father     Hypertension Sister     Hypertension Brother        Review of Systems:     Positive and Negative as described in HPI. CONSTITUTIONAL:  negative for fevers, chills, sweats, fatigue, weight loss  HEENT:  negative for vision, hearing changes, runny nose, throat pain  RESPIRATORY:  negative for shortness of breath, cough, congestion, wheezing. CARDIOVASCULAR:  negative for chest pain, palpitations.   GASTROINTESTINAL:  negative for nausea, vomiting, diarrhea, constipation, change in bowel habits, abdominal pain   GENITOURINARY:  negative for difficulty of urination, burning with urination, frequency   INTEGUMENT:  negative for rash, skin lesions, easy bruising   HEMATOLOGIC/LYMPHATIC:  negative for swelling/edema   ALLERGIC/IMMUNOLOGIC:  negative for urticaria , itching  ENDOCRINE:  negative increase in drinking, increase in urination, hot or cold intolerance  MUSCULOSKELETAL:  negative joint pains, muscle aches, swelling of joints  NEUROLOGICAL: left hemipareis  Dysarthria  lightheadedness, numbness, pain, tingling extremities      Physical Exam: BP (!) 147/66   Pulse 51   Temp 98.3 °F (36.8 °C) (Oral)   Resp 20   Ht 6' 1\" (1.854 m)   Wt 245 lb (111.1 kg)   SpO2 98%   BMI 32.32 kg/m²   Temp (24hrs), Av.2 °F (36.8 °C), Min:98.1 °F (36.7 °C), Max:98.3 °F (36.8 °C)    No results for input(s): POCGLU in the last 72 hours. No intake or output data in the 24 hours ending 20 0829    General Appearance:  alert, well appearing, and in no acute distress  Mental status: oriented to person, place, and time with normal affect  Head:  normocephalic, atraumatic. Eye: no icterus, redness, pupils equal and reactive, extraocular eye movements intact, conjunctiva clear  Ear: normal external ear, no discharge, hearing intact  Nose:  no drainage noted  Mouth: mucous membranes moist  Neck: supple, no carotid bruits, thyroid not palpable  Lungs: Bilateral equal air entry, clear to ausculation, no wheezing, rales or rhonchi, normal effort  Cardiovascular: normal rate, regular rhythm, no murmur, gallop, rub. Abdomen: Soft, nontender, nondistended, normal bowel sounds, no hepatomegaly or splenomegaly  Neurologic: left side wekaness  And dysarthria  Skin: No gross lesions, rashes, bruising or bleeding on exposed skin area  Extremities:  peripheral pulses palpable, no pedal edema or calf pain with palpation  Psy flat    Investigations:      Laboratory Testing:  No results found for this or any previous visit (from the past 24 hour(s)).     Imaging/Diagonstics:      Assessment :      Primary Problem  <principal problem not specified>    Active Hospital Problems    Diagnosis Date Noted    [facility] [I63.9] 2020       Plan:   Ischemic cva pontine  Left hemipareissi and dysarthraia  Cad with stent  Depression on prozac  htn meds rev meds resumed  hld on lipitor  dvt  prophy      Consultations:   IP CONSULT TO DIETITIAN  IP CONSULT TO SOCIAL WORK  IP CONSULT TO RECREATION THERAPY  IP CONSULT TO INTERNAL MEDICINE      Yessica Cao Aide Franks MD  2/22/2020  8:29 AM    Copy sent to Dr. Yady Rios PA-C    Please note that this chart was generated using voice recognition Dragon dictation software. Although every effort was made to ensure the accuracy of this automated transcription, some errors in transcription may have occurred.

## 2020-02-22 NOTE — PLAN OF CARE
Problem: Falls - Risk of:  Goal: Will remain free from falls  Description  Will remain free from falls  Outcome: Ongoing  Note:   Patient remains free from falls. Call light remains within reach, non-skid footwear is applied, and hourly rounds throughout shift. Problem: Risk for Impaired Skin Integrity  Goal: Tissue integrity - skin and mucous membranes  Description  Structural intactness and normal physiological function of skin and  mucous membranes. Outcome: Ongoing  Note:   There are no new skin isssues so far this shift. Skin remains intact. Will continue to assess.

## 2020-02-22 NOTE — PROGRESS NOTES
Extension: x  Weight Bearing  Weight Bearing Technique: Yes  LUE Weight Bearing: Extended arm seated  Response To Weight Bearing Technique: Supported LUE in extension on therapist knee, instructed patient to shift weight / lean through LUE. Patient tolerated well, no c/o pain or discomfort         Sensory Integration  Tactile Stimulation: Left Arm - shoulder, elbow and forearm - Vibration, touch  Weight Bearing  Weight Bearing Technique: Yes  LUE Weight Bearing: Extended arm seated  Response To Weight Bearing Technique: Supported LUE in extension on therapist knee, instructed patient to shift weight / lean through LUE. Patient tolerated well, no c/o pain or discomfort            ADL  Additional Comments: See AM notes for ADL tasks          Assessment     Activity Tolerance: Patient limited by fatigue  Activity Tolerance: Patient fatigued in PM, requesting back to bed at end of session   Safety Devices in place: Yes  Type of devices: Left in bed;Call light within reach;Gait belt(PCT assisted getting patient back to bed, family present in room upon OT exit)          Patient Education:  Patient Goals   Patient goals : To be independent  Learner:family and patient  Method: demonstration and explanation       Outcome: needs reinforcement   OT Education  OT Education: OT Role;Plan of Care;Transfer Training;Equipment  Patient Education: rosalind rehab techniques     Plan  Plan  Times per week: 5-7  Times per day: Twice a day  Current Treatment Recommendations: Strengthening, ROM, Balance Training, Functional Mobility Training, Neuromuscular Re-education, Safety Education & Training, Patient/Caregiver Education & Training, Equipment Evaluation, Education, & procurement, Self-Care / ADL, Positioning  Plan Comment: continue OT  Patient Goals   Patient goals :  To be independent  Short term goals  Time Frame for Short term goals: 1 week  Short term goal 1: pt will demo set up/SBA for UB ADLs (dressing/bathing) & modified

## 2020-02-22 NOTE — CONSULTS
[facility] Internal Medicine    CONSULTATION / HISTORY AND PHYSICAL EXAMINATION            Date:   2/22/2020  Patient name:  Nubia Olivarez  Date of admission:  2/19/2020  7:20 PM  MRN:   [id number]  Account:  [de-identified]  YOB: 1970  PCP:    Zulma Gama PA-C  Room:   5324/3581-80  Code Status:    Full Code    Physician Requesting Consult: Marylee Rival, MD    Reason for Consult:  Med management    Chief Complaint:     No chief complaint on file. cva  History Obtained From:     History obtained from patient medical record nursing staff    History of Present Illness:   52year old admittedwith speech difficulty and left side numbness  At [facility] on 2.13  Diagnosed with pontine cva  Hx of cad and stent  Combined diastlic and sytolic dysfution        Past Medical History:     Past Medical History:   Diagnosis Date    [address] [facility] [address] [address]     Hyperlipidemia     Hypertension         Past Surgical History:     Past Surgical History:   Procedure Laterality Date    CARDIAC SURGERY      CORONARY ANGIOPLASTY WITH STENT PLACEMENT  2019    x 1 stent for LAD    FOOT SURGERY Bilateral     KNEE SURGERY Right         Medications Prior to Admission:     Prior to Admission medications    Medication Sig Start Date End Date Taking?  Authorizing Provider   lisinopril (PRINIVIL;ZESTRIL) 40 MG tablet Take 1 tablet by mouth daily 2/10/20  Yes Zulma Gama PA-C   aspirin 81 MG EC tablet Take 1 tablet by mouth daily 5/26/19  Yes Kandace Cohen MD   amLODIPine (NORVASC) 10 MG tablet Take 1 tablet by mouth daily 5/26/19  Yes Kandace Cohen MD   ticagrelor (BRILINTA) 90 MG TABS tablet Take 1 tablet by mouth 2 times daily 5/26/19  Yes Kandace Cohen MD   meloxicam (MOBIC) 7.5 MG tablet Take 1 tablet by mouth 2 times daily as needed for Pain 2/10/20 2/17/20  Zulma Gama PA-C   nebivolol (BYSTOLIC) 5 MG tablet Take 2 tablets by mouth daily for 7 days 2/10/20 2/17/20 Eliana Witt PA-C   tadalafil (CIALIS) 20 MG tablet Take 1 tablet by mouth as needed for Erectile Dysfunction 2/10/20   Eliana Witt PA-C   carvedilol (COREG) 25 MG tablet Take 1 tablet by mouth 2 times daily (with meals) 10/18/19   Eliana Witt PA-C   nitroGLYCERIN (NITROSTAT) 0.4 MG SL tablet up to max of 3 total doses. If no relief after 1 dose, call 911. 5/26/19   Mitch Yoder MD   atorvastatin (LIPITOR) 80 MG tablet Take 1 tablet by mouth nightly 5/26/19   Mitch Yoder MD        Allergies:     Patient has no known allergies. Social History:     Tobacco:    reports that he has never smoked. He has never used smokeless tobacco.  Alcohol:      reports current alcohol use. Drug Use:  reports no history of drug use. Family History:     Family History   Problem Relation Age of Onset    High Blood Pressure Mother     High Blood Pressure Father     Hypertension Sister     Hypertension Brother        Review of Systems:     Positive and Negative as described in HPI. CONSTITUTIONAL:  negative for fevers, chills, sweats, fatigue, weight loss  HEENT:  negative for vision, hearing changes, runny nose, throat pain  RESPIRATORY:  negative for shortness of breath, cough, congestion, wheezing. CARDIOVASCULAR:  negative for chest pain, palpitations.   GASTROINTESTINAL:  negative for nausea, vomiting, diarrhea, constipation, change in bowel habits, abdominal pain   GENITOURINARY:  negative for difficulty of urination, burning with urination, frequency   INTEGUMENT:  negative for rash, skin lesions, easy bruising   HEMATOLOGIC/LYMPHATIC:  negative for swelling/edema   ALLERGIC/IMMUNOLOGIC:  negative for urticaria , itching  ENDOCRINE:  negative increase in drinking, increase in urination, hot or cold intolerance  MUSCULOSKELETAL:  negative joint pains, muscle aches, swelling of joints  NEUROLOGICAL: left hemipareis  Dysarthria  lightheadedness, numbness, pain, tingling extremities      Physical Exam: Anibal Araujo MD  2/22/2020  7:56 AM    Copy sent to Dr. Mohsen Mix PA-C    Please note that this chart was generated using voice recognition Dragon dictation software. Although every effort was made to ensure the accuracy of this automated transcription, some errors in transcription may have occurred.

## 2020-02-23 LAB
ANION GAP SERPL CALCULATED.3IONS-SCNC: 13 MMOL/L (ref 9–17)
BUN BLDV-MCNC: 35 MG/DL (ref 6–20)
BUN/CREAT BLD: ABNORMAL (ref 9–20)
CALCIUM SERPL-MCNC: 9.5 MG/DL (ref 8.6–10.4)
CHLORIDE BLD-SCNC: 105 MMOL/L (ref 98–107)
CO2: 25 MMOL/L (ref 20–31)
CREAT SERPL-MCNC: 1.83 MG/DL (ref 0.7–1.2)
GFR AFRICAN AMERICAN: 48 ML/MIN
GFR NON-AFRICAN AMERICAN: 40 ML/MIN
GFR SERPL CREATININE-BSD FRML MDRD: ABNORMAL ML/MIN/{1.73_M2}
GFR SERPL CREATININE-BSD FRML MDRD: ABNORMAL ML/MIN/{1.73_M2}
GLUCOSE BLD-MCNC: 95 MG/DL (ref 70–99)
POTASSIUM SERPL-SCNC: 3.8 MMOL/L (ref 3.7–5.3)
SODIUM BLD-SCNC: 143 MMOL/L (ref 135–144)

## 2020-02-23 PROCEDURE — 6360000002 HC RX W HCPCS: Performed by: STUDENT IN AN ORGANIZED HEALTH CARE EDUCATION/TRAINING PROGRAM

## 2020-02-23 PROCEDURE — 97110 THERAPEUTIC EXERCISES: CPT

## 2020-02-23 PROCEDURE — 36415 COLL VENOUS BLD VENIPUNCTURE: CPT

## 2020-02-23 PROCEDURE — 97112 NEUROMUSCULAR REEDUCATION: CPT

## 2020-02-23 PROCEDURE — 97530 THERAPEUTIC ACTIVITIES: CPT

## 2020-02-23 PROCEDURE — 6370000000 HC RX 637 (ALT 250 FOR IP): Performed by: STUDENT IN AN ORGANIZED HEALTH CARE EDUCATION/TRAINING PROGRAM

## 2020-02-23 PROCEDURE — 1180000000 HC REHAB R&B

## 2020-02-23 PROCEDURE — 97535 SELF CARE MNGMENT TRAINING: CPT

## 2020-02-23 PROCEDURE — 80048 BASIC METABOLIC PNL TOTAL CA: CPT

## 2020-02-23 PROCEDURE — 2580000003 HC RX 258: Performed by: INTERNAL MEDICINE

## 2020-02-23 PROCEDURE — 99232 SBSQ HOSP IP/OBS MODERATE 35: CPT | Performed by: INTERNAL MEDICINE

## 2020-02-23 PROCEDURE — 6370000000 HC RX 637 (ALT 250 FOR IP): Performed by: PHYSICAL MEDICINE & REHABILITATION

## 2020-02-23 PROCEDURE — 97116 GAIT TRAINING THERAPY: CPT

## 2020-02-23 RX ORDER — HYDRALAZINE HYDROCHLORIDE 20 MG/ML
5 INJECTION INTRAMUSCULAR; INTRAVENOUS EVERY 6 HOURS PRN
Status: DISCONTINUED | OUTPATIENT
Start: 2020-02-23 | End: 2020-02-24 | Stop reason: SDUPTHER

## 2020-02-23 RX ORDER — ROSUVASTATIN CALCIUM 5 MG/1
40 TABLET, COATED ORAL NIGHTLY
Status: DISCONTINUED | OUTPATIENT
Start: 2020-02-23 | End: 2020-02-24 | Stop reason: HOSPADM

## 2020-02-23 RX ORDER — FLUOXETINE HYDROCHLORIDE 20 MG/1
40 CAPSULE ORAL DAILY
Status: DISCONTINUED | OUTPATIENT
Start: 2020-02-24 | End: 2020-02-24 | Stop reason: HOSPADM

## 2020-02-23 RX ADMIN — ASPIRIN 81 MG: 81 TABLET, COATED ORAL at 11:40

## 2020-02-23 RX ADMIN — FLUOXETINE HYDROCHLORIDE 20 MG: 20 CAPSULE ORAL at 08:27

## 2020-02-23 RX ADMIN — ENOXAPARIN SODIUM 30 MG: 30 INJECTION SUBCUTANEOUS at 08:27

## 2020-02-23 RX ADMIN — FOLIC ACID 1 MG: 1 TABLET ORAL at 08:27

## 2020-02-23 RX ADMIN — HYDROCHLOROTHIAZIDE 25 MG: 25 TABLET ORAL at 08:27

## 2020-02-23 RX ADMIN — ENOXAPARIN SODIUM 30 MG: 30 INJECTION SUBCUTANEOUS at 21:59

## 2020-02-23 RX ADMIN — TICAGRELOR 90 MG: 90 TABLET ORAL at 22:00

## 2020-02-23 RX ADMIN — TICAGRELOR 90 MG: 90 TABLET ORAL at 08:28

## 2020-02-23 RX ADMIN — ROSUVASTATIN CALCIUM 40 MG: 5 TABLET, FILM COATED ORAL at 22:01

## 2020-02-23 RX ADMIN — AMLODIPINE BESYLATE 10 MG: 10 TABLET ORAL at 22:01

## 2020-02-23 RX ADMIN — POLYETHYLENE GLYCOL 3350 17 G: 17 POWDER, FOR SOLUTION ORAL at 08:28

## 2020-02-23 RX ADMIN — FOLIC ACID 1 MG: 1 TABLET ORAL at 22:01

## 2020-02-23 RX ADMIN — SODIUM CHLORIDE: 9 INJECTION, SOLUTION INTRAVENOUS at 22:30

## 2020-02-23 RX ADMIN — LISINOPRIL 40 MG: 20 TABLET ORAL at 08:27

## 2020-02-23 NOTE — PROGRESS NOTES
Physical Therapy  Facility/Department: [facility] ACUTE REHAB  Daily Treatment Note  NAME: Aissatou Beyer  : 1970  MRN: [id number]    Date of Service: 2020    Discharge Recommendations:  Home with assist PRN, Patient would benefit from continued therapy after discharge   PT Equipment Recommendations  Equipment Needed: Yes  Vendor Name: ongoing assessment for AD need  Walker: Platform Left    Assessment      REQUIRES PT FOLLOW UP: Yes  Activity Tolerance  Activity Tolerance: Patient limited by fatigue;Patient limited by endurance;Treatment limited secondary to agitation(Pt self limiting, signs of Depression present )  Activity Tolerance: Pt expressing discouragement and frustration throughout tx. Patient Diagnosis(es): There were no encounter diagnoses. has a past medical history of Heart attack ([facility] [address] Hyperlipidemia, and Hypertension. has a past surgical history that includes knee surgery (Right); Foot surgery (Bilateral); Coronary angioplasty with stent (2019); and Cardiac surgery. Restrictions  Restrictions/Precautions  Restrictions/Precautions: Fall Risk, Up as Tolerated, General Precautions, Swallowing - Thickened Liquids(nectar thick liquids)  Required Braces or Orthoses?: No  Position Activity Restriction  Other position/activity restrictions: SBP goal 130-150. Subjective   General  Chart Reviewed: Yes  Additional Pertinent Hx: Mr. Aissatou Beyer is a 52 y.o. right handed male who was admitted to [place] [address] on 2020 with Aphasia, Left hemiparesis and Numbness. MRI brain with acute right pontine infarct and concern for possible basilar thrombus. Patient started on low-dose heparin. He was then noted to have some worsening left upper extremity weakness. Stat CT did not show any hemorrhagic conversion. Pt has recent cardiac catherization with stens placement 2019. Pt admitted to rehab unit on 2020.   Response To Previous Treatment: Patient with no complaints from previous Intermitten initiation of motion throughout intervention  Balance  Posture: Fair(R lat lean, slouched position )  Sitting - Static: +;Poor  Sitting - Dynamic: Poor  Standing - Static: Poor  Standing - Dynamic: Poor;-  Comments: standing balance assessed with green walker lift, Sitting in WC  Other exercises  Other exercises?: Yes  Other exercises 1: seated right 2.5# left AAROM x 15 recipriocal pattern  Other exercises 2: dynamic reaching activity ~ 5mins reaching out of ANEUDY With R UE          Comment: rest breaks PRN     Goals  Short term goals  Time Frame for Short term goals: 10 days  Short term goal 1: Complete bed mobiltiy independently from flat position without bedrails for return to prior level  Short term goal 2: Transfer SBA with least restrictive device for greater independence  Short term goal 3: Amb 150ft CGA  least restrictive device for ease of home navigation  Short term goal 4: Complete 10 stairs min A with1 handrail for safe home entry/exit  Short term goal 5: Improve standing static and dynamic balance to good/fair to reduce risk of falls and injury  Long term goals  Time Frame for Long term goals : By DC  Long term goal 1: Pt able to transfer from varies surfaces at mod-I  Long term goal 2:  Pt able to ambulate with appropriate assistive device dist of 150 ft , level surfaces, mod-I   Long term goal 3:  Pt able to ambulate on outside/uneven surface with appropriate device, at SBA/[facility]. Long term goal 4: Pt able go up adn down 12 steps with 1 UE support, at A  Long term goal 5: Determine appropraite device for mobility and assess need for L LE bracing for safe mobility/steps. Long term goal 6: Improve PASS score from 10/36 to29/36  to improve fucntion and reduce fall risk. Long term goal 7: Improve 2MWT to atleast 120 ft with assistive device, mod-I    Plan    Plan  Times per week: 1.5 hr/day, 5 to 7 days/week.   Current Treatment Recommendations: Functional Mobility Training, Safety Education & Training, Transfer Training, Endurance Training, Home Exercise Program, Balance Training, Equipment Evaluation, Education, & procurement, Stair training, ROM, Strengthening, Gait Training, Patient/Caregiver Education & Training, Neuromuscular Re-education, Modalities, Positioning  Safety Devices  Type of devices:  All fall risk precautions in place, Gait belt, Left in chair(family present )  Restraints  Initially in place: No     Therapy Time         02/23/20 0934 02/23/20 1632   PT Individual Minutes   Time In 0934 1300   Time Out 1021 1343   Minutes 47 43   Total Minutes: 275 [place], [place]

## 2020-02-23 NOTE — PROGRESS NOTES
[place] [address] Internal Medicine    CONSULTATION / HISTORY AND PHYSICAL EXAMINATION            Date:   2/23/2020  Patient name:  Maria Elena Boucher  Date of admission:  2/19/2020  7:20 PM  MRN:   [id number]  Account:  [de-identified]  YOB: 1970  PCP:    Justus Meraz PA-C  Room:   8259/2072-13  Code Status:    Full Code    Physician Requesting Consult: Godfrey Carrillo MD    Reason for Consult:  Med management    Chief Complaint:     No chief complaint on file. cva  History Obtained From:     History obtained from patient medical record nursing staff    History of Present Illness:   52year old admittedwith speech difficulty and left side numbness  At [facility] on 2.13  Diagnosed with pontine cva  Hx of cad and stent  Combined diastlic and sytolic dysfution        Past Medical History:     Past Medical History:   Diagnosis Date    [facility] ([facility] [address]     Hyperlipidemia     Hypertension         Past Surgical History:     Past Surgical History:   Procedure Laterality Date    CARDIAC SURGERY      CORONARY ANGIOPLASTY WITH STENT PLACEMENT  2019    x 1 stent for LAD    FOOT SURGERY Bilateral     KNEE SURGERY Right         Medications Prior to Admission:     Prior to Admission medications    Medication Sig Start Date End Date Taking?  Authorizing Provider   lisinopril (PRINIVIL;ZESTRIL) 40 MG tablet Take 1 tablet by mouth daily 2/10/20  Yes Justus Meraz PA-C   aspirin 81 MG EC tablet Take 1 tablet by mouth daily 5/26/19  Yes Clemente Rendon MD   amLODIPine (NORVASC) 10 MG tablet Take 1 tablet by mouth daily 5/26/19  Yes Clemente Rendon MD   ticagrelor (BRILINTA) 90 MG TABS tablet Take 1 tablet by mouth 2 times daily 5/26/19  Yes Clemente Rendon MD   meloxicam (MOBIC) 7.5 MG tablet Take 1 tablet by mouth 2 times daily as needed for Pain 2/10/20 2/17/20  Justus Meraz PA-C   nebivolol (BYSTOLIC) 5 MG tablet Take 2 tablets by mouth daily for 7 days 2/10/20 2/17/20 Janell Gibson PA-C   tadalafil (CIALIS) 20 MG tablet Take 1 tablet by mouth as needed for Erectile Dysfunction 2/10/20   Janell Gibson PA-C   carvedilol (COREG) 25 MG tablet Take 1 tablet by mouth 2 times daily (with meals) 10/18/19   Janell Gibson PA-C   nitroGLYCERIN (NITROSTAT) 0.4 MG SL tablet up to max of 3 total doses. If no relief after 1 dose, call 911. 5/26/19   Arvin Lerma MD   atorvastatin (LIPITOR) 80 MG tablet Take 1 tablet by mouth nightly 5/26/19   Arvin Lerma MD        Allergies:     Patient has no known allergies. Social History:     Tobacco:    reports that he has never smoked. He has never used smokeless tobacco.  Alcohol:      reports current alcohol use. Drug Use:  reports no history of drug use. Family History:     Family History   Problem Relation Age of Onset    High Blood Pressure Mother     High Blood Pressure Father     Hypertension Sister     Hypertension Brother        Review of Systems:     Positive and Negative as described in HPI. CONSTITUTIONAL:  negative for fevers, chills, sweats, fatigue, weight loss  HEENT:  negative for vision, hearing changes, runny nose, throat pain  RESPIRATORY:  negative for shortness of breath, cough, congestion, wheezing. CARDIOVASCULAR:  negative for chest pain, palpitations.   GASTROINTESTINAL:  negative for nausea, vomiting, diarrhea, constipation, change in bowel habits, abdominal pain   GENITOURINARY:  negative for difficulty of urination, burning with urination, frequency   INTEGUMENT:  negative for rash, skin lesions, easy bruising   HEMATOLOGIC/LYMPHATIC:  negative for swelling/edema   ALLERGIC/IMMUNOLOGIC:  negative for urticaria , itching  ENDOCRINE:  negative increase in drinking, increase in urination, hot or cold intolerance  MUSCULOSKELETAL:  negative joint pains, muscle aches, swelling of joints  NEUROLOGICAL: left hemipareis  Dysarthria  lightheadedness, numbness, pain, tingling extremities      Physical Exam: /64   Pulse 63   Temp 98.4 °F (36.9 °C) (Oral)   Resp 20   Ht 6' 1\" (1.854 m)   Wt 245 lb (111.1 kg)   SpO2 93%   BMI 32.32 kg/m²   Temp (24hrs), Av.3 °F (36.8 °C), Min:98.2 °F (36.8 °C), Max:98.4 °F (36.9 °C)    No results for input(s): POCGLU in the last 72 hours. Intake/Output Summary (Last 24 hours) at 2020 1608  Last data filed at 2020 1402  Gross per 24 hour   Intake 892 ml   Output 1000 ml   Net -108 ml       General Appearance:  alert, well appearing, and in no acute distress  Mental status: oriented to person, place, and time with normal affect  Head:  normocephalic, atraumatic. Eye: no icterus, redness, pupils equal and reactive, extraocular eye movements intact, conjunctiva clear  Ear: normal external ear, no discharge, hearing intact  Nose:  no drainage noted  Mouth: mucous membranes moist  Neck: supple, no carotid bruits, thyroid not palpable  Lungs: Bilateral equal air entry, clear to ausculation, no wheezing, rales or rhonchi, normal effort  Cardiovascular: normal rate, regular rhythm, no murmur, gallop, rub.   Abdomen: Soft, nontender, nondistended, normal bowel sounds, no hepatomegaly or splenomegaly  Neurologic: left side wekaness  And dysarthria  Skin: No gross lesions, rashes, bruising or bleeding on exposed skin area  Extremities:  peripheral pulses palpable, no pedal edema or calf pain with palpation  Psy flat    Investigations:      Laboratory Testing:  Recent Results (from the past 24 hour(s))   Basic Metabolic Prof    Collection Time: 20  7:10 AM   Result Value Ref Range    Glucose 95 70 - 99 mg/dL    BUN 35 (H) 6 - 20 mg/dL    CREATININE 1.83 (H) 0.70 - 1.20 mg/dL    Bun/Cre Ratio NOT REPORTED 9 - 20    Calcium 9.5 8.6 - 10.4 mg/dL    Sodium 143 135 - 144 mmol/L    Potassium 3.8 3.7 - 5.3 mmol/L    Chloride 105 98 - 107 mmol/L    CO2 25 20 - 31 mmol/L    Anion Gap 13 9 - 17 mmol/L    GFR Non-African American 40 (L) >60 mL/min    GFR

## 2020-02-23 NOTE — PROGRESS NOTES
Writer contacts Dr. Jose Evangelista after performing physical assessment. Although pt. continues to appear depressed, Hyun Jacobo feels there is also a noticeable change in his mentation and neuro assessment such as offering no speech with the rare exception of one whispered word answers, coughing with liquids, and blank gaze. Dr. Jose Evangelista orders CT Head without contrast to rule out stroke redemonstration/extension.     [facility] RN  2/22/20  3604

## 2020-02-23 NOTE — PLAN OF CARE
Problem: Falls - Risk of:  Goal: Will remain free from falls  Description  Will remain free from falls  Outcome: Ongoing  Note:   Patient remains free from falls so far this shift. Will continue to round at least hourly, place bed in lowest position with call light within reach, and alarm activated. Problem: Risk for Impaired Skin Integrity  Goal: Tissue integrity - skin and mucous membranes  Description  Structural intactness and normal physiological function of skin and  mucous membranes. Outcome: Ongoing  Note:   There are no new skin issues so far this shift. Will continue to assist patient with repositioning at least every two hours.

## 2020-02-23 NOTE — PROGRESS NOTES
Verbal order received per Dr. Cullen Neither for psych consult with TeleHealth. Perfect Serve message sent to [facility].

## 2020-02-23 NOTE — PROGRESS NOTES
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 Required Braces or Orthoses?: No    Subjective  Subjective: OT asked pt if he wanted to take a shower and pt stated yes. Comments: Pt's family very encouraging. Pt had nose bleed and PCT was going to inform nursing. Patient Currently in Pain: Denies  Restrictions/Precautions: Fall Risk;Up as Tolerated;General Precautions;Swallowing - Thickened Liquids(nectar thick liquids)  Overall Orientation Status: Within Functional Limits          Objective     Balance  Sitting Balance: Contact guard assistance(CGA to SBA when pt sat on tub transfer bench (verbal/tactile cues to correct posturewhen pt was leaning back or to rt side.)  Standing Balance: Dependent/Total(Assist x2 when stand up from extended tub bench for clothing management)  Transfers  Sit to stand: Dependent/Total;2 Person assistance; Moderate assistance(assist x3 to stand during w/c to shower transfer)  Stand to sit: 2 Person assistance;Dependent/Total;Moderate assistance  Transfer Comments: Pt used grab bar w rt UE during transfer. Standing Balance  Time: 1 minutes increments  Activity: Pt stood w assist x2-3 during shower transfer. Comment: Pt used grab to assist therapist,PCT,his son. Shower Transfers  Shower - Transfer From: Wheelchair  Shower - Transfer Type: To and From  Shower - Transfer To: Transfer tub bench  Shower - Technique: To right; To left;Stand pivot  Shower Transfers: Dependent;2 Person assistance; Moderate assistance(assist x3(OT,PCT,pt's son) going to lt side from w/c to extended tub bench w pt holding grab bar w rt UE to pull up, assist x2 (OT and PCT going to rt from  tub transfer bench to w/c w pt holding grab bar w rt hand to help pull himself up)  Shower Transfers Comments: Verbal cues before and during transfers. OT explained transfer process 1st to pt. Upper Extremity Function  NDT Treatment: Upper extremity  Type of ROM/Therapeutic Exercise  Type of ROM/Therapeutic Exercise: PROM;AAROM; Self PROM  Comment: PROM lt UE, forearm wt bearing on table, towel on table ex w OT supporting lt arm at elbow 10x each way  (forward/back, horizontal abd/adduction, circles cw/ccw) w hand over hand assist (pt's rt hand on top of lt hand), then towel on table 3x each way (forward/back, horizontal adduction,circlse cw ) OT supporting lt arm at elbow. Vibrator used to stimulate lt UE (shoulder, elbow) & scapula muscle movement. Weight Bearing  Weight Bearing Technique: Yes  LUE Weight Bearing: Forearm seated  Response To Weight Bearing Technique: lt forearm wt bearing and shifting on table w pt sitting in w/c. Neuromuscular Education  Neuromuscular education: Yes  NDT Treatment: Upper extremity  Vibration: Vibrator used to facilitate lt shoulder trapezius for elevation, scapula muscles for scapula protraction/retraction,biceps for elbow flexion  Weight Bearing  Weight Bearing Technique: Yes  LUE Weight Bearing: Forearm seated  Response To Weight Bearing Technique: lt forearm wt bearing and shifting on table w pt sitting in w/c. ADL  Equipment Provided: Long-handled sponge  Grooming: Minimal assistance(Pt's son washed his hair. Pt washed his face & lt hand.)  UE Bathing: Minimal assistance;Verbal cueing; Increased time to complete(Pt washes chest, abdomen, lt arm (OT supported arm). Pt's son washed pt's rt arm.)  LE Bathing: Maximum assistance; Increased time to complete;Verbal cueing(Pt uses LHS to watch part of his rt leg. Pt washes rose marie and front of thighs. Pt needs assist w rest of bathing.)  UE Dressing: Minimal assistance;Verbal cueing; Increased time to complete(Pt needs assist to pull down shirt. Pt uses rosalind techniques to yoli shirt w OT supporting lt arm)  LE Dressing: Dependent/Total(Pt wears underwear, shorts, knee hi venkat hose, shoes. Max assist w shoes. Dependent w rest of lower body ADLs.)  Toileting: (not done during a.m OT.)  Additional Comments: Pt took a shower during a.m OT. Assessment  Performance deficits / Impairments: Decreased functional mobility ; Decreased ADL status; Decreased ROM; Decreased strength;Decreased balance;Decreased high-level IADLs;Decreased coordination;Decreased fine motor control  Assessment: Pt needs increase assist w safe shower transfer (assist x3 w/c to extended tub bench, assist x2 extended tub bench to w/c). See ADL for a.m shower. Pt needs cues for rosalind techniques & using AE for ADLs. P.M tx focus on therapeutic exercises & uses of vibration from the vibrator to facilitate lt UE movement. Pt demo lt shoulder elevation min/moderate, AAROM rt elbow flexion, AAROM scapula protraction/retraction, AAROM shoulder horizontal adduction,Pt unable to move towel on table in horizontal abduction or circles going ccw using lt UE w OT supporting arm. Pt demo AAROM using lt UE towel on table(forward/back, circles cw, horizontal adduction)w OT supporting at lt elbow. OT provided mirror (visual) feedback, verbal &tactile cues, demonstration, & positive feedback when pt did exercises. Pt's spouse and his oldest brother Pascual Mcallister present for p.m OT. Prognosis: Good  Discharge Recommendations: Patient would benefit from continued therapy after discharge  Activity Tolerance: Patient limited by fatigue  Activity Tolerance: Pt tired after shower.   Safety Devices in place: Yes  Type of devices: Left in chair;Patient at risk for falls;Gait belt;Call light within reach(Family in room w pt after a.m ADLs)          Patient Education:  Patient Goals   Patient goals : To be independent  Learner:family and patient  Method: demonstration and explanation       Outcome: demonstrated understanding and needs reinforcement   OT Education  OT Education: Transfer Training;ADL Adaptive Strategies  Patient Education: OT instructed pt's family (his son) in rosalind dressing techniques,bathing w LHS,& he participated and assisted w shower transfer w OT, PCT, and his DAD. P.M OT educated pt, his spouse, his brother that there is a delay from when brain gets message to move body part & you can actually move body part. OT told pt to do neck exercises slowly to decrease muscle tightness. Barriers to Learning: Pt has flat affect. Pt needs encouragement. Plan  Plan  Times per week: 5-7  Times per day: Twice a day  Current Treatment Recommendations: Strengthening, ROM, Balance Training, Functional Mobility Training, Neuromuscular Re-education, Safety Education & Training, Patient/Caregiver Education & Training, Equipment Evaluation, Education, & procurement, Self-Care / ADL, Positioning  Plan Comment: continue OT  Patient Goals   Patient goals : To be independent  Short term goals  Time Frame for Short term goals: 1 week  Short term goal 1: pt will demo set up/SBA for UB ADLs (dressing/bathing) & modified independence w grooming using rosalind techniques & AE  Short term goal 2: pt will need min/mod assist w lower body ADLs(dressing/bathing) using AE,compensatory technique, 1 handed techniques  Short term goal 3: Increase stand tolerance to 5-7 minutes w mod assist x1  Short term goal 4: pt will safely complete ADL transfers w mod assist x1  Short term goal 5: pt will participate in 30 minutes bilateral UE therapeutic & functional activities to improve/facilitate increase lt UE movement,coordination, & strength.   Short term goal 6: --  Long term goals  Time Frame for Long term goals : by discharge  Long term goal 1: Pt will be modified independent w grooming, UB dressing, set up/SBA w bathing,toileting & LE dressing using rosalind techniques & AE as needed  Long term goal 2: Pt will safely complete ADL transfers at SBA   Long term goal 3: Pt will participate in light IADLs and kitchen mobility at SBA  Long term goal 4:  Increase stand tolerance 7-10 minutes SBA during ADLs, UE functional and therapeutic exercises  Long term goal 5: pt will demo independence w lt UE HEP  Timed Code Treatment Minutes: 4 Minutes     02/23/20 1030 02/23/20 1410 02/23/20 1459   OT Individual Minutes   Time In [id number]   Time Out [id number]   Minutes 60 26 4   Time Code Minutes    Timed Code Treatment Minutes 60 Minutes 26 Minutes 4 Minutes     Electronically signed by Jani Harrell OT on 2/23/20 at 3:19 PM

## 2020-02-23 NOTE — PROGRESS NOTES
New message received from [facility]. TeleHealth consult will take place on 2/24/20 at 0915.

## 2020-02-23 NOTE — PROGRESS NOTES
[facility] Internal Medicine    CONSULTATION / HISTORY AND PHYSICAL EXAMINATION            Date:   2/22/2020  Patient name:  Claudette Kapoor  Date of admission:  2/19/2020  7:20 PM  MRN:   [id number]  Account:  [de-identified]  YOB: 1970  PCP:    Stephanie Glover PA-C  Room:   [facility]  Code Status:    Full Code    Physician Requesting Consult: Enrique Clinton MD    Reason for Consult:  Med management    Chief Complaint:     No chief complaint on file. cva  History Obtained From:     History obtained from patient medical record nursing staff    History of Present Illness:   52year old admittedwith speech difficulty and left side numbness  At [facility] 2.13  Diagnosed with pontine cva  Hx of cad and stent  Combined diastlic and sytolic dysfution        Past Medical History:     Past Medical History:   Diagnosis Date    Heart attack ([address]     Hyperlipidemia     Hypertension         Past Surgical History:     Past Surgical History:   Procedure Laterality Date    CARDIAC SURGERY      CORONARY ANGIOPLASTY WITH STENT PLACEMENT  2019    x 1 stent for LAD    FOOT SURGERY Bilateral     KNEE SURGERY Right         Medications Prior to Admission:     Prior to Admission medications    Medication Sig Start Date End Date Taking?  Authorizing Provider   lisinopril (PRINIVIL;ZESTRIL) 40 MG tablet Take 1 tablet by mouth daily 2/10/20  Yes Stephanie Glover PA-C   aspirin 81 MG EC tablet Take 1 tablet by mouth daily 5/26/19  Yes Radha Michael MD   amLODIPine (NORVASC) 10 MG tablet Take 1 tablet by mouth daily 5/26/19  Yes Radha Michael MD   ticagrelor (BRILINTA) 90 MG TABS tablet Take 1 tablet by mouth 2 times daily 5/26/19  Yes Radha Michael MD   meloxicam (MOBIC) 7.5 MG tablet Take 1 tablet by mouth 2 times daily as needed for Pain 2/10/20 2/17/20  Stephanie Glover PA-C   nebivolol (BYSTOLIC) 5 MG tablet Take 2 tablets by mouth daily for 7 days 2/10/20 2/17/20 Antonia Higuera PA-C   tadalafil (CIALIS) 20 MG tablet Take 1 tablet by mouth as needed for Erectile Dysfunction 2/10/20   Antonia Higuera PA-C   carvedilol (COREG) 25 MG tablet Take 1 tablet by mouth 2 times daily (with meals) 10/18/19   Antonia Higuera PA-C   nitroGLYCERIN (NITROSTAT) 0.4 MG SL tablet up to max of 3 total doses. If no relief after 1 dose, call 911. 5/26/19   Camelia Gillis MD   atorvastatin (LIPITOR) 80 MG tablet Take 1 tablet by mouth nightly 5/26/19   Camelia Gillis MD        Allergies:     Patient has no known allergies. Social History:     Tobacco:    reports that he has never smoked. He has never used smokeless tobacco.  Alcohol:      reports current alcohol use. Drug Use:  reports no history of drug use. Family History:     Family History   Problem Relation Age of Onset    High Blood Pressure Mother     High Blood Pressure Father     Hypertension Sister     Hypertension Brother        Review of Systems:     Positive and Negative as described in HPI. CONSTITUTIONAL:  negative for fevers, chills, sweats, fatigue, weight loss  HEENT:  negative for vision, hearing changes, runny nose, throat pain  RESPIRATORY:  negative for shortness of breath, cough, congestion, wheezing. CARDIOVASCULAR:  negative for chest pain, palpitations.   GASTROINTESTINAL:  negative for nausea, vomiting, diarrhea, constipation, change in bowel habits, abdominal pain   GENITOURINARY:  negative for difficulty of urination, burning with urination, frequency   INTEGUMENT:  negative for rash, skin lesions, easy bruising   HEMATOLOGIC/LYMPHATIC:  negative for swelling/edema   ALLERGIC/IMMUNOLOGIC:  negative for urticaria , itching  ENDOCRINE:  negative increase in drinking, increase in urination, hot or cold intolerance  MUSCULOSKELETAL:  negative joint pains, muscle aches, swelling of joints  NEUROLOGICAL: left hemipareis  Dysarthria  lightheadedness, numbness, pain, tingling extremities      Physical Exam: [facility])  Resolved Problems:    * No resolved hospital problems. *      Plan:   Ischemic cva pontine  Left hemipareissi and dysarthraia  Cad with stent  Depression on prozac  htn meds rev meds resumed  hld on lipitor  dvt  prophy    2/22  Patient still Depressed on Prozac   Poor oral intake   Started on saline GTT   BMP    Consultations:   [address] [place] CONSULT TO SOCIAL WORK  IP CONSULT TO RECREATION THERAPY  IP CONSULT TO INTERNAL MEDICINE      Claribel Arnold MD  2/22/2020  8:21 PM    Copy sent to Dr. Cristina Hickey PA-C    Please note that this chart was generated using voice recognition Dragon dictation software. Although every effort was made to ensure the accuracy of this automated transcription, some errors in transcription may have occurred.

## 2020-02-23 NOTE — PROGRESS NOTES
Dr. Jackie Potts notified via [address] [place] [address] of CT results. No new orders received.

## 2020-02-24 ENCOUNTER — APPOINTMENT (OUTPATIENT)
Dept: MRI IMAGING | Age: 50
DRG: 058 | End: 2020-02-24
Payer: MEDICARE

## 2020-02-24 ENCOUNTER — HOSPITAL ENCOUNTER (INPATIENT)
Age: 50
LOS: 16 days | Discharge: NURSING, REHAB OR LONG-TERM CARE | DRG: 005 | End: 2020-03-11
Attending: SPECIALIST | Admitting: SPECIALIST
Payer: MEDICARE

## 2020-02-24 ENCOUNTER — APPOINTMENT (OUTPATIENT)
Dept: CT IMAGING | Age: 50
DRG: 058 | End: 2020-02-24
Payer: MEDICARE

## 2020-02-24 ENCOUNTER — HOSPITAL ENCOUNTER (INPATIENT)
Age: 50
LOS: 1 days | Discharge: ANOTHER HOSPITAL | DRG: 058 | End: 2020-02-24
Attending: EMERGENCY MEDICINE | Admitting: INTERNAL MEDICINE
Payer: MEDICARE

## 2020-02-24 ENCOUNTER — APPOINTMENT (OUTPATIENT)
Dept: GENERAL RADIOLOGY | Age: 50
DRG: 058 | End: 2020-02-24
Payer: MEDICARE

## 2020-02-24 VITALS
DIASTOLIC BLOOD PRESSURE: 76 MMHG | RESPIRATION RATE: 14 BRPM | HEART RATE: 58 BPM | HEIGHT: 73 IN | TEMPERATURE: 98.2 F | BODY MASS INDEX: 32.47 KG/M2 | OXYGEN SATURATION: 98 % | WEIGHT: 245 LBS | SYSTOLIC BLOOD PRESSURE: 155 MMHG

## 2020-02-24 VITALS
RESPIRATION RATE: 18 BRPM | TEMPERATURE: 97.7 F | BODY MASS INDEX: 32.47 KG/M2 | WEIGHT: 245 LBS | HEIGHT: 73 IN | HEART RATE: 120 BPM | SYSTOLIC BLOOD PRESSURE: 155 MMHG | OXYGEN SATURATION: 99 % | DIASTOLIC BLOOD PRESSURE: 87 MMHG

## 2020-02-24 PROBLEM — I63.9 BRAIN STEM INFARCTION (HCC): Status: ACTIVE | Noted: 2020-02-24

## 2020-02-24 PROBLEM — I63.9 BRAINSTEM INFARCTION (HCC): Status: ACTIVE | Noted: 2020-02-24

## 2020-02-24 PROBLEM — I16.0 HYPERTENSIVE URGENCY: Status: ACTIVE | Noted: 2020-02-24

## 2020-02-24 LAB
ABSOLUTE EOS #: 0 K/UL (ref 0–0.4)
ABSOLUTE IMMATURE GRANULOCYTE: ABNORMAL K/UL (ref 0–0.3)
ABSOLUTE LYMPH #: 1.6 K/UL (ref 1–4.8)
ABSOLUTE MONO #: 1 K/UL (ref 0.1–1.3)
ANION GAP SERPL CALCULATED.3IONS-SCNC: 17 MMOL/L (ref 9–17)
BASOPHILS # BLD: 1 % (ref 0–2)
BASOPHILS ABSOLUTE: 0.1 K/UL (ref 0–0.2)
BUN BLDV-MCNC: 29 MG/DL (ref 6–20)
BUN/CREAT BLD: ABNORMAL (ref 9–20)
CALCIUM SERPL-MCNC: 9.9 MG/DL (ref 8.6–10.4)
CHLORIDE BLD-SCNC: 103 MMOL/L (ref 98–107)
CO2: 22 MMOL/L (ref 20–31)
CREAT SERPL-MCNC: 1.64 MG/DL (ref 0.7–1.2)
DIFFERENTIAL TYPE: ABNORMAL
EOSINOPHILS RELATIVE PERCENT: 0 % (ref 0–4)
GFR AFRICAN AMERICAN: 55 ML/MIN
GFR NON-AFRICAN AMERICAN: 45 ML/MIN
GFR SERPL CREATININE-BSD FRML MDRD: ABNORMAL ML/MIN/{1.73_M2}
GFR SERPL CREATININE-BSD FRML MDRD: ABNORMAL ML/MIN/{1.73_M2}
GLUCOSE BLD-MCNC: 124 MG/DL (ref 70–99)
HCT VFR BLD CALC: 41.1 % (ref 41–53)
HCT VFR BLD CALC: 42.7 % (ref 40.7–50.3)
HCT VFR BLD CALC: 45.1 % (ref 41–53)
HEMOGLOBIN: 13.7 G/DL (ref 13.5–17.5)
HEMOGLOBIN: 13.9 G/DL (ref 13–17)
HEMOGLOBIN: 15 G/DL (ref 13.5–17.5)
IMMATURE GRANULOCYTES: ABNORMAL %
INR BLD: 1.1
LYMPHOCYTES # BLD: 15 % (ref 24–44)
MCH RBC QN AUTO: 28.7 PG (ref 26–34)
MCH RBC QN AUTO: 28.9 PG (ref 26–34)
MCH RBC QN AUTO: 29 PG (ref 25.2–33.5)
MCHC RBC AUTO-ENTMCNC: 32.6 G/DL (ref 28.4–34.8)
MCHC RBC AUTO-ENTMCNC: 33.4 G/DL (ref 31–37)
MCHC RBC AUTO-ENTMCNC: 33.4 G/DL (ref 31–37)
MCV RBC AUTO: 86 FL (ref 80–100)
MCV RBC AUTO: 86.6 FL (ref 80–100)
MCV RBC AUTO: 89 FL (ref 82.6–102.9)
MONOCYTES # BLD: 9 % (ref 1–7)
NRBC AUTOMATED: 0 PER 100 WBC
NRBC AUTOMATED: ABNORMAL PER 100 WBC
NRBC AUTOMATED: ABNORMAL PER 100 WBC
PARTIAL THROMBOPLASTIN TIME: 28.1 SEC (ref 20.5–30.5)
PARTIAL THROMBOPLASTIN TIME: 32.7 SEC (ref 24–36)
PDW BLD-RTO: 12.5 % (ref 11.8–14.4)
PDW BLD-RTO: 13.4 % (ref 11.5–14.9)
PDW BLD-RTO: 13.5 % (ref 11.5–14.9)
PLATELET # BLD: 318 K/UL (ref 150–450)
PLATELET # BLD: 319 K/UL (ref 138–453)
PLATELET # BLD: 349 K/UL (ref 150–450)
PLATELET ESTIMATE: ABNORMAL
PMV BLD AUTO: 10.4 FL (ref 8.1–13.5)
PMV BLD AUTO: 8.2 FL (ref 6–12)
PMV BLD AUTO: 8.4 FL (ref 6–12)
POTASSIUM SERPL-SCNC: 3.6 MMOL/L (ref 3.7–5.3)
PROTHROMBIN TIME: 14.1 SEC (ref 11.8–14.6)
RBC # BLD: 4.75 M/UL (ref 4.5–5.9)
RBC # BLD: 4.8 M/UL (ref 4.21–5.77)
RBC # BLD: 5.24 M/UL (ref 4.5–5.9)
RBC # BLD: ABNORMAL 10*6/UL
SEG NEUTROPHILS: 75 % (ref 36–66)
SEGMENTED NEUTROPHILS ABSOLUTE COUNT: 8.2 K/UL (ref 1.3–9.1)
SODIUM BLD-SCNC: 142 MMOL/L (ref 135–144)
WBC # BLD: 10.9 K/UL (ref 3.5–11)
WBC # BLD: 11.8 K/UL (ref 3.5–11.3)
WBC # BLD: 12.5 K/UL (ref 3.5–11)
WBC # BLD: ABNORMAL 10*3/UL

## 2020-02-24 PROCEDURE — 2580000003 HC RX 258: Performed by: PHYSICAL MEDICINE & REHABILITATION

## 2020-02-24 PROCEDURE — 6360000002 HC RX W HCPCS: Performed by: STUDENT IN AN ORGANIZED HEALTH CARE EDUCATION/TRAINING PROGRAM

## 2020-02-24 PROCEDURE — 99255 IP/OBS CONSLTJ NEW/EST HI 80: CPT | Performed by: PSYCHIATRY & NEUROLOGY

## 2020-02-24 PROCEDURE — 70450 CT HEAD/BRAIN W/O DYE: CPT

## 2020-02-24 PROCEDURE — 6370000000 HC RX 637 (ALT 250 FOR IP): Performed by: PSYCHIATRY & NEUROLOGY

## 2020-02-24 PROCEDURE — 99232 SBSQ HOSP IP/OBS MODERATE 35: CPT | Performed by: PHYSICAL MEDICINE & REHABILITATION

## 2020-02-24 PROCEDURE — 70551 MRI BRAIN STEM W/O DYE: CPT

## 2020-02-24 PROCEDURE — 85027 COMPLETE CBC AUTOMATED: CPT

## 2020-02-24 PROCEDURE — 85610 PROTHROMBIN TIME: CPT

## 2020-02-24 PROCEDURE — 6370000000 HC RX 637 (ALT 250 FOR IP): Performed by: PHYSICAL MEDICINE & REHABILITATION

## 2020-02-24 PROCEDURE — 84484 ASSAY OF TROPONIN QUANT: CPT

## 2020-02-24 PROCEDURE — 96375 TX/PRO/DX INJ NEW DRUG ADDON: CPT

## 2020-02-24 PROCEDURE — 83874 ASSAY OF MYOGLOBIN: CPT

## 2020-02-24 PROCEDURE — 90792 PSYCH DIAG EVAL W/MED SRVCS: CPT | Performed by: NURSE PRACTITIONER

## 2020-02-24 PROCEDURE — 2500000003 HC RX 250 WO HCPCS: Performed by: EMERGENCY MEDICINE

## 2020-02-24 PROCEDURE — 80048 BASIC METABOLIC PNL TOTAL CA: CPT

## 2020-02-24 PROCEDURE — 84145 PROCALCITONIN (PCT): CPT

## 2020-02-24 PROCEDURE — 83880 ASSAY OF NATRIURETIC PEPTIDE: CPT

## 2020-02-24 PROCEDURE — 6360000002 HC RX W HCPCS: Performed by: EMERGENCY MEDICINE

## 2020-02-24 PROCEDURE — 36415 COLL VENOUS BLD VENIPUNCTURE: CPT

## 2020-02-24 PROCEDURE — 85730 THROMBOPLASTIN TIME PARTIAL: CPT

## 2020-02-24 PROCEDURE — 96374 THER/PROPH/DIAG INJ IV PUSH: CPT

## 2020-02-24 PROCEDURE — 86140 C-REACTIVE PROTEIN: CPT

## 2020-02-24 PROCEDURE — 2580000003 HC RX 258: Performed by: PSYCHIATRY & NEUROLOGY

## 2020-02-24 PROCEDURE — 99223 1ST HOSP IP/OBS HIGH 75: CPT | Performed by: INTERNAL MEDICINE

## 2020-02-24 PROCEDURE — 6360000002 HC RX W HCPCS: Performed by: PSYCHIATRY & NEUROLOGY

## 2020-02-24 PROCEDURE — 99285 EMERGENCY DEPT VISIT HI MDM: CPT

## 2020-02-24 PROCEDURE — 6370000000 HC RX 637 (ALT 250 FOR IP): Performed by: STUDENT IN AN ORGANIZED HEALTH CARE EDUCATION/TRAINING PROGRAM

## 2020-02-24 PROCEDURE — 71045 X-RAY EXAM CHEST 1 VIEW: CPT

## 2020-02-24 PROCEDURE — 2000000000 HC ICU R&B

## 2020-02-24 PROCEDURE — 2580000003 HC RX 258: Performed by: STUDENT IN AN ORGANIZED HEALTH CARE EDUCATION/TRAINING PROGRAM

## 2020-02-24 PROCEDURE — 87641 MR-STAPH DNA AMP PROBE: CPT

## 2020-02-24 PROCEDURE — 2000000003 HC NEURO ICU R&B

## 2020-02-24 PROCEDURE — 2580000003 HC RX 258: Performed by: INTERNAL MEDICINE

## 2020-02-24 PROCEDURE — 6360000002 HC RX W HCPCS: Performed by: INTERNAL MEDICINE

## 2020-02-24 PROCEDURE — 84134 ASSAY OF PREALBUMIN: CPT

## 2020-02-24 PROCEDURE — 85025 COMPLETE CBC W/AUTO DIFF WBC: CPT

## 2020-02-24 RX ORDER — LORAZEPAM 2 MG/ML
1 INJECTION INTRAMUSCULAR ONCE
Status: DISCONTINUED | OUTPATIENT
Start: 2020-02-24 | End: 2020-02-24 | Stop reason: HOSPADM

## 2020-02-24 RX ORDER — ACETAMINOPHEN 650 MG/1
650 SUPPOSITORY RECTAL EVERY 6 HOURS PRN
Status: DISCONTINUED | OUTPATIENT
Start: 2020-02-24 | End: 2020-02-24 | Stop reason: HOSPADM

## 2020-02-24 RX ORDER — POTASSIUM CHLORIDE 7.45 MG/ML
10 INJECTION INTRAVENOUS PRN
Status: DISCONTINUED | OUTPATIENT
Start: 2020-02-24 | End: 2020-02-24 | Stop reason: HOSPADM

## 2020-02-24 RX ORDER — AMLODIPINE BESYLATE 10 MG/1
10 TABLET ORAL NIGHTLY
Status: CANCELLED | OUTPATIENT
Start: 2020-02-24

## 2020-02-24 RX ORDER — BISACODYL 10 MG
10 SUPPOSITORY, RECTAL RECTAL DAILY PRN
Status: CANCELLED | OUTPATIENT
Start: 2020-02-24

## 2020-02-24 RX ORDER — HEPARIN SODIUM 5000 [USP'U]/ML
2000 INJECTION, SOLUTION INTRAVENOUS; SUBCUTANEOUS PRN
Status: DISCONTINUED | OUTPATIENT
Start: 2020-02-24 | End: 2020-02-24 | Stop reason: HOSPADM

## 2020-02-24 RX ORDER — HEPARIN SODIUM 10000 [USP'U]/100ML
9.2 INJECTION, SOLUTION INTRAVENOUS CONTINUOUS
Status: DISCONTINUED | OUTPATIENT
Start: 2020-02-24 | End: 2020-02-27

## 2020-02-24 RX ORDER — NEBIVOLOL 10 MG/1
10 TABLET ORAL DAILY
Status: CANCELLED | OUTPATIENT
Start: 2020-02-24

## 2020-02-24 RX ORDER — SODIUM CHLORIDE 9 MG/ML
INJECTION, SOLUTION INTRAVENOUS CONTINUOUS
Status: CANCELLED | OUTPATIENT
Start: 2020-02-24

## 2020-02-24 RX ORDER — HYDRALAZINE HYDROCHLORIDE 20 MG/ML
10 INJECTION INTRAMUSCULAR; INTRAVENOUS EVERY 4 HOURS PRN
Status: DISCONTINUED | OUTPATIENT
Start: 2020-02-24 | End: 2020-02-24

## 2020-02-24 RX ORDER — SODIUM CHLORIDE 0.9 % (FLUSH) 0.9 %
10 SYRINGE (ML) INJECTION PRN
Status: DISCONTINUED | OUTPATIENT
Start: 2020-02-24 | End: 2020-03-11 | Stop reason: HOSPADM

## 2020-02-24 RX ORDER — ACETAMINOPHEN 325 MG/1
650 TABLET ORAL EVERY 4 HOURS PRN
Status: DISCONTINUED | OUTPATIENT
Start: 2020-02-24 | End: 2020-02-24 | Stop reason: HOSPADM

## 2020-02-24 RX ORDER — HEPARIN SODIUM 1000 [USP'U]/ML
2000 INJECTION, SOLUTION INTRAVENOUS; SUBCUTANEOUS PRN
Status: DISCONTINUED | OUTPATIENT
Start: 2020-02-24 | End: 2020-02-26

## 2020-02-24 RX ORDER — POLYETHYLENE GLYCOL 3350 17 G/17G
17 POWDER, FOR SOLUTION ORAL DAILY PRN
Status: DISCONTINUED | OUTPATIENT
Start: 2020-02-24 | End: 2020-03-11 | Stop reason: HOSPADM

## 2020-02-24 RX ORDER — HYDRALAZINE HYDROCHLORIDE 20 MG/ML
10 INJECTION INTRAMUSCULAR; INTRAVENOUS EVERY 4 HOURS PRN
Status: DISCONTINUED | OUTPATIENT
Start: 2020-02-24 | End: 2020-02-24 | Stop reason: HOSPADM

## 2020-02-24 RX ORDER — SODIUM CHLORIDE 0.9 % (FLUSH) 0.9 %
10 SYRINGE (ML) INJECTION EVERY 12 HOURS SCHEDULED
Status: DISCONTINUED | OUTPATIENT
Start: 2020-02-24 | End: 2020-03-11 | Stop reason: HOSPADM

## 2020-02-24 RX ORDER — ACETAMINOPHEN 325 MG/1
650 TABLET ORAL EVERY 6 HOURS PRN
Status: DISCONTINUED | OUTPATIENT
Start: 2020-02-24 | End: 2020-02-24 | Stop reason: SDUPTHER

## 2020-02-24 RX ORDER — LORAZEPAM 2 MG/ML
1 INJECTION INTRAMUSCULAR ONCE
Status: COMPLETED | OUTPATIENT
Start: 2020-02-24 | End: 2020-02-24

## 2020-02-24 RX ORDER — FLUOXETINE HYDROCHLORIDE 20 MG/1
40 CAPSULE ORAL DAILY
Status: CANCELLED | OUTPATIENT
Start: 2020-02-25

## 2020-02-24 RX ORDER — LABETALOL 20 MG/4 ML (5 MG/ML) INTRAVENOUS SYRINGE
20 ONCE
Status: COMPLETED | OUTPATIENT
Start: 2020-02-24 | End: 2020-02-24

## 2020-02-24 RX ORDER — FLUOXETINE HYDROCHLORIDE 20 MG/1
40 CAPSULE ORAL DAILY
Status: DISCONTINUED | OUTPATIENT
Start: 2020-02-25 | End: 2020-02-24 | Stop reason: HOSPADM

## 2020-02-24 RX ORDER — POLYETHYLENE GLYCOL 3350 17 G/17G
17 POWDER, FOR SOLUTION ORAL DAILY PRN
Status: DISCONTINUED | OUTPATIENT
Start: 2020-02-24 | End: 2020-02-24 | Stop reason: HOSPADM

## 2020-02-24 RX ORDER — 0.9 % SODIUM CHLORIDE 0.9 %
1000 INTRAVENOUS SOLUTION INTRAVENOUS ONCE
Status: DISCONTINUED | OUTPATIENT
Start: 2020-02-24 | End: 2020-02-24 | Stop reason: HOSPADM

## 2020-02-24 RX ORDER — LABETALOL 20 MG/4 ML (5 MG/ML) INTRAVENOUS SYRINGE
10 EVERY 10 MIN PRN
Status: DISCONTINUED | OUTPATIENT
Start: 2020-02-24 | End: 2020-02-25

## 2020-02-24 RX ORDER — ASPIRIN 300 MG/1
300 SUPPOSITORY RECTAL ONCE
Status: COMPLETED | OUTPATIENT
Start: 2020-02-24 | End: 2020-02-24

## 2020-02-24 RX ORDER — SODIUM CHLORIDE 0.9 % (FLUSH) 0.9 %
10 SYRINGE (ML) INJECTION PRN
Status: DISCONTINUED | OUTPATIENT
Start: 2020-02-24 | End: 2020-02-24 | Stop reason: HOSPADM

## 2020-02-24 RX ORDER — AMLODIPINE BESYLATE 10 MG/1
10 TABLET ORAL DAILY
Status: DISCONTINUED | OUTPATIENT
Start: 2020-02-24 | End: 2020-02-24 | Stop reason: HOSPADM

## 2020-02-24 RX ORDER — PROMETHAZINE HYDROCHLORIDE 25 MG/1
12.5 TABLET ORAL EVERY 6 HOURS PRN
Status: DISCONTINUED | OUTPATIENT
Start: 2020-02-24 | End: 2020-02-24 | Stop reason: HOSPADM

## 2020-02-24 RX ORDER — HEPARIN SODIUM 5000 [USP'U]/ML
4000 INJECTION, SOLUTION INTRAVENOUS; SUBCUTANEOUS PRN
Status: DISCONTINUED | OUTPATIENT
Start: 2020-02-24 | End: 2020-02-24 | Stop reason: HOSPADM

## 2020-02-24 RX ORDER — AMLODIPINE BESYLATE 10 MG/1
10 TABLET ORAL NIGHTLY
Status: DISCONTINUED | OUTPATIENT
Start: 2020-02-24 | End: 2020-02-24

## 2020-02-24 RX ORDER — BISACODYL 10 MG
10 SUPPOSITORY, RECTAL RECTAL DAILY PRN
Status: DISCONTINUED | OUTPATIENT
Start: 2020-02-24 | End: 2020-02-24 | Stop reason: HOSPADM

## 2020-02-24 RX ORDER — FOLIC ACID 1 MG/1
1 TABLET ORAL 2 TIMES DAILY
Status: DISCONTINUED | OUTPATIENT
Start: 2020-02-24 | End: 2020-02-24 | Stop reason: HOSPADM

## 2020-02-24 RX ORDER — FOLIC ACID 1 MG/1
1 TABLET ORAL 2 TIMES DAILY
Status: CANCELLED | OUTPATIENT
Start: 2020-02-24

## 2020-02-24 RX ORDER — POLYETHYLENE GLYCOL 3350 17 G/17G
17 POWDER, FOR SOLUTION ORAL DAILY
Status: CANCELLED | OUTPATIENT
Start: 2020-02-25

## 2020-02-24 RX ORDER — ONDANSETRON 2 MG/ML
4 INJECTION INTRAMUSCULAR; INTRAVENOUS EVERY 6 HOURS PRN
Status: DISCONTINUED | OUTPATIENT
Start: 2020-02-24 | End: 2020-02-24 | Stop reason: HOSPADM

## 2020-02-24 RX ORDER — HEPARIN SODIUM 5000 [USP'U]/ML
5000 INJECTION, SOLUTION INTRAVENOUS; SUBCUTANEOUS EVERY 8 HOURS SCHEDULED
Status: DISCONTINUED | OUTPATIENT
Start: 2020-02-24 | End: 2020-02-24

## 2020-02-24 RX ORDER — HYDRALAZINE HYDROCHLORIDE 20 MG/ML
10 INJECTION INTRAMUSCULAR; INTRAVENOUS EVERY 6 HOURS PRN
Status: DISCONTINUED | OUTPATIENT
Start: 2020-02-24 | End: 2020-02-25

## 2020-02-24 RX ORDER — ROSUVASTATIN CALCIUM 10 MG/1
40 TABLET, COATED ORAL NIGHTLY
Status: DISCONTINUED | OUTPATIENT
Start: 2020-02-24 | End: 2020-02-24

## 2020-02-24 RX ORDER — HEPARIN SODIUM 10000 [USP'U]/100ML
12 INJECTION, SOLUTION INTRAVENOUS CONTINUOUS
Status: DISCONTINUED | OUTPATIENT
Start: 2020-02-24 | End: 2020-02-24 | Stop reason: HOSPADM

## 2020-02-24 RX ORDER — HEPARIN SODIUM 1000 [USP'U]/ML
4000 INJECTION, SOLUTION INTRAVENOUS; SUBCUTANEOUS ONCE
Status: DISCONTINUED | OUTPATIENT
Start: 2020-02-24 | End: 2020-02-26

## 2020-02-24 RX ORDER — CARVEDILOL 25 MG/1
25 TABLET ORAL 2 TIMES DAILY WITH MEALS
Status: DISCONTINUED | OUTPATIENT
Start: 2020-02-24 | End: 2020-02-24 | Stop reason: HOSPADM

## 2020-02-24 RX ORDER — MAGNESIUM SULFATE 1 G/100ML
1 INJECTION INTRAVENOUS PRN
Status: DISCONTINUED | OUTPATIENT
Start: 2020-02-24 | End: 2020-02-24 | Stop reason: HOSPADM

## 2020-02-24 RX ORDER — ATORVASTATIN CALCIUM 80 MG/1
80 TABLET, FILM COATED ORAL NIGHTLY
Status: DISCONTINUED | OUTPATIENT
Start: 2020-02-24 | End: 2020-02-24 | Stop reason: HOSPADM

## 2020-02-24 RX ORDER — SODIUM CHLORIDE 9 MG/ML
INJECTION, SOLUTION INTRAVENOUS CONTINUOUS
Status: DISCONTINUED | OUTPATIENT
Start: 2020-02-24 | End: 2020-02-29

## 2020-02-24 RX ORDER — ASPIRIN 81 MG/1
81 TABLET ORAL DAILY
Status: CANCELLED | OUTPATIENT
Start: 2020-02-25

## 2020-02-24 RX ORDER — SODIUM CHLORIDE 0.9 % (FLUSH) 0.9 %
10 SYRINGE (ML) INJECTION EVERY 12 HOURS SCHEDULED
Status: DISCONTINUED | OUTPATIENT
Start: 2020-02-24 | End: 2020-02-24 | Stop reason: HOSPADM

## 2020-02-24 RX ORDER — SENNA PLUS 8.6 MG/1
2 TABLET ORAL NIGHTLY PRN
Status: DISCONTINUED | OUTPATIENT
Start: 2020-02-24 | End: 2020-02-24 | Stop reason: HOSPADM

## 2020-02-24 RX ORDER — ROSUVASTATIN CALCIUM 10 MG/1
40 TABLET, COATED ORAL NIGHTLY
Status: CANCELLED | OUTPATIENT
Start: 2020-02-24

## 2020-02-24 RX ORDER — POTASSIUM CHLORIDE 20 MEQ/1
40 TABLET, EXTENDED RELEASE ORAL PRN
Status: DISCONTINUED | OUTPATIENT
Start: 2020-02-24 | End: 2020-02-24 | Stop reason: HOSPADM

## 2020-02-24 RX ORDER — POLYETHYLENE GLYCOL 3350 17 G/17G
17 POWDER, FOR SOLUTION ORAL DAILY
Status: DISCONTINUED | OUTPATIENT
Start: 2020-02-25 | End: 2020-02-24

## 2020-02-24 RX ORDER — ASPIRIN 81 MG/1
81 TABLET ORAL DAILY
Status: DISCONTINUED | OUTPATIENT
Start: 2020-02-25 | End: 2020-02-24

## 2020-02-24 RX ORDER — PROMETHAZINE HYDROCHLORIDE 25 MG/1
12.5 TABLET ORAL EVERY 6 HOURS PRN
Status: DISCONTINUED | OUTPATIENT
Start: 2020-02-24 | End: 2020-03-11 | Stop reason: HOSPADM

## 2020-02-24 RX ORDER — SODIUM CHLORIDE 9 MG/ML
INJECTION, SOLUTION INTRAVENOUS CONTINUOUS
Status: DISCONTINUED | OUTPATIENT
Start: 2020-02-24 | End: 2020-02-24 | Stop reason: HOSPADM

## 2020-02-24 RX ORDER — HEPARIN SODIUM 1000 [USP'U]/ML
4000 INJECTION, SOLUTION INTRAVENOUS; SUBCUTANEOUS PRN
Status: DISCONTINUED | OUTPATIENT
Start: 2020-02-24 | End: 2020-02-26

## 2020-02-24 RX ORDER — HYDRALAZINE HYDROCHLORIDE 20 MG/ML
10 INJECTION INTRAMUSCULAR; INTRAVENOUS EVERY 4 HOURS PRN
Status: CANCELLED | OUTPATIENT
Start: 2020-02-24

## 2020-02-24 RX ORDER — ACETAMINOPHEN 325 MG/1
650 TABLET ORAL EVERY 4 HOURS PRN
Status: CANCELLED | OUTPATIENT
Start: 2020-02-24

## 2020-02-24 RX ORDER — ONDANSETRON 2 MG/ML
4 INJECTION INTRAMUSCULAR; INTRAVENOUS EVERY 6 HOURS PRN
Status: DISCONTINUED | OUTPATIENT
Start: 2020-02-24 | End: 2020-03-11 | Stop reason: HOSPADM

## 2020-02-24 RX ORDER — SENNA PLUS 8.6 MG/1
2 TABLET ORAL NIGHTLY PRN
Status: CANCELLED | OUTPATIENT
Start: 2020-02-24

## 2020-02-24 RX ORDER — ASPIRIN 81 MG/1
81 TABLET ORAL DAILY
Status: DISCONTINUED | OUTPATIENT
Start: 2020-02-24 | End: 2020-02-24 | Stop reason: SDUPTHER

## 2020-02-24 RX ADMIN — ENOXAPARIN SODIUM 30 MG: 30 INJECTION SUBCUTANEOUS at 07:55

## 2020-02-24 RX ADMIN — SODIUM CHLORIDE: 9 INJECTION, SOLUTION INTRAVENOUS at 23:37

## 2020-02-24 RX ADMIN — SODIUM CHLORIDE: 9 INJECTION, SOLUTION INTRAVENOUS at 06:26

## 2020-02-24 RX ADMIN — HEPARIN SODIUM AND DEXTROSE 9.2 UNITS/KG/HR: 10000; 5 INJECTION INTRAVENOUS at 23:36

## 2020-02-24 RX ADMIN — ASPIRIN 300 MG: 300 SUPPOSITORY RECTAL at 21:04

## 2020-02-24 RX ADMIN — LABETALOL 20 MG/4 ML (5 MG/ML) INTRAVENOUS SYRINGE 20 MG: at 14:04

## 2020-02-24 RX ADMIN — HYDRALAZINE HYDROCHLORIDE 10 MG: 20 INJECTION INTRAMUSCULAR; INTRAVENOUS at 09:25

## 2020-02-24 RX ADMIN — LORAZEPAM 1 MG: 2 INJECTION INTRAMUSCULAR; INTRAVENOUS at 14:02

## 2020-02-24 RX ADMIN — SODIUM CHLORIDE 1000 ML: 9 INJECTION, SOLUTION INTRAVENOUS at 21:05

## 2020-02-24 RX ADMIN — HEPARIN SODIUM AND DEXTROSE 12 UNITS/KG/HR: 10000; 5 INJECTION INTRAVENOUS at 21:04

## 2020-02-24 RX ADMIN — ASPIRIN 81 MG: 81 TABLET, COATED ORAL at 07:55

## 2020-02-24 RX ADMIN — TICAGRELOR 90 MG: 90 TABLET ORAL at 07:54

## 2020-02-24 RX ADMIN — SODIUM CHLORIDE: 9 INJECTION, SOLUTION INTRAVENOUS at 21:05

## 2020-02-24 ASSESSMENT — ENCOUNTER SYMPTOMS
BACK PAIN: 0
ABDOMINAL DISTENTION: 0
EYE REDNESS: 0
APNEA: 1
SHORTNESS OF BREATH: 0
BLOOD IN STOOL: 0
CHEST TIGHTNESS: 0
NAUSEA: 0
ABDOMINAL PAIN: 0
COUGH: 0
COLOR CHANGE: 0
EYE DISCHARGE: 0
SORE THROAT: 0
SINUS PRESSURE: 0
RHINORRHEA: 0
TROUBLE SWALLOWING: 0
VOMITING: 0
FACIAL SWELLING: 0
DIARRHEA: 0
EYE PAIN: 0
WHEEZING: 0
PHOTOPHOBIA: 0
CONSTIPATION: 0
SINUS PAIN: 0

## 2020-02-24 ASSESSMENT — PAIN SCALES - GENERAL: PAINLEVEL_OUTOF10: 0

## 2020-02-24 NOTE — FLOWSHEET NOTE
Patient was anxious and indicating he wanted to go home. Brothers and girlfriend were with him. They are waiting to find out next steps. Writer told them she's here all evening and will follow up.     02/24/20 1503   Encounter Summary   Services provided to: Patient and family together   Referral/Consult From: Other    Support System Significant other;Family members   Continue Visiting   (2-24-20)   Complexity of Encounter Moderate   Length of Encounter 15 minutes   Routine   Type Follow up   Spiritual/Denominational   Type Spiritual support   Assessment Approachable   Intervention Active listening;Explored feelings, thoughts, concerns;Sustaining presence/ Ministry of presence; Discussed illness/injury and it's impact   Outcome Expressed gratitude;Engaged in conversation;Expressed feelings/needs/concerns;Receptive

## 2020-02-24 NOTE — PROGRESS NOTES
Physical Medicine & Rehabilitation  Progress Note      Subjective:      52year-old gentleman with R pontine ischemic CVA and L nondominant hemiparesis. Patient is having problems with impaired speech. Noted decline in mood and communication for past few days. ROS:  Denies fevers, chills, sweats. No chest pain, palpitations, lightheadedness. Denies coughing, wheezing or shortness of breath. Denies abdominal pain, nausea, diarrhea or constipation. No new areas of joint pain. Denies new areas of numbness or weakness. Denies new anxiety or depression issues. No new skin problems. Rehabilitation:   Progressing in therapies. PT:  Restrictions/Precautions: Fall Risk, Up as Tolerated, General Precautions, Swallowing - Thickened Liquids(nectar thick liquids)  Other position/activity restrictions: SBP goal 130-150. Transfers  Sit to Stand: Maximum Assistance, 2 Person Assistance(assist left UE to walker platform)  Stand to sit: Moderate Assistance, Minimal Assistance  Bed to Chair: Moderate assistance, 2 Person Assistance(to left , platform RW)  Stand Pivot Transfers: Maximum Assistance, 2 Person Assistance(3rd person assist for L LE )  Squat Pivot Transfers: Moderate Assistance  Comment: assist left UE placement , left LE advancement , knee ext stance phase 1 step instruction sequencing and RW managment. 3rd person assist required to advance L LE. MOD A x2 with SS in PM tx  Ambulation 1  Surface: level tile  Device: (green walker lift)  Other Apparatus: Wheelchair follow  Assistance: Moderate assistance, 2 Person assistance  Quality of Gait: L knee bucckles/hyperextends, narrow ANEUDY  Gait Deviations: Decreased step length, Decreased step height  Distance: 50ft  Comments: Max cueing and encouragment required.  3rd person assist to facilitate L LE    Transfers  Sit to Stand: Maximum Assistance, 2 Person Assistance(assist left UE to walker platform)  Stand to sit: Moderate Assistance, Minimal Assistance  Bed to Chair: Moderate assistance, 2 Person Assistance(to left , platform RW)  Stand Pivot Transfers: Maximum Assistance, 2 Person Assistance(3rd person assist for L LE )  Squat Pivot Transfers: Moderate Assistance  Comment: assist left UE placement , left LE advancement , knee ext stance phase 1 step instruction sequencing and RW managment. 3rd person assist required to advance L LE. MOD A x2 with SS in PM tx  Ambulation  Ambulation?: Yes  More Ambulation?: No  Ambulation 1  Surface: level tile  Device: (green walker lift)  Other Apparatus: Wheelchair follow  Assistance: Moderate assistance, 2 Person assistance  Quality of Gait: L knee bucckles/hyperextends, narrow ANEUDY  Gait Deviations: Decreased step length, Decreased step height  Distance: 50ft  Comments: Max cueing and encouragment required. 3rd person assist to facilitate L LE    Surface: level tile  Ambulation 1  Surface: level tile  Device: (green walker lift)  Other Apparatus: Wheelchair follow  Assistance: Moderate assistance, 2 Person assistance  Quality of Gait: L knee bucckles/hyperextends, narrow ANEUDY  Gait Deviations: Decreased step length, Decreased step height  Distance: 50ft  Comments: Max cueing and encouragment required. 3rd person assist to facilitate L LE    OT:  ADL  Equipment Provided: Long-handled sponge  Feeding: Setup(Pt's spouse assisted him w his lunch)  Grooming: Minimal assistance(Pt's son washed his hair. Pt washed his face & lt hand.)  UE Bathing: Minimal assistance, Verbal cueing, Increased time to complete(Pt washes chest, abdomen, lt arm (OT supported arm). Pt's son washed pt's rt arm.)  LE Bathing: Maximum assistance, Increased time to complete, Verbal cueing(Pt uses LHS to watch part of his rt leg. Pt washes rose marie and front of thighs. Pt needs assist w rest of bathing.)  UE Dressing: Minimal assistance, Verbal cueing, Increased time to complete(Pt needs assist to pull down shirt.  Pt uses rosalind techniques to yoli shirt w OT supporting lt arm)  LE Dressing: Dependent/Total(Pt wears underwear, shorts, knee hi venkat hose, shoes. Max assist w shoes. Dependent w rest of lower body ADLs.)  Toileting: (not done during a.m OT.)  Additional Comments: Pt took a shower during a.m OT. Balance  Sitting Balance: Contact guard assistance(CGA to SBA when pt sat on tub transfer bench (verbal/tactile cues to correct posturewhen pt was leaning back or to rt side.)  Standing Balance: Dependent/Total(Assist x2 when stand up from extended tub bench for clothing management)   Standing Balance  Time: 1 minutes increments  Activity: Pt stood w assist x2-3 during shower transfer. Comment: Pt used grab to assist therapist,PCT,his son. Functional Mobility  Functional Mobility Comments: Pt uses w/c in room. Bed mobility  Rolling to Left: Minimal assistance  Rolling to Right: Minimal assistance  Supine to Sit: 2 Person assistance, Moderate assistance(for trunk progression and BLE )  Sit to Supine: (left in WC AM/PM)  Scooting: Moderate assistance(to EOB)  Comment: Pt with decreased participation in bed mobility, HOB elevated to assist in sit to supine  Transfers  Sit to stand: Dependent/Total, 2 Person assistance, Moderate assistance(assist x3 to stand during w/c to shower transfer)  Stand to sit: 2 Person assistance, Dependent/Total, Moderate assistance  Transfer Comments: Pt used grab bar w rt UE during transfer. Toilet Transfers  Toilet Transfer: (TBA)     Shower Transfers  Shower - Transfer From: Wheelchair  Shower - Transfer Type: To and From  Shower - Transfer To: Transfer tub bench  Shower - Technique:  To right, To left, Stand pivot  Shower Transfers: Dependent, 2 Person assistance, Moderate assistance(assist x3(OT,PCT,pt's son) going to lt side from w/c to extended tub bench w pt holding grab bar w rt UE to pull up, assist x2 (OT and PCT going to rt from  tub transfer bench to w/c w pt holding grab bar w rt hand to help pull himself up)  Shower Transfers Comments: Verbal cues before and during transfers. OT explained transfer process 1st to pt. SPEECH:      Objective:  BP (!) 160/67   Pulse 81   Temp 97.7 °F (36.5 °C) (Oral)   Resp 18   Ht 6' 1\" (1.854 m)   Wt 245 lb (111.1 kg)   SpO2 99%   BMI 32.32 kg/m²       GEN: Well developed, well nourished, in NAD  HEENT:  NCAT.  PERRL.  EOMI.  Mucous membranes pink and moist - oral leukoplakia. PULM:  Clear to ausculation. No rales or rhonchi. Respirations WNL and unlabored. CV:  Tachycardic rate regular rhythm.  No murmurs or gallops. GI:  Abdomen soft. Nontender. Non-distended.  BS + and equal.    NEUROLOGICAL: Alert and interactive. Sensation intact to light touch. Dysarthria. Minimal L shoulder shrug today. No new focal deficits on cranial nerve exam.  MSK:  Functional ROM RUE and RLE. Weakness limits AROM LLE. No AROM LUE. Motor testing 5/5 key muscles RUE and RLE. L shoulder retraction 1/5. L elbow 2/5, wrist and hand 0/5. L hip flexion 3/5, L knee extension 3/5.   SKIN: Warm dry and intact. Good turgor. EXTREMITIES:  No calf tenderness to palpation. No edema BLEs. .    PSYCH: Mood depressed. Minimally interactive. Affect flat.     Diagnostics:     CT OF THE HEAD WITHOUT CONTRAST  2/22/2020 9:57 pm       TECHNIQUE:   CT of the head was performed without the administration of intravenous   contrast. Dose modulation, iterative reconstruction, and/or weight based   adjustment of the mA/kV was utilized to reduce the radiation dose to as low   as reasonably achievable.       COMPARISON:   CTA of the head and neck February 15, 2020 and CT head February 13, 2029 and   MR brain February 13, 2020       HISTORY:   ORDERING SYSTEM PROVIDED HISTORY: Change in Mentation. r/o stroke   redemonstration/extension. TECHNOLOGIST PROVIDED HISTORY:   Change in Mentation. r/o stroke redemonstration/extension.       Reason for Exam: Change in Mentation.  r/o stroke redemonstration/extension.       FINDINGS: BRAIN/VENTRICLES: There is no acute intracranial hemorrhage, mass effect or   midline shift.  No abnormal extra-axial fluid collection.  The gray-white   differentiation is maintained without evidence of an acute infarct.  There is   no evidence of hydrocephalus. Hypodensity right mayuri unchanged.  Intracranial   atherosclerosis.       ORBITS: The visualized portion of the orbits demonstrate no acute abnormality.       SINUSES: Air-fluid level right maxillary sinus.       SOFT TISSUES/SKULL:  No acute abnormality of the visualized skull or soft   tissues.           Impression   No change right pontine infarct.  No acute disease.             CBC: No results for input(s): WBC, RBC, HGB, HCT, MCV, RDW, PLT in the last 72 hours. BMP:   Recent Labs     02/23/20  0710      K 3.8      CO2 25   BUN 35*   CREATININE 1.83*   GLUCOSE 95     BNP: No results for input(s): BNP in the last 72 hours. PT/INR: No results for input(s): PROTIME, INR in the last 72 hours. APTT: No results for input(s): APTT in the last 72 hours. CARDIAC ENZYMES: No results for input(s): CKMB, CKMBINDEX, TROPONINT in the last 72 hours. Invalid input(s): CKTOTAL;3  FASTING LIPID PANEL:  Lab Results   Component Value Date    CHOL 140 02/14/2020    HDL 41 02/14/2020    TRIG 40 02/14/2020     LIVER PROFILE: No results for input(s): AST, ALT, ALB, BILIDIR, BILITOT, ALKPHOS in the last 72 hours.      Current Medications:   Current Facility-Administered Medications: FLUoxetine (PROZAC) capsule 40 mg, 40 mg, Oral, Daily  hydrALAZINE (APRESOLINE) injection 5 mg, 5 mg, Intravenous, Q6H PRN  rosuvastatin (CRESTOR) tablet 40 mg, 40 mg, Oral, Nightly  0.9 % sodium chloride infusion, , Intravenous, Continuous  senna (SENOKOT) tablet 17.2 mg, 2 tablet, Oral, Nightly PRN  acetaminophen (TYLENOL) tablet 650 mg, 650 mg, Oral, Q4H PRN  magnesium hydroxide (MILK OF MAGNESIA) 400 MG/5ML suspension 30 mL, 30 mL, Oral, Daily PRN  aspirin EC tablet 81 mg, 81 mg, Oral, Daily  enoxaparin (LOVENOX) injection 30 mg, 30 mg, Subcutaneous, BID  folic acid (FOLVITE) tablet 1 mg, 1 mg, Oral, BID  ticagrelor (BRILINTA) tablet 90 mg, 90 mg, Oral, BID  amLODIPine (NORVASC) tablet 10 mg, 10 mg, Oral, Nightly  polyethylene glycol (GLYCOLAX) packet 17 g, 17 g, Oral, Daily  bisacodyl (DULCOLAX) suppository 10 mg, 10 mg, Rectal, Daily PRN  Facility-Administered Medications Ordered in Other Encounters: sodium chloride flush 0.9 % injection 10 mL, 10 mL, Intravenous, 2 times per day  sodium chloride flush 0.9 % injection 10 mL, 10 mL, Intravenous, PRN      Impression/Plan:   Impaired ADLs, gait, and mobility due to:      1. R pontine ischemic CVA with L nondominant hemiparesis and dysarthria:  PT/OT for gait, mobility, strengthening, endurance, ADLs, and self care. Repeat CT scan over the weekend with no change in CVA findings. Will repeat MRI brain and consult neurology for functional decline. 2. Insomnia: On Trazodone. Monitor for possible increase in dose. 3. HTN/cardiomyopathy: on amlodipine, HCTZ, lisinopril, Crestor, ASA, Brilinta  4. Aortic root dilatation: cardiology doing serial testing. Follow up with echo in 6 months with Dr. Lv Simeon as outpatient  5. Hypercoagulable/MTHFR mutation: on ASA and Brilinta, APA workup pending. Follow up with hematology 2 weeks after discharge from rehab. 6. Depression: on fluoxetine for mood and motor return - increased dose to 40 mg on 2/23. Psych consult placed by medicine.  arranging peer visit from stroke support group. 7. RASHMI: IM started IV fluids. Monitoring BMP. 8. Bowel management: dulcolax prn, milk of magnesia prn. miralax daily, senna-docusate - will discontinue for duplicate and change to   9. DVT Prophylaxis:  low molecular weight heparin, SCD's while in bed and HIEU's while in bed  10.  Internal medicine for medical management    Electronically signed by Jaguar Merchant MD on 2/24/2020 at 8:33 AM      This note is

## 2020-02-24 NOTE — FLOWSHEET NOTE
*Rapid Response to patient's room, where his two brothers and niece were present  *Patient transferred to ED for tests  *[facility] [address] Sonali Scott took them to the ED to wait for patient       02/24/20 1315   Encounter Summary   Services provided to: Patient and family together   Continue Visiting   (2/24/20)   Complexity of Encounter Moderate   Length of Encounter 15 minutes   Crisis   Type Rapid response   Assessment Approachable   Intervention Sustaining presence/ Ministry of presence;Prayer   Outcome Expressed gratitude;Engaged in conversation

## 2020-02-24 NOTE — PROGRESS NOTES
[facility]  Speech Language Pathology    Date: 2/24/2020  Patient Name: Liliane Bray  YOB: 1970   AGE: 52 y.o. MRN: [id number]        Patient Not Available for Speech Therapy     Due to:  [] Testing  [] Hemodialysis  [] Cancelled by RN  [] Surgery   [] Intubation/Sedation/Pain Medication  [] Medical instability  [x] Other:  Attempted to see pt for ST, however pt in ER following rapid response call at 1300. ST to continue to follow. Completed by:  Brenda Mehta M.A.CFY-SLP

## 2020-02-24 NOTE — PROGRESS NOTES
Medication History completed:    New medications: none    Medications discontinued: carvedilol, Bystolic    Changes to dosing: none    Stated allergies: NKDA    Other pertinent information: Medications confirmed with family.      Thank you,  Obi Perales, PharmD, BCPS  [phone]

## 2020-02-24 NOTE — ED PROVIDER NOTES
[address] ED  eMERGENCY dEPARTMENT eNCOUnter      Pt Name: Patricia Tai  MRN: [id number]  [place] 1970  Date of evaluation: 2/24/20      CHIEF COMPLAINT       Chief Complaint   Patient presents with    Hypertension         HISTORY OF PRESENT ILLNESS    Patricia Tai is a 52 y.o. male who presents complaining of high blood pressure. Patient reportedly came in 2 weeks ago for a stroke that affected the left side of his body. Patient was up in rehab and noted that today he is blood pressure was 237/130. Patient was sent down here for evaluation. Patient evidently has had some decrease in his speech over the last 3 days. Patient has had no new numbness or weakness. Patient denies any headache. Patient had no nausea or vomiting. Patient has evidently been a little combative and agitated today and therefore did not get any physical therapy. REVIEW OF SYSTEMS       Review of Systems   Constitutional: Negative for activity change, appetite change, chills, diaphoresis and fever. HENT: Negative for congestion, ear pain, facial swelling, nosebleeds, rhinorrhea, sinus pressure, sore throat and trouble swallowing. Eyes: Negative for pain, discharge and redness. Respiratory: Negative for cough, chest tightness, shortness of breath and wheezing. Cardiovascular: Negative for chest pain, palpitations and leg swelling. Gastrointestinal: Negative for abdominal pain, blood in stool, constipation, diarrhea, nausea and vomiting. Genitourinary: Negative for difficulty urinating, dysuria, flank pain, frequency, genital sores and hematuria. Musculoskeletal: Negative for arthralgias, back pain, gait problem, joint swelling, myalgias and neck pain. Skin: Negative for color change, pallor, rash and wound. Neurological: Negative for dizziness, tremors, seizures, syncope, speech difficulty, weakness, numbness and headaches.    Psychiatric/Behavioral: Negative for confusion, decreased concentration, and atraumatic. Eyes:      General: No scleral icterus. Right eye: No discharge. Left eye: No discharge. Conjunctiva/sclera: Conjunctivae normal.      Pupils: Pupils are equal, round, and reactive to light. Cardiovascular:      Rate and Rhythm: Normal rate and regular rhythm. Heart sounds: Normal heart sounds. No murmur. No friction rub. No gallop. Pulmonary:      Effort: Pulmonary effort is normal. No respiratory distress. Breath sounds: Normal breath sounds. No wheezing or rales. Chest:      Chest wall: No tenderness. Abdominal:      General: Bowel sounds are normal. There is no distension. Palpations: Abdomen is soft. There is no mass. Tenderness: There is no abdominal tenderness. There is no guarding or rebound. Musculoskeletal: Normal range of motion. General: No tenderness. Skin:     General: Skin is warm and dry. Coloration: Skin is not pale. Findings: No erythema or rash. Neurological:      Mental Status: He is alert and oriented to person, place, and time. Cranial Nerves: No cranial nerve deficit. Sensory: No sensory deficit. Motor: Weakness (Left arm and leg) present. No abnormal muscle tone. Coordination: Coordination normal.      Comments: Patient has expressive aphasia   Psychiatric:         Behavior: Behavior normal.         Thought Content: Thought content normal.         Judgment: Judgment normal.         DIAGNOSTIC RESULTS     RADIOLOGY:All plain film, CT,MRI, and formal ultrasound images (except ED bedside ultrasound) are read by the radiologist and the interpretations are directly viewed by the emergency physician.    Ct Head Wo Contrast    Result Date: 2/24/2020  EXAMINATION: CT OF THE HEAD WITHOUT CONTRAST  2/24/2020 2:10 pm TECHNIQUE: CT of the head was performed without the administration of intravenous contrast. Dose modulation, iterative reconstruction, and/or weight based adjustment of the mA/kV 02/24/20 1451   BP: (!) 180/91 (!) 152/99 (!) 171/94 (!) 160/84   Pulse: 113 113 108 86   Resp: 22 20 19 17   Temp: 99.3 °F (37.4 °C)      TempSrc: Oral      SpO2: 96% 96% 97% 97%   Weight: 245 lb (111.1 kg)      Height: 6' 1\" (1.854 m)          The patient was given the following medications while in the emergency department:  Orders Placed This Encounter   Medications    labetalol (NORMODYNE;TRANDATE) injection syringe 20 mg    LORazepam (ATIVAN) injection 1 mg       -------------------------  3:14 PM  Updated the family on the results. I spoke with Dr. Triston Tai who is recommending that we admit the patient overnight for blood pressure monitoring and repeat evaluation. 3:20 PM  Discussed the case with Dr. Jer Bai who agrees to admit the patient. Residents been notified. CONSULTS:  IP CONSULT TO NEUROLOGY  IP CONSULT TO PRIMARY CARE PROVIDER    PROCEDURES:  None    FINAL IMPRESSION      1. Hypertension, unspecified type          DISPOSITION/PLAN   DISPOSITION Decision To Admit 02/24/2020 03:14:26 PM      PATIENT REFERREDTO:  No follow-up provider specified.     DISCHARGEMEDICATIONS:  New Prescriptions    No medications on file       (Please note that portions of this note were completed with a voice recognition program.  Efforts were made to edit thedictations but occasionally words are mis-transcribed.)    Jaycee Britt MD  Attending Emergency Physician                        Jaycee Britt MD  02/24/20 7620

## 2020-02-24 NOTE — H&P
[address]      [address] [facility]     HISTORY AND PHYSICAL EXAMINATION            Date:   2/24/2020  Patient name:  Davide Black  Date of admission:  2/24/2020  1:26 PM  MRN:   [id number]  Account:  [de-identified]  YOB: 1970  PCP:    Susu Orr PA-C  Room:   [address] [place] [address]  Code Status:    Prior    Chief Complaint:     Chief Complaint   Patient presents with    Hypertension       History Obtained From:     patient, spouse, electronic medical record, multiple family members and friends at bedside    History of Present Illness: The patient is a 52 y.o.  [place] male who presents with Hypertension   and he is admitted to the hospital for the management of hypertensive urgency S/P right pontine stroke. Patient has PMH significant for HTN, HLD and MI with stent in LAD 2019. Patient had right pontine stroke 2/13/2020. Patient was recovering in acute rehab when rapid response was called 2/24/2020. Patients BP was noted to be 237/130 and he was noted to have worsening speech over the last 3 days. Code stroke was called and patient sent to ED for stat CT head non-contrast. Currently patient sitting up in bed with family at bedside. Patient is aphasic and has rebecca strokes respirations. In the ED CT head non-contrast shows stable brain with subacute right pontine infarct. Patient was given Labetalol IV 20mg one time with SBP dropping to 160s. Patient found to have acute kidney injury that slowly evolved since 2/18/2020 Cr 1.15 to 2/23-->1. 83. patient had progressive dysphasia during this time and was made NPO and unable to have swallow study. Patient started on IVF NaCl at 100 in ED and admitted to progressive unit. Plan for swallow study 2/25/2020 and continue to monitor SBP with a goal of 150-180.  Admit patient to MICU for tight blood pressure control on cardizem drip. Also will need NG tube inserted so he can receive Asa and plavix. Past Medical History:     Past Medical History:   Diagnosis Date    Heart attack ([facility]     Hyperlipidemia     Hypertension     Stroke ([facility] 02/13/2020        Past SurgicalHistory:     Past Surgical History:   Procedure Laterality Date    CARDIAC SURGERY      CORONARY ANGIOPLASTY WITH STENT PLACEMENT  2019    x 1 stent for LAD    FOOT SURGERY Bilateral     KNEE SURGERY Right         Medications Prior to Admission:        Prior to Admission medications    Medication Sig Start Date End Date Taking? Authorizing Provider   meloxicam (MOBIC) 7.5 MG tablet Take 1 tablet by mouth 2 times daily as needed for Pain 2/10/20 2/24/20 Yes Sheryl Whitt PA-C   tadalafil (CIALIS) 20 MG tablet Take 1 tablet by mouth as needed for Erectile Dysfunction 2/10/20  Yes Sheryl Whitt PA-C   lisinopril (PRINIVIL;ZESTRIL) 40 MG tablet Take 1 tablet by mouth daily 2/10/20  Yes Sheryl Whitt PA-C   aspirin 81 MG EC tablet Take 1 tablet by mouth daily 5/26/19  Yes Roxie Babb MD   nitroGLYCERIN (NITROSTAT) 0.4 MG SL tablet up to max of 3 total doses. If no relief after 1 dose, call 911. 5/26/19  Yes Roxie Babb MD   atorvastatin (LIPITOR) 80 MG tablet Take 1 tablet by mouth nightly 5/26/19  Yes Roxie Babb MD   amLODIPine (NORVASC) 10 MG tablet Take 1 tablet by mouth daily 5/26/19  Yes Roxie Babb MD   ticagrelor (BRILINTA) 90 MG TABS tablet Take 1 tablet by mouth 2 times daily 5/26/19  Yes Roxie Babb MD        Allergies:     Patient has no known allergies. Social History:     Tobacco:    reports that he has never smoked. He has never used smokeless tobacco.  Alcohol:      reports current alcohol use. Drug Use:  reports no history of drug use.     Family History:     Family History   Problem Relation Age of Onset    High Blood Pressure Mother     High Blood Pressure Father     Hypertension Sister    [facility] Hypertension Brother        Review of Systems:     Positive and Negative as described in HPI. Review of Systems   Constitutional: Positive for activity change and fatigue. Negative for appetite change, chills and unexpected weight change. HENT: Negative for congestion, ear pain and sinus pain. Eyes: Negative for photophobia, pain and visual disturbance. Respiratory: Positive for apnea. Negative for cough and shortness of breath. Ary strokes respirations   Cardiovascular: Positive for leg swelling. Negative for chest pain and palpitations. Gastrointestinal: Negative for abdominal distention, abdominal pain, nausea and vomiting. Endocrine: Negative for cold intolerance and polydipsia. Genitourinary: Negative for difficulty urinating and flank pain. Musculoskeletal: Negative for arthralgias and myalgias. Skin: Negative for rash. Allergic/Immunologic: Negative for environmental allergies and food allergies. Neurological: Negative for dizziness, weakness and headaches. Hematological: Negative for adenopathy. Psychiatric/Behavioral: Negative for agitation and behavioral problems. Patient visually frustrated from new stroke and related deficits       Physical Exam:   BP (!) 160/79   Pulse 77   Temp 99.3 °F (37.4 °C) (Oral)   Resp 19   Ht 6' 1\" (1.854 m)   Wt 245 lb (111.1 kg)   SpO2 96%   BMI 32.32 kg/m²   Temp (24hrs), Av.2 °F (36.8 °C), Min:97.7 °F (36.5 °C), Max:99.3 °F (37.4 °C)    No results for input(s): POCGLU in the last 72 hours. Intake/Output Summary (Last 24 hours) at 2020 1706  Last data filed at 2020 1201  Gross per 24 hour   Intake --   Output 600 ml   Net -600 ml       Physical Exam  Vitals signs reviewed. Constitutional:       General: He is not in acute distress. Appearance: He is well-developed. He is obese. HENT:      Head: Normocephalic and atraumatic. Nose: Nose normal. No congestion or rhinorrhea.       Mouth/Throat: Mouth: Mucous membranes are dry. Pharynx: No oropharyngeal exudate or posterior oropharyngeal erythema. Eyes:      General: No scleral icterus. Extraocular Movements: Extraocular movements intact. Pupils: Pupils are equal, round, and reactive to light. Neck:      Musculoskeletal: Normal range of motion and neck supple. Vascular: No JVD. Cardiovascular:      Rate and Rhythm: Normal rate and regular rhythm. Heart sounds: Normal heart sounds. Pulmonary:      Effort: No respiratory distress. Breath sounds: Normal breath sounds. Comments: Ary strokes respirations  Chest:      Chest wall: No tenderness. Abdominal:      General: Bowel sounds are normal. There is no distension. Palpations: Abdomen is soft. Tenderness: There is no abdominal tenderness. Musculoskeletal: Normal range of motion. General: Swelling present. No deformity. Lymphadenopathy:      Cervical: No cervical adenopathy. Skin:     Capillary Refill: Capillary refill takes less than 2 seconds. Findings: No rash. Neurological:      Mental Status: He is alert and oriented to person, place, and time. Sensory: No sensory deficit. Motor: Weakness (Left arm and leg 1+ right arm and leg antigravity) present.       Deep Tendon Reflexes: Reflexes abnormal.      Comments: Patient has aphasia and left sided deficits   Psychiatric:         Behavior: Behavior normal.         Investigations:     Laboratory Testing:  Recent Results (from the past 24 hour(s))   CBC Auto Differential    Collection Time: 02/24/20  1:58 PM   Result Value Ref Range    WBC 10.9 3.5 - 11.0 k/uL    RBC 5.24 4.5 - 5.9 m/uL    Hemoglobin 15.0 13.5 - 17.5 g/dL    Hematocrit 45.1 41 - 53 %    MCV 86.0 80 - 100 fL    MCH 28.7 26 - 34 pg    MCHC 33.4 31 - 37 g/dL    RDW 13.4 11.5 - 14.9 %    Platelets 943 763 - 104 k/uL    MPV 8.2 6.0 - 12.0 fL    NRBC Automated NOT REPORTED per 100 WBC    Differential Type NOT stroke redemonstration/extension. TECHNOLOGIST PROVIDED HISTORY: Change in Mentation. r/o stroke redemonstration/extension. Reason for Exam: Change in Mentation. r/o stroke redemonstration/extension. FINDINGS: BRAIN/VENTRICLES: There is no acute intracranial hemorrhage, mass effect or midline shift. No abnormal extra-axial fluid collection. The gray-white differentiation is maintained without evidence of an acute infarct. There is no evidence of hydrocephalus. Hypodensity right mayuri unchanged. Intracranial atherosclerosis. ORBITS: The visualized portion of the orbits demonstrate no acute abnormality. SINUSES: Air-fluid level right maxillary sinus. SOFT TISSUES/SKULL:  No acute abnormality of the visualized skull or soft tissues. No change right pontine infarct. No acute disease. Ct Head Wo Contrast    Result Date: 2/13/2020  EXAMINATION: CT OF THE HEAD WITHOUT CONTRAST  2/13/2020 10:03 pm TECHNIQUE: CT of the head was performed without the administration of intravenous contrast. Dose modulation, iterative reconstruction, and/or weight based adjustment of the mA/kV was utilized to reduce the radiation dose to as low as reasonably achievable. COMPARISON: CT and MRI 02/13/2020 HISTORY: ORDERING SYSTEM PROVIDED HISTORY: worsening left weakness, was on heparin TECHNOLOGIST PROVIDED HISTORY: worsening left weakness, was on heparin FINDINGS: BRAIN/VENTRICLES: There is no acute intracranial hemorrhage, mass effect or midline shift. No abnormal extra-axial fluid collection. Right paramedian pontine infarct not well visualized on this exam.  There is no evidence of hydrocephalus. Intracranial vascular calcifications. ORBITS: The visualized portion of the orbits demonstrate no acute abnormality. SINUSES: Moderate right sphenoid sinus air-fluid level. Moderate ethmoid sinus mucosal thickening. Mild-to-moderate mucosal thickening involving maxillary sinuses.   Mastoids are clear SOFT TISSUES/SKULL:  No acute abnormality of the visualized skull or soft tissues. No evidence of hemorrhagic transformation of the right paramedian pontine infarct. Moderate sinus disease. Ct Head Wo Contrast    Result Date: 2/13/2020  EXAMINATION: CT OF THE HEAD WITHOUT CONTRAST  2/13/2020 3:26 pm TECHNIQUE: CT of the head was performed without the administration of intravenous contrast. Dose modulation, iterative reconstruction, and/or weight based adjustment of the mA/kV was utilized to reduce the radiation dose to as low as reasonably achievable. COMPARISON: None. HISTORY: ORDERING SYSTEM PROVIDED HISTORY: left sided numbness, weakness, slurred speech TECHNOLOGIST PROVIDED HISTORY: left sided numbness, weakness, slurred speech FINDINGS: BRAIN/VENTRICLES: There is no acute intracranial hemorrhage, mass effect, or midline shift. There is satisfactory overall gray-white matter differentiation. The ventricular structures are symmetric and unremarkable. The infratentorial structures are unremarkable. ORBITS: The visualized portion of the orbits demonstrate no acute abnormality. SINUSES: There is chronic sinusitis. The mastoid air cells are normally aerated. SOFT TISSUES/SKULL:  No acute abnormality of the visualized skull or soft tissues. No acute intracranial abnormality. Findings were discussed with Dr. Yanci Rust At 3:41 pm on 2/13/2020. Ct Chest W Contrast    Result Date: 2/13/2020  EXAMINATION: CT OF THE CHEST WITH CONTRAST 2/13/2020 4:50 pm TECHNIQUE: CT of the chest was performed with the administration of intravenous contrast. Multiplanar reformatted images are provided for review. Dose modulation, iterative reconstruction, and/or weight based adjustment of the mA/kV was utilized to reduce the radiation dose to as low as reasonably achievable.  COMPARISON: Chest radiograph done today HISTORY: ORDERING SYSTEM PROVIDED HISTORY: right hilar shadow on CXR TECHNOLOGIST PROVIDED HISTORY: right hilar shadow on CXR Reason for Exam: right hilar shadow on CXR FINDINGS: Mediastinum: No mediastinal or hilar masses. Coronary artery calcifications. No pericardial effusion. The aorta is mildly tortuous accounting for the density seen on chest x-ray. Lungs/pleura: No acute airspace disease. No pleural or pericardial effusion Upper Abdomen: Unremarkable. Soft Tissues/Bones: No acute fracture. No lytic or blastic lesion. Degenerative changes seen in the spine. 1. No acute abnormalities seen in the chest 2. Tortuous ascending thoracic aorta accounts for the density seen on the chest radiograph 3. Coronary artery calcifications     Us Renal Complete    Result Date: 2/17/2020  EXAMINATION: RETROPERITONEAL ULTRASOUND OF THE KIDNEYS AND URINARY BLADDER 2/17/2020 COMPARISON: None HISTORY: ORDERING SYSTEM PROVIDED HISTORY: uncontrolled HTN TECHNOLOGIST PROVIDED HISTORY: uncontrolled HTN FINDINGS: Kidneys: The right kidney measures 11.6 cm in length and the left kidney measures 11.6 cm in length. Kidneys demonstrate normal cortical echogenicity. No evidence of hydronephrosis or intrarenal stones. Bladder: Urinary bladder is decompressed and not well visualized. Patient voided prior to the examination. Unremarkable ultrasound of the kidneys. Cta Head Neck W Contrast    Result Date: 2/15/2020  EXAMINATION: CTA OF THE HEAD AND NECK WITH CONTRAST 2/15/2020 8:11 pm: TECHNIQUE: CTA of the head and neck was performed with the administration of intravenous contrast. Multiplanar reformatted images are provided for review. MIP images are provided for review. Stenosis of the internal carotid arteries measured using NASCET criteria. Dose modulation, iterative reconstruction, and/or weight based adjustment of the mA/kV was utilized to reduce the radiation dose to as low as reasonably achievable. COMPARISON: None.  HISTORY: ORDERING SYSTEM PROVIDED HISTORY: f/u basilar atery filling defect TECHNOLOGIST PROVIDED HISTORY: f/u basilar atery filling defect Reason for Exam: basilar artery filling defect Acuity: Unknown Type of Exam: Unknown FINDINGS: CTA NECK: AORTIC ARCH/ARCH VESSELS: No dissection or arterial injury. No significant stenosis of the brachiocephalic or subclavian arteries. CAROTID ARTERIES: No dissection, arterial injury, or hemodynamically significant stenosis by NASCET criteria. VERTEBRAL ARTERIES: No dissection, arterial injury, or significant stenosis. SOFT TISSUES: The lung apices are clear. No cervical or superior mediastinal lymphadenopathy. The larynx and pharynx are unremarkable. No acute abnormality of the salivary and thyroid glands. BONES: No acute osseous abnormality. CTA HEAD: ANTERIOR CIRCULATION: No significant stenosis of the intracranial internal carotid, anterior cerebral, or middle cerebral arteries. No aneurysm. POSTERIOR CIRCULATION: Severe diffuse stenosis or hypoplasia of the intracranial vertebral arteries distal to the posterior inferior cerebellar artery origins. Persistent occlusion of the basilar artery, which appears hypoplastic. The posterior cerebral arteries are predominantly supplied by the posterior communicating arteries with retrograde filling of the P1 segment supplying the superior cerebellar arteries. There is diffuse moderate irregularity of the P2 and P3 segments posterior cerebral arteries. No aneurysm. OTHER: No dural venous sinus thrombosis on this non-dedicated study. BRAIN: No mass effect or midline shift. No extra-axial fluid collection. The gray-white differentiation is maintained. 1. No new arterial abnormality in the head or neck. 2. 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 3. Moderate diffuse irregularity of the P2 and P3 segments posterior cerebral arteries, which are supplied by the posterior communicating arteries. 4. No hemodynamically significant arterial stenosis in the neck.      Cta Head Neck W Contrast    Result Date: 2/13/2020  EXAMINATION: CTA OF THE HEAD AND NECK WITH CONTRAST 2/13/2020 3:27 pm: TECHNIQUE: CTA of the head and neck was performed with the administration of intravenous contrast. Multiplanar reformatted images are provided for review. MIP images are provided for review. Stenosis of the internal carotid arteries measured using NASCET criteria. Dose modulation, iterative reconstruction, and/or weight based adjustment of the mA/kV was utilized to reduce the radiation dose to as low as reasonably achievable. COMPARISON: None. HISTORY: ORDERING SYSTEM PROVIDED HISTORY: left sided deficits TECHNOLOGIST PROVIDED HISTORY: left sided deficits Reason for Exam: lt sided numbness weakness slurred speech FINDINGS: CTA NECK: AORTIC ARCH/ARCH VESSELS: No dissection or arterial injury. No significant stenosis of the brachiocephalic or subclavian arteries. CAROTID ARTERIES: No dissection, arterial injury, or hemodynamically significant stenosis by NASCET criteria. VERTEBRAL ARTERIES: No dissection, arterial injury, or significant stenosis. SOFT TISSUES: The lung apices are clear. No cervical or superior mediastinal lymphadenopathy. The larynx and pharynx are unremarkable. No acute abnormality of the salivary and thyroid glands. BONES: No acute osseous abnormality. CTA HEAD: ANTERIOR CIRCULATION: No significant stenosis of the intracranial internal carotid, anterior cerebral, or middle cerebral arteries. No aneurysm. 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 There is persistent fetal origin of the posterior cerebral arteries bilaterally. The superior cerebellar arteries appear to arise from the posterior cerebral arteries. OTHER: No dural venous sinus thrombosis on this non-dedicated study. BRAIN: No mass effect or midline shift. No extra-axial fluid collection. The gray-white differentiation is maintained. No significant stenosis or evidence for occlusion.  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 The superior cerebellar arteries also appear to arise from the posterior cerebral arteries. Mri Brain Wo Contrast    Result Date: 2/13/2020  EXAMINATION: MRI OF THE BRAIN WITHOUT CONTRAST  2/13/2020 6:20 pm TECHNIQUE: Multiplanar multisequence MRI of the brain was performed without the administration of intravenous contrast. COMPARISON: CT head neck 02/13/2020 and CT head 02/13/2020 HISTORY: ORDERING SYSTEM PROVIDED HISTORY: left sided weakness TECHNOLOGIST PROVIDED HISTORY: left sided weakness FINDINGS: INTRACRANIAL STRUCTURES/VENTRICLES: There is a right paramedian pontine acute stroke. No obvious T2 prolongation. No mass effect or midline shift. No evidence of an acute intracranial hemorrhage. The ventricles and sulci are normal in size and configuration. Mild non-specific periventricular and subcortical T2 prolongation identified may be sequelae of chronic small ischemic disease versus demyelinating process. Wedge-shaped focus of T2 prolongation identified left posterior cerebellum without corresponding restricted diffusion suggestive of prior stroke. The sellar/suprasellar regions appear unremarkable. There is normal flow of the normal flow void involving the basilar artery. The Remainder of the normal signal voids within the major intracranial vessels appear maintained. Foci of hemosiderin left posterior cerebellum noted of uncertain clinical significance ORBITS: The visualized portion of the orbits demonstrate no acute abnormality. SINUSES: Moderate maxillary sinus disease. Air-fluid level right sphenoid sinus. Small air-fluid level right maxillary sinus. BONES/SOFT TISSUES: The bone marrow signal intensity appears normal. The soft tissues demonstrate no acute abnormality. Acute right paramedian pontine stroke. 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 Remote left posterior cerebellar stroke.  Mild non-specific white matter changes favor chronic

## 2020-02-24 NOTE — PROGRESS NOTES
Significant other left at this time and states that she will be back around 0800. Patient tearful, but calms down when spoken to and reminded that his significant other will be returning. Will continue to monitor.

## 2020-02-24 NOTE — PROGRESS NOTES
Physical Therapy  DATE: 2020    NAME: Ralf Mcgee  MRN: [id number]   : 1970    Patient not seen this date for Physical Therapy due to:    AM: Patient being monitored for medical change in status. Noted elevated BP per RN.    Pm: patient transfer to ED so critically high BP can be treated per RN Darlene Romero and Dr Zhen Barksdale notes.     :Richard Alvarez, PTA

## 2020-02-24 NOTE — CODE DOCUMENTATION
RRt called for elevated BP, pt refusing some PO medication. Dr Delia Dumont at bedside, agreed to transfer patient to ED so BP can be treated.

## 2020-02-24 NOTE — ED NOTES
Pt is brought to this ER from Acute Rehab with hypertensive urgency. Pt is in rehab after having a CVA on the 13th of this month. Pt has no rt arm movement and very slight lt foot movement after the CVA. Family voices concerns that the pt has had a decrease in his ability to swallow and have concerns about dehydration. Pt initially is combative/defiant with his rt     Pt arrives awake and communicating with family's assistance, eupneic, and PWD. Pulse is regular et strong.          Collette Presley RN  02/24/20 2048

## 2020-02-24 NOTE — VIRTUAL HEALTH
Current Outpatient Psychiatric Medications:  Fluoxetine was started near the end of his hospitalization at [facility]    Medications:    Current Facility-Administered Medications: FLUoxetine (PROZAC) capsule 40 mg, 40 mg, Oral, Daily  hydrALAZINE (APRESOLINE) injection 5 mg, 5 mg, Intravenous, Q6H PRN  rosuvastatin (CRESTOR) tablet 40 mg, 40 mg, Oral, Nightly  0.9 % sodium chloride infusion, , Intravenous, Continuous  senna (SENOKOT) tablet 17.2 mg, 2 tablet, Oral, Nightly PRN  acetaminophen (TYLENOL) tablet 650 mg, 650 mg, Oral, Q4H PRN  magnesium hydroxide (MILK OF MAGNESIA) 400 MG/5ML suspension 30 mL, 30 mL, Oral, Daily PRN  aspirin EC tablet 81 mg, 81 mg, Oral, Daily  enoxaparin (LOVENOX) injection 30 mg, 30 mg, Subcutaneous, BID  folic acid (FOLVITE) tablet 1 mg, 1 mg, Oral, BID  ticagrelor (BRILINTA) tablet 90 mg, 90 mg, Oral, BID  amLODIPine (NORVASC) tablet 10 mg, 10 mg, Oral, Nightly  polyethylene glycol (GLYCOLAX) packet 17 g, 17 g, Oral, Daily  bisacodyl (DULCOLAX) suppository 10 mg, 10 mg, Rectal, Daily PRN  Facility-Administered Medications Ordered in Other Encounters: sodium chloride flush 0.9 % injection 10 mL, 10 mL, Intravenous, 2 times per day  sodium chloride flush 0.9 % injection 10 mL, 10 mL, Intravenous, PRN       PAST PSYCHIATRIC HISTORY:  Patient has no past mental health treatment history. Adverse reactions from psychotropic medications:  N/A    Lifetime Psychiatric Review of Systems:     Carrie: denies  Panic: denies  Phobia: denies  Hallucinations: denies  Delusions: denies     Past Medical History:        Diagnosis Date    Heart attack ([facility] [address]     Hyperlipidemia     Hypertension        Past Surgical History:        Procedure Laterality Date    CARDIAC SURGERY      CORONARY ANGIOPLASTY WITH STENT PLACEMENT  2019    x 1 stent for LAD    FOOT SURGERY Bilateral     KNEE SURGERY Right        Allergies: Patient has no known allergies.       Social History:  Patient has a significant other and family who are supportive. He was working prior to his CVA. Family Psychiatric History:  Unknown    Family History:       Problem Relation Age of Onset    High Blood Pressure Mother     High Blood Pressure Father     Hypertension Sister     Hypertension Brother        Physical  BP (!) 160/67   Pulse 81   Temp 97.7 °F (36.5 °C) (Oral)   Resp 18   Ht 6' 1\" (1.854 m)   Wt 245 lb (111.1 kg)   SpO2 99%   BMI 32.32 kg/m²     MENTAL STATUS EXAM:  Level of consciousness:  within normal limits  Appearance:  well-appearing, street clothes, seated in bed, good grooming and good hygiene  Behavior/Motor:  no abnormalities noted  Attitude toward examiner:  cooperative, attentive and good eye contact  Speech:  aphasic  Mood:  Depressed, sad, frustrated  Affect:  mood congruent  Thought processes:  linear, goal directed and coherent  Thought content:  Homocidal ideation denies  Suicidal Ideation:  denies suicidal ideation  Delusions:  no evidence of delusions  Perceptual Disturbance:  denies any perceptual disturbance  Cognition:  oriented to person, place, and time  Concentration intact  Memory: intact  Insight & Judgement: fair   Medication side effects: None noted     DSM V DIAGNOSIS:  Adjustment disorder with depressive features    Impression: This is a 52 y.o. male being seen for consultation today. The patient is seen at bedside. He suffered a stroke on 2/13/2020 leaving him aphasic and experiencing left sided weakness at this time. Review of the chart indicates that the patient has been increasingly frustrated, tearful, overwhelmed, and sad. He has been displaying low energy and fatigue as well as difficulties with sleep patterns and low appetite. When these reported symptoms were discussed with the patient the patient denied them all. By observation the patient was intermittently tearful during assessment, frustrated, and irritable with those around him.   Concern that the patient is minimizing symptoms at this time. Family is supportive and encouraging to him. No note of suicidality. No evidence of psychosis. General Medical Condition:      Patient Active Problem List   Diagnosis Code    STEMI (ST elevation myocardial infarction) ([facility] [address] I21.3    Coronary artery disease involving native coronary artery of native heart without angina pectoris I25.10    Acute ST segment elevation myocardial infarction (HCC) I21.3    Essential hypertension I10    Class 1 obesity due to excess calories with body mass index (BMI) of 32.0 to 32.9 in adult E66.09, Z68.32    Stroke-like symptoms R29.90    Ischemic stroke ([facility] [address] I63.9    Hypertensive emergency without congestive heart failure I16.1    Numbness R20.0    Basilar artery stenosis/occlusion with infarction ([facility]) I63.22    Right arm weakness R29.898    Acute ischemic stroke ([facility]) I63.9    Left-sided weakness Y05.9    Systolic murmur F44.0    [facility] I63.9     PLAN:    1. Continue with inpatient rehabilitative therapy. 2. At this time the patient does not want for any medication changes and adamantly shakes his head no when discussing medications that may support his mood. Will follow up with patient after 3PM tomorrow to provide psychoeducation and for further assessment. Thank you very much for allowing us to participate in the care of this patient. Time spent > 60 min.  Provider's Signature:  Electronically signed by Brooklynn Gasca, MSN, PMHNP-BC on 2/24/2020 at 10:59 AM

## 2020-02-24 NOTE — PROGRESS NOTES
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   Rapid Response Team called in [address] [facility] [address], [facility]

## 2020-02-24 NOTE — ED NOTES
Spoonfuls of honey-thickened water given. Pt swallows most of spoonful with slight dribbles.      Radhika Cardona RN  02/24/20 0439

## 2020-02-24 NOTE — ED NOTES
Bed: 07A  Expected date:   Expected time:   Means of arrival:   Comments:  Jake Jackson, Hypertension       Callie Lise, DEISI  02/24/20 [id number]

## 2020-02-25 ENCOUNTER — APPOINTMENT (OUTPATIENT)
Dept: CT IMAGING | Age: 50
DRG: 005 | End: 2020-02-25
Attending: SPECIALIST
Payer: MEDICARE

## 2020-02-25 ENCOUNTER — APPOINTMENT (OUTPATIENT)
Dept: GENERAL RADIOLOGY | Age: 50
DRG: 005 | End: 2020-02-25
Attending: SPECIALIST
Payer: MEDICARE

## 2020-02-25 ENCOUNTER — ANESTHESIA (OUTPATIENT)
Dept: INTERVENTIONAL RADIOLOGY/VASCULAR | Age: 50
DRG: 005 | End: 2020-02-25
Payer: MEDICARE

## 2020-02-25 ENCOUNTER — APPOINTMENT (OUTPATIENT)
Dept: INTERVENTIONAL RADIOLOGY/VASCULAR | Age: 50
DRG: 005 | End: 2020-02-25
Attending: SPECIALIST
Payer: MEDICARE

## 2020-02-25 ENCOUNTER — ANESTHESIA EVENT (OUTPATIENT)
Dept: INTERVENTIONAL RADIOLOGY/VASCULAR | Age: 50
DRG: 005 | End: 2020-02-25
Payer: MEDICARE

## 2020-02-25 VITALS — DIASTOLIC BLOOD PRESSURE: 86 MMHG | SYSTOLIC BLOOD PRESSURE: 174 MMHG | OXYGEN SATURATION: 100 % | TEMPERATURE: 95.8 F

## 2020-02-25 LAB
ANION GAP SERPL CALCULATED.3IONS-SCNC: 14 MMOL/L (ref 9–17)
ANION GAP SERPL CALCULATED.3IONS-SCNC: 14 MMOL/L (ref 9–17)
BUN BLDV-MCNC: 24 MG/DL (ref 6–20)
BUN BLDV-MCNC: 25 MG/DL (ref 6–20)
BUN/CREAT BLD: ABNORMAL (ref 9–20)
BUN/CREAT BLD: ABNORMAL (ref 9–20)
CALCIUM SERPL-MCNC: 8.9 MG/DL (ref 8.6–10.4)
CALCIUM SERPL-MCNC: 9.5 MG/DL (ref 8.6–10.4)
CHLORIDE BLD-SCNC: 110 MMOL/L (ref 98–107)
CHLORIDE BLD-SCNC: 112 MMOL/L (ref 98–107)
CO2: 20 MMOL/L (ref 20–31)
CO2: 21 MMOL/L (ref 20–31)
CREAT SERPL-MCNC: 1.29 MG/DL (ref 0.7–1.2)
CREAT SERPL-MCNC: 1.37 MG/DL (ref 0.7–1.2)
GFR AFRICAN AMERICAN: >60 ML/MIN
GFR AFRICAN AMERICAN: >60 ML/MIN
GFR NON-AFRICAN AMERICAN: 55 ML/MIN
GFR NON-AFRICAN AMERICAN: 59 ML/MIN
GFR SERPL CREATININE-BSD FRML MDRD: ABNORMAL ML/MIN/{1.73_M2}
GLUCOSE BLD-MCNC: 100 MG/DL (ref 70–99)
GLUCOSE BLD-MCNC: 91 MG/DL (ref 70–99)
HCT VFR BLD CALC: 40.8 % (ref 40.7–50.3)
HEMOGLOBIN: 13.1 G/DL (ref 13–17)
HOMOCYSTEINE: 9.4 UMOL/L
MCH RBC QN AUTO: 28.9 PG (ref 25.2–33.5)
MCHC RBC AUTO-ENTMCNC: 32.1 G/DL (ref 28.4–34.8)
MCV RBC AUTO: 90.1 FL (ref 82.6–102.9)
MRSA, DNA, NASAL: ABNORMAL
NRBC AUTOMATED: 0 PER 100 WBC
PARTIAL THROMBOPLASTIN TIME: 26.7 SEC (ref 20.5–30.5)
PARTIAL THROMBOPLASTIN TIME: 35.7 SEC (ref 20.5–30.5)
PARTIAL THROMBOPLASTIN TIME: 43.7 SEC (ref 20.5–30.5)
PDW BLD-RTO: 12.6 % (ref 11.8–14.4)
PLATELET # BLD: 294 K/UL (ref 138–453)
PMV BLD AUTO: 10.6 FL (ref 8.1–13.5)
POTASSIUM SERPL-SCNC: 3.7 MMOL/L (ref 3.7–5.3)
POTASSIUM SERPL-SCNC: 3.9 MMOL/L (ref 3.7–5.3)
PROCALCITONIN: 0.06 NG/ML
RBC # BLD: 4.53 M/UL (ref 4.21–5.77)
SODIUM BLD-SCNC: 145 MMOL/L (ref 135–144)
SODIUM BLD-SCNC: 146 MMOL/L (ref 135–144)
SPECIMEN DESCRIPTION: ABNORMAL
TOTAL CK: 181 U/L (ref 39–308)
TROPONIN INTERP: ABNORMAL
TROPONIN INTERP: ABNORMAL
TROPONIN T: ABNORMAL NG/ML
TROPONIN T: ABNORMAL NG/ML
TROPONIN, HIGH SENSITIVITY: 32 NG/L (ref 0–22)
TROPONIN, HIGH SENSITIVITY: 36 NG/L (ref 0–22)
WBC # BLD: 10.8 K/UL (ref 3.5–11.3)

## 2020-02-25 PROCEDURE — 6360000004 HC RX CONTRAST MEDICATION: Performed by: STUDENT IN AN ORGANIZED HEALTH CARE EDUCATION/TRAINING PROGRAM

## 2020-02-25 PROCEDURE — 93005 ELECTROCARDIOGRAM TRACING: CPT | Performed by: STUDENT IN AN ORGANIZED HEALTH CARE EDUCATION/TRAINING PROGRAM

## 2020-02-25 PROCEDURE — 92523 SPEECH SOUND LANG COMPREHEN: CPT

## 2020-02-25 PROCEDURE — 2000000003 HC NEURO ICU R&B

## 2020-02-25 PROCEDURE — 6360000004 HC RX CONTRAST MEDICATION: Performed by: PSYCHIATRY & NEUROLOGY

## 2020-02-25 PROCEDURE — 36226 PLACE CATH VERTEBRAL ART: CPT | Performed by: PSYCHIATRY & NEUROLOGY

## 2020-02-25 PROCEDURE — B3181ZZ FLUOROSCOPY OF BILATERAL INTERNAL CAROTID ARTERIES USING LOW OSMOLAR CONTRAST: ICD-10-PCS | Performed by: PSYCHIATRY & NEUROLOGY

## 2020-02-25 PROCEDURE — 85027 COMPLETE CBC AUTOMATED: CPT

## 2020-02-25 PROCEDURE — 70450 CT HEAD/BRAIN W/O DYE: CPT

## 2020-02-25 PROCEDURE — 6360000002 HC RX W HCPCS: Performed by: STUDENT IN AN ORGANIZED HEALTH CARE EDUCATION/TRAINING PROGRAM

## 2020-02-25 PROCEDURE — 2709999900 HC NON-CHARGEABLE SUPPLY

## 2020-02-25 PROCEDURE — 6370000000 HC RX 637 (ALT 250 FOR IP): Performed by: PSYCHIATRY & NEUROLOGY

## 2020-02-25 PROCEDURE — 99291 CRITICAL CARE FIRST HOUR: CPT | Performed by: PSYCHIATRY & NEUROLOGY

## 2020-02-25 PROCEDURE — 6370000000 HC RX 637 (ALT 250 FOR IP): Performed by: STUDENT IN AN ORGANIZED HEALTH CARE EDUCATION/TRAINING PROGRAM

## 2020-02-25 PROCEDURE — 74018 RADEX ABDOMEN 1 VIEW: CPT

## 2020-02-25 PROCEDURE — 2580000003 HC RX 258: Performed by: ANESTHESIOLOGY

## 2020-02-25 PROCEDURE — 84484 ASSAY OF TROPONIN QUANT: CPT

## 2020-02-25 PROCEDURE — 2500000003 HC RX 250 WO HCPCS: Performed by: NURSE PRACTITIONER

## 2020-02-25 PROCEDURE — 93005 ELECTROCARDIOGRAM TRACING: CPT | Performed by: INTERNAL MEDICINE

## 2020-02-25 PROCEDURE — C1887 CATHETER, GUIDING: HCPCS

## 2020-02-25 PROCEDURE — B31F1ZZ FLUOROSCOPY OF LEFT VERTEBRAL ARTERY USING LOW OSMOLAR CONTRAST: ICD-10-PCS | Performed by: PSYCHIATRY & NEUROLOGY

## 2020-02-25 PROCEDURE — 84145 PROCALCITONIN (PCT): CPT

## 2020-02-25 PROCEDURE — 36224 PLACE CATH CAROTD ART: CPT | Performed by: PSYCHIATRY & NEUROLOGY

## 2020-02-25 PROCEDURE — C1760 CLOSURE DEV, VASC: HCPCS

## 2020-02-25 PROCEDURE — 6360000002 HC RX W HCPCS: Performed by: NURSE ANESTHETIST, CERTIFIED REGISTERED

## 2020-02-25 PROCEDURE — 82746 ASSAY OF FOLIC ACID SERUM: CPT

## 2020-02-25 PROCEDURE — 6360000002 HC RX W HCPCS: Performed by: NURSE PRACTITIONER

## 2020-02-25 PROCEDURE — B3151ZZ FLUOROSCOPY OF BILATERAL COMMON CAROTID ARTERIES USING LOW OSMOLAR CONTRAST: ICD-10-PCS | Performed by: PSYCHIATRY & NEUROLOGY

## 2020-02-25 PROCEDURE — 83090 ASSAY OF HOMOCYSTEINE: CPT

## 2020-02-25 PROCEDURE — C1894 INTRO/SHEATH, NON-LASER: HCPCS

## 2020-02-25 PROCEDURE — C1769 GUIDE WIRE: HCPCS

## 2020-02-25 PROCEDURE — 99255 IP/OBS CONSLTJ NEW/EST HI 80: CPT | Performed by: PSYCHIATRY & NEUROLOGY

## 2020-02-25 PROCEDURE — 82607 VITAMIN B-12: CPT

## 2020-02-25 PROCEDURE — 36415 COLL VENOUS BLD VENIPUNCTURE: CPT

## 2020-02-25 PROCEDURE — 3700000000 HC ANESTHESIA ATTENDED CARE

## 2020-02-25 PROCEDURE — 82550 ASSAY OF CK (CPK): CPT

## 2020-02-25 PROCEDURE — 85730 THROMBOPLASTIN TIME PARTIAL: CPT

## 2020-02-25 PROCEDURE — B31R1ZZ FLUOROSCOPY OF INTRACRANIAL ARTERIES USING LOW OSMOLAR CONTRAST: ICD-10-PCS | Performed by: PSYCHIATRY & NEUROLOGY

## 2020-02-25 PROCEDURE — 6370000000 HC RX 637 (ALT 250 FOR IP): Performed by: NURSE PRACTITIONER

## 2020-02-25 PROCEDURE — 2500000003 HC RX 250 WO HCPCS: Performed by: NURSE ANESTHETIST, CERTIFIED REGISTERED

## 2020-02-25 PROCEDURE — 6360000002 HC RX W HCPCS: Performed by: ANESTHESIOLOGY

## 2020-02-25 PROCEDURE — 99254 IP/OBS CNSLTJ NEW/EST MOD 60: CPT | Performed by: INTERNAL MEDICINE

## 2020-02-25 PROCEDURE — 3700000001 HC ADD 15 MINUTES (ANESTHESIA)

## 2020-02-25 PROCEDURE — 80048 BASIC METABOLIC PNL TOTAL CA: CPT

## 2020-02-25 PROCEDURE — 2580000003 HC RX 258: Performed by: NURSE ANESTHETIST, CERTIFIED REGISTERED

## 2020-02-25 PROCEDURE — 70498 CT ANGIOGRAPHY NECK: CPT

## 2020-02-25 PROCEDURE — 0042T CT BRAIN PERFUSION: CPT

## 2020-02-25 PROCEDURE — 2580000003 HC RX 258: Performed by: STUDENT IN AN ORGANIZED HEALTH CARE EDUCATION/TRAINING PROGRAM

## 2020-02-25 RX ORDER — FENTANYL CITRATE 50 UG/ML
25 INJECTION, SOLUTION INTRAMUSCULAR; INTRAVENOUS ONCE
Status: COMPLETED | OUTPATIENT
Start: 2020-02-25 | End: 2020-02-25

## 2020-02-25 RX ORDER — HYDRALAZINE HYDROCHLORIDE 20 MG/ML
10 INJECTION INTRAMUSCULAR; INTRAVENOUS
Status: DISCONTINUED | OUTPATIENT
Start: 2020-02-25 | End: 2020-02-28

## 2020-02-25 RX ORDER — CHLORHEXIDINE GLUCONATE 0.12 MG/ML
15 RINSE ORAL 2 TIMES DAILY
Status: DISCONTINUED | OUTPATIENT
Start: 2020-02-25 | End: 2020-03-11 | Stop reason: HOSPADM

## 2020-02-25 RX ORDER — LABETALOL 20 MG/4 ML (5 MG/ML) INTRAVENOUS SYRINGE
10
Status: DISCONTINUED | OUTPATIENT
Start: 2020-02-25 | End: 2020-02-28

## 2020-02-25 RX ORDER — FLUOXETINE HYDROCHLORIDE 20 MG/5ML
20 LIQUID ORAL DAILY
Status: DISCONTINUED | OUTPATIENT
Start: 2020-02-25 | End: 2020-03-11 | Stop reason: HOSPADM

## 2020-02-25 RX ORDER — FENTANYL CITRATE 50 UG/ML
50 INJECTION, SOLUTION INTRAMUSCULAR; INTRAVENOUS EVERY 5 MIN PRN
Status: DISCONTINUED | OUTPATIENT
Start: 2020-02-25 | End: 2020-02-26

## 2020-02-25 RX ORDER — LIDOCAINE HYDROCHLORIDE 10 MG/ML
INJECTION, SOLUTION EPIDURAL; INFILTRATION; INTRACAUDAL; PERINEURAL PRN
Status: DISCONTINUED | OUTPATIENT
Start: 2020-02-25 | End: 2020-02-25 | Stop reason: SDUPTHER

## 2020-02-25 RX ORDER — ACETAMINOPHEN 325 MG/1
650 TABLET ORAL EVERY 4 HOURS PRN
Status: DISCONTINUED | OUTPATIENT
Start: 2020-02-25 | End: 2020-02-29

## 2020-02-25 RX ORDER — ONDANSETRON 2 MG/ML
4 INJECTION INTRAMUSCULAR; INTRAVENOUS ONCE
Status: DISCONTINUED | OUTPATIENT
Start: 2020-02-25 | End: 2020-02-29

## 2020-02-25 RX ORDER — MIDAZOLAM HYDROCHLORIDE 1 MG/ML
INJECTION INTRAMUSCULAR; INTRAVENOUS PRN
Status: DISCONTINUED | OUTPATIENT
Start: 2020-02-25 | End: 2020-02-25 | Stop reason: SDUPTHER

## 2020-02-25 RX ORDER — ASPIRIN 81 MG/1
81 TABLET, CHEWABLE ORAL DAILY
Status: DISCONTINUED | OUTPATIENT
Start: 2020-02-25 | End: 2020-02-28

## 2020-02-25 RX ORDER — SODIUM CHLORIDE, SODIUM LACTATE, POTASSIUM CHLORIDE, CALCIUM CHLORIDE 600; 310; 30; 20 MG/100ML; MG/100ML; MG/100ML; MG/100ML
INJECTION, SOLUTION INTRAVENOUS CONTINUOUS
Status: DISCONTINUED | OUTPATIENT
Start: 2020-02-25 | End: 2020-02-25

## 2020-02-25 RX ORDER — FENTANYL CITRATE 50 UG/ML
INJECTION, SOLUTION INTRAMUSCULAR; INTRAVENOUS PRN
Status: DISCONTINUED | OUTPATIENT
Start: 2020-02-25 | End: 2020-02-25 | Stop reason: SDUPTHER

## 2020-02-25 RX ORDER — ACETAMINOPHEN 325 MG/1
650 TABLET ORAL EVERY 4 HOURS PRN
Status: DISCONTINUED | OUTPATIENT
Start: 2020-02-25 | End: 2020-03-06

## 2020-02-25 RX ORDER — MIDAZOLAM HYDROCHLORIDE 1 MG/ML
2 INJECTION INTRAMUSCULAR; INTRAVENOUS
Status: ACTIVE | OUTPATIENT
Start: 2020-02-25 | End: 2020-02-25

## 2020-02-25 RX ORDER — SODIUM CHLORIDE 0.9 % (FLUSH) 0.9 %
10 SYRINGE (ML) INJECTION EVERY 12 HOURS SCHEDULED
Status: DISCONTINUED | OUTPATIENT
Start: 2020-02-25 | End: 2020-02-29

## 2020-02-25 RX ORDER — FENTANYL CITRATE 50 UG/ML
25 INJECTION, SOLUTION INTRAMUSCULAR; INTRAVENOUS ONCE
Status: DISCONTINUED | OUTPATIENT
Start: 2020-02-25 | End: 2020-02-26

## 2020-02-25 RX ORDER — SODIUM CHLORIDE, SODIUM LACTATE, POTASSIUM CHLORIDE, CALCIUM CHLORIDE 600; 310; 30; 20 MG/100ML; MG/100ML; MG/100ML; MG/100ML
INJECTION, SOLUTION INTRAVENOUS CONTINUOUS PRN
Status: DISCONTINUED | OUTPATIENT
Start: 2020-02-25 | End: 2020-02-25 | Stop reason: SDUPTHER

## 2020-02-25 RX ORDER — HEPARIN SODIUM 1000 [USP'U]/ML
INJECTION, SOLUTION INTRAVENOUS; SUBCUTANEOUS PRN
Status: DISCONTINUED | OUTPATIENT
Start: 2020-02-25 | End: 2020-02-25 | Stop reason: SDUPTHER

## 2020-02-25 RX ORDER — CEFAZOLIN SODIUM 2 G/50ML
SOLUTION INTRAVENOUS PRN
Status: DISCONTINUED | OUTPATIENT
Start: 2020-02-25 | End: 2020-02-25 | Stop reason: SDUPTHER

## 2020-02-25 RX ORDER — ONDANSETRON 2 MG/ML
4 INJECTION INTRAMUSCULAR; INTRAVENOUS
Status: ACTIVE | OUTPATIENT
Start: 2020-02-25 | End: 2020-02-25

## 2020-02-25 RX ORDER — SODIUM CHLORIDE 0.9 % (FLUSH) 0.9 %
10 SYRINGE (ML) INJECTION PRN
Status: DISCONTINUED | OUTPATIENT
Start: 2020-02-25 | End: 2020-02-29

## 2020-02-25 RX ORDER — 0.9 % SODIUM CHLORIDE 0.9 %
1000 INTRAVENOUS SOLUTION INTRAVENOUS ONCE
Status: COMPLETED | OUTPATIENT
Start: 2020-02-25 | End: 2020-02-25

## 2020-02-25 RX ORDER — ATORVASTATIN CALCIUM 80 MG/1
80 TABLET, FILM COATED ORAL NIGHTLY
Status: DISCONTINUED | OUTPATIENT
Start: 2020-02-25 | End: 2020-02-29

## 2020-02-25 RX ORDER — SENNA AND DOCUSATE SODIUM 50; 8.6 MG/1; MG/1
2 TABLET, FILM COATED ORAL DAILY
Status: DISCONTINUED | OUTPATIENT
Start: 2020-02-26 | End: 2020-03-11 | Stop reason: HOSPADM

## 2020-02-25 RX ORDER — IODIXANOL 270 MG/ML
200 INJECTION, SOLUTION INTRAVASCULAR
Status: COMPLETED | OUTPATIENT
Start: 2020-02-25 | End: 2020-02-25

## 2020-02-25 RX ADMIN — FENTANYL CITRATE 25 MCG: 50 INJECTION INTRAMUSCULAR; INTRAVENOUS at 15:11

## 2020-02-25 RX ADMIN — FENTANYL CITRATE 25 MCG: 50 INJECTION INTRAMUSCULAR; INTRAVENOUS at 15:41

## 2020-02-25 RX ADMIN — Medication 20 MG: at 21:01

## 2020-02-25 RX ADMIN — CEFAZOLIN SODIUM 2 G: 2 SOLUTION INTRAVENOUS at 14:38

## 2020-02-25 RX ADMIN — MIDAZOLAM HYDROCHLORIDE 0.5 MG: 1 INJECTION, SOLUTION INTRAMUSCULAR; INTRAVENOUS at 14:30

## 2020-02-25 RX ADMIN — ASPIRIN 81 MG: 81 TABLET, CHEWABLE ORAL at 08:41

## 2020-02-25 RX ADMIN — ATORVASTATIN CALCIUM 80 MG: 80 TABLET, FILM COATED ORAL at 21:02

## 2020-02-25 RX ADMIN — HEPARIN SODIUM AND DEXTROSE 9.2 UNITS/KG/HR: 10000; 5 INJECTION INTRAVENOUS at 22:38

## 2020-02-25 RX ADMIN — LIDOCAINE HYDROCHLORIDE 20 MG: 10 INJECTION, SOLUTION EPIDURAL; INFILTRATION; INTRACAUDAL; PERINEURAL at 14:21

## 2020-02-25 RX ADMIN — IOHEXOL 50 ML: 350 INJECTION, SOLUTION INTRAVENOUS at 01:45

## 2020-02-25 RX ADMIN — HEPARIN SODIUM 1000 UNITS: 1000 INJECTION INTRAVENOUS; SUBCUTANEOUS at 15:26

## 2020-02-25 RX ADMIN — TICAGRELOR 90 MG: 90 TABLET ORAL at 17:32

## 2020-02-25 RX ADMIN — FENTANYL CITRATE 25 MCG: 50 INJECTION INTRAMUSCULAR; INTRAVENOUS at 16:07

## 2020-02-25 RX ADMIN — ACETAMINOPHEN 650 MG: 325 TABLET ORAL at 21:01

## 2020-02-25 RX ADMIN — IODIXANOL 115 ML: 270 INJECTION, SOLUTION INTRAVASCULAR at 16:43

## 2020-02-25 RX ADMIN — HEPARIN SODIUM 2000 UNITS: 1000 INJECTION INTRAVENOUS; SUBCUTANEOUS at 14:46

## 2020-02-25 RX ADMIN — FENTANYL CITRATE 50 MCG: 50 INJECTION, SOLUTION INTRAMUSCULAR; INTRAVENOUS at 23:03

## 2020-02-25 RX ADMIN — Medication 10 MG: at 18:40

## 2020-02-25 RX ADMIN — MIDAZOLAM HYDROCHLORIDE 0.5 MG: 1 INJECTION, SOLUTION INTRAMUSCULAR; INTRAVENOUS at 15:41

## 2020-02-25 RX ADMIN — Medication 15 ML: at 21:03

## 2020-02-25 RX ADMIN — FENTANYL CITRATE 50 MCG: 50 INJECTION, SOLUTION INTRAMUSCULAR; INTRAVENOUS at 21:02

## 2020-02-25 RX ADMIN — TICAGRELOR 180 MG: 90 TABLET ORAL at 03:06

## 2020-02-25 RX ADMIN — MIDAZOLAM HYDROCHLORIDE 0.5 MG: 1 INJECTION, SOLUTION INTRAMUSCULAR; INTRAVENOUS at 15:11

## 2020-02-25 RX ADMIN — HYDRALAZINE HYDROCHLORIDE 10 MG: 20 INJECTION INTRAMUSCULAR; INTRAVENOUS at 18:02

## 2020-02-25 RX ADMIN — FENTANYL CITRATE 25 MCG: 50 INJECTION, SOLUTION INTRAMUSCULAR; INTRAVENOUS at 19:10

## 2020-02-25 RX ADMIN — FENTANYL CITRATE 25 MCG: 50 INJECTION INTRAMUSCULAR; INTRAVENOUS at 14:30

## 2020-02-25 RX ADMIN — IOHEXOL 90 ML: 350 INJECTION, SOLUTION INTRAVENOUS at 01:46

## 2020-02-25 RX ADMIN — Medication 10 ML: at 21:02

## 2020-02-25 RX ADMIN — MIDAZOLAM HYDROCHLORIDE 0.5 MG: 1 INJECTION, SOLUTION INTRAMUSCULAR; INTRAVENOUS at 16:07

## 2020-02-25 RX ADMIN — HYDRALAZINE HYDROCHLORIDE 10 MG: 20 INJECTION INTRAMUSCULAR; INTRAVENOUS at 21:02

## 2020-02-25 RX ADMIN — SODIUM CHLORIDE 1000 ML: 9 INJECTION, SOLUTION INTRAVENOUS at 01:43

## 2020-02-25 RX ADMIN — SODIUM CHLORIDE, POTASSIUM CHLORIDE, SODIUM LACTATE AND CALCIUM CHLORIDE: 600; 310; 30; 20 INJECTION, SOLUTION INTRAVENOUS at 14:08

## 2020-02-25 ASSESSMENT — PULMONARY FUNCTION TESTS
PIF_VALUE: 1
PIF_VALUE: 2
PIF_VALUE: 1
PIF_VALUE: 0
PIF_VALUE: 1
PIF_VALUE: 0
PIF_VALUE: 1
PIF_VALUE: 0
PIF_VALUE: 1
PIF_VALUE: 0
PIF_VALUE: 1
PIF_VALUE: 1

## 2020-02-25 ASSESSMENT — PAIN - FUNCTIONAL ASSESSMENT: PAIN_FUNCTIONAL_ASSESSMENT: FACES

## 2020-02-25 ASSESSMENT — PAIN SCALES - GENERAL: PAINLEVEL_OUTOF10: 9

## 2020-02-25 NOTE — H&P
[address] [place] [address]     HISTORY AND PHYSICAL EXAMINATION            Date:   2/24/2020  Patient name:  Betty Cardenas  Date of admission:  2/24/2020  1:26 PM  MRN:   [id number]  Account:  [de-identified]  YOB: 1970  PCP:    Sb Evangelista PA-C  Room:   2114/2114-01  Code Status:    Full Code    Chief Complaint:     Chief Complaint   Patient presents with    Hypertension       History Obtained From:     patient, spouse, electronic medical record, multiple family members and friends at bedside    History of Present Illness: The patient is a 52 y.o.  [place] male who presents with Hypertension   and he is admitted to the hospital for the management of hypertensive urgency S/P right pontine stroke. Patient has PMH significant for HTN, HLD and MI with stent in LAD 2019. Patient had right pontine stroke 2/13/2020. Patient was recovering in acute rehab when rapid response was called 2/24/2020. Patients BP was noted to be 237/130 and he was noted to have worsening speech over the last 3 days. Code stroke was called and patient sent to ED for stat CT head non-contrast. Currently patient sitting up in bed with family at bedside. Patient is aphasic and has rebecca strokes respirations. In the ED CT head non-contrast shows stable brain with subacute right pontine infarct. Patient was given Labetalol IV 20mg one time with SBP dropping to 160s. Patient found to have acute kidney injury that slowly evolved since 2/18/2020 Cr 1.15 to 2/23-->1. 83. patient had progressive dysphasia during this time and was made NPO and unable to have swallow study. Patient started on IVF NaCl at 100 in ED and admitted to progressive unit. Plan for swallow study 2/25/2020 and continue to monitor SBP with a goal of 150-180.  Admit patient to MICU for tight blood pressure control on cardizem drip. Also will need NG tube inserted so he can receive Asa and plavix. Past Medical History:     Past Medical History:   Diagnosis Date    Heart [facility] ([facility] [address]     Hyperlipidemia     Hypertension     Stroke ([facility] [address] 02/13/2020        Past SurgicalHistory:     Past Surgical History:   Procedure Laterality Date    CARDIAC SURGERY      CORONARY ANGIOPLASTY WITH STENT PLACEMENT  2019    x 1 stent for LAD    FOOT SURGERY Bilateral     KNEE SURGERY Right         Medications Prior to Admission:        Prior to Admission medications    Medication Sig Start Date End Date Taking? Authorizing Provider   meloxicam (MOBIC) 7.5 MG tablet Take 1 tablet by mouth 2 times daily as needed for Pain 2/10/20 2/24/20 Yes Justus Meraz PA-C   tadalafil (CIALIS) 20 MG tablet Take 1 tablet by mouth as needed for Erectile Dysfunction 2/10/20  Yes Justus Meraz PA-C   lisinopril (PRINIVIL;ZESTRIL) 40 MG tablet Take 1 tablet by mouth daily 2/10/20  Yes Justus Meraz PA-C   aspirin 81 MG EC tablet Take 1 tablet by mouth daily 5/26/19  Yes Clemente Rendon MD   nitroGLYCERIN (NITROSTAT) 0.4 MG SL tablet up to max of 3 total doses. If no relief after 1 dose, call 911. 5/26/19  Yes Clemente Rendon MD   atorvastatin (LIPITOR) 80 MG tablet Take 1 tablet by mouth nightly 5/26/19  Yes Clemente Rendon MD   amLODIPine (NORVASC) 10 MG tablet Take 1 tablet by mouth daily 5/26/19  Yes Clemente Rendon MD   ticagrelor (BRILINTA) 90 MG TABS tablet Take 1 tablet by mouth 2 times daily 5/26/19  Yes Clemente Rendon MD        Allergies:     Patient has no known allergies. Social History:     Tobacco:    reports that he has never smoked. He has never used smokeless tobacco.  Alcohol:      reports current alcohol use. Drug Use:  reports no history of drug use.     Family History:     Family History   Problem Relation Age of Onset    High Blood Pressure Mother     High Blood Pressure Father     Hypertension Sister    Devon Rayo Hypertension Brother        Review of Systems:     Positive and Negative as described in HPI. Review of Systems   Constitutional: Positive for activity change and fatigue. Negative for appetite change, chills and unexpected weight change. HENT: Negative for congestion, ear pain and sinus pain. Eyes: Negative for photophobia, pain and visual disturbance. Respiratory: Positive for apnea. Negative for cough and shortness of breath. Ary strokes respirations   Cardiovascular: Positive for leg swelling. Negative for chest pain and palpitations. Gastrointestinal: Negative for abdominal distention, abdominal pain, nausea and vomiting. Endocrine: Negative for cold intolerance and polydipsia. Genitourinary: Negative for difficulty urinating and flank pain. Musculoskeletal: Negative for arthralgias and myalgias. Skin: Negative for rash. Allergic/Immunologic: Negative for environmental allergies and food allergies. Neurological: Negative for dizziness, weakness and headaches. Hematological: Negative for adenopathy. Psychiatric/Behavioral: Negative for agitation and behavioral problems. Patient visually frustrated from new stroke and related deficits       Physical Exam:   BP (!) 160/74   Pulse 72   Temp 99.7 °F (37.6 °C) (Axillary)   Resp 18   Ht 6' 1\" (1.854 m)   Wt 245 lb (111.1 kg)   SpO2 97%   BMI 32.32 kg/m²   Temp (24hrs), Av.9 °F (37.2 °C), Min:97.7 °F (36.5 °C), Max:99.7 °F (37.6 °C)    No results for input(s): POCGLU in the last 72 hours. Intake/Output Summary (Last 24 hours) at 2020 1939  Last data filed at 2020 1201  Gross per 24 hour   Intake --   Output 600 ml   Net -600 ml       Physical Exam  Vitals signs reviewed. Constitutional:       General: He is not in acute distress. Appearance: He is well-developed. He is obese. HENT:      Head: Normocephalic and atraumatic. Nose: Nose normal. No congestion or rhinorrhea. Mouth/Throat:      Mouth: Mucous membranes are dry. Pharynx: No oropharyngeal exudate or posterior oropharyngeal erythema. Eyes:      General: No scleral icterus. Extraocular Movements: Extraocular movements intact. Pupils: Pupils are equal, round, and reactive to light. Neck:      Musculoskeletal: Normal range of motion and neck supple. Vascular: No JVD. Cardiovascular:      Rate and Rhythm: Normal rate and regular rhythm. Heart sounds: Normal heart sounds. Pulmonary:      Effort: No respiratory distress. Breath sounds: Normal breath sounds. Comments: Ary strokes respirations  Chest:      Chest wall: No tenderness. Abdominal:      General: Bowel sounds are normal. There is no distension. Palpations: Abdomen is soft. Tenderness: There is no abdominal tenderness. Musculoskeletal: Normal range of motion. General: Swelling present. No deformity. Lymphadenopathy:      Cervical: No cervical adenopathy. Skin:     Capillary Refill: Capillary refill takes less than 2 seconds. Findings: No rash. Neurological:      Mental Status: He is alert and oriented to person, place, and time. Sensory: No sensory deficit. Motor: Weakness (Left arm and leg 1+ right arm and leg antigravity) present.       Deep Tendon Reflexes: Reflexes abnormal.      Comments: Patient has aphasia and left sided deficits   Psychiatric:         Behavior: Behavior normal.         Investigations:     Laboratory Testing:  Recent Results (from the past 24 hour(s))   CBC Auto Differential    Collection Time: 02/24/20  1:58 PM   Result Value Ref Range    WBC 10.9 3.5 - 11.0 k/uL    RBC 5.24 4.5 - 5.9 m/uL    Hemoglobin 15.0 13.5 - 17.5 g/dL    Hematocrit 45.1 41 - 53 %    MCV 86.0 80 - 100 fL    MCH 28.7 26 - 34 pg    MCHC 33.4 31 - 37 g/dL    RDW 13.4 11.5 - 14.9 %    Platelets 971 001 - 512 k/uL    MPV 8.2 6.0 - 12.0 fL    NRBC Automated NOT REPORTED per 100 WBC Differential Type NOT REPORTED     Immature Granulocytes NOT REPORTED 0 %    Absolute Immature Granulocyte NOT REPORTED 0.00 - 0.30 k/uL    WBC Morphology NOT REPORTED     RBC Morphology NOT REPORTED     Platelet Estimate NOT REPORTED     Seg Neutrophils 75 (H) 36 - 66 %    Lymphocytes 15 (L) 24 - 44 %    Monocytes 9 (H) 1 - 7 %    Eosinophils % 0 0 - 4 %    Basophils 1 0 - 2 %    Segs Absolute 8.20 1.3 - 9.1 k/uL    Absolute Lymph # 1.60 1.0 - 4.8 k/uL    Absolute Mono # 1.00 0.1 - 1.3 k/uL    Absolute Eos # 0.00 0.0 - 0.4 k/uL    Basophils Absolute 0.10 0.0 - 0.2 k/uL   Basic Metabolic Panel    Collection Time: 02/24/20  1:58 PM   Result Value Ref Range    Glucose 124 (H) 70 - 99 mg/dL    BUN 29 (H) 6 - 20 mg/dL    CREATININE 1.64 (H) 0.70 - 1.20 mg/dL    Bun/Cre Ratio NOT REPORTED 9 - 20    Calcium 9.9 8.6 - 10.4 mg/dL    Sodium 142 135 - 144 mmol/L    Potassium 3.6 (L) 3.7 - 5.3 mmol/L    Chloride 103 98 - 107 mmol/L    CO2 22 20 - 31 mmol/L    Anion Gap 17 9 - 17 mmol/L    GFR Non-African American 45 (L) >60 mL/min    GFR  55 (L) >60 mL/min    GFR Comment          GFR Staging NOT REPORTED        Imaging/Diagnostics:  Xr Chest Standard (2 Vw)    Result Date: 2/13/2020  EXAMINATION: TWO XRAY VIEWS OF THE CHEST 2/13/2020 3:50 pm COMPARISON: Chest 05/24/2019 HISTORY: ORDERING SYSTEM PROVIDED HISTORY: CVA workup, new heart murmur TECHNOLOGIST PROVIDED HISTORY: CVA workup, new heart murmur Reason for Exam: CVA workup Acuity: Unknown Type of Exam: Unknown FINDINGS: The cardiac silhouette is enlarged. The cardiothoracic ratio is 16.8/31.1 cm. Indeterminate prominence of the right hilar silhouette. The mediastinal and left hilar silhouettes appear unremarkable. The lungs appear clear. No pleural effusion. No pneumothorax is seen. No acute osseous abnormality is identified.      Indeterminate prominence of the right hilar shadow could relate to adenopathy, mass, adjacent consolidation or pulmonary artery Mentation. r/o stroke redemonstration/extension. TECHNOLOGIST PROVIDED HISTORY: Change in Mentation. r/o stroke redemonstration/extension. Reason for Exam: Change in Mentation. r/o stroke redemonstration/extension. FINDINGS: BRAIN/VENTRICLES: There is no acute intracranial hemorrhage, mass effect or midline shift. No abnormal extra-axial fluid collection. The gray-white differentiation is maintained without evidence of an acute infarct. There is no evidence of hydrocephalus. Hypodensity right mayuri unchanged. Intracranial atherosclerosis. ORBITS: The visualized portion of the orbits demonstrate no acute abnormality. SINUSES: Air-fluid level right maxillary sinus. SOFT TISSUES/SKULL:  No acute abnormality of the visualized skull or soft tissues. No change right pontine infarct. No acute disease. Ct Head Wo Contrast    Result Date: 2/13/2020  EXAMINATION: CT OF THE HEAD WITHOUT CONTRAST  2/13/2020 10:03 pm TECHNIQUE: CT of the head was performed without the administration of intravenous contrast. Dose modulation, iterative reconstruction, and/or weight based adjustment of the mA/kV was utilized to reduce the radiation dose to as low as reasonably achievable. COMPARISON: CT and MRI 02/13/2020 HISTORY: ORDERING SYSTEM PROVIDED HISTORY: worsening left weakness, was on heparin TECHNOLOGIST PROVIDED HISTORY: worsening left weakness, was on heparin FINDINGS: BRAIN/VENTRICLES: There is no acute intracranial hemorrhage, mass effect or midline shift. No abnormal extra-axial fluid collection. Right paramedian pontine infarct not well visualized on this exam.  There is no evidence of hydrocephalus. Intracranial vascular calcifications. ORBITS: The visualized portion of the orbits demonstrate no acute abnormality. SINUSES: Moderate right sphenoid sinus air-fluid level. Moderate ethmoid sinus mucosal thickening. Mild-to-moderate mucosal thickening involving maxillary sinuses.   Mastoids are clear SOFT TISSUES/SKULL: No acute abnormality of the visualized skull or soft tissues. No evidence of hemorrhagic transformation of the right paramedian pontine infarct. Moderate sinus disease. Ct Head Wo Contrast    Result Date: 2/13/2020  EXAMINATION: CT OF THE HEAD WITHOUT CONTRAST  2/13/2020 3:26 pm TECHNIQUE: CT of the head was performed without the administration of intravenous contrast. Dose modulation, iterative reconstruction, and/or weight based adjustment of the mA/kV was utilized to reduce the radiation dose to as low as reasonably achievable. COMPARISON: None. HISTORY: ORDERING SYSTEM PROVIDED HISTORY: left sided numbness, weakness, slurred speech TECHNOLOGIST PROVIDED HISTORY: left sided numbness, weakness, slurred speech FINDINGS: BRAIN/VENTRICLES: There is no acute intracranial hemorrhage, mass effect, or midline shift. There is satisfactory overall gray-white matter differentiation. The ventricular structures are symmetric and unremarkable. The infratentorial structures are unremarkable. ORBITS: The visualized portion of the orbits demonstrate no acute abnormality. SINUSES: There is chronic sinusitis. The mastoid air cells are normally aerated. SOFT TISSUES/SKULL:  No acute abnormality of the visualized skull or soft tissues. No acute intracranial abnormality. Findings were discussed with Dr. Nabor Honeycutt At 3:41 pm on 2/13/2020. Ct Chest W Contrast    Result Date: 2/13/2020  EXAMINATION: CT OF THE CHEST WITH CONTRAST 2/13/2020 4:50 pm TECHNIQUE: CT of the chest was performed with the administration of intravenous contrast. Multiplanar reformatted images are provided for review. Dose modulation, iterative reconstruction, and/or weight based adjustment of the mA/kV was utilized to reduce the radiation dose to as low as reasonably achievable.  COMPARISON: Chest radiograph done today HISTORY: ORDERING SYSTEM PROVIDED HISTORY: right hilar shadow on CXR TECHNOLOGIST PROVIDED HISTORY: right hilar shadow on CXR Reason for Exam: right hilar shadow on CXR FINDINGS: Mediastinum: No mediastinal or hilar masses. Coronary artery calcifications. No pericardial effusion. The aorta is mildly tortuous accounting for the density seen on chest x-ray. Lungs/pleura: No acute airspace disease. No pleural or pericardial effusion Upper Abdomen: Unremarkable. Soft Tissues/Bones: No acute fracture. No lytic or blastic lesion. Degenerative changes seen in the spine. 1. No acute abnormalities seen in the chest 2. Tortuous ascending thoracic aorta accounts for the density seen on the chest radiograph 3. Coronary artery calcifications     Us Renal Complete    Result Date: 2/17/2020  EXAMINATION: RETROPERITONEAL ULTRASOUND OF THE KIDNEYS AND URINARY BLADDER 2/17/2020 COMPARISON: None HISTORY: ORDERING SYSTEM PROVIDED HISTORY: uncontrolled HTN TECHNOLOGIST PROVIDED HISTORY: uncontrolled HTN FINDINGS: Kidneys: The right kidney measures 11.6 cm in length and the left kidney measures 11.6 cm in length. Kidneys demonstrate normal cortical echogenicity. No evidence of hydronephrosis or intrarenal stones. Bladder: Urinary bladder is decompressed and not well visualized. Patient voided prior to the examination. Unremarkable ultrasound of the kidneys. Cta Head Neck W Contrast    Result Date: 2/15/2020  EXAMINATION: CTA OF THE HEAD AND NECK WITH CONTRAST 2/15/2020 8:11 pm: TECHNIQUE: CTA of the head and neck was performed with the administration of intravenous contrast. Multiplanar reformatted images are provided for review. MIP images are provided for review. Stenosis of the internal carotid arteries measured using NASCET criteria. Dose modulation, iterative reconstruction, and/or weight based adjustment of the mA/kV was utilized to reduce the radiation dose to as low as reasonably achievable. COMPARISON: None.  HISTORY: ORDERING SYSTEM PROVIDED HISTORY: f/u basilar atery filling defect TECHNOLOGIST PROVIDED HISTORY: f/u basilar atery filling defect Reason for Exam: basilar artery filling defect Acuity: Unknown Type of Exam: Unknown FINDINGS: CTA NECK: AORTIC ARCH/ARCH VESSELS: No dissection or arterial injury. No significant stenosis of the brachiocephalic or subclavian arteries. CAROTID ARTERIES: No dissection, arterial injury, or hemodynamically significant stenosis by NASCET criteria. VERTEBRAL ARTERIES: No dissection, arterial injury, or significant stenosis. SOFT TISSUES: The lung apices are clear. No cervical or superior mediastinal lymphadenopathy. The larynx and pharynx are unremarkable. No acute abnormality of the salivary and thyroid glands. BONES: No acute osseous abnormality. CTA HEAD: ANTERIOR CIRCULATION: No significant stenosis of the intracranial internal carotid, anterior cerebral, or middle cerebral arteries. No aneurysm. POSTERIOR CIRCULATION: Severe diffuse stenosis or hypoplasia of the intracranial vertebral arteries distal to the posterior inferior cerebellar artery origins. Persistent occlusion of the basilar artery, which appears hypoplastic. The posterior cerebral arteries are predominantly supplied by the posterior communicating arteries with retrograde filling of the P1 segment supplying the superior cerebellar arteries. There is diffuse moderate irregularity of the P2 and P3 segments posterior cerebral arteries. No aneurysm. OTHER: No dural venous sinus thrombosis on this non-dedicated study. BRAIN: No mass effect or midline shift. No extra-axial fluid collection. The gray-white differentiation is maintained. 1. No new arterial abnormality in the head or neck. 2. 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 3. Moderate diffuse irregularity of the P2 and P3 segments posterior cerebral arteries, which are supplied by the posterior communicating arteries. 4. No hemodynamically significant arterial stenosis in the neck.      Cta Head Neck W Contrast    Result Date: 2/13/2020  EXAMINATION: CTA OF THE HEAD AND NECK WITH CONTRAST 2/13/2020 3:27 pm: TECHNIQUE: CTA of the head and neck was performed with the administration of intravenous contrast. Multiplanar reformatted images are provided for review. MIP images are provided for review. Stenosis of the internal carotid arteries measured using NASCET criteria. Dose modulation, iterative reconstruction, and/or weight based adjustment of the mA/kV was utilized to reduce the radiation dose to as low as reasonably achievable. COMPARISON: None. HISTORY: ORDERING SYSTEM PROVIDED HISTORY: left sided deficits TECHNOLOGIST PROVIDED HISTORY: left sided deficits Reason for Exam: lt sided numbness weakness slurred speech FINDINGS: CTA NECK: AORTIC ARCH/ARCH VESSELS: No dissection or arterial injury. No significant stenosis of the brachiocephalic or subclavian arteries. CAROTID ARTERIES: No dissection, arterial injury, or hemodynamically significant stenosis by NASCET criteria. VERTEBRAL ARTERIES: No dissection, arterial injury, or significant stenosis. SOFT TISSUES: The lung apices are clear. No cervical or superior mediastinal lymphadenopathy. The larynx and pharynx are unremarkable. No acute abnormality of the salivary and thyroid glands. BONES: No acute osseous abnormality. CTA HEAD: ANTERIOR CIRCULATION: No significant stenosis of the intracranial internal carotid, anterior cerebral, or middle cerebral arteries. No aneurysm. 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 There is persistent fetal origin of the posterior cerebral arteries bilaterally. The superior cerebellar arteries appear to arise from the posterior cerebral arteries. OTHER: No dural venous sinus thrombosis on this non-dedicated study. BRAIN: No mass effect or midline shift. No extra-axial fluid collection. The gray-white differentiation is maintained. No significant stenosis or evidence for occlusion.  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 The superior cerebellar arteries also appear to arise from the posterior cerebral arteries. Mri Brain Wo Contrast    Result Date: 2/13/2020  EXAMINATION: MRI OF THE BRAIN WITHOUT CONTRAST  2/13/2020 6:20 pm TECHNIQUE: Multiplanar multisequence MRI of the brain was performed without the administration of intravenous contrast. COMPARISON: CT head neck 02/13/2020 and CT head 02/13/2020 HISTORY: ORDERING SYSTEM PROVIDED HISTORY: left sided weakness TECHNOLOGIST PROVIDED HISTORY: left sided weakness FINDINGS: INTRACRANIAL STRUCTURES/VENTRICLES: There is a right paramedian pontine acute stroke. No obvious T2 prolongation. No mass effect or midline shift. No evidence of an acute intracranial hemorrhage. The ventricles and sulci are normal in size and configuration. Mild non-specific periventricular and subcortical T2 prolongation identified may be sequelae of chronic small ischemic disease versus demyelinating process. Wedge-shaped focus of T2 prolongation identified left posterior cerebellum without corresponding restricted diffusion suggestive of prior stroke. The sellar/suprasellar regions appear unremarkable. There is normal flow of the normal flow void involving the basilar artery. The Remainder of the normal signal voids within the major intracranial vessels appear maintained. Foci of hemosiderin left posterior cerebellum noted of uncertain clinical significance ORBITS: The visualized portion of the orbits demonstrate no acute abnormality. SINUSES: Moderate maxillary sinus disease. Air-fluid level right sphenoid sinus. Small air-fluid level right maxillary sinus. BONES/SOFT TISSUES: The bone marrow signal intensity appears normal. The soft tissues demonstrate no acute abnormality. Acute right paramedian pontine stroke. 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 Remote left posterior cerebellar stroke.  Mild non-specific white matter changes favor

## 2020-02-25 NOTE — PROGRESS NOTES
TODAY:      AWAKE & FOLLOWING COMMANDS:  [] No   [x] Yes, drowsy but arousable    INTUBATED:   [x] No   [] Yes    SEDATION/ANALGESIA:    [] Propofol gtt  [] Versed gtt  [] Ativan gtt   [x] No Sedation  Pain medications:      FEEDING: Able to take PO?  [] No:  [x] NPO for: swallow study  [x] NG/OG medication administration [] PEG  Tube Feeds:      [] Yes:  Diet:   DVT Prophylaxis:  [] Yes:           [x] No rationale: on heparin    Stress Ulcer Prophylaxis: [] Yes:   [x] Not indicated    VASOPRESSORS:  [x] No    [] Yes  [] Levophed [] Dopamine [] Vasopressin  [] Dobutamine [] Phenylephrine [] Epinephrine    CENTRAL/ARTERIAL LINES:  [x] No    [] Yes:  Location: , Date placed: , Indication:     MARTIN CATHETER: [x] No  (external catheter)  [] Yes:      DRAINS: [x] No    [] Yes:  Location: , Date placed: , Output:     Head of Bed: [x] Elevated:          [] Flat    Glucose management: [x] Not indicated, consistently less than 180 [] Sliding Scale :            [] Long Acting:    Secondary Stroke PPX: [x] Antiplatelet:     [facility]           [] Statin: lipitor

## 2020-02-25 NOTE — CONSULTS
Today's Date: 2/25/2020  Patient Name: Toñito Linares  Date of admission: 2/24/2020  9:40 PM  Patient's age: 52 y. o., 1970  Admission Dx: Brain stem infarction [facility]) [I63.9]    Reason for Consult: management recommendations  Requesting Physician: No admitting provider for patient encounter. CHIEF COMPLAINT: Recurrent ischemic stroke. MTHFR mutation. Mildly elevated homocystine    History Obtained From:  patient, electronic medical record    HISTORY OF PRESENT ILLNESS:      The patient is a 52 y.o.  male With past medical history of hypertension coronary artery disease status post stent placement January 2019 and recent pontine infarction was transferred from [facility] due to complaints of aphasia and left hemiparesis. Patient was recently patient was recently admitted to [facility] on 2/13/2020 after presenting with dysarthria and left hemiparesis and found to have a right pontine infarction. He did not receive TPA. 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 Patient was discharged on aspirin and Brilinta. On 2/22 he was noted by the nurse to have decreased word output and coughing with liquids. CT head performed showed stable right pontine infarction. Patient was sent to the [facility] ED on 2/24 due to hypertensive urgency with blood pressure 237/130. Repeat CT showed stable right pontine infarction. Brain MRI showed increasing multifocal infarction in the mayuri extending to the middle and new punctate infarct left cerebellar hemisphere. Patient was then transferred to [facility] ICU. On arrival to [facility] CTA showed hypoplastic basilar artery with no flow in the mid to distal portion. CT brain perfusion with no perfusion mismatch. Patient was subsequently started on heparin. Work-up from previous admission shows compound heterozygous MTHFR mutation.   Other hypercoagulable work-up was unremarkable for factor V Leyden mutation, prothrombin gene mutation and lupus antibodies. Past Medical History:   has a past medical history of Heart attack ([facility] [address] Hyperlipidemia, Hypertension, and Stroke ([facility] [address] Past Surgical History:   has a past surgical history that includes knee surgery (Right); Foot surgery (Bilateral); Coronary angioplasty with stent (2019); and Cardiac surgery. Medications:    Prior to Admission medications    Medication Sig Start Date End Date Taking? Authorizing Provider   meloxicam (MOBIC) 7.5 MG tablet Take 1 tablet by mouth 2 times daily as needed for Pain 2/10/20 2/24/20  Delonte Castro PA-C   tadalafil (CIALIS) 20 MG tablet Take 1 tablet by mouth as needed for Erectile Dysfunction 2/10/20   Delonte Castro PA-C   lisinopril (PRINIVIL;ZESTRIL) 40 MG tablet Take 1 tablet by mouth daily 2/10/20   Delonte Castro PA-C   aspirin 81 MG EC tablet Take 1 tablet by mouth daily 5/26/19   Jason Miles MD   nitroGLYCERIN (NITROSTAT) 0.4 MG SL tablet up to max of 3 total doses.  If no relief after 1 dose, call 911. 5/26/19   Jason Miles MD   atorvastatin (LIPITOR) 80 MG tablet Take 1 tablet by mouth nightly 5/26/19   Jason Miles MD   amLODIPine (NORVASC) 10 MG tablet Take 1 tablet by mouth daily 5/26/19   Jason Miles MD   ticagrelor (BRILINTA) 90 MG TABS tablet Take 1 tablet by mouth 2 times daily 5/26/19   Jason Miles MD     Current Facility-Administered Medications   Medication Dose Route Frequency Provider Last Rate Last Dose    aspirin chewable tablet 81 mg  81 mg Oral Daily BALTAZAR Gonsales CNP   81 mg at 02/25/20 0841    ticagrelor (BRILINTA) tablet 90 mg  90 mg Oral BID BALTAZAR Gonsales CNP   90 mg at 02/25/20 1732    atorvastatin (LIPITOR) tablet 80 mg  80 mg Oral Nightly Jesus Lara MD        FLUoxetine (PROZAC) 20 MG/5ML solution 20 mg  20 mg Per NG tube Daily Jesus Lara MD        chlorhexidine (PERIDEX) 0.12 % solution 15 mL  15 mL 20 0146      promethazine (PHENERGAN) tablet 12.5 mg  12.5 mg Oral Q6H PRN Samer Arvind Maldonado MD        heparin (porcine) injection 4,000 Units  4,000 Units Intravenous Once Samer Arvind Maldonado MD        heparin (porcine) injection 4,000 Units  4,000 Units Intravenous PRN Mertr Arvind Maldonado MD        heparin (porcine) injection 2,000 Units  2,000 Units Intravenous PRN Mertr Arvind Maldonado MD        heparin 25,000 units in dextrose 5% 250 mL infusion  9.2 Units/kg/hr Intravenous Continuous Samer Arvind Maldonado MD 16.5 mL/hr at 20 0545 15.2 Units/kg/hr at 20 0545     Facility-Administered Medications Ordered in Other Encounters   Medication Dose Route Frequency Provider Last Rate Last Dose    sodium chloride flush 0.9 % injection 10 mL  10 mL Intravenous 2 times per day Shirin Spence MD        sodium chloride flush 0.9 % injection 10 mL  10 mL Intravenous PRN Shirin Spence MD           Allergies:  Patient has no known allergies. Social History:   reports that he has never smoked. He has never used smokeless tobacco. He reports current alcohol use. He reports that he does not use drugs. Family History: family history includes High Blood Pressure in his father and mother; Hypertension in his brother and sister.     REVIEW OF SYSTEMS:      Patient has difficulty with speech and not able to give any reliable review of systems    PHYSICAL EXAM:        BP (!) 172/91   Pulse 74   Temp 97.8 °F (36.6 °C) (Oral)   Resp 20   Ht 6' 1\" (1.854 m)   Wt 238 lb 12.1 oz (108.3 kg)   SpO2 96%   BMI 31.50 kg/m²    Temp (24hrs), Av.5 °F (35.8 °C), Min:89.9 °F (32.2 °C), Max:98.7 °F (37.1 °C)      General appearance -not in acute distress  Mental status -lethargic  Eyes - pupils equal and reactive, extraocular eye movements intact   Ears - bilateral TM's and external ear canals normal   Mouth - mucous membranes moist, pharynx normal without lesions   Neck - supple, no significant adenopathy Lymphatics - no palpable lymphadenopathy, no hepatosplenomegaly   Chest - clear to auscultation, no wheezes, rales or rhonchi, symmetric air entry   Heart - normal rate, regular rhythm, normal S1, S2, no murmurs  Abdomen - soft, nontender, nondistended, no masses or organomegaly   Neurological -positive left-sided weakness  Musculoskeletal - no joint tenderness, deformity or swelling   Extremities - peripheral pulses normal, no pedal edema, no clubbing or cyanosis   Skin - normal coloration and turgor, no rashes, no suspicious skin lesions noted ,      DATA:      Labs:     CBC:   Recent Labs     02/24/20 2320 02/25/20  0432   WBC 11.8* 10.8   HGB 13.9 13.1   HCT 42.7 40.8    294     BMP:   Recent Labs     02/24/20 2320 02/25/20  0432   * 146*   K 3.7 3.9   CO2 21 20   BUN 25* 24*   CREATININE 1.37* 1.29*   LABGLOM 55* 59*   GLUCOSE 100* 91     PT/INR:   Recent Labs     02/24/20 2023   PROTIME 14.1   INR 1.1     APTT:  Recent Labs     02/25/20  1128 02/25/20  1739   APTT 43.7* 26.7     Results for orders placed or performed during the hospital encounter of 02/24/20   MRSA DNA Probe, Nasal   Result Value Ref Range    Specimen Description . NASAL SWAB     MRSA, DNA, Nasal (A) NEGATIVE:  MRSA DNA not detected by nucleic acid amplificati     POSITIVE:  MRSA DNA detected by nucleic acid amplification.    CBC   Result Value Ref Range    WBC 11.8 (H) 3.5 - 11.3 k/uL    RBC 4.80 4.21 - 5.77 m/uL    Hemoglobin 13.9 13.0 - 17.0 g/dL    Hematocrit 42.7 40.7 - 50.3 %    MCV 89.0 82.6 - 102.9 fL    MCH 29.0 25.2 - 33.5 pg    MCHC 32.6 28.4 - 34.8 g/dL    RDW 12.5 11.8 - 14.4 %    Platelets 370 992 - 318 k/uL    MPV 10.4 8.1 - 13.5 fL    NRBC Automated 0.0 0.0 per 100 WBC   CBC   Result Value Ref Range    WBC 10.8 3.5 - 11.3 k/uL    RBC 4.53 4.21 - 5.77 m/uL    Hemoglobin 13.1 13.0 - 17.0 g/dL    Hematocrit 40.8 40.7 - 50.3 %    MCV 90.1 82.6 - 102.9 fL    MCH 28.9 25.2 - 33.5 pg    MCHC 32.1 28.4 - 34.8 g/dL RDW 12.6 11.8 - 14.4 %    Platelets 981 385 - 490 k/uL    MPV 10.6 8.1 - 13.5 fL    NRBC Automated 0.0 0.0 per 100 WBC   Basic Metabolic Panel w/ Reflex to MG   Result Value Ref Range    Glucose 100 (H) 70 - 99 mg/dL    BUN 25 (H) 6 - 20 mg/dL    CREATININE 1.37 (H) 0.70 - 1.20 mg/dL    Bun/Cre Ratio NOT REPORTED 9 - 20    Calcium 9.5 8.6 - 10.4 mg/dL    Sodium 145 (H) 135 - 144 mmol/L    Potassium 3.7 3.7 - 5.3 mmol/L    Chloride 110 (H) 98 - 107 mmol/L    CO2 21 20 - 31 mmol/L    Anion Gap 14 9 - 17 mmol/L    GFR Non-African American 55 (L) >60 mL/min    GFR African American >60 >60 mL/min    GFR Comment          GFR Staging NOT REPORTED    Troponin   Result Value Ref Range    Troponin, High Sensitivity 36 (H) 0 - 22 ng/L    Troponin T NOT REPORTED <0.03 ng/mL    Troponin Interp NOT REPORTED    Troponin   Result Value Ref Range    Troponin, High Sensitivity 32 (H) 0 - 22 ng/L    Troponin T NOT REPORTED <0.03 ng/mL    Troponin Interp NOT REPORTED    APTT   Result Value Ref Range    PTT 28.1 20.5 - 30.5 sec   APTT   Result Value Ref Range    PTT 35.7 (H) 20.5 - 30.5 sec   APTT   Result Value Ref Range    PTT 43.7 (H) 20.5 - 30.5 sec   APTT   Result Value Ref Range    PTT 26.7 20.5 - 30.5 sec   Basic Metabolic Panel   Result Value Ref Range    Glucose 91 70 - 99 mg/dL    BUN 24 (H) 6 - 20 mg/dL    CREATININE 1.29 (H) 0.70 - 1.20 mg/dL    Bun/Cre Ratio NOT REPORTED 9 - 20    Calcium 8.9 8.6 - 10.4 mg/dL    Sodium 146 (H) 135 - 144 mmol/L    Potassium 3.9 3.7 - 5.3 mmol/L    Chloride 112 (H) 98 - 107 mmol/L    CO2 20 20 - 31 mmol/L    Anion Gap 14 9 - 17 mmol/L    GFR Non-African American 59 (L) >60 mL/min    GFR African American >60 >60 mL/min    GFR Comment          GFR Staging NOT REPORTED    CK   Result Value Ref Range    Total  39 - 308 U/L   Procalcitonin   Result Value Ref Range    Procalcitonin 0.06 <0.09 ng/mL   HOMOCYSTEINE, SERUM   Result Value Ref Range    Homocysteine 9.4 <15.0 umol/L   EKG 12 Lead Result Value Ref Range    Ventricular Rate 73 BPM    Atrial Rate 73 BPM    P-R Interval 166 ms    QRS Duration 132 ms    Q-T Interval 404 ms    QTc Calculation (Bazett) 445 ms    P Axis 35 degrees    R Axis 3 degrees    T Axis 5 degrees       IMAGING DATA:    Xr Chest (single View Frontal)    Result Date: 2/24/2020  EXAMINATION: ONE XRAY VIEW OF THE CHEST 2/24/2020 7:22 pm COMPARISON: February 13, 2020 HISTORY: ORDERING SYSTEM PROVIDED HISTORY: Concern for hypertensive emergency patient has RASHMI recieving large amounts of fluid with respiratory distress TECHNOLOGIST PROVIDED HISTORY: Concern for hypertensive emergency patient has RASHMI recieving large amounts of fluid with respiratory distress Reason for Exam: Concern for hypertensive emergency; patient has RASHMI recieving large amounts of fluid with respiratory distress. H/O hypertension. Acuity: Unknown Type of Exam: Unknown Additional signs and symptoms: Concern for hypertensive emergency; patient has RASHMI recieving large amounts of fluid with respiratory distress. H/O hypertension. FINDINGS: Lung volumes are low. Heart size is stable. Mediastinal contours are stable. Ill-defined increased density in the left lung base is noted, greatest in the retrocardiac region. There is no consolidation otherwise. Ill-defined increased density in the left lung base, atelectasis versus pneumonia. No findings diagnostic of pulmonary edema     Xr Chest Standard (2 Vw)    Result Date: 2/13/2020  EXAMINATION: TWO XRAY VIEWS OF THE CHEST 2/13/2020 3:50 pm COMPARISON: Chest 05/24/2019 HISTORY: ORDERING SYSTEM PROVIDED HISTORY: CVA workup, new heart murmur TECHNOLOGIST PROVIDED HISTORY: CVA workup, new heart murmur Reason for Exam: CVA workup Acuity: Unknown Type of Exam: Unknown FINDINGS: The cardiac silhouette is enlarged. The cardiothoracic ratio is 16.8/31.1 cm. Indeterminate prominence of the right hilar silhouette.   The mediastinal and left hilar silhouettes appear midline shift. No evidence of hydrocephalus. ORBITS: The visualized portion of the orbits demonstrate no acute abnormality. SINUSES:  The visualized paranasal sinuses and mastoid air cells demonstrate no acute abnormality. SOFT TISSUES/SKULL: No acute abnormality of the visualized skull or soft tissues. CTA NECK: AORTIC ARCH/ARCH VESSELS: No dissection or arterial injury. No significant stenosis of the brachiocephalic or subclavian arteries. CAROTID ARTERIES: No dissection, arterial injury, or hemodynamically significant stenosis by NASCET criteria. VERTEBRAL ARTERIES: No dissection, arterial injury, or significant stenosis. SOFT TISSUES: The lung apices are clear. No cervical or superior mediastinal lymphadenopathy. The larynx and pharynx are unremarkable. No acute abnormality of the salivary and thyroid glands. BONES: No acute osseous abnormality. CTA HEAD: ANTERIOR CIRCULATION: No significant stenosis of the intracranial internal carotid, anterior cerebral, or middle cerebral arteries. No aneurysm. Congenital absent left A1 segment, anatomic variant. POSTERIOR CIRCULATION: Bilateral fetal supply. 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 No aneurysm. OTHER: No dural venous sinus thrombosis on this non-dedicated study. CT PERFUSION: No significant asymmetry of color maps. No evidence of penumbra. 1. Hypodense areas in the mayuri concordant with acute infarct visualized at MR. 2. No perfusion mismatch. 3. 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  4. Otherwise patent cervical and cerebral vasculature     Ct Head Wo Contrast    Result Date: 2/24/2020  EXAMINATION: CT OF THE HEAD WITHOUT CONTRAST  2/24/2020 2:10 pm TECHNIQUE: CT of the head was performed without the administration of intravenous contrast. Dose modulation, iterative reconstruction, and/or weight based adjustment of the mA/kV was utilized to reduce the radiation dose to as low as reasonably achievable. COMPARISON: 02/22/2020. HISTORY: ORDERING SYSTEM PROVIDED HISTORY: HTN, Aphasia TECHNOLOGIST PROVIDED HISTORY: HTN, Aphasia Reason for Exam: HTN, Aphasia Acuity: Acute Type of Exam: Initial FINDINGS: BRAIN/VENTRICLES: There is no acute intracranial hemorrhage, mass effect or midline shift. No abnormal extra-axial fluid collection. The gray-white differentiation is maintained. The redemonstrated is low attenuation within the right mayuri. There is no evidence of hydrocephalus. ORBITS: The visualized portion of the orbits demonstrate no acute abnormality. SINUSES: The visualized paranasal sinuses and mastoid air cells demonstrate no acute abnormality. There is mucosal disease involving the maxillary, ethmoid and sphenoid sinuses. SOFT TISSUES/SKULL:  No acute abnormality of the visualized skull or soft tissues. 1. Stable CT scan of the brain with subacute right pontine infarct. Ct Head Wo Contrast    Result Date: 2/22/2020  EXAMINATION: CT OF THE HEAD WITHOUT CONTRAST  2/22/2020 9:57 pm TECHNIQUE: CT of the head was performed without the administration of intravenous contrast. Dose modulation, iterative reconstruction, and/or weight based adjustment of the mA/kV was utilized to reduce the radiation dose to as low as reasonably achievable. COMPARISON: CTA of the head and neck February 15, 2020 and CT head February 13, 2029 and MR brain February 13, 2020 HISTORY: ORDERING SYSTEM PROVIDED HISTORY: Change in Mentation. r/o stroke redemonstration/extension. TECHNOLOGIST PROVIDED HISTORY: Change in Mentation. r/o stroke redemonstration/extension. Reason for Exam: Change in Mentation. r/o stroke redemonstration/extension. FINDINGS: BRAIN/VENTRICLES: There is no acute intracranial hemorrhage, mass effect or midline shift. No abnormal extra-axial fluid collection. The gray-white differentiation is maintained without evidence of an acute infarct.   There is no evidence of hydrocephalus. Hypodensity right mayuri unchanged. Intracranial atherosclerosis. ORBITS: The visualized portion of the orbits demonstrate no acute abnormality. SINUSES: Air-fluid level right maxillary sinus. SOFT TISSUES/SKULL:  No acute abnormality of the visualized skull or soft tissues. No change right pontine infarct. No acute disease. Ct Head Wo Contrast    Result Date: 2/13/2020  EXAMINATION: CT OF THE HEAD WITHOUT CONTRAST  2/13/2020 10:03 pm TECHNIQUE: CT of the head was performed without the administration of intravenous contrast. Dose modulation, iterative reconstruction, and/or weight based adjustment of the mA/kV was utilized to reduce the radiation dose to as low as reasonably achievable. COMPARISON: CT and MRI 02/13/2020 HISTORY: ORDERING SYSTEM PROVIDED HISTORY: worsening left weakness, was on heparin TECHNOLOGIST PROVIDED HISTORY: worsening left weakness, was on heparin FINDINGS: BRAIN/VENTRICLES: There is no acute intracranial hemorrhage, mass effect or midline shift. No abnormal extra-axial fluid collection. Right paramedian pontine infarct not well visualized on this exam.  There is no evidence of hydrocephalus. Intracranial vascular calcifications. ORBITS: The visualized portion of the orbits demonstrate no acute abnormality. SINUSES: Moderate right sphenoid sinus air-fluid level. Moderate ethmoid sinus mucosal thickening. Mild-to-moderate mucosal thickening involving maxillary sinuses. Mastoids are clear SOFT TISSUES/SKULL:  No acute abnormality of the visualized skull or soft tissues. No evidence of hemorrhagic transformation of the right paramedian pontine infarct. Moderate sinus disease.      Ct Head Wo Contrast    Result Date: 2/13/2020  EXAMINATION: CT OF THE HEAD WITHOUT CONTRAST  2/13/2020 3:26 pm TECHNIQUE: CT of the head was performed without the administration of intravenous contrast. Dose modulation, iterative reconstruction, and/or weight based adjustment Abdomen For Ng/og/ne Tube Placement    Result Date: 2/25/2020  EXAMINATION: ONE SUPINE XRAY VIEW(S) OF THE ABDOMEN 2/25/2020 2:49 am COMPARISON: None. HISTORY: ORDERING SYSTEM PROVIDED HISTORY: Confirmation of course of NG/OG/NE tube and location of tip of tube TECHNOLOGIST PROVIDED HISTORY: Confirmation of course of NG/OG/NE tube and location of tip of tube Portable? ->Yes Reason for Exam: NG placement   supine port    1  FINDINGS: There is an enteric tube with its tip projecting over the body of the stomach. Proximal port is within the gastric body. No evidence of bowel obstruction. Enteric tube in the stomach as above. No evidence of bowel obstruction. Cta Head Neck W Contrast    Result Date: 2/25/2020  EXAMINATION: CTA OF THE HEAD AND NECK WITH CONTRAST; CT OF THE HEAD WITHOUT CONTRAST 2/25/2020 1:37 am; 2/25/2020 1:31 am: TECHNIQUE: CTA of the head and neck was performed with the administration of intravenous contrast. Multiplanar reformatted images are provided for review. MIP images are provided for review. Stenosis of the internal carotid arteries measured using NASCET criteria. Dose modulation, iterative reconstruction, and/or weight based adjustment of the mA/kV was utilized to reduce the radiation dose to as low as reasonably achievable.; CT of the head was performed without the administration of intravenous contrast. Dose modulation, iterative reconstruction, and/or weight based adjustment of the mA/kV was utilized to reduce the radiation dose to as low as reasonably achievable. Noncontrast CT of the head with reconstructed 2-D images are also provided for review. COMPARISON: 02/15/2020.  HISTORY: ORDERING SYSTEM PROVIDED HISTORY: Left hemiplegia TECHNOLOGIST PROVIDED HISTORY: Please add to VIZ clovis Left hemiplegia Reason for Exam: lt hemiplegia Acuity: Unknown Type of Exam: Unknown; ORDERING SYSTEM PROVIDED HISTORY: Left hemiplegia TECHNOLOGIST PROVIDED HISTORY: Please add to VIZ clovis Left moderate irregularity of the P2 and P3 segments posterior cerebral arteries. No aneurysm. OTHER: No dural venous sinus thrombosis on this non-dedicated study. BRAIN: No mass effect or midline shift. No extra-axial fluid collection. The gray-white differentiation is maintained. 1. No new arterial abnormality in the head or neck. 2. 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 3. Moderate diffuse irregularity of the P2 and P3 segments posterior cerebral arteries, which are supplied by the posterior communicating arteries. 4. No hemodynamically significant arterial stenosis in the neck. Cta Head Neck W Contrast    Result Date: 2/13/2020  EXAMINATION: CTA OF THE HEAD AND NECK WITH CONTRAST 2/13/2020 3:27 pm: TECHNIQUE: CTA of the head and neck was performed with the administration of intravenous contrast. Multiplanar reformatted images are provided for review. MIP images are provided for review. Stenosis of the internal carotid arteries measured using NASCET criteria. Dose modulation, iterative reconstruction, and/or weight based adjustment of the mA/kV was utilized to reduce the radiation dose to as low as reasonably achievable. COMPARISON: None. HISTORY: ORDERING SYSTEM PROVIDED HISTORY: left sided deficits TECHNOLOGIST PROVIDED HISTORY: left sided deficits Reason for Exam: lt sided numbness weakness slurred speech FINDINGS: CTA NECK: AORTIC ARCH/ARCH VESSELS: No dissection or arterial injury. No significant stenosis of the brachiocephalic or subclavian arteries. CAROTID ARTERIES: No dissection, arterial injury, or hemodynamically significant stenosis by NASCET criteria. VERTEBRAL ARTERIES: No dissection, arterial injury, or significant stenosis. SOFT TISSUES: The lung apices are clear. No cervical or superior mediastinal lymphadenopathy. The larynx and pharynx are unremarkable. No acute abnormality of the salivary and thyroid glands. BONES: No acute osseous abnormality.  CTA HEAD: ANTERIOR CIRCULATION: No significant stenosis of the intracranial internal carotid, anterior cerebral, or middle cerebral arteries. No aneurysm. 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 There is persistent fetal origin of the posterior cerebral arteries bilaterally. The superior cerebellar arteries appear to arise from the posterior cerebral arteries. OTHER: No dural venous sinus thrombosis on this non-dedicated study. BRAIN: No mass effect or midline shift. No extra-axial fluid collection. The gray-white differentiation is maintained. No significant stenosis or evidence for occlusion. 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 The superior cerebellar arteries also appear to arise from the posterior cerebral arteries. Mri Brain Wo Contrast    Result Date: 2/24/2020  EXAMINATION: MRI OF THE BRAIN WITHOUT CONTRAST  2/24/2020 5:35 pm TECHNIQUE: Multiplanar multisequence MRI of the brain was performed without the administration of intravenous contrast. COMPARISON: February 13 HISTORY: ORDERING SYSTEM PROVIDED HISTORY: worsening speech over past 3 days, CT brain showed no changes from known infarct TECHNOLOGIST PROVIDED HISTORY: worsening speech over past 3 days, CT brain showed no changes from known infarct Reason for Exam: worsening speech over past 3 days, CT brain showed no changes from known infarct Acuity: Unknown Type of Exam: Ongoing Additional signs and symptoms: S/P Rapid response on 02/24/2020 - HBP. Relevant Medical/Surgical History: Hx - HBP, Acute CVA and NSTEMI. No relevant surgical hx to area of interest FINDINGS: INTRACRANIAL STRUCTURES/VENTRICLES: Increasing multifocal high signal throughout the mayuri is noted. Multifocal high T2 and FLAIR signal is again noted bilaterally in the periventricular and the subcortical white matter. This is stable. Ventricles and sulci are stable.   Small focus of stable high T2 signal in the periphery of the left cerebellar hemisphere is again noted on image 5. Additional punctate focus of high T2 signal in the anterolateral periphery of the left cerebellar hemisphere was not definitively present on the prior. Heterogeneous signal in the basilar artery flow void is noted. Similar findings were noted on the previous exam.  Punctate foci of dark gradient echo signal in the left cerebellar hemisphere are not as well seen compared to the prior. No new areas of dark gradient echo signal are noted. Numerous areas of high diffusion signal throughout the mayuri are noted, increased from the prior exam.  This is greatest right of midline. Punctate new focus of high diffusion signal in the anterolateral aspect of the left cerebellar hemisphere is noted as well. No other areas of new abnormal diffusion signal are noted ORBITS: No acute orbital abnormality is noted SINUSES: Multifocal paranasal sinus opacification is noted with a small amount of fluid noted in the right sphenoid and probably in the left posterior ethmoid. BONES/SOFT TISSUES: The bone marrow signal intensity appears normal. The soft tissues demonstrate no acute abnormality. Increasing multifocal acute infarct in the mayuri. New punctate acute infarct left anterolateral cerebellar hemisphere Multifocal periventricular and subcortical white matter small vessel ischemic changes are again noted bilaterally. Mri Brain Wo Contrast    Result Date: 2/13/2020  EXAMINATION: MRI OF THE BRAIN WITHOUT CONTRAST  2/13/2020 6:20 pm TECHNIQUE: Multiplanar multisequence MRI of the brain was performed without the administration of intravenous contrast. COMPARISON: CT head neck 02/13/2020 and CT head 02/13/2020 HISTORY: ORDERING SYSTEM PROVIDED HISTORY: left sided weakness TECHNOLOGIST PROVIDED HISTORY: left sided weakness FINDINGS: INTRACRANIAL STRUCTURES/VENTRICLES: There is a right paramedian pontine acute stroke. No obvious T2 prolongation. No mass effect or midline shift. No evidence of an acute intracranial hemorrhage. The ventricles and sulci are normal in size and configuration. Mild non-specific periventricular and subcortical T2 prolongation identified may be sequelae of chronic small ischemic disease versus demyelinating process. Wedge-shaped focus of T2 prolongation identified left posterior cerebellum without corresponding restricted diffusion suggestive of prior stroke. The sellar/suprasellar regions appear unremarkable. There is normal flow of the normal flow void involving the basilar artery. The Remainder of the normal signal voids within the major intracranial vessels appear maintained. Foci of hemosiderin left posterior cerebellum noted of uncertain clinical significance ORBITS: The visualized portion of the orbits demonstrate no acute abnormality. SINUSES: Moderate maxillary sinus disease. Air-fluid level right sphenoid sinus. Small air-fluid level right maxillary sinus. BONES/SOFT TISSUES: The bone marrow signal intensity appears normal. The soft tissues demonstrate no acute abnormality. Acute right paramedian pontine stroke. 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 Remote left posterior cerebellar stroke. Mild non-specific white matter changes favor chronic small ischemic disease. Demyelinating process may also be considered in differential although thought less likely. 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 [address] [I63.9] 02/24/2020    Hypertensive emergency [I16.1] 02/13/2020     Compound heterozygous MTHFR mutation with mildly elevated homocystine  Hypoplastic basilar artery      RECOMMENDATIONS:  Personally reviewed results of lab work-up and other relevant clinical data.   Patient is not able to give any meaningful history  Medical records from previous admission reviewed  According to the patient's family member there is no previous history of venous thromboembolism. Patient underwent heart catheterization 2019 and since then has not been doing well. He has recurrent strokes since beginning of this year  Work-up done previously was unremarkable for factor V Leyden mutation prothrombin gene mutation and antiphospholipid antibodies. I will recheck homocystine level. I will also check Z37 and folic acid level. Continue current treatment with anticoagulation and anti-platelet regimen  We will continue to follow      Discussed with patient and Nurse. Thank you for asking us to see this patient. Shilo Hutchinson MD          This note is created with the assistance of a speech recognition program.  While intending to generate a document that actually reflects the content of the visit, the document can still have some errors including those of syntax and sound a like substitutions which may escape proof reading. It such instances, actual meaning can be extrapolated by contextual diversion.

## 2020-02-25 NOTE — PROGRESS NOTES
to produce verbal responses, unable to mouth responses)  Labial Symmetry: Abnormal symmetry left    Auditory Comprehension  Comprehension: Exceptions(Sentence Comprehension, y/n: 7/8)       Expression  Primary Mode of Expression: Nonverbal - gestures    Verbal Expression  Verbal Expression: Exceptions to functional limits (Pt unable to produce verbal responses or mouth responses)  Automatic Speech: Moderate, Counting (5/10 using gestures)   Responsive Naming: To be assessed in therapy  Word Generation: To be assessed in therapy  Confrontational naming: To be assessed in therapy  Sentence Completion: To be assessed in therapy        Motor Speech  Motor Speech: Unable to assess       Cognition:      Orientation  Overall Orientation Status: Within Functional Limits(Pt oriented to name, month, and year)  Memory  Memory: Unable to assess  Problem Solving  Problem Solving: Unable to assess  Abstract Reasoning  Abstract Reasoning: Unable to assess  Safety/Judgement  Safety/Judgement: Unable to assess    Prognosis:  Speech Therapy Prognosis  Prognosis: Guarded  Individuals consulted  Consulted and agree with results and recommendations: Patient; Family member    Education:  Patient Education: Yes  Patient Education Response: Needs reinforcement          Therapy Time:   Individual Concurrent Group Co-treatment   Time In [phone]         Time Out 0901         Minutes 10               Completed by Raeann King,  Clinician    Co-signed by Cipriano Harris. JULEE/SLP   2/25/2020 9:28 AM

## 2020-02-25 NOTE — PROGRESS NOTES
Patient transported from [facility] [facility] to room 514. Vitals taken and patient on cardiac monitor. Will continue to closely monitor.

## 2020-02-25 NOTE — PROGRESS NOTES
Dr. Robbin Willson at bedside to assess patient. Notified that patient will be transferred to [facility]. V's for angiogram.  Orders received. Family notified.

## 2020-02-25 NOTE — PROGRESS NOTES
Smoking Cessation - topics covered   []  Health Risks  []  Benefits of Quitting   []  Smoking Cessation  [x]  Patient has no history of tobacco use per note in significant history. []  Patient is former smoker per note in significant history. Patient quit in   [x]  No need for tobacco cessation education. []  Booklet given  []  Patient verbalizes understanding. []  Patient denies need for tobacco cessation education. []  Unable to meet with patient today. Will follow up as able.   Eliezer Alford  7:33 AM

## 2020-02-25 NOTE — PROGRESS NOTES
Patient admitted to ICU 10 and transferred to bed. Assessment complete and pt oriented to room and call light. Fall precautions in place.

## 2020-02-25 NOTE — PROGRESS NOTES
Daily Progress Note  Neuro Critical Care    Patient Name: Cherie Harrison  Patient : 1970  Room/Bed: 4633/5627-31  Code Status: FULL  Allergies: No Known Allergies    CHIEF COMPLAINT:      Aphasia, left sided weakness     INTERVAL HISTORY    Initial Presentation (Admitted 20): The patient is a 53 yo with a history of HTN, CAD s/p stent (2019), and recent right pontine infarct who presented as a transfer from [address] [place] [address] for worsening aphasia, dysphagia, and left hemiparesis. Patient was recently admitted to [facility] on 20 after presenting with dysarthria and left hemiparesis and found to have a right pontine infarction. He was out of window for tPA. 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 He was maintained on his home ASA and Brilinta and was treated with a low dose heparin infusion for 48h. Repeat CTA Head/Neck on 2/15 remained stable and patient's clinical exam remained unchanged after heparin was stopped. He was discharged to [facility] on . At the time of discharge he had a left facial droop and mild to moderate left sided weakness (LUE 3/5, LLE 4/5). For the last few days prior to re-admission, family states they started to notice patient's speech started to decline and he was having more difficulty swallowing. On , he was noted by the nurse to have decreased word output and to be coughing with liquids. CT Head performed which showed a stable right pontine infarct. On , patient was sent to [address] [place] [address] ED due to hypertensive urgency (237/130). Given 20mg IV Labetalol with improvement of BP down to 152/99. Started on IV fluids for RASHMI. Repeat CT head showed stable right pontine infarct. MRI Brain showed increasing multifocal infarct in the mayuri extending to medulla and new punctate infarct left cerebellar hemisphere.   Patient initially admitted to the ICU at [address] [place] [address] but then decision made to Oral 63 20 94 % --   02/25/20 0302 (!) 170/69 -- -- 72 18 96 % --   02/25/20 0230 (!) 171/72 -- -- 61 20 95 % --   02/25/20 0103 (!) 158/91 -- -- 58 16 94 % --   02/25/20 0002 (!) 167/74 98.4 °F (36.9 °C) Oral 56 18 94 % --   02/25/20 0000 -- -- -- 55 -- -- --   02/24/20 2302 (!) 170/77 -- -- 62 13 95 % --   02/24/20 2204 (!) 164/70 -- -- 68 19 95 % --   02/24/20 2155 -- -- -- -- -- -- 238 lb 12.1 oz (108.3 kg)   02/24/20 2146 (!) 174/67 98 °F (36.7 °C) Oral 67 (!) 34 98 % --   02/24/20 2130 -- -- -- 67 -- -- --     Estimated body mass index is 31.5 kg/m² as calculated from the following:    Height as of an earlier encounter on 2/24/20: 6' 1\" (1.854 m). Weight as of this encounter: 238 lb 12.1 oz (108.3 kg).  []<16 Severe malnutrition  []16-16.99 Moderate malnutrition  []17-18.49 Mild malnutrition  []18.5-24.9 Normal  []25-29.9 Overweight (not obese)  [x]30-34.9 Obese class 1 (Low Risk)  []35-39.9 Obese class 2 (Moderate Risk)  []?40 Obese class 3 (High Risk)    RECENT LABS:   Lab Results   Component Value Date    WBC 10.8 02/25/2020    HGB 13.1 02/25/2020    HCT 40.8 02/25/2020     02/25/2020    CHOL 140 02/14/2020    TRIG 40 02/14/2020    HDL 41 02/14/2020    ALT 57 (H) 05/25/2019    AST 29 05/25/2019     (H) 02/24/2020    K 3.7 02/24/2020     (H) 02/24/2020    CREATININE 1.37 (H) 02/24/2020    BUN 25 (H) 02/24/2020    CO2 21 02/24/2020    INR 1.1 02/24/2020    LABA1C 5.6 02/14/2020     24 HOUR INTAKE/OUTPUT:    Intake/Output Summary (Last 24 hours) at 2/25/2020 0704  Last data filed at 2/25/2020 4442  Gross per 24 hour   Intake 1981.92 ml   Output 1400 ml   Net 581.92 ml       IMAGING:   Xr Chest (single View Frontal)  Result Date: 2/24/2020  Ill-defined increased density in the left lung base, atelectasis versus pneumonia. No findings diagnostic of pulmonary edema     Ct Head Wo Contrast  Result Date: 2/25/2020  1. Hypodense areas in the mayuri concordant with acute infarct visualized at MR. with a history of HTN, CAD s/p stent (January 2019), and recent right pontine infarct who presented as a transfer from [facility] for worsening aphasia, dysphagia, and left hemiparesis. Found to have increasing multifocal acute infarctions in the mayuri extending to the medulla as well as a small acute infarct in the left cerebellar infarction. 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  - Continue low dose heparin infusion  - Continue Aspirin 81mg QD, Brilinta 90mg BID, Lipitor 80mg QHS  - Hypercoag panel reviewed; +Hetero MTHFR mutations, mildly elevated homocysteine level, Hem/Onc consulted  - Neuro Endovascular following; plans for cerebral angiogram later today  - Goal -200  - Neuro checks per protocol    CARDIOVASCULAR:  - Goal -200, avoid hypotension  - PRN Hydralazine/Labetalol  - Hold home antihypertensives for now  - History of CAD s/p stent Jan 2019  - Continue Asprin and Brilinta  - Mild troponin leak (initially 36), down to 32, F/U EKG  - Echo 2/14 EF 55%, severe LVH, aortic root dilation with mild aortic insufficiency  - Outpatient Cardiology follow up  - Hyperlipidemia, recent lipid panel; LDL 91, Cholesterol 140  - Lipitor 80mg QHS  - Continue telemetry    PULMONARY:  - Maintaining O2 sats on room air  - CXR 2/24 showed ill defined density in the left lung base; atelectasis vs pneumonia  - Monitor patient's ability to protect airway closely    RENAL/FLUID/ELECTROLYTE:  - Mild RASHMI likely related to poor intake PTA  - BUN 24/ Creatinine 1.29, improving  - Normal CK  - Monitor I&O  - IVF: Continue [email or web address]  - Replace electrolytes PRN  - Daily BMP    GI/NUTRITION:  NUTRITION:  Diet NPO Effective Now  - Keep NPO  - Consider starting tube feeds 2/26, has NG access  - Bowel regimen: Senokot-S daily, Milk of Mag and Dulcolax suppository PRN  - GI prophylaxis: Not indicated    ID:  - Afebrile, Tmax 37.6  - Mild leukocytosis resolved, WBC 10.8  - CXR showed left lung base density; atelectasis vs pneumonia  - Procalcitonin 0.06, not suggestive of infection  - Monitor off antibiotics for now  - Continue to monitor for fevers  - Daily CBC    HEME:   - H&H 13.1/40.8  - Platelets 537  - Heterozygous MTHFR mutation, mildly elevated homocysteine levels  - Negative prothrombin, Factor V Leiden, lupus anticoagulant  - Hem/Onc consulted  - Daily CBC    ENDOCRINE:  - Continue to monitor blood glucose, goal <180  - Recent Hemoglobin A1C 5.6    OTHER:  - Start Prozac 10mg QD for depression  - PT/OT/ST  - PM&R  - Code Status: FULL    PROPHYLAXIS:  Stress ulcer: Not indicated    DVT PROPHYLAXIS:  - SCD sleeves - Thigh High   - On Heparin infusion    DISPOSITION:  [x] To remain ICU for close neurological monitoring. We will continue to follow along. For any changes in exam or patient status please contact Neuro Critical Care.       BALTAZAR King - [facility]  Neuro Critical Care  Pager [phone]  2/25/2020     7:04 AM

## 2020-02-25 NOTE — PLAN OF CARE
Problem: HEMODYNAMIC STATUS  Goal: Patient has stable vital signs and fluid balance  Outcome: Ongoing   Neuro assessment completed, fall precautions in place, aspirations precautions in place, assess for barriers in communication and mobility, interventions to assist in communication and mobility in place, encouraged to call for assistance, adaptive devices used as needed, assess emotional state and support offered, encouraged patient to communicate by available means, and support systems included in patient care.

## 2020-02-25 NOTE — ANESTHESIA POSTPROCEDURE EVALUATION
Department of Anesthesiology  Postprocedure Note    Patient: Rosie Pantoja  MRN: [id number]  [place]: 1970  Date of evaluation: 2/25/2020  Time:  6:14 PM     Procedure Summary     Date:  02/25/20 Room / Location:  [facility]    Anesthesia Start:  1408 Anesthesia Stop:  2468    Procedure:  IR ANGIOGRAM CAROTID CEREBRAL BILATERAL Diagnosis:  (mac;Basilar stenosis)    Scheduled Providers:  BALTAZAR Carias - CRNA Responsible Provider:  Jessica Alejandro MD    Anesthesia Type:  general, MAC ASA Status:  3          Anesthesia Type: general, MAC    Adolfo Phase I:      Adolfo Phase II:      Last vitals: Reviewed and per EMR flowsheets.        Anesthesia Post Evaluation    Patient location during evaluation: ICU  Patient participation: complete - patient participated  Level of consciousness: awake and alert  Pain score: 2  Airway patency: patent  Nausea & Vomiting: no nausea and no vomiting  Complications: no  Cardiovascular status: hemodynamically stable  Respiratory status: acceptable, nasal cannula and room air  Hydration status: stable Alcohol withdrawal syndrome without complication

## 2020-02-25 NOTE — ANESTHESIA PRE PROCEDURE
Department of Anesthesiology  Preprocedure Note       Name:  Jennifer Manley   Age:  52 y.o.  :  1970                                          MRN:  [id number]         Date:  2020      Surgeon: * No surgeons listed *    Procedure: IR ANGIOGRAM CAROTID CEREBRAL BILATERAL    Medications prior to admission:   Prior to Admission medications    Medication Sig Start Date End Date Taking? Authorizing Provider   meloxicam (MOBIC) 7.5 MG tablet Take 1 tablet by mouth 2 times daily as needed for Pain 2/10/20 2/24/20  Loren Wong PA-C   tadalafil (CIALIS) 20 MG tablet Take 1 tablet by mouth as needed for Erectile Dysfunction 2/10/20   Loren Wong PA-C   lisinopril (PRINIVIL;ZESTRIL) 40 MG tablet Take 1 tablet by mouth daily 2/10/20   Loren Wong PA-C   aspirin 81 MG EC tablet Take 1 tablet by mouth daily 19   Diego Asif MD   nitroGLYCERIN (NITROSTAT) 0.4 MG SL tablet up to max of 3 total doses.  If no relief after 1 dose, call 911. 19   Diego Asif MD   atorvastatin (LIPITOR) 80 MG tablet Take 1 tablet by mouth nightly 19   Diego Asif MD   amLODIPine (NORVASC) 10 MG tablet Take 1 tablet by mouth daily 19   Diego Asif MD   ticagrelor (BRILINTA) 90 MG TABS tablet Take 1 tablet by mouth 2 times daily 19   Diego Asif MD       Current medications:    Current Facility-Administered Medications   Medication Dose Route Frequency Provider Last Rate Last Dose    aspirin chewable tablet 81 mg  81 mg Oral Daily Alfreda Loges, APRN - CNP   81 mg at 20 0841    ticagrelor (BRILINTA) tablet 90 mg  90 mg Oral BID Alfreda Logeloisa, APRN - CNP        atorvastatin (LIPITOR) tablet 80 mg  80 mg Oral Nightly Robby Choi MD        FLUoxetine (PROZAC) 20 MG/5ML solution 20 mg  20 mg Per NG tube Daily Robby Choi MD        chlorhexidine (PERIDEX) 0.12 % solution 15 mL  15 mL Mouth/Throat BID Alfreda Loges, APRN - CNP        ondansetron [facility] injection 4 mg  4 mg Intravenous Once PRN Gricel Padilla MD        fentaNYL (SUBLIMAZE) injection 50 mcg  50 mcg Intravenous Q5 Min PRN Gricel Padilla MD        sodium chloride flush 0.9 % injection 10 mL  10 mL Intravenous 2 times per day Wilmer Mckeon MD        sodium chloride flush 0.9 % injection 10 mL  10 mL Intravenous PRN Wilmer Mckeon MD        polyethylene glycol [facility]) packet 17 g  17 g Oral Daily PRN Alex Cadena MD        ondansetron [facility]) injection 4 mg  4 mg Intravenous Q6H PRN Alex Cadena MD        0.9 % sodium chloride infusion   Intravenous Continuous Alex Cadena  mL/hr at 02/25/20 0146      promethazine (PHENERGAN) tablet 12.5 mg  12.5 mg Oral Q6H PRN Alex Cadena MD        labetalol (NORMODYNE;TRANDATE) injection syringe 10 mg  10 mg Intravenous Q10 Min PRN Alex Cadena MD        hydrALAZINE (APRESOLINE) injection 10 mg  10 mg Intravenous Q6H PRN Wilmer Mckeon MD        heparin (porcine) injection 4,000 Units  4,000 Units Intravenous Once Wilmer Mckeon MD        heparin (porcine) injection 4,000 Units  4,000 Units Intravenous PRN Alex Cadena MD        heparin (porcine) injection 2,000 Units  2,000 Units Intravenous PRN Alex Cadena MD        heparin 25,000 units in dextrose 5% 250 mL infusion  9.2 Units/kg/hr Intravenous Continuous Alex Cadena MD 16.5 mL/hr at 02/25/20 0545 15.2 Units/kg/hr at 02/25/20 0545     Facility-Administered Medications Ordered in Other Encounters   Medication Dose Route Frequency Provider Last Rate Last Dose    sodium chloride flush 0.9 % injection 10 mL  10 mL Intravenous 2 times per day Tobias Quezada MD        sodium chloride flush 0.9 % injection 10 mL  10 mL Intravenous PRN Tobias Quezada MD           Allergies:  No Known Allergies    Problem List:    Patient Active Problem List   Diagnosis Code    STEMI (ST elevation myocardial infarction) ([facility] [address] I21.3    Coronary artery disease solid consumption: 1000                        Date of last liquid consumption: 02/24/20                        Date of last solid food consumption: 02/24/20    BMI:   Wt Readings from Last 3 Encounters:   02/24/20 238 lb 12.1 oz (108.3 kg)   02/24/20 245 lb (111.1 kg)   02/19/20 245 lb (111.1 kg)     Body mass index is 31.5 kg/m². CBC:   Lab Results   Component Value Date    WBC 10.8 02/25/2020    RBC 4.53 02/25/2020    HGB 13.1 02/25/2020    HCT 40.8 02/25/2020    MCV 90.1 02/25/2020    RDW 12.6 02/25/2020     02/25/2020       CMP:   Lab Results   Component Value Date     02/25/2020    K 3.9 02/25/2020     02/25/2020    CO2 20 02/25/2020    BUN 24 02/25/2020    CREATININE 1.29 02/25/2020    GFRAA >60 02/25/2020    LABGLOM 59 02/25/2020    GLUCOSE 91 02/25/2020    PROT 7.1 05/25/2019    CALCIUM 8.9 02/25/2020    BILITOT 1.02 05/25/2019    ALKPHOS 103 05/25/2019    AST 29 05/25/2019    ALT 57 05/25/2019       POC Tests: No results for input(s): POCGLU, POCNA, POCK, POCCL, POCBUN, POCHEMO, POCHCT in the last 72 hours.     Coags:   Lab Results   Component Value Date    PROTIME 14.1 02/24/2020    INR 1.1 02/24/2020    APTT 43.7 02/25/2020       HCG (If Applicable): No results found for: PREGTESTUR, PREGSERUM, HCG, HCGQUANT     ABGs: No results found for: PHART, PO2ART, XFF0USY, OIT2GII, BEART, [id number]     Type & Screen (If Applicable):  No results found for: LABABO, [address]    Anesthesia Evaluation  Patient summary reviewed and Nursing notes reviewed  Airway: Mallampati: II  TM distance: >3 FB   Neck ROM: full  Mouth opening: > = 3 FB Dental: normal exam         Pulmonary: breath sounds clear to auscultation                             Cardiovascular:  Exercise tolerance: good (>4 METS),   (+) hypertension:, past MI:, CAD: obstructive,       ECG reviewed  Rhythm: regular  Rate: normal  Echocardiogram reviewed    Cleared by cardiology     Beta Blocker:  Not on Beta Blocker         Neuro/Psych:   (+)

## 2020-02-25 NOTE — CARE COORDINATION
Writer had intended to see patient for follow up on consultation service on acute rehab at [facility] this afternoon. However, patient has been admitted to Neuro ICU at [facility] with recent procedure completed today. Please reconsult as necessary for psychiatric services and once more stable medically.

## 2020-02-25 NOTE — PROGRESS NOTES
Occupational Therapy Not Seen Note    DATE: 2020  Name: Carmen Beck  : 1970  MRN: [id number]    Patient not available for Occupational Therapy due to:    Surgery/Procedure: Angiogram this PM. k tomorrow    Next Scheduled Treatment: 2020    Electronically signed by DIONY Ricketts on 2020 at 1:37 PM

## 2020-02-25 NOTE — PROGRESS NOTES
Patient's family updated at bedside by writer and neuro critical resident. Patient assessment unchanged. Will continued to monitor.

## 2020-02-25 NOTE — OP NOTE
[facility]    NEUROENDOVASCULAR SERVICE: POST-OP NOTE: 2/25/2020    Pt Name: Candance Ora  MRN: [id number]  [place]: 1970  Date of Procedure: 2/25/2020  Primary Care Physician: Donovan Pantoja PA-C      Pre-Procedural Diagnosis: Basilar artery occlusion  Post-Procedural Diagnosis: Same      Procedure Performed:Diagnostic Cerebral Angiogram    Surgeon:   Pushpa Rosario MD    Fellow: Jesse Duverney, MD     1st Assistant:  Nanci Trammell    PRE-PROCEDURAL EXAM:  Prestroke baseline mRS MODIFIED LILIANA SCORE: 4 - Moderate severe disability:  unable to walk or attend to own bodily needs without assistance. Neurological exam performed and unchanged from initial H&P or consult    Anesthesia: MAC anesthesia  Complications: none    Intra-Operative EXAM:  Neurological exam performed and unchanged from initial H&P or consult    EBL: < Minimal      Cc            Specimens: Were not Obtained  Contrast:     Visipaque 270 low osmolar 115 Cc with 50/50             Fluoro: 32.1 min    Findings:  Please see dictated Radiology note for further details  1. Mid basilar artery occlusion distal to anterior inferior cerebellar artery and proximal to superior cerebellar artery with retrograde filling to the superior cerebral artery from anterior circulation  2. Bilateral dominant P-comm  3. Dominant left vertebral artery      POST-PROCEDURAL EXAM :   Stable neurological Exam  Neurological exam performed and unchanged from initial H&P or consult    Closure:  right 25 cm 5 [place] sheath sutured in place 5   F        POST-PROCEDURAL MONITORING : see orders  Disposition: Neuro ICU      Recommendations:  Back to NSICU. Do not bend right leg since the sheath is in  Groin checks per protocol. Neuro checks per icu protocol. Peripheral pulse checks per protocol.   ICU management per NSICU team.   SBP goal 140-200  Resume aspirin and MD Pushpa Yeung MD   Pager [phone]  Stroke,

## 2020-02-25 NOTE — CONSULTS
Hypertension Sister     Hypertension Brother        Social History     Socioeconomic History    Marital status: Single     Spouse name: None    Number of children: None    Years of education: None    Highest education level: None   Occupational History    None   Social Needs    Financial resource strain: None    Food insecurity:     Worry: None     Inability: None    Transportation needs:     Medical: None     Non-medical: None   Tobacco Use    Smoking status: Never Smoker    Smokeless tobacco: Never Used   Substance and Sexual Activity    Alcohol use:  Yes    Drug use: Never    Sexual activity: None   Lifestyle    Physical activity:     Days per week: None     Minutes per session: None    Stress: None   Relationships    Social connections:     Talks on phone: None     Gets together: None     Attends Scientology service: None     Active member of club or organization: None     Attends meetings of clubs or organizations: None     Relationship status: None    Intimate partner violence:     Fear of current or ex partner: None     Emotionally abused: None     Physically abused: None     Forced sexual activity: None   Other Topics Concern    None   Social History Narrative    None       Current Facility-Administered Medications   Medication Dose Route Frequency Provider Last Rate Last Dose    amLODIPine (NORVASC) tablet 10 mg  10 mg Oral Daily Codey A Marlea Kanner, MD        aspirin EC tablet 81 mg  81 mg Oral Daily Codey A Marlea Kanner, MD        atorvastatin (LIPITOR) tablet 80 mg  80 mg Oral Nightly Fermin Parker MD        carvedilol (COREG) tablet 25 mg  25 mg Oral BID WC Meliton Tao MD        ticagrelor (BRILINTA) tablet 90 mg  90 mg Oral BID Meliton Tao MD        sodium chloride flush 0.9 % injection 10 mL  10 mL Intravenous 2 times per day Meliton Tao MD        sodium chloride flush 0.9 % injection 10 mL  10 mL Intravenous PRN Fermin Parker MD        acetaminophen (TYLENOL) tablet 650 mg  650 mg Facility-Administered Medications Ordered in Other Encounters   Medication Dose Route Frequency Provider Last Rate Last Dose    sodium chloride flush 0.9 % injection 10 mL  10 mL Intravenous 2 times per day Shaq Cortes MD        sodium chloride flush 0.9 % injection 10 mL  10 mL Intravenous PRN Meche Lee MD           No Known Allergies    ROS:   Constitutional                  Negative for fever and chills   HEENT                            Positive for swallowing difficulty   Eyes                                Negative for photophobia, pain and discharge  Respiratory                      Negative for hemoptysis and sputum  Cardiovascular                Negative for orthopnea, claudication and PND  Gastrointestinal               Negative for abdominal pain, diarrhea, blood in stool  Musculoskeletal               Negative for joint pain, negative for myalgia  Skin                                 Negative for rash or itching  Endo/heme/allergies       Negative for polydipsia, environmental allergy  Psychiatric                       Negative for suicidal ideation. Patient is not anxious    Vitals:    02/24/20 1818   BP: (!) 160/74   Pulse: 72   Resp: 18   Temp: 99.7 °F (37.6 °C)   SpO2: 97%     Admission weight: 245 lb (111.1 kg)    Neurological Examination  Constitutional .General exam well groomed   Head/ Ears /Nose/Throat/external ear . Normal exam  Neck and thyroid . Normal size. No bruits  Respiratory . Breathsounds clear bilaterally  Cardiovascular: Auscultation of heart with regular rate and rhythm   Musculoskeletal. Muscle tone decreased tone left arm and leg                                                           Muscle strength left arm plegic .  Left leg with mild withdrawal . Lifting right arm and right leg                                                                                 No dysmetria or dysdiadokinesis  No tremor   No left fine motor  Orientation Alert and oriented x 3   Attention and concentration normal  Language process anarthric . Following commands    Cranial nerve 2 normal acuety and visual fields  Cranial nerve 3, 4 and 6 . Extraocular muscles are intact . Pupils are equal and reactive   Cranial nerve 5 . Intact corneal reflex  Cranial nerve 7 decrease left NLF  Cranial nerve 8. Grossly intact hearing   Cranial nerve 9 and 10. Symmetric palate elevation   Cranial nerve 11 , 5 out of 5 strength   Cranial Nerve 12 midline tongue . No atrophy  Sensation . No withdrawal left arm with mild left left withdrawal  Deep Tendon Reflexes brisker on left   Plantar response plantar extensor bilaterally     Assessment :    Brainstem infarction with progression     Plan:    Blood pressure parameter 160-200.  mg rectal . Low intensity heparin . Discussed with Dr Bib Rodriguez transfer to [facility] neuro ICU .  Discussed with Dr Davi Andrews who will accet him on his service

## 2020-02-25 NOTE — PROGRESS NOTES
Physical Therapy  DATE: 2020    NAME: Tierra Collado  MRN: [id number]   : 1970    Patient not seen this date for Physical Therapy due to:  [] Blood transfusion in progress  [] Hemodialysis  []  Patient Declined  [] Spine Precautions   [] Strict Bedrest  [x] Surgery/ Procedure: pt currently off floor getting an angiogram this PM; PT will check back . [] Testing      [] Other        [] PT being discontinued at this time. Patient independent. No further needs. [] PT being discontinued at this time as the patient has been transferred to palliative care. No further needs. JOYCE Starr This treatment/evaluation completed by signing SPT. Signing PT agrees with treatment and documentation.

## 2020-02-25 NOTE — H&P
Neuro ICU History & Physical    Patient Name: Flavio Ragland  Patient : 1970  Room/Bed: 5491/3395-55  Allergies: No Known Allergies  Problem List:   Patient Active Problem List   Diagnosis    STEMI (ST elevation myocardial infarction) ([address]    Coronary artery disease involving native coronary artery of native heart without angina pectoris    Acute ST segment elevation myocardial infarction ([address]    Essential hypertension    Class 1 obesity due to excess calories with body mass index (BMI) of 32.0 to 32.9 in adult    Stroke-like symptoms    Ischemic stroke ([address]    Hypertensive emergency without congestive heart failure    Numbness    Basilar artery stenosis/occlusion with infarction ([address]    Right arm weakness    Right pontine stroke ([address]    Left-sided weakness    Systolic murmur    Acute CVA (cerebrovascular accident) ([address]    Adjustment disorder with depressed mood    Hypertensive urgency    Brainstem infarction ([address]    Brain stem infarction ([address]       CHIEF COMPLAINT     Severe dysarthria, left hemiplegia, left facial droop    HPI    History Obtained From: Medical records and medical staff    Jacob Ramirez a 52 y. o. male with history of HTN and MI s/p cardiac stent x 1 2019 who was transferred from [facility] rehab after worsening left hemiplegia, severe dysarthria and altered mental status. The patient was admitted to the neuro ICU 2024 dizziness, dysarthria and mild left-sided weakness secondary to right pontine infarction on MRI. The patient was on aspirin 81 mg and Brilinta 90 mg twice daily with Lipitor 80 mg for history of cardiac stent. CTA of the head and neck revealed hypoplastic appearing basilar artery with bilateral fetal PCAs. The patient was started on heparin drip for 2 days and then CTA was repeated with the same findings. Echo was done and was within normal limits except for aortic dilatation 4.5 cm, cardiology was consulted.   LDL 91, Not on file     Minutes per session: Not on file    Stress: Not on file   Relationships    Social connections:     Talks on phone: Not on file     Gets together: Not on file     Attends Church service: Not on file     Active member of club or organization: Not on file     Attends meetings of clubs or organizations: Not on file     Relationship status: Not on file    Intimate partner violence:     Fear of current or ex partner: Not on file     Emotionally abused: Not on file     Physically abused: Not on file     Forced sexual activity: Not on file   Other Topics Concern    Not on file   Social History Narrative    Not on file       Family History:       Problem Relation Age of Onset    High Blood Pressure Mother     High Blood Pressure Father     Hypertension Sister     Hypertension Brother        Allergies:    Patient has no known allergies. Medications Prior to Admission:    Medications Prior to Admission: meloxicam (MOBIC) 7.5 MG tablet, Take 1 tablet by mouth 2 times daily as needed for Pain  tadalafil (CIALIS) 20 MG tablet, Take 1 tablet by mouth as needed for Erectile Dysfunction  lisinopril (PRINIVIL;ZESTRIL) 40 MG tablet, Take 1 tablet by mouth daily  aspirin 81 MG EC tablet, Take 1 tablet by mouth daily  nitroGLYCERIN (NITROSTAT) 0.4 MG SL tablet, up to max of 3 total doses.  If no relief after 1 dose, call 911.  atorvastatin (LIPITOR) 80 MG tablet, Take 1 tablet by mouth nightly  amLODIPine (NORVASC) 10 MG tablet, Take 1 tablet by mouth daily  ticagrelor (BRILINTA) 90 MG TABS tablet, Take 1 tablet by mouth 2 times daily    Current Medications:  Current Facility-Administered Medications: sodium chloride flush 0.9 % injection 10 mL, 10 mL, Intravenous, 2 times per day  sodium chloride flush 0.9 % injection 10 mL, 10 mL, Intravenous, PRN  polyethylene glycol (GLYCOLAX) packet 17 g, 17 g, Oral, Daily PRN  ondansetron (ZOFRAN) injection 4 mg, 4 mg, Intravenous, Q6H PRN  0.9 % sodium chloride Symmetrical shoulder shrug  XII   -     Midline tongue, no atrophy    MOTOR FUNCTION:  significant for good strength of grade 5/5 in the right side, 0-1 out of 5 in the left side extremities in bilateral proximal and distal muscle groups of both upper and lower extremities with normal bulk, normal tone and no involuntary movements, no tremor   SENSORY FUNCTION:  Decrease sensation the left side   CEREBELLAR FUNCTION:  Intact fine motor control over upper limbs   REFLEX FUNCTION:  Brisk reflexes in the left side of the body, positive Babinski sign and positive Favian sign in the left side   STATION and GAIT  Not tested         LABS AND IMAGING:     RECENT LABS:  CBC with Differential:    Lab Results   Component Value Date    WBC 12.5 02/24/2020    RBC 4.75 02/24/2020    HGB 13.7 02/24/2020    HCT 41.1 02/24/2020     02/24/2020    MCV 86.6 02/24/2020    MCH 28.9 02/24/2020    MCHC 33.4 02/24/2020    RDW 13.5 02/24/2020    LYMPHOPCT 15 02/24/2020    MONOPCT 9 02/24/2020    BASOPCT 1 02/24/2020    MONOSABS 1.00 02/24/2020    LYMPHSABS 1.60 02/24/2020    EOSABS 0.00 02/24/2020    BASOSABS 0.10 02/24/2020    DIFFTYPE NOT REPORTED 02/24/2020     BMP:    Lab Results   Component Value Date     02/24/2020    K 3.6 02/24/2020     02/24/2020    CO2 22 02/24/2020    BUN 29 02/24/2020    LABALBU 4.0 05/25/2019    CREATININE 1.64 02/24/2020    CALCIUM 9.9 02/24/2020    GFRAA 55 02/24/2020    LABGLOM 45 02/24/2020    GLUCOSE 124 02/24/2020       RADIOLOGY:   Xr Chest (single View Frontal)    Result Date: 2/24/2020  EXAMINATION: ONE XRAY VIEW OF THE CHEST 2/24/2020 7:22 pm COMPARISON: February 13, 2020 HISTORY: ORDERING SYSTEM PROVIDED HISTORY: Concern for hypertensive emergency patient has RASHMI recieving large amounts of fluid with respiratory distress TECHNOLOGIST PROVIDED HISTORY: Concern for hypertensive emergency patient has RASHMI recieving large amounts of fluid with respiratory distress Reason for Exam: infarct. Mri Brain Wo Contrast    Result Date: 2/24/2020  EXAMINATION: MRI OF THE BRAIN WITHOUT CONTRAST  2/24/2020 5:35 pm TECHNIQUE: Multiplanar multisequence MRI of the brain was performed without the administration of intravenous contrast. COMPARISON: February 13 HISTORY: ORDERING SYSTEM PROVIDED HISTORY: worsening speech over past 3 days, CT brain showed no changes from known infarct TECHNOLOGIST PROVIDED HISTORY: worsening speech over past 3 days, CT brain showed no changes from known infarct Reason for Exam: worsening speech over past 3 days, CT brain showed no changes from known infarct Acuity: Unknown Type of Exam: Ongoing Additional signs and symptoms: S/P Rapid response on 02/24/2020 - HBP. Relevant Medical/Surgical History: Hx - HBP, Acute CVA and NSTEMI. No relevant surgical hx to area of interest FINDINGS: INTRACRANIAL STRUCTURES/VENTRICLES: Increasing multifocal high signal throughout the mayuri is noted. Multifocal high T2 and FLAIR signal is again noted bilaterally in the periventricular and the subcortical white matter. This is stable. Ventricles and sulci are stable. Small focus of stable high T2 signal in the periphery of the left cerebellar hemisphere is again noted on image 5. Additional punctate focus of high T2 signal in the anterolateral periphery of the left cerebellar hemisphere was not definitively present on the prior. Heterogeneous signal in the basilar artery flow void is noted. Similar findings were noted on the previous exam.  Punctate foci of dark gradient echo signal in the left cerebellar hemisphere are not as well seen compared to the prior. No new areas of dark gradient echo signal are noted. Numerous areas of high diffusion signal throughout the mayuri are noted, increased from the prior exam.  This is greatest right of midline. Punctate new focus of high diffusion signal in the anterolateral aspect of the left cerebellar hemisphere is noted as well.   No other areas of

## 2020-02-25 NOTE — FLOWSHEET NOTE
Family had prayer [place] around patient's bed before he was moved to ICU with their . 02/24/20 2006   Encounter Summary   Services provided to: Patient and family together   Referral/Consult From: Rounding   Continue Visiting   (2-24-20)   Complexity of Encounter High   Length of Encounter 15 minutes   Routine   Type Follow up   Spiritual/Lutheran   Type Spiritual support   Assessment Anxious   Intervention Active listening;Sustaining presence/ Ministry of presence; Discussed illness/injury and it's impact   Outcome Expressed gratitude

## 2020-02-25 NOTE — CONSULTS
ticagrelor (BRILINTA) 90 MG TABS tablet Take 1 tablet by mouth 2 times daily 5/26/19   Charles Hodges MD    Scheduled Meds:   aspirin  81 mg Oral Daily    ticagrelor  90 mg Oral BID    atorvastatin  80 mg Oral Nightly    FLUoxetine  20 mg Per NG tube Daily    sodium chloride flush  10 mL Intravenous 2 times per day    heparin (porcine)  4,000 Units Intravenous Once     Continuous Infusions:   sodium chloride 125 mL/hr at 02/25/20 0146    heparin (porcine) 15.2 Units/kg/hr (02/25/20 0545)     PRN Meds:.sodium chloride flush, polyethylene glycol, ondansetron, promethazine, labetalol, hydrALAZINE, heparin (porcine), heparin (porcine)  Past Medical History   has a past medical history of Heart attack ([facility] [address] Hyperlipidemia, Hypertension, and Stroke ([facility] [address] Past Surgical History   has a past surgical history that includes knee surgery (Right); Foot surgery (Bilateral); Coronary angioplasty with stent (2019); and Cardiac surgery. Social History   reports that he has never smoked. He has never used smokeless tobacco.   reports current alcohol use. reports no history of drug use. Family History  family history includes High Blood Pressure in his father and mother; Hypertension in his brother and sister. Review of Systems:  CONSTITUTIONAL:  negative for fevers, chills, fatigue and malaise    EYES:  negative for double vision, blurred vision and photophobia     HEENT:  negative for tinnitus, epistaxis and sore throat    RESPIRATORY:  negative for cough, shortness of breath, wheezing    CARDIOVASCULAR:  negative for chest pain, palpitations, syncope, edema    GASTROINTESTINAL:  negative for nausea, vomiting    GENITOURINARY:  negative for incontinence    MUSCULOSKELETAL:  negative for neck or back pain    NEUROLOGICAL:  Negative for weakness and tingling  negative for headaches and dizziness    PSYCHIATRIC:  negative for anxiety      Review of systems otherwise negative.       OBJECTIVE:   Vitals: BP (!) 168/75 Pulse 65   Temp 98.7 °F (37.1 °C) (Oral)   Resp 13   Ht 6' 1\" (1.854 m)   Wt 238 lb 12.1 oz (108.3 kg)   SpO2 94%   BMI 31.50 kg/m²   General appearance: Lying in bed, NAD. Lungs: No respiratory distress noted. Heart: In sinus rhythm. Abdomen: Soft nontender. Extremities: No lower limb edema noted. Neurologic: He is awake, he is intermittently following simple commands, he has severe dysarthria noted. CN: He is able to track in the room, face is symmetric. MOTOR: To be weak on the left body side. Able to move left upper and left lower extremities on the bed. Able to move both right upper and right lower extremities against gravity. SENSORY: Intact sensation to simple touch bilaterally in both upper and lower extremities. COORDINATION: Unable to assess. LABS:     Recent Labs     20  1358 20  2023 20  2320 20  0432   WBC 10.9 12.5* 11.8* 10.8   HGB 15.0 13.7 13.9 13.1   HCT 45.1 41.1 42.7 40.8    318 319 294     --  145* 146*   K 3.6*  --  3.7 3.9     --  110* 112*   CO2 22  --  21 20   BUN 29*  --  25* 24*   CREATININE 1.64*  --  1.37* 1.29*   CALCIUM 9.9  --  9.5 8.9   INR  --  1.1  --   --      No results for input(s): ALKPHOS, ALT, AST, BILITOT, BILIDIR, LABALBU, AMYLASE, LIPASE in the last 72 hours. RADIOLOGY:   Images were personally reviewed including:    CTA imagin. Hypodense areas in the mayuri concordant with acute infarct visualized at MR.   2. No perfusion mismatch. 3. 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 4. Otherwise patent cervical and cerebral vasculature       IMPRESSIONS:     Symptomatic basilar artery stenosis/hypoplasia. Patient was loaded with Brilinta 180 mg over the night. He is currently on heparin drip, Brilinta and aspirin 81 mg.     Cerebral angiogram with IV moderate sedation.  Risks and benefits discussed including but not limited to bruising, stroke, sah, death, retroperitoneal

## 2020-02-26 ENCOUNTER — APPOINTMENT (OUTPATIENT)
Dept: CT IMAGING | Age: 50
DRG: 005 | End: 2020-02-26
Attending: SPECIALIST
Payer: MEDICARE

## 2020-02-26 ENCOUNTER — TELEPHONE (OUTPATIENT)
Dept: PRIMARY CARE CLINIC | Age: 50
End: 2020-02-26

## 2020-02-26 ENCOUNTER — APPOINTMENT (OUTPATIENT)
Dept: GENERAL RADIOLOGY | Age: 50
DRG: 005 | End: 2020-02-26
Attending: SPECIALIST
Payer: MEDICARE

## 2020-02-26 LAB
ANION GAP SERPL CALCULATED.3IONS-SCNC: 15 MMOL/L (ref 9–17)
BUN BLDV-MCNC: 17 MG/DL (ref 6–20)
BUN/CREAT BLD: ABNORMAL (ref 9–20)
CALCIUM SERPL-MCNC: 9.4 MG/DL (ref 8.6–10.4)
CHLORIDE BLD-SCNC: 107 MMOL/L (ref 98–107)
CO2: 21 MMOL/L (ref 20–31)
CREAT SERPL-MCNC: 1.03 MG/DL (ref 0.7–1.2)
EKG ATRIAL RATE: 60 BPM
EKG ATRIAL RATE: 73 BPM
EKG P AXIS: 35 DEGREES
EKG P AXIS: 48 DEGREES
EKG P-R INTERVAL: 166 MS
EKG P-R INTERVAL: 166 MS
EKG Q-T INTERVAL: 404 MS
EKG Q-T INTERVAL: 436 MS
EKG QRS DURATION: 132 MS
EKG QRS DURATION: 132 MS
EKG QTC CALCULATION (BAZETT): 436 MS
EKG QTC CALCULATION (BAZETT): 445 MS
EKG R AXIS: 22 DEGREES
EKG R AXIS: 3 DEGREES
EKG T AXIS: -7 DEGREES
EKG T AXIS: 5 DEGREES
EKG VENTRICULAR RATE: 60 BPM
EKG VENTRICULAR RATE: 73 BPM
FOLATE: 16.9 NG/ML
GFR AFRICAN AMERICAN: >60 ML/MIN
GFR NON-AFRICAN AMERICAN: >60 ML/MIN
GFR SERPL CREATININE-BSD FRML MDRD: ABNORMAL ML/MIN/{1.73_M2}
GFR SERPL CREATININE-BSD FRML MDRD: ABNORMAL ML/MIN/{1.73_M2}
GLUCOSE BLD-MCNC: 106 MG/DL (ref 70–99)
HCT VFR BLD CALC: 42.9 % (ref 40.7–50.3)
HEMOGLOBIN: 14.1 G/DL (ref 13–17)
MCH RBC QN AUTO: 28.5 PG (ref 25.2–33.5)
MCHC RBC AUTO-ENTMCNC: 32.9 G/DL (ref 28.4–34.8)
MCV RBC AUTO: 86.8 FL (ref 82.6–102.9)
NRBC AUTOMATED: 0 PER 100 WBC
PARTIAL THROMBOPLASTIN TIME: 24.4 SEC (ref 20.5–30.5)
PARTIAL THROMBOPLASTIN TIME: 25 SEC (ref 20.5–30.5)
PARTIAL THROMBOPLASTIN TIME: 29.4 SEC (ref 20.5–30.5)
PARTIAL THROMBOPLASTIN TIME: 40.3 SEC (ref 20.5–30.5)
PDW BLD-RTO: 12.4 % (ref 11.8–14.4)
PLATELET # BLD: 347 K/UL (ref 138–453)
PMV BLD AUTO: 10.4 FL (ref 8.1–13.5)
POTASSIUM SERPL-SCNC: 3.6 MMOL/L (ref 3.7–5.3)
PROCALCITONIN: 0.09 NG/ML
RBC # BLD: 4.94 M/UL (ref 4.21–5.77)
SODIUM BLD-SCNC: 143 MMOL/L (ref 135–144)
VITAMIN B-12: 653 PG/ML (ref 232–1245)
WBC # BLD: 15.6 K/UL (ref 3.5–11.3)

## 2020-02-26 PROCEDURE — 2000000003 HC NEURO ICU R&B

## 2020-02-26 PROCEDURE — 93010 ELECTROCARDIOGRAM REPORT: CPT | Performed by: INTERNAL MEDICINE

## 2020-02-26 PROCEDURE — 70450 CT HEAD/BRAIN W/O DYE: CPT

## 2020-02-26 PROCEDURE — 85027 COMPLETE CBC AUTOMATED: CPT

## 2020-02-26 PROCEDURE — 99291 CRITICAL CARE FIRST HOUR: CPT | Performed by: PSYCHIATRY & NEUROLOGY

## 2020-02-26 PROCEDURE — 6360000002 HC RX W HCPCS: Performed by: NURSE PRACTITIONER

## 2020-02-26 PROCEDURE — 6360000002 HC RX W HCPCS: Performed by: STUDENT IN AN ORGANIZED HEALTH CARE EDUCATION/TRAINING PROGRAM

## 2020-02-26 PROCEDURE — C1894 INTRO/SHEATH, NON-LASER: HCPCS

## 2020-02-26 PROCEDURE — 94761 N-INVAS EAR/PLS OXIMETRY MLT: CPT

## 2020-02-26 PROCEDURE — 99233 SBSQ HOSP IP/OBS HIGH 50: CPT | Performed by: PSYCHIATRY & NEUROLOGY

## 2020-02-26 PROCEDURE — 6370000000 HC RX 637 (ALT 250 FOR IP): Performed by: NURSE PRACTITIONER

## 2020-02-26 PROCEDURE — 99232 SBSQ HOSP IP/OBS MODERATE 35: CPT | Performed by: INTERNAL MEDICINE

## 2020-02-26 PROCEDURE — 80048 BASIC METABOLIC PNL TOTAL CA: CPT

## 2020-02-26 PROCEDURE — 2580000003 HC RX 258: Performed by: NURSE PRACTITIONER

## 2020-02-26 PROCEDURE — 2500000003 HC RX 250 WO HCPCS: Performed by: NURSE PRACTITIONER

## 2020-02-26 PROCEDURE — C1760 CLOSURE DEV, VASC: HCPCS

## 2020-02-26 PROCEDURE — 99253 IP/OBS CNSLTJ NEW/EST LOW 45: CPT | Performed by: PHYSICAL MEDICINE & REHABILITATION

## 2020-02-26 PROCEDURE — 6370000000 HC RX 637 (ALT 250 FOR IP): Performed by: PSYCHIATRY & NEUROLOGY

## 2020-02-26 PROCEDURE — 74176 CT ABD & PELVIS W/O CONTRAST: CPT

## 2020-02-26 PROCEDURE — 2709999900 HC NON-CHARGEABLE SUPPLY

## 2020-02-26 PROCEDURE — 85730 THROMBOPLASTIN TIME PARTIAL: CPT

## 2020-02-26 PROCEDURE — 84145 PROCALCITONIN (PCT): CPT

## 2020-02-26 PROCEDURE — C1769 GUIDE WIRE: HCPCS

## 2020-02-26 PROCEDURE — 71045 X-RAY EXAM CHEST 1 VIEW: CPT

## 2020-02-26 RX ORDER — FENTANYL CITRATE 50 UG/ML
INJECTION, SOLUTION INTRAMUSCULAR; INTRAVENOUS
Status: DISCONTINUED
Start: 2020-02-26 | End: 2020-02-27

## 2020-02-26 RX ORDER — FENTANYL CITRATE 50 UG/ML
25 INJECTION, SOLUTION INTRAMUSCULAR; INTRAVENOUS ONCE
Status: COMPLETED | OUTPATIENT
Start: 2020-02-26 | End: 2020-02-26

## 2020-02-26 RX ADMIN — DEXTROSE MONOHYDRATE 5 MG/HR: 50 INJECTION, SOLUTION INTRAVENOUS at 21:53

## 2020-02-26 RX ADMIN — TICAGRELOR 90 MG: 90 TABLET ORAL at 08:44

## 2020-02-26 RX ADMIN — HYDRALAZINE HYDROCHLORIDE 10 MG: 20 INJECTION INTRAMUSCULAR; INTRAVENOUS at 13:32

## 2020-02-26 RX ADMIN — FENTANYL CITRATE 25 MCG: 50 INJECTION, SOLUTION INTRAMUSCULAR; INTRAVENOUS at 07:52

## 2020-02-26 RX ADMIN — FENTANYL CITRATE 25 MCG: 50 INJECTION, SOLUTION INTRAMUSCULAR; INTRAVENOUS at 06:25

## 2020-02-26 RX ADMIN — POTASSIUM BICARBONATE 40 MEQ: 782 TABLET, EFFERVESCENT ORAL at 08:44

## 2020-02-26 RX ADMIN — ASPIRIN 81 MG: 81 TABLET, CHEWABLE ORAL at 08:44

## 2020-02-26 RX ADMIN — Medication 15 ML: at 08:44

## 2020-02-26 RX ADMIN — HYDRALAZINE HYDROCHLORIDE 10 MG: 20 INJECTION INTRAMUSCULAR; INTRAVENOUS at 04:54

## 2020-02-26 RX ADMIN — Medication 10 MG: at 03:03

## 2020-02-26 RX ADMIN — Medication 20 MG: at 08:44

## 2020-02-26 RX ADMIN — HEPARIN SODIUM AND DEXTROSE 17.2 UNITS/KG/HR: 10000; 5 INJECTION INTRAVENOUS at 12:28

## 2020-02-26 RX ADMIN — STANDARDIZED SENNA CONCENTRATE AND DOCUSATE SODIUM 2 TABLET: 8.6; 5 TABLET ORAL at 08:44

## 2020-02-26 RX ADMIN — HYDRALAZINE HYDROCHLORIDE 10 MG: 20 INJECTION INTRAMUSCULAR; INTRAVENOUS at 07:48

## 2020-02-26 RX ADMIN — TICAGRELOR 90 MG: 90 TABLET ORAL at 19:37

## 2020-02-26 RX ADMIN — ATORVASTATIN CALCIUM 80 MG: 80 TABLET, FILM COATED ORAL at 19:37

## 2020-02-26 RX ADMIN — Medication 10 MG: at 12:28

## 2020-02-26 RX ADMIN — DEXTROSE MONOHYDRATE 2.5 MG/HR: 50 INJECTION, SOLUTION INTRAVENOUS at 16:03

## 2020-02-26 RX ADMIN — Medication 10 MG: at 08:54

## 2020-02-26 RX ADMIN — Medication 15 ML: at 19:37

## 2020-02-26 RX ADMIN — HYDRALAZINE HYDROCHLORIDE 10 MG: 20 INJECTION INTRAMUSCULAR; INTRAVENOUS at 10:54

## 2020-02-26 ASSESSMENT — PAIN SCALES - GENERAL: PAINLEVEL_OUTOF10: 10

## 2020-02-26 NOTE — DISCHARGE SUMMARY
Physical Medicine & Rehabilitation  Discharge Summary     Patient Identification:  Davide Black  : 1970  Admit date: 2020  Discharge date: 2020   Attending provider: No att. providers found        Primary care provider: Susu Orr PA-C     Discharge Diagnoses:   R pontine CVA with L nondominant hemiparesis, insomnia, HTN, cardiomyopathy, aortic root dilation, Hypercoagulable/MTHFR mutation, depression, RASHMI    Discharge Functional Status:    Physical therapy:  Bed Mobility: Rolling: Moderate assistance  Supine to Sit: Moderate assistance(head of bed elevated)  Scooting: Moderate assistance(to EOB)  Transfers: Sit to Stand: Maximum Assistance, 2 Person Assistance(assist left UE to walker platform)  Stand to sit: Moderate Assistance, Minimal Assistance  Bed to Chair: Moderate assistance, 2 Person Assistance(to left , platform RW)  Squat Pivot Transfers: Moderate Assistance  Comment: rest breaks PRN, Ambulation 1  Surface: level tile  Device: (green walker lift)  Other Apparatus: Wheelchair follow  Assistance: Moderate assistance, 2 Person assistance  Quality of Gait: L knee bucckles/hyperextends, narrow ANEUDY  Gait Deviations: Decreased step length, Decreased step height  Distance: 50ft  Comments: Max cueing and encouragment required. 3rd person assist to facilitate L LE,    Mobility:  , PT Equipment Recommendations  Equipment Needed: Yes  Vendor Name: ongoing assessment for AD need  Mobility Devices: [place]: Platform Left, Assessment: Pt present with Left side hemiparesis from CVA, affecting his mobility and safety awarensss. Pt has left neglect too. Occupational therapy:  ,  , Assessment: Pt needs increase assist w safe shower transfer (assist x3 w/c to extended tub bench, assist x2 extended tub bench to w/c). See ADL for a.m shower. Pt needs cues for rosalind techniques & using AE for ADLs.  P.M tx focus on therapeutic exercises & uses of vibration from the vibrator to facilitate lt UE movement. Pt demo lt shoulder elevation min/moderate, AAROM rt elbow flexion, AAROM scapula protraction/retraction, AAROM shoulder horizontal adduction,Pt unable to move towel on table in horizontal abduction or circles going ccw using lt UE w OT supporting arm. Pt demo AAROM using lt UE towel on table(forward/back, circles cw, horizontal adduction)w OT supporting at lt elbow. OT provided mirror (visual) feedback, verbal &tactile cues, demonstration, & positive feedback when pt did exercises. Pt's spouse and his oldest brother Rakan Jenkins present for p.m OT. Speech therapy:  Comprehension: 4 - Patient understands basic needs 75-90%+ of the time  Expression: 4 - Expresses basic ideas/needs 75-90%+ of the time  Social Interaction: 6 - Patient requires medication for mood and/or effect  Problem Solvin - Patient solves simple/routine tasks 75-90%+   Memory: 4 - Patient remembers 75-90%+ of the time      Inpatient Rehabilitation Course:   Noam Lopez is a 52 y.o. male admitted to inpatient rehabilitation on 2020 for rehab for ischemic CVA. Rehab course:   He was treating with oral medications for BP management. He was on ASA and Brillinta pending APA workup and final anticoagulant determination with hematology in the office. Depression was being managed with fluoxetine. He had been started on IV fluids for RASHMI. He was having worsening dysarthria and was being worked up when he developed hypertensive emergency with . Rapid response was called and patient was sent to the Emergency Department. The patient participated in an aggressive multidisciplinary inpatient rehabilitation program involving 3 hours per day, 5 days per week of rehabilitation.         Consults:   Internal medicine    Significant Diagnostics:   CBC:   Lab Results   Component Value Date    WBC 10.8 2020    RBC 4.53 2020    HGB 13.1 2020    HCT 40.8 2020    MCV 90.1 2020    MCH 28.9 2020    MCHC 32.1

## 2020-02-26 NOTE — CARE COORDINATION
After speaking to the RN and physicians, I believe the patient  Will meet LTACH criteria. At this time family needs to be updated on patient.   Will follow for progress

## 2020-02-26 NOTE — PROGRESS NOTES
ENDOVASCULAR NEUROSURGERY PROGRESS NOTE  2/26/2020 5:05 PM  Subjective:   Admit Date: 2/24/2020  PCP: Natanael Ty PA-C    Underwent diagnostic angiogram yesterday. Objective:   Vitals: BP (!) 174/81   Pulse 68   Temp 97.8 °F (36.6 °C) (Oral)   Resp 26   Ht 6' 1\" (1.854 m)   Wt 238 lb 12.1 oz (108.3 kg)   SpO2 96%   BMI 31.50 kg/m²   General appearance: Lying in bed, NAD. Lungs: No respiratory distress noted. Heart: In sinus rhythm. Abdomen: Soft nontender. Extremities: No lower limb edema noted.     Neurologic: He is awake, he is intermittently following simple commands, he has severe dysarthria noted. CN: He is able to track in the room, face is symmetric. MOTOR:  He is weak on the left body side. Able to move left upper and left lower extremities on the bed. Able to move both right upper and right lower extremities against gravity. SENSORY: Intact sensation to simple touch bilaterally in both upper and lower extremities.   COORDINATION: Unable to assess.       Medications and labs:   Scheduled Meds:   fentaNYL        aspirin  81 mg Oral Daily    ticagrelor  90 mg Oral BID    atorvastatin  80 mg Oral Nightly    FLUoxetine  20 mg Per NG tube Daily    chlorhexidine  15 mL Mouth/Throat BID    sodium chloride flush  10 mL Intravenous 2 times per day    ondansetron  4 mg Intravenous Once    sennosides-docusate sodium  2 tablet Oral Daily    sodium chloride flush  10 mL Intravenous 2 times per day     Continuous Infusions:   niCARdipine 2.5 mg/hr (02/26/20 1603)    sodium chloride 125 mL/hr at 02/25/20 0146    heparin (porcine) Stopped (02/26/20 1356)     CBC:   Recent Labs     02/24/20 2320 02/25/20  0432 02/26/20  0322   WBC 11.8* 10.8 15.6*   HGB 13.9 13.1 14.1    294 347     BMP:    Recent Labs     02/24/20 2320 02/25/20  0432 02/26/20  0322   * 146* 143   K 3.7 3.9 3.6*   * 112* 107   CO2 21 20 21   BUN 25* 24* 17   CREATININE 1.37* 1.29* 1.03   GLUCOSE 100* 91 106*     Hepatic: No results for input(s): AST, ALT, ALB, BILITOT, ALKPHOS in the last 72 hours. Troponin: No results for input(s): TROPONINI in the last 72 hours. BNP: No results for input(s): BNP in the last 72 hours. Lipids: No results for input(s): CHOL, HDL in the last 72 hours. Invalid input(s): LDLCALCU  INR:   Recent Labs     02/24/20 2023   INR 1.1       Assessment and Recommendations:   52 y.o. male with past medical history including hypertension, hyperlipidemia, recent history of right pontine stroke with residual left-sided weakness. He was recently discharged on aspirin 81 and Brilinta 90 mg twice daily. During rehab he had worsening speech/dysphagia and worsening left-sided weakness. He was sent to [facility] emergency department with systolic blood pressure to 130s. Subsequently he was transferred to neuro ICU for further evaluation.     He underwent CTH head showing tiny hypodensity. Underwent CTA head and neck showing a plastic basilar artery/stenosis.     He was loaded with Brilinta upon presentation and continued on dual antiplatelet therapy was started on heparin drip.     diagnostic angiogram on February 25  Impression:    1. 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 2. Bilateral dominant P-comm   3. Dominant left vertebral artery             1. Continue dual antiplatelet therapy with aspirin and Brilinta. 2. High intensity statin. 3. Heparin drip can be used if plans for procedures like pretty clear. Then would resume dual antiplatelet therapy with Brilinta and aspirin.   [address], MD  Stroke, [facility]  [address] [place] [address]

## 2020-02-26 NOTE — PROGRESS NOTES
Physical Therapy  DATE: 2020    NAME: Shelli Sadler  MRN: [id number]   : 1970    Patient not seen this date for Physical Therapy due to:  [] Blood transfusion in progress  [] Hemodialysis  []  Patient Declined  [] Spine Precautions   [] Strict Bedrest  [] Surgery/ Procedure  [] Testing      [x] Other: sheath in place, plan to remove, ck pm as able. [] PT being discontinued at this time. Patient independent. No further needs. [] PT being discontinued at this time as the patient has been transferred to palliative care. No further needs.     Carlos Aaron, PT

## 2020-02-26 NOTE — PROGRESS NOTES
Occupational Therapy Not Seen Note    DATE: 2020  Name: Oscar Briones  : 1970  MRN: [id number]    Patient not available for Occupational Therapy due to:     Other: Sheath in place AM and PM. Await removal.     Next Scheduled Treatment: 2020    Electronically signed by DIONY Jimenez on 2020 at 9:18 AM

## 2020-02-26 NOTE — CONSULTS
mg, 80 mg, Oral, Nightly  FLUoxetine (PROZAC) 20 MG/5ML solution 20 mg, 20 mg, Per NG tube, Daily  chlorhexidine (PERIDEX) 0.12 % solution 15 mL, 15 mL, Mouth/Throat, BID  sodium chloride flush 0.9 % injection 10 mL, 10 mL, Intravenous, 2 times per day  sodium chloride flush 0.9 % injection 10 mL, 10 mL, Intravenous, PRN  ondansetron (ZOFRAN) injection 4 mg, 4 mg, Intravenous, Once  acetaminophen (TYLENOL) tablet 650 mg, 650 mg, Oral, Q4H PRN  hydrALAZINE (APRESOLINE) injection 10 mg, 10 mg, Intravenous, Q2H PRN  labetalol (NORMODYNE;TRANDATE) injection syringe 10 mg, 10 mg, Intravenous, Q1H PRN  sennosides-docusate sodium (SENOKOT-S) 8.6-50 MG tablet 2 tablet, 2 tablet, Oral, Daily  magnesium hydroxide (MILK OF MAGNESIA) 400 MG/5ML suspension 30 mL, 30 mL, Oral, Daily PRN  acetaminophen (TYLENOL) tablet 650 mg, 650 mg, Oral, Q4H PRN  sodium chloride flush 0.9 % injection 10 mL, 10 mL, Intravenous, 2 times per day  sodium chloride flush 0.9 % injection 10 mL, 10 mL, Intravenous, PRN  polyethylene glycol (GLYCOLAX) packet 17 g, 17 g, Oral, Daily PRN  ondansetron (ZOFRAN) injection 4 mg, 4 mg, Intravenous, Q6H PRN  0.9 % sodium chloride infusion, , Intravenous, Continuous  promethazine (PHENERGAN) tablet 12.5 mg, 12.5 mg, Oral, Q6H PRN  heparin 25,000 units in dextrose 5% 250 mL infusion, 9.2 Units/kg/hr, Intravenous, Continuous  Facility-Administered Medications Ordered in Other Encounters: sodium chloride flush 0.9 % injection 10 mL, 10 mL, Intravenous, 2 times per day  sodium chloride flush 0.9 % injection 10 mL, 10 mL, Intravenous, PRN    Social History:  Lives with:   Significant other  Home setup:   Floors in home:  #3. Bed/bathroom on floor  1. Steps into home 2 .    Device:   None  Social History     Socioeconomic History    Marital status: Single     Spouse name: Not on file    Number of children: Not on file    Years of education: Not on file    Highest education level: Not on file   Occupational History  Not on file   Social Needs    Financial resource strain: Not on file    Food insecurity:     Worry: Not on file     Inability: Not on file    Transportation needs:     Medical: Not on file     Non-medical: Not on file   Tobacco Use    Smoking status: Never Smoker    Smokeless tobacco: Never Used   Substance and Sexual Activity    Alcohol use: Yes    Drug use: Never    Sexual activity: Not on file   Lifestyle    Physical activity:     Days per week: Not on file     Minutes per session: Not on file    Stress: Not on file   Relationships    Social connections:     Talks on phone: Not on file     Gets together: Not on file     Attends [employer] service: Not on file     Active member of club or organization: Not on file     Attends meetings of clubs or organizations: Not on file     Relationship status: Not on file    Intimate partner violence:     Fear of current or ex partner: Not on file     Emotionally abused: Not on file     Physically abused: Not on file     Forced sexual activity: Not on file   Other Topics Concern    Not on file   Social History Narrative    Not on file       Family History:       Problem Relation Age of Onset    High Blood Pressure Mother     High Blood Pressure Father     Hypertension Sister     Hypertension Brother        Radiology:  CT ABDOMEN PELVIS WO CONTRAST Additional Contrast? None [[id number]] Collected: 02/26/20 0435      Order Status: Completed Updated: 02/26/20 0455     Narrative:       EXAMINATION:  CT OF THE ABDOMEN AND PELVIS WITHOUT CONTRAST 2/26/2020 4:18 am    TECHNIQUE:  CT of the abdomen and pelvis was performed without the administration of  intravenous contrast. Multiplanar reformatted images are provided for review. Dose modulation, iterative reconstruction, and/or weight based adjustment of  the mA/kV was utilized to reduce the radiation dose to as low as reasonably  achievable. COMPARISON:  None.     HISTORY:  ORDERING SYSTEM PROVIDED HISTORY: Unknown    FINDINGS:  BRAIN/VENTRICLES: There is no acute intracranial hemorrhage, mass effect or  midline shift.  No abnormal extra-axial fluid collection.  Hypodensities  again seen in the mayuri, areas of known infarct. Eder Neighbours is no evidence of  hydrocephalus. ORBITS: The visualized portion of the orbits demonstrate no acute abnormality. SINUSES: The visualized paranasal sinuses and mastoid air cells demonstrate  no acute abnormality. SOFT TISSUES/SKULL:  No acute abnormality of the visualized skull or soft  tissues.     Impression:       Pontine infarcts without evidence for hemorrhagic conversion.  No additional  findings compared to recent prior study. .       MRI OF THE BRAIN WITHOUT CONTRAST  2/24/2020 5:35 pm       TECHNIQUE:   Multiplanar multisequence MRI of the brain was performed without the   administration of intravenous contrast.       COMPARISON:   February 13       HISTORY:   ORDERING SYSTEM PROVIDED HISTORY: worsening speech over past 3 days, CT brain   showed no changes from known infarct   TECHNOLOGIST PROVIDED HISTORY:   worsening speech over past 3 days, CT brain showed no changes from known   infarct   Reason for Exam: worsening speech over past 3 days, CT brain showed no   changes from known infarct   Acuity: Unknown   Type of Exam: Ongoing   Additional signs and symptoms: S/P Rapid response on 02/24/2020 - HBP. Relevant Medical/Surgical History: Hx - HBP, Acute CVA and NSTEMI.  No   relevant surgical hx to area of interest       FINDINGS:   INTRACRANIAL STRUCTURES/VENTRICLES: Increasing multifocal high signal   throughout the mayuri is noted.  Multifocal high T2 and FLAIR signal is again   noted bilaterally in the periventricular and the subcortical white matter.    This is stable.  Ventricles and sulci are stable.  Small focus of stable high   T2 signal in the periphery of the left cerebellar hemisphere is again noted   on image 5.  Additional punctate focus of high T2 signal in the anterolateral   periphery of the left cerebellar hemisphere was not definitively present on   the prior.  Heterogeneous signal in the basilar artery flow void is noted. Similar findings were noted on the previous exam.  Punctate foci of dark   gradient echo signal in the left cerebellar hemisphere are not as well seen   compared to the prior.  No new areas of dark gradient echo signal are noted. Numerous areas of high diffusion signal throughout the mayuri are noted,   increased from the prior exam.  This is greatest right of midline.  Punctate   new focus of high diffusion signal in the anterolateral aspect of the left   cerebellar hemisphere is noted as well.  No other areas of new abnormal   diffusion signal are noted       ORBITS: No acute orbital abnormality is noted       SINUSES: Multifocal paranasal sinus opacification is noted with a small   amount of fluid noted in the right sphenoid and probably in the left   posterior ethmoid.       BONES/SOFT TISSUES: The bone marrow signal intensity appears normal. The soft   tissues demonstrate no acute abnormality.           Impression   Increasing multifocal acute infarct in the mayuri.       New punctate acute infarct left anterolateral cerebellar hemisphere       Multifocal periventricular and subcortical white matter small vessel ischemic   changes are again noted bilaterally. Diagnostics:    CBC:   Recent Labs     02/24/20 2320 02/25/20  0432 02/26/20  0322   WBC 11.8* 10.8 15.6*   RBC 4.80 4.53 4.94   HGB 13.9 13.1 14.1   HCT 42.7 40.8 42.9   MCV 89.0 90.1 86.8   RDW 12.5 12.6 12.4    294 347     BMP:   Recent Labs     02/24/20 2320 02/25/20 0432 02/26/20  0322   * 146* 143   K 3.7 3.9 3.6*   * 112* 107   CO2 21 20 21   BUN 25* 24* 17   CREATININE 1.37* 1.29* 1.03   GLUCOSE 100* 91 106*      HbA1c:   Lab Results   Component Value Date    LABA1C 5.6 02/14/2020     BNP: No results for input(s): BNP in the last 72 hours.   PT/INR: Recent Labs     02/24/20 2023   PROTIME 14.1   INR 1.1     APTT:   Recent Labs     02/25/20  1739 02/25/20  2252 02/26/20  0430   APTT 26.7 25.0 29.4     CARDIAC ENZYMES:   Recent Labs     02/24/20  2320 02/25/20  0432   TROPONINT NOT REPORTED NOT REPORTED     FASTING LIPID PANEL:  Lab Results   Component Value Date    CHOL 140 02/14/2020    HDL 41 02/14/2020    TRIG 40 02/14/2020     LIVER PROFILE: No results for input(s): AST, ALT, ALB, BILIDIR, BILITOT, ALKPHOS in the last 72 hours. Physical Exam:  BP (!) 157/58   Pulse 65   Temp 98.7 °F (37.1 °C) (Oral)   Resp 18   Ht 6' 1\" (1.854 m)   Wt 238 lb 12.1 oz (108.3 kg)   SpO2 92%   BMI 31.50 kg/m²     GEN: Well developed, well nourished, no acute distress. HEENT: NCAT, PERRL, EOMI, mucous membranes pink and moist.  RESP: Lungs clear to auscultation. No rales or rhonchi. Respirations WNL and unlabored. CV: Regular rate rhythm. No murmurs, rubs, or gallops. ABD: soft, non-distended, non-tender. BS+ and equal.  NEURO: Poorly arousable. Sensation intact to light touch. Impaired L CN VII.   MSK: RANJITH ROM. Muscle tone and bulk are normal bilaterally. Strength RANJITH due to inability to stay awake long enough to follow commands. EXT: No calf tenderness to palpation or edema BLEs. SKIN: Warm dry and intact with good turgor. PSYCH: Mood WNL. Affect WNL. Appropriately interactive. Impression:  1. Recurrent CVA: patient currently obtunded. 2. HTN/Cardiomyopathy  3. Aortic root dilatation  4. Hypercoagulable/MTHFR mutation: hematology following    Recommendations:    1. Diagnosis:  Recurrent stroke  2. Therapy: Await therapy evaluations  3. Medical Necessity: As above  4. Support: Supportive significant other and family  5. Rehab Recommendation: Will follow progress in therapies to determine if patient is a candidate to return to acute rehab.    6. DVT Prophylaxis: chemoprophylaxis on hold to reduce risk of hemorrhagic conversion of stroke    It was my pleasure to evaluate Carmen Deyotis today. Please call with questions. Heinz Najjar, MD        This note is created with the assistance of a speech recognition program.  While intending to generate a document that actually reflects the content of the visit, the document can still have some errors including those of syntax and sound a like substitutions which may escape proof reading. In such instances, actual meaning can be extrapolated by contextual diversion.

## 2020-02-26 NOTE — PROGRESS NOTES
Daily Progress Note  Neuro Critical Care    Patient Name: Oscar Briones  Patient : 1970  Room/Bed: 8704/4786-77  Code Status: FULL  Allergies: No Known Allergies    CHIEF COMPLAINT:      Aphasia, left sided weakness     INTERVAL HISTORY    Initial Presentation (Admitted 20): The patient is a 53 yo with a history of HTN, CAD s/p stent (2019), and recent right pontine infarct who presented as a transfer from [address] [place] [address] for worsening aphasia, dysphagia, and left hemiparesis. Patient was recently admitted to [facility] on 20 after presenting with dysarthria and left hemiparesis and found to have a right pontine infarction. He was out of window for tPA. 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 He was maintained on his home ASA and Brilinta and was treated with a low dose heparin infusion for 48h. Repeat CTA Head/Neck on 2/15 remained stable and patient's clinical exam remained unchanged after heparin was stopped. He was discharged to [facility] on . At the time of discharge he had a left facial droop and mild to moderate left sided weakness (LUE 3/5, LLE 4/5). For the last few days prior to re-admission, family states they started to notice patient's speech started to decline and he was having more difficulty swallowing. On , he was noted by the nurse to have decreased word output and to be coughing with liquids. CT Head performed which showed a stable right pontine infarct. On , patient was sent to [address] [place] [address] ED due to hypertensive urgency (237/130). Given 20mg IV Labetalol with improvement of BP down to 152/99. Started on IV fluids for RASHMI. Repeat CT head showed stable right pontine infarct. MRI Brain showed increasing multifocal infarct in the mayuri extending to medulla and new punctate infarct left cerebellar hemisphere.   Patient initially admitted to the ICU at [address] [place] [address] but then decision made to INFUSIONS:   niCARdipine 2.5 mg/hr (20 1603)    sodium chloride 125 mL/hr at 20 0146    heparin (porcine) Stopped (20 1356)     PRN MEDICATIONS:   sodium chloride flush, acetaminophen, hydrALAZINE, labetalol, magnesium hydroxide, acetaminophen, sodium chloride flush, polyethylene glycol, ondansetron, promethazine    VITALS:  Temperature Range: Temp: 97.8 °F (36.6 °C) Temp  Av.3 °F (36.8 °C)  Min: 97.8 °F (36.6 °C)  Max: 98.8 °F (37.1 °C)  BP Range: Systolic (67HXC), VOLODYMYR:993 , Min:132 , WVB:456     Diastolic (53ZYR), QFX:34, Min:58, Max:161    Pulse Range: Pulse  Av  Min: 58  Max: 88  Respiration Range: Resp  Av.9  Min: 0  Max: 26  Current Pulse Ox: SpO2: 96 %  24HR Pulse Ox Range: SpO2  Av.2 %  Min: 92 %  Max: 100 %  Patient Vitals for the past 12 hrs:   BP Temp Temp src Pulse Resp SpO2   20 1500 -- 97.8 °F (36.6 °C) Oral 68 26 96 %   20 1345 (!) 174/81 -- -- 78 23 96 %   20 1245 (!) 155/72 -- -- 64 21 95 %   20 1100 -- 98.4 °F (36.9 °C) -- -- -- --   20 1045 (!) 196/161 -- -- 65 23 95 %   20 0945 (!) 169/76 -- -- 75 18 95 %   20 0845 (!) 159/79 98 °F (36.7 °C) Oral 69 21 95 %   20 0745 (!) 142/93 -- -- 85 17 95 %   20 0647 (!) 157/58 -- -- 65 18 92 %   20 0547 132/77 -- -- 84 17 95 %   20 0447 (!) 182/78 98.7 °F (37.1 °C) Oral 69 17 92 %     Estimated body mass index is 31.5 kg/m² as calculated from the following:    Height as of this encounter: 6' 1\" (1.854 m).     Weight as of this encounter: 238 lb 12.1 oz (108.3 kg).  []<16 Severe malnutrition  []16-16.99 Moderate malnutrition  []17-18.49 Mild malnutrition  []18.5-24.9 Normal  []25-29.9 Overweight (not obese)  [x]30-34.9 Obese class 1 (Low Risk)  []35-39.9 Obese class 2 (Moderate Risk)  []?40 Obese class 3 (High Risk)    RECENT LABS:   Lab Results   Component Value Date    WBC 15.6 (H) 2020    HGB 14.1 2020    HCT 42.9 2020     PHYSICAL EXAM       CONSTITUTIONAL:  Drowsy. Opens eyes to voice and tracking appropriately. Not nodding appropriately or following commands. HEAD:  normocephalic, atraumatic    EYES:  PERRL, EOMI   ENT:  moist mucous membranes   NECK:  supple, symmetric   LUNGS:  Equal air entry bilaterally, clear   CARDIOVASCULAR:  normal s1 / s2, RRR, distal pulses intact. Right groin site with sheath in place. Small amount of shadowing on dressing. ABDOMEN:  Soft, no rigidity, normal bowel sounds   NEUROLOGIC:  Mental Status:  Drowsy. Opens eyes to voice but dozes off when not stimulated. Tracking appropriately. Not nodding appropriately or following commands. Cranial Nerves:    III: Pupils:  equal, round, reactive to light  III,IV,VI: Extra Ocular Movements: intact  V: Facial sensation:  intact  VII: Facial strength: abnormal - left facial droop  Weak tongue movement, not able to stick out like previous day. Motor Exam:    Flexion to pain in left upper extremity. Left lower extremity withdraws to pain. Able to weakly resist gravity in distal right forearm when passively raised. Limited exam to right lower extremity due to sheath, wiggles toes. NIH Stroke Scale Total (if not done complete detailed one below):    1a.  Level of consciousness:  2 - not alert, requires repeated stimulation to attend, or is obtunded and requires strong or painful stimulation to make movements (not stereotyped)   1b. Level of consciousness questions:  2 - answers neither question correctly  1c. Level of consciousness questions:  2 - performs neither task correctly  2. Best Gaze:  0 - normal  3. Visual:  0 - no visual loss  4. Facial Palsy:  1 - minor paralysis (flattened nasolabial fold, asymmetric on smiling)  5a. Motor left arm:  3 - no effort against gravity, limb falls  5b.   Motor right arm:  2 - some effort against gravity, limb cannot get to or maintain (if cued) 90 (or 45) degrees, drifts down to mutations, mildly elevated homocysteine level, Hem/Onc following  - Neuro Endovascular following; no further intervention planned  - Goal -180  - Neuro checks per protocol    CARDIOVASCULAR:  - Goal -180, avoid hypotension  - PRN Hydralazine/Labetalol  - Hold home antihypertensives for now  - Started on Cardene infusion in afternoon  - History of CAD s/p stent Jan 2019  - Continue Asprin and Brilinta  - Mild troponin leak (initially 36), down to 32, EKG unchanged  - Echo 2/14 EF 55%, severe LVH, aortic root dilation with mild aortic insufficiency  - Outpatient Cardiology follow up  - Hyperlipidemia, recent lipid panel; LDL 91, Cholesterol 140  - Lipitor 80mg QHS  - Continue telemetry    PULMONARY:  - Maintaining O2 sats on room air  - CXR 2/25 showed no acute process  - Intermittent brief episodes of apnea without hypoxia  - Monitor patient's respiratory status and ability to protect airway closely  - Spoke to family about possible need for intubation and mechanical ventilation; family agreeable    RENAL/FLUID/ELECTROLYTE:  - Mild RASHMI improved  - BUN 17/ Creatinine 1.03  - Normal CK  - Monitor I&O; 8355/8994  - IVF: Continue [email or web address]  - Mild hypokalemia, K 3.6, replace with 40meq KCl  - Replace electrolytes PRN  - Daily BMP    GI/NUTRITION:  NUTRITION:  Diet NPO Effective Now  - Keep NPO  - Has NG access, consider tube feeds 2/27  - Formed BM 2/26  - Bowel regimen: Senokot-S daily, Milk of Mag and Dulcolax suppository PRN  - GI prophylaxis: Not indicated    ID:  - Afebrile, Tmax 37.1  - Leukocytosis possibly reactive, WBC 15.6  - Repeat CXR negative for acute process  - Procalcitonin 0.09, slightly increase from previous day  - Continue to monitor for fevers  - Daily CBC    HEME:   - H&H 14.1/42.9  - Platelets 345  - Heterozygous MTHFR mutation, mildly elevated homocysteine levels  - Negative prothrombin, Factor V Leiden, lupus anticoagulant  - Hem/Onc following  - Repeat homocysteine normal   -

## 2020-02-27 ENCOUNTER — APPOINTMENT (OUTPATIENT)
Dept: GENERAL RADIOLOGY | Age: 50
DRG: 005 | End: 2020-02-27
Attending: SPECIALIST
Payer: MEDICARE

## 2020-02-27 ENCOUNTER — APPOINTMENT (OUTPATIENT)
Dept: CT IMAGING | Age: 50
DRG: 005 | End: 2020-02-27
Attending: SPECIALIST
Payer: MEDICARE

## 2020-02-27 LAB
-: ABNORMAL
ABSOLUTE EOS #: 0.46 K/UL (ref 0–0.4)
ABSOLUTE IMMATURE GRANULOCYTE: 0 K/UL (ref 0–0.3)
ABSOLUTE LYMPH #: 1.62 K/UL (ref 1–4.8)
ABSOLUTE MONO #: 1.62 K/UL (ref 0.1–0.8)
ALLEN TEST: ABNORMAL
AMORPHOUS: ABNORMAL
ANION GAP SERPL CALCULATED.3IONS-SCNC: 13 MMOL/L (ref 9–17)
BACTERIA: ABNORMAL
BASOPHILS # BLD: 0 % (ref 0–2)
BASOPHILS ABSOLUTE: 0 K/UL (ref 0–0.2)
BILIRUBIN URINE: NEGATIVE
BUN BLDV-MCNC: 19 MG/DL (ref 6–20)
BUN/CREAT BLD: ABNORMAL (ref 9–20)
CALCIUM SERPL-MCNC: 9 MG/DL (ref 8.6–10.4)
CASTS UA: ABNORMAL /LPF (ref 0–2)
CHLORIDE BLD-SCNC: 107 MMOL/L (ref 98–107)
CO2: 20 MMOL/L (ref 20–31)
COLOR: YELLOW
COMMENT UA: ABNORMAL
CREAT SERPL-MCNC: 1.01 MG/DL (ref 0.7–1.2)
CRYSTALS, UA: ABNORMAL /HPF
DIFFERENTIAL TYPE: ABNORMAL
EOSINOPHILS RELATIVE PERCENT: 2 % (ref 1–4)
EPITHELIAL CELLS UA: ABNORMAL /HPF (ref 0–5)
FIO2: 40
GFR AFRICAN AMERICAN: >60 ML/MIN
GFR NON-AFRICAN AMERICAN: >60 ML/MIN
GFR SERPL CREATININE-BSD FRML MDRD: ABNORMAL ML/MIN/{1.73_M2}
GFR SERPL CREATININE-BSD FRML MDRD: ABNORMAL ML/MIN/{1.73_M2}
GLUCOSE BLD-MCNC: 115 MG/DL (ref 70–99)
GLUCOSE URINE: NEGATIVE
HCT VFR BLD CALC: 32.2 % (ref 40.7–50.3)
HCT VFR BLD CALC: 41.2 % (ref 40.7–50.3)
HCT VFR BLD CALC: 41.3 % (ref 40.7–50.3)
HEMOGLOBIN: 10.2 G/DL (ref 13–17)
HEMOGLOBIN: 13.6 G/DL (ref 13–17)
HEMOGLOBIN: 13.8 G/DL (ref 13–17)
IMMATURE GRANULOCYTES: 0 %
KETONES, URINE: NEGATIVE
LEUKOCYTE ESTERASE, URINE: NEGATIVE
LYMPHOCYTES # BLD: 7 % (ref 24–44)
MCH RBC QN AUTO: 27.2 PG (ref 25.2–33.5)
MCH RBC QN AUTO: 28.5 PG (ref 25.2–33.5)
MCH RBC QN AUTO: 28.9 PG (ref 25.2–33.5)
MCHC RBC AUTO-ENTMCNC: 31.7 G/DL (ref 28.4–34.8)
MCHC RBC AUTO-ENTMCNC: 32.9 G/DL (ref 28.4–34.8)
MCHC RBC AUTO-ENTMCNC: 33.5 G/DL (ref 28.4–34.8)
MCV RBC AUTO: 85.9 FL (ref 82.6–102.9)
MCV RBC AUTO: 86.2 FL (ref 82.6–102.9)
MCV RBC AUTO: 86.6 FL (ref 82.6–102.9)
MODE: ABNORMAL
MONOCYTES # BLD: 7 % (ref 1–7)
MORPHOLOGY: NORMAL
MUCUS: ABNORMAL
MYOGLOBIN: 142 NG/ML (ref 28–72)
NEGATIVE BASE EXCESS, ART: ABNORMAL (ref 0–2)
NITRITE, URINE: NEGATIVE
NRBC AUTOMATED: 0 PER 100 WBC
O2 DEVICE/FLOW/%: ABNORMAL
OTHER OBSERVATIONS UA: ABNORMAL
PATIENT TEMP: ABNORMAL
PDW BLD-RTO: 12.4 % (ref 11.8–14.4)
PDW BLD-RTO: 12.5 % (ref 11.8–14.4)
PDW BLD-RTO: 13.7 % (ref 11.8–14.4)
PH UA: 5 (ref 5–8)
PLATELET # BLD: 289 K/UL (ref 138–453)
PLATELET # BLD: 331 K/UL (ref 138–453)
PLATELET # BLD: 336 K/UL (ref 138–453)
PLATELET ESTIMATE: ABNORMAL
PMV BLD AUTO: 10.4 FL (ref 8.1–13.5)
PMV BLD AUTO: 10.5 FL (ref 8.1–13.5)
PMV BLD AUTO: 9.6 FL (ref 8.1–13.5)
POC HCO3: 23.6 MMOL/L (ref 21–28)
POC LACTIC ACID: 0.77 MMOL/L (ref 0.56–1.39)
POC O2 SATURATION: 99 % (ref 94–98)
POC PCO2 TEMP: ABNORMAL MM HG
POC PCO2: 35.2 MM HG (ref 35–48)
POC PH TEMP: ABNORMAL
POC PH: 7.43 (ref 7.35–7.45)
POC PO2 TEMP: ABNORMAL MM HG
POC PO2: 121 MM HG (ref 83–108)
POSITIVE BASE EXCESS, ART: 0 (ref 0–3)
POTASSIUM SERPL-SCNC: 3.4 MMOL/L (ref 3.7–5.3)
PROCALCITONIN: 0.21 NG/ML
PROTEIN UA: ABNORMAL
RBC # BLD: 3.75 M/UL (ref 4.21–5.77)
RBC # BLD: 4.77 M/UL (ref 4.21–5.77)
RBC # BLD: 4.78 M/UL (ref 4.21–5.77)
RBC # BLD: ABNORMAL 10*6/UL
RBC UA: ABNORMAL /HPF (ref 0–2)
RENAL EPITHELIAL, UA: ABNORMAL /HPF
SAMPLE SITE: ABNORMAL
SEG NEUTROPHILS: 84 % (ref 36–66)
SEGMENTED NEUTROPHILS ABSOLUTE COUNT: 19.5 K/UL (ref 1.8–7.7)
SODIUM BLD-SCNC: 140 MMOL/L (ref 135–144)
SPECIFIC GRAVITY UA: 1.02 (ref 1–1.03)
TCO2 (CALC), ART: 25 MMOL/L (ref 22–29)
TOTAL CK: 196 U/L (ref 39–308)
TRICHOMONAS: ABNORMAL
TROPONIN INTERP: ABNORMAL
TROPONIN INTERP: ABNORMAL
TROPONIN T: ABNORMAL NG/ML
TROPONIN T: ABNORMAL NG/ML
TROPONIN, HIGH SENSITIVITY: 61 NG/L (ref 0–22)
TROPONIN, HIGH SENSITIVITY: 63 NG/L (ref 0–22)
TURBIDITY: CLEAR
URINE HGB: NEGATIVE
UROBILINOGEN, URINE: NORMAL
WBC # BLD: 23.2 K/UL (ref 3.5–11.3)
WBC # BLD: 23.9 K/UL (ref 3.5–11.3)
WBC # BLD: 7.4 K/UL (ref 3.5–11.3)
WBC # BLD: ABNORMAL 10*3/UL
WBC UA: ABNORMAL /HPF (ref 0–5)
YEAST: ABNORMAL

## 2020-02-27 PROCEDURE — 87086 URINE CULTURE/COLONY COUNT: CPT

## 2020-02-27 PROCEDURE — 2000000003 HC NEURO ICU R&B

## 2020-02-27 PROCEDURE — 71045 X-RAY EXAM CHEST 1 VIEW: CPT

## 2020-02-27 PROCEDURE — 2500000003 HC RX 250 WO HCPCS: Performed by: NURSE PRACTITIONER

## 2020-02-27 PROCEDURE — 97162 PT EVAL MOD COMPLEX 30 MIN: CPT

## 2020-02-27 PROCEDURE — 70450 CT HEAD/BRAIN W/O DYE: CPT

## 2020-02-27 PROCEDURE — 82803 BLOOD GASES ANY COMBINATION: CPT

## 2020-02-27 PROCEDURE — 84484 ASSAY OF TROPONIN QUANT: CPT

## 2020-02-27 PROCEDURE — 94761 N-INVAS EAR/PLS OXIMETRY MLT: CPT

## 2020-02-27 PROCEDURE — 94002 VENT MGMT INPAT INIT DAY: CPT

## 2020-02-27 PROCEDURE — 94770 HC ETCO2 MONITOR DAILY: CPT

## 2020-02-27 PROCEDURE — 84145 PROCALCITONIN (PCT): CPT

## 2020-02-27 PROCEDURE — 6370000000 HC RX 637 (ALT 250 FOR IP): Performed by: PSYCHIATRY & NEUROLOGY

## 2020-02-27 PROCEDURE — 80048 BASIC METABOLIC PNL TOTAL CA: CPT

## 2020-02-27 PROCEDURE — 0BH17EZ INSERTION OF ENDOTRACHEAL AIRWAY INTO TRACHEA, VIA NATURAL OR ARTIFICIAL OPENING: ICD-10-PCS | Performed by: PSYCHIATRY & NEUROLOGY

## 2020-02-27 PROCEDURE — 97167 OT EVAL HIGH COMPLEX 60 MIN: CPT

## 2020-02-27 PROCEDURE — 37799 UNLISTED PX VASCULAR SURGERY: CPT

## 2020-02-27 PROCEDURE — APPNB180 APP NON BILLABLE TIME > 60 MINS: Performed by: NURSE PRACTITIONER

## 2020-02-27 PROCEDURE — 2580000003 HC RX 258: Performed by: NURSE PRACTITIONER

## 2020-02-27 PROCEDURE — 85025 COMPLETE CBC W/AUTO DIFF WBC: CPT

## 2020-02-27 PROCEDURE — 36415 COLL VENOUS BLD VENIPUNCTURE: CPT

## 2020-02-27 PROCEDURE — 6360000002 HC RX W HCPCS: Performed by: NURSE PRACTITIONER

## 2020-02-27 PROCEDURE — 6360000002 HC RX W HCPCS: Performed by: STUDENT IN AN ORGANIZED HEALTH CARE EDUCATION/TRAINING PROGRAM

## 2020-02-27 PROCEDURE — 2700000000 HC OXYGEN THERAPY PER DAY

## 2020-02-27 PROCEDURE — 6370000000 HC RX 637 (ALT 250 FOR IP): Performed by: NURSE PRACTITIONER

## 2020-02-27 PROCEDURE — 81001 URINALYSIS AUTO W/SCOPE: CPT

## 2020-02-27 PROCEDURE — 83605 ASSAY OF LACTIC ACID: CPT

## 2020-02-27 PROCEDURE — 99232 SBSQ HOSP IP/OBS MODERATE 35: CPT | Performed by: INTERNAL MEDICINE

## 2020-02-27 PROCEDURE — 2580000003 HC RX 258: Performed by: STUDENT IN AN ORGANIZED HEALTH CARE EDUCATION/TRAINING PROGRAM

## 2020-02-27 PROCEDURE — 97530 THERAPEUTIC ACTIVITIES: CPT

## 2020-02-27 PROCEDURE — 6360000002 HC RX W HCPCS

## 2020-02-27 PROCEDURE — 85027 COMPLETE CBC AUTOMATED: CPT

## 2020-02-27 PROCEDURE — 6370000000 HC RX 637 (ALT 250 FOR IP): Performed by: STUDENT IN AN ORGANIZED HEALTH CARE EDUCATION/TRAINING PROGRAM

## 2020-02-27 PROCEDURE — 99233 SBSQ HOSP IP/OBS HIGH 50: CPT | Performed by: PSYCHIATRY & NEUROLOGY

## 2020-02-27 PROCEDURE — 99222 1ST HOSP IP/OBS MODERATE 55: CPT | Performed by: FAMILY MEDICINE

## 2020-02-27 PROCEDURE — 97535 SELF CARE MNGMENT TRAINING: CPT

## 2020-02-27 PROCEDURE — 99291 CRITICAL CARE FIRST HOUR: CPT | Performed by: PSYCHIATRY & NEUROLOGY

## 2020-02-27 PROCEDURE — 83874 ASSAY OF MYOGLOBIN: CPT

## 2020-02-27 PROCEDURE — 5A1955Z RESPIRATORY VENTILATION, GREATER THAN 96 CONSECUTIVE HOURS: ICD-10-PCS | Performed by: PSYCHIATRY & NEUROLOGY

## 2020-02-27 PROCEDURE — 82550 ASSAY OF CK (CPK): CPT

## 2020-02-27 RX ORDER — PROPOFOL 10 MG/ML
INJECTION, EMULSION INTRAVENOUS
Status: COMPLETED
Start: 2020-02-27 | End: 2020-02-27

## 2020-02-27 RX ORDER — FENTANYL CITRATE 50 UG/ML
INJECTION, SOLUTION INTRAMUSCULAR; INTRAVENOUS
Status: COMPLETED
Start: 2020-02-27 | End: 2020-02-27

## 2020-02-27 RX ORDER — FENTANYL CITRATE 50 UG/ML
100 INJECTION, SOLUTION INTRAMUSCULAR; INTRAVENOUS ONCE
Status: COMPLETED | OUTPATIENT
Start: 2020-02-27 | End: 2020-02-27

## 2020-02-27 RX ORDER — AMLODIPINE BESYLATE 10 MG/1
10 TABLET ORAL DAILY
Status: DISCONTINUED | OUTPATIENT
Start: 2020-02-27 | End: 2020-03-11 | Stop reason: HOSPADM

## 2020-02-27 RX ORDER — PROPOFOL 10 MG/ML
10 INJECTION, EMULSION INTRAVENOUS
Status: DISCONTINUED | OUTPATIENT
Start: 2020-02-27 | End: 2020-03-03

## 2020-02-27 RX ADMIN — POTASSIUM BICARBONATE 40 MEQ: 782 TABLET, EFFERVESCENT ORAL at 09:01

## 2020-02-27 RX ADMIN — TICAGRELOR 90 MG: 90 TABLET ORAL at 08:55

## 2020-02-27 RX ADMIN — TICAGRELOR 90 MG: 90 TABLET ORAL at 19:58

## 2020-02-27 RX ADMIN — PROPOFOL 10 MCG/KG/MIN: 10 INJECTION, EMULSION INTRAVENOUS at 14:00

## 2020-02-27 RX ADMIN — HYDRALAZINE HYDROCHLORIDE 10 MG: 20 INJECTION INTRAMUSCULAR; INTRAVENOUS at 21:57

## 2020-02-27 RX ADMIN — Medication 15 ML: at 08:55

## 2020-02-27 RX ADMIN — FENTANYL CITRATE 100 MCG: 50 INJECTION, SOLUTION INTRAMUSCULAR; INTRAVENOUS at 14:34

## 2020-02-27 RX ADMIN — HYDRALAZINE HYDROCHLORIDE 10 MG: 20 INJECTION INTRAMUSCULAR; INTRAVENOUS at 00:55

## 2020-02-27 RX ADMIN — DEXTROSE MONOHYDRATE 12.5 MG/HR: 50 INJECTION, SOLUTION INTRAVENOUS at 00:33

## 2020-02-27 RX ADMIN — Medication 20 MG: at 08:55

## 2020-02-27 RX ADMIN — AMPICILLIN SODIUM AND SULBACTAM SODIUM 1.5 G: 1; .5 INJECTION, POWDER, FOR SOLUTION INTRAMUSCULAR; INTRAVENOUS at 20:45

## 2020-02-27 RX ADMIN — ATORVASTATIN CALCIUM 80 MG: 80 TABLET, FILM COATED ORAL at 19:58

## 2020-02-27 RX ADMIN — Medication 15 ML: at 19:58

## 2020-02-27 RX ADMIN — DEXTROSE MONOHYDRATE 15 MG/HR: 50 INJECTION, SOLUTION INTRAVENOUS at 02:31

## 2020-02-27 RX ADMIN — AMLODIPINE BESYLATE 10 MG: 10 TABLET ORAL at 13:10

## 2020-02-27 RX ADMIN — ASPIRIN 81 MG: 81 TABLET, CHEWABLE ORAL at 08:55

## 2020-02-27 ASSESSMENT — PULMONARY FUNCTION TESTS
PIF_VALUE: 19
PIF_VALUE: 28
PIF_VALUE: 14

## 2020-02-27 NOTE — PROGRESS NOTES
Occupational Therapy   Occupational Therapy Initial Assessment  Date: 2020   Patient Name: Michael Staton  MRN: [id number]     : 1970    Date of Service: 2020    Discharge Recommendations:  Patient would benefit from continued therapy after discharge  OT Equipment Recommendations  Equipment Needed: (CTA)    Assessment   Performance deficits / Impairments: Decreased functional mobility ; Decreased ADL status; Decreased ROM; Decreased strength;Decreased safe awareness;Decreased cognition;Decreased endurance;Decreased sensation;Decreased balance;Decreased high-level IADLs;Decreased fine motor control;Decreased coordination;Decreased posture  Assessment: Pt completed bed mobility this date with Max A X2. Pt sat EOB ~5 minutes with Mod A. Pt with increased flexion when seated EOB; requiring Max VCs to lift head and sit up straight however pt unable to straighten posture. Pt able to bring head to look straight ahead multiple times throughout sitting with VCs and TCs. Pt demo no active movement to BUE. Pt able to wiggle toes to R foot only this date with VC/TCs. Pt returned to supine in bed at end of session, call light within reach and RN notified at end of session. Pt to require continued OT services to address performance deficits listed. Prognosis: Fair  Decision Making: High Complexity  Patient Education: Pt and pt family educated on OT POC, OT role, safety awareness, importance of continued therapy; good return from family  REQUIRES OT FOLLOW UP: Yes  Activity Tolerance  Activity Tolerance: Treatment limited secondary to medical complications (free text); Treatment limited secondary to decreased cognition;Patient limited by fatigue  Safety Devices  Safety Devices in place: Yes  Type of devices: Nurse notified; Left in bed;Call light within reach  Restraints  Initially in place: No           Patient Diagnosis(es): There were no encounter diagnoses.      has a past medical history of Heart attack ([facility] Hyperlipidemia, Hypertension, and Stroke ([facility] [address] has a past surgical history that includes knee surgery (Right); Foot surgery (Bilateral); Coronary angioplasty with stent (2019); and Cardiac surgery. Restrictions  Restrictions/Precautions  Restrictions/Precautions: Fall Risk, Up as Tolerated, Contact Precautions, General Precautions  Required Braces or Orthoses?: No  Position Activity Restriction  Other position/activity restrictions: Goal -180    Subjective   General  Patient assessed for rehabilitation services?: Yes  Family / Caregiver Present: Yes(family present throughout eval)  General Comment  Comments: RN ok'd pt for therapy this AM. Family very encouraging throughout evaluation  Patient Currently in Pain: (pt giving no verbal responses, no grimace with mobility)    Social/Functional History  Social/Functional History  Lives With: Significant other, Son(17 y.o. son)  Type of Home: House  Home Layout: Two level, Bed/Bath upstairs, Able to Live on Main level with bedroom/bathroom(bathroom on main level; walk-in shower in basement)  Home Access: Stairs to enter without rails, Stairs to enter with rails  Entrance Stairs - Number of Steps: 1 step at entrance with no rail, 10 steps to 2nd floor.  10 steps to basement with L rail descending  Entrance Stairs - Rails: Right  Bathroom Shower/Tub: Walk-in shower, Tub/Shower unit(tub/shower on main floor, walk-in in basement)  Bathroom Toilet: Handicap height  Bathroom Equipment: Grab bars in shower  Bathroom Accessibility: Accessible  Home Equipment: (no DME)  Receives Help From: Family, Friend(s)  ADL Assistance: Independent  Homemaking Assistance: Independent  Homemaking Responsibilities: Yes  Ambulation Assistance: Independent  Transfer Assistance: Independent  Active : Yes  Mode of Transportation: Alden Bermudez  Occupation: Full time employment  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 Pt is able to lift head intermittently when prompted with VCs and TCs)  Standing Balance: Unable to assess(comment)(d/t poor sitting balance)     ADL  Feeding: Maximum assistance; Increased time to complete;Bringing food to mouth assist;Setup;Verbal cueing  Grooming: Maximum assistance;Setup;Verbal cueing; Increased time to complete  UE Bathing: Dependent/Total  LE Bathing: Dependent/Total  UE Dressing: Dependent/Total  LE Dressing: Dependent/Total  Toileting: Dependent/Total  Tone RUE  RUE Tone: Hypotonic  Tone LUE  LUE Tone: Hypotonic  Tone Description: LUE flaccid  Coordination  Movements Are Fluid And Coordinated: No  Coordination and Movement description: Gross motor impairments;Right UE;Left UE;Fine motor impairments    Bed mobility  Rolling to Left: Maximum assistance;2 Person assistance  Supine to Sit: Maximum assistance;2 Person assistance  Sit to Supine: Maximum assistance;2 Person assistance  Scooting: Maximal assistance;2 Person assistance  Comment: HOB elevated to assist with bed sit > supine  Transfers  Sit to stand: Unable to assess  Stand to sit: Unable to assess    Cognition  Overall Cognitive Status: Exceptions  Arousal/Alertness: Inconsistent responses to stimuli  Following Commands: Inconsistently follows commands  Safety Judgement: Decreased awareness of need for assistance;Decreased awareness of need for safety  Problem Solving: Decreased awareness of errors  Insights: Decreased awareness of deficits  Initiation: Requires cues for all  Sequencing: Requires cues for all       Sensation  Overall Sensation Status: (RANJITH; pt does not verbalize)  Additional Comments: RANJITH, no verbal responses        LUE PROM (degrees)  LUE PROM: WFL  LUE AROM (degrees)  LUE General AROM: no movement or muscle twitch observed  Left Hand PROM (degrees)  Left Hand PROM: WFL  Left Hand AROM (degrees)  Left Hand General AROM: no movement observed  RUE PROM (degrees)  RUE PROM: WFL  RUE AROM (degrees)  RUE AROM : Exceptions  RUE General AROM: no movement or muscle twitch observed  Right Hand PROM (degrees)  Right Hand PROM: WFL  Right Hand AROM (degrees)  Right Hand General AROM: no movement observed when asked to squeeze therapist hand  LUE Strength  Gross LUE Strength: Exceptions to [facility]  L Shoulder Flex: 0/5  L Elbow Flex: 0/5  L Hand General: 0/5  RUE Strength  Gross RUE Strength: Exceptions to [facility]  R Shoulder Flex: 0/5  R Elbow Flex: 0/5  R Hand General: 0/5                   Plan   Plan  Times per week: 3-5 x/wk  Current Treatment Recommendations: Strengthening, ROM, Balance Training, Functional Mobility Training, Neuromuscular Re-education, Safety Education & Training, Patient/Caregiver Education & Training, Equipment Evaluation, Education, & procurement, Self-Care / ADL, Endurance Training, Positioning    AM-PAC Score        AM-[facility] Inpatient Daily Activity Raw Score: 8 (02/27/20 1000)  AM-PAC Inpatient ADL T-Scale Score : 22.86 (02/27/20 1000)  ADL Inpatient CMS 0-100% Score: 85.69 (02/27/20 1000)  ADL Inpatient CMS G-Code Modifier : CM (02/27/20 1000)    Goals  Short term goals  Time Frame for Short term goals: By discharge, pt will:  Short term goal 1: Demo Min A x2 for bed mobility in order to increase safety and independence with functional ADLs  Short term goal 2: Demo Mod A for feeding and grooming tasks with setup and VCs/TCs throughout  Short term goal 3: Demo Min A for static/dynamic sitting balance for 8+ minutes for increased independence with functional tasks and NMRE  Short term goal 4: Demo Mod A with setup, increased time, AE, and VC/TCs for UB bathing/dressing tasks  Short term goal 5: Notify OTR for updated goals once appropriate       Therapy Time   Individual Concurrent Group Co-treatment   Time In [address]         Time Out 0905 Minutes 16         Timed Code Treatment Minutes: 8 Minutes       Farzana Vizcaino, OTR/L

## 2020-02-27 NOTE — PROGRESS NOTES
ENDOVASCULAR NEUROSURGERY PROGRESS NOTE  2/27/2020 9:39 AM  Subjective:   Admit Date: 2/24/2020  PCP: Atif Nash PA-C    No new acute events. Objective:   Vitals: BP (!) 161/75   Pulse 86   Temp 97.2 °F (36.2 °C) (Oral)   Resp 24   Ht 6' 1\" (1.854 m)   Wt 238 lb 12.1 oz (108.3 kg)   SpO2 94%   BMI 31.50 kg/m²   General appearance: Lying in bed, NAD. Lungs: No respiratory distress noted. Heart: In sinus rhythm. Abdomen: Soft nontender. Extremities: No lower limb edema noted.     Neurologic: He is awake, he is intermittently following simple commands, he has severe dysarthria noted. CN: He is able to track in the room, face is symmetric. MOTOR:  He is weak on the left body side. Able to move left upper and left lower extremities on the bed. Able to move both right upper and right lower extremities against gravity. SENSORY: Intact sensation to simple touch bilaterally in both upper and lower extremities.   COORDINATION: Unable to assess.       Medications and labs:   Scheduled Meds:   aspirin  81 mg Oral Daily    ticagrelor  90 mg Oral BID    atorvastatin  80 mg Oral Nightly    FLUoxetine  20 mg Per NG tube Daily    chlorhexidine  15 mL Mouth/Throat BID    sodium chloride flush  10 mL Intravenous 2 times per day    ondansetron  4 mg Intravenous Once    sennosides-docusate sodium  2 tablet Oral Daily    sodium chloride flush  10 mL Intravenous 2 times per day     Continuous Infusions:   niCARdipine 7.5 mg/hr (02/27/20 0513)    sodium chloride 125 mL/hr at 02/25/20 0146    heparin (porcine) Stopped (02/26/20 1356)     CBC:   Recent Labs     02/26/20  0322 02/27/20  0430 02/27/20  0528   WBC 15.6* 23.9* 7.4   HGB 14.1 13.6 10.2*    331 289     BMP:    Recent Labs     02/25/20  0432 02/26/20  0322 02/27/20  0450   * 143 140   K 3.9 3.6* 3.4*   * 107 107   CO2 20 21 20   BUN 24* 17 19   CREATININE 1.29* 1.03 1.01   GLUCOSE 91 106* 115*     Hepatic: No results for input(s): AST, ALT, ALB, BILITOT, ALKPHOS in the last 72 hours. Troponin: No results for input(s): TROPONINI in the last 72 hours. BNP: No results for input(s): BNP in the last 72 hours. Lipids: No results for input(s): CHOL, HDL in the last 72 hours. Invalid input(s): LDLCALCU  INR:   Recent Labs     02/24/20 2023   INR 1.1       Assessment and Recommendations:   52 y.o. male with past medical history including hypertension, hyperlipidemia, recent history of right pontine stroke with residual left-sided weakness. He was recently discharged on aspirin 81 and Brilinta 90 mg twice daily. During rehab he had worsening speech/dysphagia and worsening left-sided weakness. He was sent to [facility] emergency department with systolic blood pressure to 130s. Subsequently he was transferred to neuro ICU for further evaluation.     He underwent CTH head showing tiny hypodensity. Underwent CTA head and neck showing a plastic basilar artery/stenosis.     He was loaded with Brilinta upon presentation and continued on dual antiplatelet therapy was started on heparin drip.     diagnostic angiogram on February 25  Impression:    1. 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 2. Bilateral dominant P-comm   3. Dominant left vertebral artery         Right groin was closed on February 26. Patient is sleepy this morning but arousable. He intermittently follows simple commands. 1. Continue dual antiplatelet therapy with aspirin and Brilinta. 2. High intensity statin. 3. Heparin drip can be used if plans for procedures like pretty clear. Then would resume dual antiplatelet therapy with Brilinta and aspirin.       Michael Gutiérrez MD  Stroke, Neurocritical Care &

## 2020-02-27 NOTE — PROGRESS NOTES
Daily Progress Note  Neuro Critical Care    Patient Name: Kae Harding  Patient : 1970  Room/Bed: 3073/5507-87  Code Status: Full  Allergies: No Known Allergies    CHIEF COMPLAINT:      Aphasia, left sided weakness     INTERVAL HISTORY    Initial Presentation (Admitted 20): The patient is a 53 yo male with history of HTN, CAD s/p stent (May 2019), and recent right pontine infarct who presented as a transfer from [facility] for worsening aphasia, dysphagia, and left hemiparesis. Patient was recently admitted to [place] [address] on 20 after presenting with dysarthria and left hemiparesis and found to have a right pontine infarct. He was out of the tPA window. 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 He was maintained on his aspirin and brilinta and was treated with low dose heparin infusion for 48 hours. Repeat CTA head/neck on 2/15 remained stable and patient's clinical exam remained unchanged after heparin was discontinued. He was discharged to [facility] on . At the time of discharge he had a left facial droop and mild to moderate left sided weakness (LUE 3/5, LLE 4/5). For the last few days prior to re-admission, family states they started to notice patient's speech declining and he was having more difficulty swallowing. On , he was noted by the nurse to have decreased word output and to be coughing with liquids. CT head performed showing stable right pontine infarct. On , patient was sent to [facility] ED due to hypertensive urgency (237/130). Given 20 mg IV labetalol with improvement of BP down to 152/00. Started on IV fluids for RASHMI. Repeat CT head showed stable right pontine infarct. MRI brain showed increasing multifocal infarct in the mayuri extending to medulla and new punctate infarct left cerebellar hemisphere. Patient initially admitted to the ICU at [facility].  Nicol Penaloza but then decision made to transfer to [place] [address] Neuro ICU Daily    chlorhexidine  15 mL Mouth/Throat BID    sodium chloride flush  10 mL Intravenous 2 times per day    ondansetron  4 mg Intravenous Once    sennosides-docusate sodium  2 tablet Oral Daily    sodium chloride flush  10 mL Intravenous 2 times per day     CONTINUOUS INFUSIONS:   niCARdipine 7.5 mg/hr (20 0513)    sodium chloride 125 mL/hr at 20 0146    heparin (porcine) Stopped (20 1356)     PRN MEDICATIONS:   sodium chloride flush, acetaminophen, hydrALAZINE, labetalol, magnesium hydroxide, acetaminophen, sodium chloride flush, polyethylene glycol, ondansetron, promethazine    VITALS:  Temperature Range: Temp: 97.2 °F (36.2 °C) Temp  Av.1 °F (36.7 °C)  Min: 97.2 °F (36.2 °C)  Max: 99.7 °F (37.6 °C)  BP Range: Systolic (54UGV), ZDA:998 , Min:143 , FKI:137     Diastolic (50GNB), DIANA:25, Min:66, Max:161    Pulse Range: Pulse  Av.5  Min: 64  Max: 96  Respiration Range: Resp  Av.5  Min: 15  Max: 32  Current Pulse Ox: SpO2: 94 %  24HR Pulse Ox Range: SpO2  Av.5 %  Min: 91 %  Max: 96 %  Patient Vitals for the past 12 hrs:   BP Temp Temp src Pulse Resp SpO2   20 0700 -- -- -- 86 24 94 %   20 0645 (!) 161/75 -- -- 78 26 94 %   20 0630 -- -- -- 80 30 93 %   20 0615 -- -- -- 83 30 92 %   20 0600 -- -- -- 87 27 94 %   20 0545 (!) 172/79 -- -- 87 28 94 %   20 0530 -- -- -- 84 24 95 %   20 0515 -- -- -- 94 21 95 %   20 0500 -- -- -- 77 28 94 %   20 0445 (!) 143/66 -- -- 80 23 94 %   20 0430 -- -- -- 74 22 95 %   20 0415 -- -- -- 81 29 96 %   20 0400 -- -- -- 77 29 95 %   20 0345 (!) 151/74 97.2 °F (36.2 °C) Oral 79 28 95 %   20 0330 -- -- -- 76 29 95 %   20 0315 -- -- -- 79 29 95 %   20 0300 -- -- -- 80 29 95 %   20 0245 (!) 146/76 -- -- 80 (!) 31 95 %   20 0230 -- -- -- 82 (!) 32 95 %   20 0215 -- -- -- 90 23 95 %   20 0200 -- -- -- 92 25 95 %   20

## 2020-02-27 NOTE — PROGRESS NOTES
[facility] was [facility]. Assisting with the transport was Marta Ann and Mary Anne Stephenson RRT. Appropriate devices were applied to monitor the patient's condition during transport. Patient transported  via 40% O2 via ventilator. Patient tolerated well.       ETT advanced 2cm per Dr Reza Tejada  3:15 PM

## 2020-02-27 NOTE — PROGRESS NOTES
Today's Date: 2/26/2020  Patient Name: Kiara Ag  Date of admission: 2/24/2020  9:40 PM  Patient's age: 52 y. o., 1970  Admission Dx: Brain stem infarction [facility]) [I63.9]    Reason for Consult: management recommendations  Requesting Physician: Ephraim Russo MD    CHIEF COMPLAINT: Recurrent ischemic stroke. MTHFR mutation. Mildly elevated homocystine    History Obtained From:  patient, electronic medical record    Interval hx     Patient seen and examined. Labs and vitals reviewed. Patient not able to give any meaningful history. Continues to have severe left-sided weakness. HISTORY OF PRESENT ILLNESS:      The patient is a 52 y.o.  male With past medical history of hypertension coronary artery disease status post stent placement January 2019 and recent pontine infarction was transferred from [facility] due to complaints of aphasia and left hemiparesis. Patient was recently patient was recently admitted to [place] on 2/13/2020 after presenting with dysarthria and left hemiparesis and found to have a right pontine infarction. He did not receive TPA. 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 Patient was discharged on aspirin and Brilinta. On 2/22 he was noted by the nurse to have decreased word output and coughing with liquids. CT head performed showed stable right pontine infarction. Patient was sent to the [facility] ED on 2/24 due to hypertensive urgency with blood pressure 237/130. Repeat CT showed stable right pontine infarction. Brain MRI showed increasing multifocal infarction in the mayuri extending to the middle and new punctate infarct left cerebellar hemisphere. Patient was then transferred to [facility] ICU. On arrival to [facility] CTA showed hypoplastic basilar artery with no flow in the mid to distal portion. CT brain perfusion with no perfusion mismatch.   Patient was subsequently started on heparin. Work-up from previous admission shows compound heterozygous MTHFR mutation. Other hypercoagulable work-up was unremarkable for factor V Leyden mutation, prothrombin gene mutation and lupus antibodies. Past Medical History:   has a past medical history of Heart attack ([facility] [address] Hyperlipidemia, Hypertension, and Stroke ([facility] [address] Past Surgical History:   has a past surgical history that includes knee surgery (Right); Foot surgery (Bilateral); Coronary angioplasty with stent (2019); and Cardiac surgery. Medications:    Prior to Admission medications    Medication Sig Start Date End Date Taking? Authorizing Provider   meloxicam (MOBIC) 7.5 MG tablet Take 1 tablet by mouth 2 times daily as needed for Pain 2/10/20 2/24/20  Susannah Moreno PA-C   tadalafil (CIALIS) 20 MG tablet Take 1 tablet by mouth as needed for Erectile Dysfunction 2/10/20   Susannah Moreno PA-C   lisinopril (PRINIVIL;ZESTRIL) 40 MG tablet Take 1 tablet by mouth daily 2/10/20   Susannah Moreno PA-C   aspirin 81 MG EC tablet Take 1 tablet by mouth daily 5/26/19   Wendy Adler MD   nitroGLYCERIN (NITROSTAT) 0.4 MG SL tablet up to max of 3 total doses.  If no relief after 1 dose, call 911. 5/26/19   Wendy Adler MD   atorvastatin (LIPITOR) 80 MG tablet Take 1 tablet by mouth nightly 5/26/19   Wendy Adler MD   amLODIPine (NORVASC) 10 MG tablet Take 1 tablet by mouth daily 5/26/19   Wendy Adler MD   ticagrelor (BRILINTA) 90 MG TABS tablet Take 1 tablet by mouth 2 times daily 5/26/19   Wendy Adler MD     Current Facility-Administered Medications   Medication Dose Route Frequency Provider Last Rate Last Dose    niCARdipine (CARDENE) 25 mg in dextrose 5 % 250 mL infusion  5 mg/hr Intravenous Continuous BALTAZAR Hardy  mL/hr at 02/26/20 2216 10 mg/hr at 02/26/20 2216    fentaNYL (SUBLIMAZE) 100 MCG/2ML injection             aspirin chewable tablet 81 mg  81 mg Oral Daily BALTAZAR Hardy CNP   81 mg -positive left-sided weakness  Musculoskeletal - no joint tenderness, deformity or swelling   Extremities - peripheral pulses normal, no pedal edema, no clubbing or cyanosis   Skin - normal coloration and turgor, no rashes, no suspicious skin lesions noted ,      DATA:      Labs:     CBC:   Recent Labs     02/25/20  0432 02/26/20  0322   WBC 10.8 15.6*   HGB 13.1 14.1   HCT 40.8 42.9    347     BMP:   Recent Labs     02/25/20  0432 02/26/20  0322   * 143   K 3.9 3.6*   CO2 20 21   BUN 24* 17   CREATININE 1.29* 1.03   LABGLOM 59* >60   GLUCOSE 91 106*     PT/INR:   Recent Labs     02/24/20 2023   PROTIME 14.1   INR 1.1     APTT:  Recent Labs     02/26/20  1051 02/26/20  1750   APTT 40.3* 24.4     Results for orders placed or performed during the hospital encounter of 02/24/20   MRSA DNA Probe, Nasal   Result Value Ref Range    Specimen Description . NASAL SWAB     MRSA, DNA, Nasal (A) NEGATIVE:  MRSA DNA not detected by nucleic acid amplificati     POSITIVE:  MRSA DNA detected by nucleic acid amplification.    CBC   Result Value Ref Range    WBC 11.8 (H) 3.5 - 11.3 k/uL    RBC 4.80 4.21 - 5.77 m/uL    Hemoglobin 13.9 13.0 - 17.0 g/dL    Hematocrit 42.7 40.7 - 50.3 %    MCV 89.0 82.6 - 102.9 fL    MCH 29.0 25.2 - 33.5 pg    MCHC 32.6 28.4 - 34.8 g/dL    RDW 12.5 11.8 - 14.4 %    Platelets 940 908 - 924 k/uL    MPV 10.4 8.1 - 13.5 fL    NRBC Automated 0.0 0.0 per 100 WBC   CBC   Result Value Ref Range    WBC 10.8 3.5 - 11.3 k/uL    RBC 4.53 4.21 - 5.77 m/uL    Hemoglobin 13.1 13.0 - 17.0 g/dL    Hematocrit 40.8 40.7 - 50.3 %    MCV 90.1 82.6 - 102.9 fL    MCH 28.9 25.2 - 33.5 pg    MCHC 32.1 28.4 - 34.8 g/dL    RDW 12.6 11.8 - 14.4 %    Platelets 531 485 - 831 k/uL    MPV 10.6 8.1 - 13.5 fL    NRBC Automated 0.0 0.0 per 100 WBC   Basic Metabolic Panel w/ Reflex to MG   Result Value Ref Range    Glucose 100 (H) 70 - 99 mg/dL    BUN 25 (H) 6 - 20 mg/dL    CREATININE 1.37 (H) 0.70 - 1.20 mg/dL    Bun/Cre Ratio NOT REPORTED 9 - 20    Calcium 9.5 8.6 - 10.4 mg/dL    Sodium 145 (H) 135 - 144 mmol/L    Potassium 3.7 3.7 - 5.3 mmol/L    Chloride 110 (H) 98 - 107 mmol/L    CO2 21 20 - 31 mmol/L    Anion Gap 14 9 - 17 mmol/L    GFR Non-African American 55 (L) >60 mL/min    GFR African American >60 >60 mL/min    GFR Comment          GFR Staging NOT REPORTED    Troponin   Result Value Ref Range    Troponin, High Sensitivity 36 (H) 0 - 22 ng/L    Troponin T NOT REPORTED <0.03 ng/mL    Troponin Interp NOT REPORTED    Troponin   Result Value Ref Range    Troponin, High Sensitivity 32 (H) 0 - 22 ng/L    Troponin T NOT REPORTED <0.03 ng/mL    Troponin Interp NOT REPORTED    APTT   Result Value Ref Range    PTT 28.1 20.5 - 30.5 sec   APTT   Result Value Ref Range    PTT 35.7 (H) 20.5 - 30.5 sec   APTT   Result Value Ref Range    PTT 43.7 (H) 20.5 - 30.5 sec   APTT   Result Value Ref Range    PTT 26.7 20.5 - 30.5 sec   Basic Metabolic Panel   Result Value Ref Range    Glucose 91 70 - 99 mg/dL    BUN 24 (H) 6 - 20 mg/dL    CREATININE 1.29 (H) 0.70 - 1.20 mg/dL    Bun/Cre Ratio NOT REPORTED 9 - 20    Calcium 8.9 8.6 - 10.4 mg/dL    Sodium 146 (H) 135 - 144 mmol/L    Potassium 3.9 3.7 - 5.3 mmol/L    Chloride 112 (H) 98 - 107 mmol/L    CO2 20 20 - 31 mmol/L    Anion Gap 14 9 - 17 mmol/L    GFR Non-African American 59 (L) >60 mL/min    GFR African American >60 >60 mL/min    GFR Comment          GFR Staging NOT REPORTED    CK   Result Value Ref Range    Total  39 - 308 U/L   Procalcitonin   Result Value Ref Range    Procalcitonin 0.06 <0.09 ng/mL   APTT   Result Value Ref Range    PTT 29.4 20.5 - 30.5 sec   CBC   Result Value Ref Range    WBC 15.6 (H) 3.5 - 11.3 k/uL    RBC 4.94 4.21 - 5.77 m/uL    Hemoglobin 14.1 13.0 - 17.0 g/dL    Hematocrit 42.9 40.7 - 50.3 %    MCV 86.8 82.6 - 102.9 fL    MCH 28.5 25.2 - 33.5 pg    MCHC 32.9 28.4 - 34.8 g/dL    RDW 12.4 11.8 - 14.4 %    Platelets 974 592 - 818 k/uL    MPV 10.4 8.1 - 13.5 fL    NRBC Automated 0.0 0.0 per 100 WBC   Basic Metabolic Panel   Result Value Ref Range    Glucose 106 (H) 70 - 99 mg/dL    BUN 17 6 - 20 mg/dL    CREATININE 1.03 0.70 - 1.20 mg/dL    Bun/Cre Ratio NOT REPORTED 9 - 20    Calcium 9.4 8.6 - 10.4 mg/dL    Sodium 143 135 - 144 mmol/L    Potassium 3.6 (L) 3.7 - 5.3 mmol/L    Chloride 107 98 - 107 mmol/L    CO2 21 20 - 31 mmol/L    Anion Gap 15 9 - 17 mmol/L    GFR Non-African American >60 >60 mL/min    GFR African American >60 >60 mL/min    GFR Comment          GFR Staging NOT REPORTED    HOMOCYSTEINE, SERUM   Result Value Ref Range    Homocysteine 9.4 <15.0 umol/L   Vitamin B12 & Folate   Result Value Ref Range    Vitamin B-12 653 232 - 1245 pg/mL    Folate 16.9 >4.8 ng/mL   APTT   Result Value Ref Range    PTT 25.0 20.5 - 30.5 sec   APTT   Result Value Ref Range    PTT 40.3 (H) 20.5 - 30.5 sec   Procalcitonin   Result Value Ref Range    Procalcitonin 0.09 (H) <0.09 ng/mL   APTT   Result Value Ref Range    PTT 24.4 20.5 - 30.5 sec   EKG 12 Lead   Result Value Ref Range    Ventricular Rate 73 BPM    Atrial Rate 73 BPM    P-R Interval 166 ms    QRS Duration 132 ms    Q-T Interval 404 ms    QTc Calculation (Bazett) 445 ms    P Axis 35 degrees    R Axis 3 degrees    T Axis 5 degrees   EKG 12 Lead   Result Value Ref Range    Ventricular Rate 60 BPM    Atrial Rate 60 BPM    P-R Interval 166 ms    QRS Duration 132 ms    Q-T Interval 436 ms    QTc Calculation (Bazett) 436 ms    P Axis 48 degrees    R Axis 22 degrees    T Axis -7 degrees       IMAGING DATA:    Xr Chest (single View Frontal)    Result Date: 2/24/2020  EXAMINATION: ONE XRAY VIEW OF THE CHEST 2/24/2020 7:22 pm COMPARISON: February 13, 2020 HISTORY: ORDERING SYSTEM PROVIDED HISTORY: Concern for hypertensive emergency patient has RASHMI recieving large amounts of fluid with respiratory distress TECHNOLOGIST PROVIDED HISTORY: Concern for hypertensive emergency patient has RASHMI recieving large amounts of fluid with respiratory mediastinal lymphadenopathy. The larynx and pharynx are unremarkable. No acute abnormality of the salivary and thyroid glands. BONES: No acute osseous abnormality. CTA HEAD: ANTERIOR CIRCULATION: No significant stenosis of the intracranial internal carotid, anterior cerebral, or middle cerebral arteries. No aneurysm. Congenital absent left A1 segment, anatomic variant. POSTERIOR CIRCULATION: Bilateral fetal supply. 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 No aneurysm. OTHER: No dural venous sinus thrombosis on this non-dedicated study. CT PERFUSION: No significant asymmetry of color maps. No evidence of penumbra. 1. Hypodense areas in the mayuri concordant with acute infarct visualized at MR. 2. No perfusion mismatch. 3. 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 4. Otherwise patent cervical and cerebral vasculature     Ct Head Wo Contrast    Result Date: 2/24/2020  EXAMINATION: CT OF THE HEAD WITHOUT CONTRAST  2/24/2020 2:10 pm TECHNIQUE: CT of the head was performed without the administration of intravenous contrast. Dose modulation, iterative reconstruction, and/or weight based adjustment of the mA/kV was utilized to reduce the radiation dose to as low as reasonably achievable. COMPARISON: 02/22/2020. HISTORY: ORDERING SYSTEM PROVIDED HISTORY: HTN, Aphasia TECHNOLOGIST PROVIDED HISTORY: HTN, Aphasia Reason for Exam: HTN, Aphasia Acuity: Acute Type of Exam: Initial FINDINGS: BRAIN/VENTRICLES: There is no acute intracranial hemorrhage, mass effect or midline shift. No abnormal extra-axial fluid collection. The gray-white differentiation is maintained. The redemonstrated is low attenuation within the right mayuri. There is no evidence of hydrocephalus. ORBITS: The visualized portion of the orbits demonstrate no acute abnormality.  SINUSES: The visualized paranasal sinuses and mastoid air cells demonstrate no acute abnormality. There is mucosal disease involving the maxillary, ethmoid and sphenoid sinuses. SOFT TISSUES/SKULL:  No acute abnormality of the visualized skull or soft tissues. 1. Stable CT scan of the brain with subacute right pontine infarct. Ct Head Wo Contrast    Result Date: 2/22/2020  EXAMINATION: CT OF THE HEAD WITHOUT CONTRAST  2/22/2020 9:57 pm TECHNIQUE: CT of the head was performed without the administration of intravenous contrast. Dose modulation, iterative reconstruction, and/or weight based adjustment of the mA/kV was utilized to reduce the radiation dose to as low as reasonably achievable. COMPARISON: CTA of the head and neck February 15, 2020 and CT head February 13, 2029 and MR brain February 13, 2020 HISTORY: ORDERING SYSTEM PROVIDED HISTORY: Change in Mentation. r/o stroke redemonstration/extension. TECHNOLOGIST PROVIDED HISTORY: Change in Mentation. r/o stroke redemonstration/extension. Reason for Exam: Change in Mentation. r/o stroke redemonstration/extension. FINDINGS: BRAIN/VENTRICLES: There is no acute intracranial hemorrhage, mass effect or midline shift. No abnormal extra-axial fluid collection. The gray-white differentiation is maintained without evidence of an acute infarct. There is no evidence of hydrocephalus. Hypodensity right mayuri unchanged. Intracranial atherosclerosis. ORBITS: The visualized portion of the orbits demonstrate no acute abnormality. SINUSES: Air-fluid level right maxillary sinus. SOFT TISSUES/SKULL:  No acute abnormality of the visualized skull or soft tissues. No change right pontine infarct. No acute disease.      Ct Head Wo Contrast    Result Date: 2/13/2020  EXAMINATION: CT OF THE HEAD WITHOUT CONTRAST  2/13/2020 10:03 pm TECHNIQUE: CT of the head was performed without the administration of intravenous contrast. Dose modulation, iterative reconstruction, and/or weight based adjustment of the mA/kV was utilized to reduce the radiation dose to as visualized. Patient voided prior to the examination. Unremarkable ultrasound of the kidneys. Ir Angiogram Carotid C Erebral Bilateral    Result Date: 2/25/2020  Date of procedure: 2/25/2020 Procedure: Diagnostic cerebral angiogram Indication: Basilar artery occlusion Procedures: 1. Selective right common carotid artery angiogram 2. Selective right internal carotid artery cerebral angiogram 3. Selective left common carotid artery angiogram 4. Selective left internal carotid artery cerebral angiogram 5. Selective left vertebral artery cerebral angiogram 6. Right common femoral artery angiogram Neurointerventionalist: Autumn Leon M.D. [place]: Dr. Shay Tolentino Comparison: None Fluoroscopy time: minutes Contrast: 115 cc Visipaque-270 50/50 Side of Access: Right common femoral Closure device: 25 cm 5 [employer] sheath was sutured in Sedation: MAC Consent: After explaining the risks and benefits to the patient and the patient's family, including but not limited to stroke, coma, death, vessel injury, dissection, tear, occlusion, and X-ray dye allergic type reaction, a signed consent form was obtained. Clinical History: 53 yo gentleman with htn, hld, recent diminutive posterior circulation and basilar occlusion with right pontine stroke and minimal deficits at rehab with decreased appetite and increased neuro deficits with sbp in the 130s. On aspirin and brilinta. Tx to  [facility] icu for worsening bilateral brainstem stroke. Description and findings: The patient's right groin was prepped and draped in standard sterile fashion and under local anesthesia with conscious sedation, the right common femoral artery was accessed with a micropuncture kit needle technique, and a 5 [employer] intravascular sheath was placed within the right common femoral artery, establishing arterial access.  A 5 [employer] multipurpose catheter was then advanced over a guide wire to the level of the aortic arch, and used to selectively catheterize the right common carotid artery. Right CCA technique: The right common carotid artery was selectively catheterized under fluoroscopic guidance and digital subtraction angiography images were obtained in biplane projections of the right cervical carotid artery. Interpretation: The right common carotid artery injection demonstrates normal antegrade flow into the external and internal carotid arteries with normal filling of the external carotid artery branches. Course and caliber of the cervical portion of the right internal carotid artery are unremarkable. Further inspection demonstrates no evidence of dissection, stenosis, aneurysm, or other vascular abnormality within the right CCA into the cervical segment of the right ICA. Right ICA technique: The right internal carotid artery was then selectively catheterized, and digital subtraction angiography of the intracranial right internal carotid circulation was performed in frontal, and lateral projections. Interpretation: There is normal antegrade filling of the distal internal carotid artery, ophthalmic artery, anterior cerebral artery, middle cerebral artery and the distal branches. Inspection of the remaining right internal carotid circulation revealed large dominant right P-comm with retrograde to the superior cerebellar artery filling from the anterior circulation. No other evidence of cerebral aneurysm, arteriovenous malformation, or arterial stenosis. Capillary and venous phase images were also unremarkable, with no evidence of veno-occlusive disease. Left CCA technique: The left common carotid artery was selectively catheterized under fluoroscopic guidance and digital subtraction angiography images were obtained in biplane projections of the left cervical common carotid artery.  Interpretation: The left common carotid artery injection demonstrates normal antegrade flow into the external and internal carotid arteries with normal filling of the external carotid artery branches. Caliber and lumen contour of the cervical portion of the left internal carotid artery are unremarkable. Left ICA technique: The left internal carotid artery was then selectively catheterized, and digital subtraction angiography of the intracranial left internal carotid artery circulation was performed in frontal and lateral projections. Interpretation: There is normal antegrade filling of the distal internal carotid artery, ophthalmic artery, anterior cerebral artery, middle cerebral artery and distal branches. Inspection of the remaining left internal carotid artery circulation revealed dominant left P-comm with retrograde to the SCA filling from the anterior circulation. No other evidence of cerebral aneurysm, arteriovenous malformation. Capillary and venous phase images were also unremarkable, with no evidence of veno-occlusive disease. Left VA technique: The left subclavian artery was then selectively catheterized and the left vertebral artery was selectively catheterized with roadmap guidance. Digital subtraction angiography of the intracranial left vertebrobasilar run-off was obtained in frontal and lateral projections. Interpretation: The left vertebral artery injection demonstrates basilar occlusion immediately distal to the AICA origin. Contrast opacification of the contralateral right V4 segment was achieved and demonstrated no evidence of an aneurysm at the origin of the right PICA. There was no evidence of cerebral aneurysm, arteriovenous malformation. Capillary and venous phase images were unremarkable. Right CFA technique: A right common femoral artery angiogram was performed and demonstrated arterial catheterization proximal to the bifurcation. There was no evidence of dissection or occlusion within the right common femoral artery. The sheath was exchanged with 25 cm sheath 5 [employer] and sutured in place . Impression: 1.  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 Multiplanar reformatted images are provided for review. MIP images are provided for review. Stenosis of the internal carotid arteries measured using NASCET criteria. Dose modulation, iterative reconstruction, and/or weight based adjustment of the mA/kV was utilized to reduce the radiation dose to as low as reasonably achievable.; CT of the head was performed without the administration of intravenous contrast. Dose modulation, iterative reconstruction, and/or weight based adjustment of the mA/kV was utilized to reduce the radiation dose to as low as reasonably achievable. Noncontrast CT of the head with reconstructed 2-D images are also provided for review. COMPARISON: 02/15/2020. HISTORY: ORDERING SYSTEM PROVIDED HISTORY: Left hemiplegia TECHNOLOGIST PROVIDED HISTORY: Please add to VIZ clovis Left hemiplegia Reason for Exam: lt hemiplegia Acuity: Unknown Type of Exam: Unknown; ORDERING SYSTEM PROVIDED HISTORY: Left hemiplegia TECHNOLOGIST PROVIDED HISTORY: Please add to VIZ clovis Left hemiplegia Reason for Exam: lt hemiplegia Acuity: Unknown Type of Exam: Unknown FINDINGS: CT HEAD: BRAIN/VENTRICLES:  No acute intracranial hemorrhage or extraaxial fluid collection. Patchy areas of hypodensity in mayuri, right side greater than left. No evidence of mass, mass effect or midline shift. No evidence of hydrocephalus. ORBITS: The visualized portion of the orbits demonstrate no acute abnormality. SINUSES:  The visualized paranasal sinuses and mastoid air cells demonstrate no acute abnormality. SOFT TISSUES/SKULL: No acute abnormality of the visualized skull or soft tissues. CTA NECK: AORTIC ARCH/ARCH VESSELS: No dissection or arterial injury.   No significant stenosis of the Exam: basilar artery filling defect Acuity: Unknown Type of Exam: Unknown FINDINGS: CTA NECK: AORTIC ARCH/ARCH VESSELS: No dissection or arterial injury. No significant stenosis of the brachiocephalic or subclavian arteries. CAROTID ARTERIES: No dissection, arterial injury, or hemodynamically significant stenosis by NASCET criteria. VERTEBRAL ARTERIES: No dissection, arterial injury, or significant stenosis. SOFT TISSUES: The lung apices are clear. No cervical or superior mediastinal lymphadenopathy. The larynx and pharynx are unremarkable. No acute abnormality of the salivary and thyroid glands. BONES: No acute osseous abnormality. CTA HEAD: ANTERIOR CIRCULATION: No significant stenosis of the intracranial internal carotid, anterior cerebral, or middle cerebral arteries. No aneurysm. POSTERIOR CIRCULATION: Severe diffuse stenosis or hypoplasia of the intracranial vertebral arteries distal to the posterior inferior cerebellar artery origins. Persistent occlusion of the basilar artery, which appears hypoplastic. The posterior cerebral arteries are predominantly supplied by the posterior communicating arteries with retrograde filling of the P1 segment supplying the superior cerebellar arteries. There is diffuse moderate irregularity of the P2 and P3 segments posterior cerebral arteries. No aneurysm. OTHER: No dural venous sinus thrombosis on this non-dedicated study. BRAIN: No mass effect or midline shift. No extra-axial fluid collection. The gray-white differentiation is maintained. 1. No new arterial abnormality in the head or neck. 2. 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 3. Moderate diffuse irregularity of the P2 and P3 segments posterior cerebral arteries, which are supplied by the posterior communicating arteries. 4. No hemodynamically significant arterial stenosis in the neck.      Cta Head Neck W Contrast    Result Date: 2/13/2020  EXAMINATION: CTA OF THE HEAD AND NECK WITH CONTRAST 2/13/2020 3:27 pm: TECHNIQUE: CTA of the head and neck was performed with the administration of intravenous contrast. Multiplanar reformatted images are provided for review. MIP images are provided for review. Stenosis of the internal carotid arteries measured using NASCET criteria. Dose modulation, iterative reconstruction, and/or weight based adjustment of the mA/kV was utilized to reduce the radiation dose to as low as reasonably achievable. COMPARISON: None. HISTORY: ORDERING SYSTEM PROVIDED HISTORY: left sided deficits TECHNOLOGIST PROVIDED HISTORY: left sided deficits Reason for Exam: lt sided numbness weakness slurred speech FINDINGS: CTA NECK: AORTIC ARCH/ARCH VESSELS: No dissection or arterial injury. No significant stenosis of the brachiocephalic or subclavian arteries. CAROTID ARTERIES: No dissection, arterial injury, or hemodynamically significant stenosis by NASCET criteria. VERTEBRAL ARTERIES: No dissection, arterial injury, or significant stenosis. SOFT TISSUES: The lung apices are clear. No cervical or superior mediastinal lymphadenopathy. The larynx and pharynx are unremarkable. No acute abnormality of the salivary and thyroid glands. BONES: No acute osseous abnormality. CTA HEAD: ANTERIOR CIRCULATION: No significant stenosis of the intracranial internal carotid, anterior cerebral, or middle cerebral arteries. No aneurysm. 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 There is persistent fetal origin of the posterior cerebral arteries bilaterally. The superior cerebellar arteries appear to arise from the posterior cerebral arteries. OTHER: No dural venous sinus thrombosis on this non-dedicated study. BRAIN: No mass effect or midline shift. No extra-axial fluid collection. The gray-white differentiation is maintained. No significant stenosis or evidence for occlusion.  Hypoplastic appearing basilar artery with persistent fetal origin of the posterior cerebral arteries the anterolateral aspect of the left cerebellar hemisphere is noted as well. No other areas of new abnormal diffusion signal are noted ORBITS: No acute orbital abnormality is noted SINUSES: Multifocal paranasal sinus opacification is noted with a small amount of fluid noted in the right sphenoid and probably in the left posterior ethmoid. BONES/SOFT TISSUES: The bone marrow signal intensity appears normal. The soft tissues demonstrate no acute abnormality. Increasing multifocal acute infarct in the mayuri. New punctate acute infarct left anterolateral cerebellar hemisphere Multifocal periventricular and subcortical white matter small vessel ischemic changes are again noted bilaterally. Mri Brain Wo Contrast    Result Date: 2/13/2020  EXAMINATION: MRI OF THE BRAIN WITHOUT CONTRAST  2/13/2020 6:20 pm TECHNIQUE: Multiplanar multisequence MRI of the brain was performed without the administration of intravenous contrast. COMPARISON: CT head neck 02/13/2020 and CT head 02/13/2020 HISTORY: ORDERING SYSTEM PROVIDED HISTORY: left sided weakness TECHNOLOGIST PROVIDED HISTORY: left sided weakness FINDINGS: INTRACRANIAL STRUCTURES/VENTRICLES: There is a right paramedian pontine acute stroke. No obvious T2 prolongation. No mass effect or midline shift. No evidence of an acute intracranial hemorrhage. The ventricles and sulci are normal in size and configuration. Mild non-specific periventricular and subcortical T2 prolongation identified may be sequelae of chronic small ischemic disease versus demyelinating process. Wedge-shaped focus of T2 prolongation identified left posterior cerebellum without corresponding restricted diffusion suggestive of prior stroke. The sellar/suprasellar regions appear unremarkable. There is normal flow of the normal flow void involving the basilar artery. The Remainder of the normal signal voids within the major intracranial vessels appear maintained.   Foci of hemosiderin left

## 2020-02-27 NOTE — CONSULTS
that time he was found to have a right pontine infarction. Patient was not a TPA candidate and was later discharged home on aspirin and Brilinta. Patient was taken to [facility] ED on 2/24/2020 due to hypertensive urgency with a blood pressure of 237/130 with worsening a aphasia and hemiparesis. Repeat CT head showed stable the stable right pontine infarct, but MRI of the brain revealed multiple new focal infarctions in the mayuri extending to the middle of the pontine and new punctate infarcts in the left cerebellar hemisphere. At that time, patient was deemed to require neuro critical care at [facility] for further evaluation and management. Upon arrival at [facility], a CTA of the head [facility] revealed lesions of the mid to distal basilar artery. Pt was started on aspirin and Brilinta. Today the patient required intubation for impending respiratory failure. During that time his blood pressures shane to the 416M systolic and repeat CT imaging was negative for any intracranial hemorrhage. Patient was found to be sedated on propofol when examined. Family was at bedside, however the son who makes the medical decisions was not present. Past Medical History   has a past medical history of Heart attack ([address] Hyperlipidemia, Hypertension, and Stroke ([address] Past Surgical History   has a past surgical history that includes knee surgery (Right); Foot surgery (Bilateral); Coronary angioplasty with stent (2019); and Cardiac surgery. Medications  Prior to Admission medications    Medication Sig Start Date End Date Taking?  Authorizing Provider   meloxicam (MOBIC) 7.5 MG tablet Take 1 tablet by mouth 2 times daily as needed for Pain 2/10/20 2/24/20  Jose Luis Contreras PA-C   tadalafil (CIALIS) 20 MG tablet Take 1 tablet by mouth as needed for Erectile Dysfunction 2/10/20   Jose Luis Contreras PA-C   lisinopril (PRINIVIL;ZESTRIL) 40 MG tablet Take 1 tablet by mouth daily 2/10/20   Jose Luis Contreras SRINI   aspirin 81 MG EC tablet Take 1 tablet by mouth daily 5/26/19   Jason Miles MD   nitroGLYCERIN (NITROSTAT) 0.4 MG SL tablet up to max of 3 total doses. If no relief after 1 dose, call 911. 5/26/19   Jason Miles MD   atorvastatin (LIPITOR) 80 MG tablet Take 1 tablet by mouth nightly 5/26/19   Jason Miles MD   amLODIPine (NORVASC) 10 MG tablet Take 1 tablet by mouth daily 5/26/19   Jason Miles MD   ticagrelor (BRILINTA) 90 MG TABS tablet Take 1 tablet by mouth 2 times daily 5/26/19   Jason Miles MD    Scheduled Meds:   amLODIPine  10 mg Oral Daily    propofol        aspirin  81 mg Oral Daily    ticagrelor  90 mg Oral BID    atorvastatin  80 mg Oral Nightly    FLUoxetine  20 mg Per NG tube Daily    chlorhexidine  15 mL Mouth/Throat BID    sodium chloride flush  10 mL Intravenous 2 times per day    ondansetron  4 mg Intravenous Once    sennosides-docusate sodium  2 tablet Oral Daily    sodium chloride flush  10 mL Intravenous 2 times per day     Continuous Infusions:   niCARdipine 7.5 mg/hr (02/27/20 0513)    sodium chloride 125 mL/hr at 02/25/20 0146     PRN Meds:.sodium chloride flush, acetaminophen, hydrALAZINE, labetalol, magnesium hydroxide, acetaminophen, sodium chloride flush, polyethylene glycol, ondansetron, promethazine  Allergies  has No Known Allergies. Family History  family history includes High Blood Pressure in his father and mother; Hypertension in his brother and sister. Social History   reports that he has never smoked. He has never used smokeless tobacco.   reports current alcohol use. reports no history of drug use. Review of Systems    Unable to ask you systems due to patient being intubated and sedated with propofol. PHYSICAL:   VITALS:  height is 6' 1\" (1.854 m) and weight is 238 lb 12.1 oz (108.3 kg). His oral temperature is 98.1 °F (36.7 °C). His blood pressure is 155/63 (abnormal) and his pulse is 85.  His respiration is 33 (abnormal) and oxygen saturation is 97%.   CONSTITUTIONAL: Intubated and sedated on bed with head elevated at 30 degrees, well-nourished well-developed  HEENT: Head is normocephalic, atraumatic, and intubated  NECK: Soft, trachea midline and straight  LUNGS: Diffuse rhonchi in all fields  CARDIOVASCULAR: Heart sounds are normal.  Regular rate and rhythm without murmur, gallop or rub. ABDOMEN: soft, nontender, nondistended, no guarding or peritoneal signs. NEUROLOGIC: Sedated with propofol and intubated. Unable to assess further neurological exam  EXTREMITIES: no cyanosis, clubbing or edema    LABS:     Recent Labs     02/24/20 2023 02/25/20  0432 02/26/20  0322 02/27/20  0430 02/27/20  0450 02/27/20  0528 02/27/20  0939 02/27/20  1218   WBC 12.5*   < > 10.8 15.6* 23.9*  --  7.4  --  23.2*   HGB 13.7   < > 13.1 14.1 13.6  --  10.2*  --  13.8   HCT 41.1   < > 40.8 42.9 41.3  --  32.2*  --  41.2      < > 294 347 331  --  289  --  336   NA  --    < > 146* 143  --  140  --   --   --    K  --    < > 3.9 3.6*  --  3.4*  --   --   --    CL  --    < > 112* 107  --  107  --   --   --    CO2  --    < > 20 21  --  20  --   --   --    BUN  --    < > 24* 17  --  19  --   --   --    CREATININE  --    < > 1.29* 1.03  --  1.01  --   --   --    CALCIUM  --    < > 8.9 9.4  --  9.0  --   --   --    INR 1.1  --   --   --   --   --   --   --   --    NITRU  --   --   --   --   --   --   --  NEGATIVE  --    COLORU  --   --   --   --   --   --   --  YELLOW  --    BACTERIA  --   --   --   --   --   --   --  NOT REPORTED  --     < > = values in this interval not displayed. No results for input(s): ALKPHOS, ALT, AST, BILITOT, BILIDIR, LABALBU, AMYLASE, LIPASE in the last 72 hours.   CBC with Differential:    Lab Results   Component Value Date    WBC 23.2 02/27/2020    RBC 4.78 02/27/2020    HGB 13.8 02/27/2020    HCT 41.2 02/27/2020     02/27/2020    MCV 86.2 02/27/2020    MCH 28.9 02/27/2020    MCHC 33.5 02/27/2020    RDW 12.5 02/27/2020    LYMPHOPCT 7 and cerebral vasculature         CT HEAD WO CONTRAST   Final Result   1. Hypodense areas in the mayuri concordant with acute infarct visualized at MR.   2. No perfusion mismatch. 3. 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    4. Otherwise patent cervical and cerebral vasculature         XR CHEST PORTABLE    (Results Pending)   XR CHEST PORTABLE    (Results Pending)       Kandace Burnett  2/27/2020, 3:23 PM

## 2020-02-27 NOTE — CONSULTS
History:    Previous  surgery: none   Past Surgical History:   Procedure Laterality Date    CARDIAC SURGERY      CORONARY ANGIOPLASTY WITH STENT PLACEMENT  2019    x 1 stent for LAD    FOOT SURGERY Bilateral     KNEE SURGERY Right        Medications:    Scheduled Meds:   amLODIPine  10 mg Oral Daily    aspirin  81 mg Oral Daily    ticagrelor  90 mg Oral BID    atorvastatin  80 mg Oral Nightly    FLUoxetine  20 mg Per NG tube Daily    chlorhexidine  15 mL Mouth/Throat BID    sodium chloride flush  10 mL Intravenous 2 times per day    ondansetron  4 mg Intravenous Once    sennosides-docusate sodium  2 tablet Oral Daily    sodium chloride flush  10 mL Intravenous 2 times per day     Continuous Infusions:   niCARdipine 7.5 mg/hr (02/27/20 0513)    sodium chloride 125 mL/hr at 02/25/20 0146    heparin (porcine) Stopped (02/26/20 1356)     PRN Meds:.sodium chloride flush, acetaminophen, hydrALAZINE, labetalol, magnesium hydroxide, acetaminophen, sodium chloride flush, polyethylene glycol, ondansetron, promethazine    Allergies:    Patient has no known allergies. Social History:    Social History     Socioeconomic History    Marital status: Single     Spouse name: Not on file    Number of children: Not on file    Years of education: Not on file    Highest education level: Not on file   Occupational History    Not on file   Social Needs    Financial resource strain: Not on file    Food insecurity:     Worry: Not on file     Inability: Not on file    Transportation needs:     Medical: Not on file     Non-medical: Not on file   Tobacco Use    Smoking status: Never Smoker    Smokeless tobacco: Never Used   Substance and Sexual Activity    Alcohol use:  Yes    Drug use: Never    Sexual activity: Not on file   Lifestyle    Physical activity:     Days per week: Not on file     Minutes per session: Not on file    Stress: Not on file   Relationships    Social connections:     Talks on phone: Not on file     Gets together: Not on file     Attends Yazidism service: Not on file     Active member of club or organization: Not on file     Attends meetings of clubs or organizations: Not on file     Relationship status: Not on file    Intimate partner violence:     Fear of current or ex partner: Not on file     Emotionally abused: Not on file     Physically abused: Not on file     Forced sexual activity: Not on file   Other Topics Concern    Not on file   Social History Narrative    Not on file       Family History:    Previous Urologic Family history: Unknown  Family History   Problem Relation Age of Onset    High Blood Pressure Mother     High Blood Pressure Father     Hypertension Sister     Hypertension Brother          REVIEW OF SYSTEMS:  Unable to be obtained as patient is intubated and sedated     Physical Exam:    This a 52 y.o. male   Patient Vitals for the past 24 hrs:   BP Temp Temp src Pulse Resp SpO2   02/27/20 0845 (!) 155/63 98.1 °F (36.7 °C) Oral 85 24 94 %   02/27/20 0700 -- -- -- 86 24 94 %   02/27/20 0645 (!) 161/75 -- -- 78 26 94 %   02/27/20 0630 -- -- -- 80 30 93 %   02/27/20 0615 -- -- -- 83 30 92 %   02/27/20 0600 -- -- -- 87 27 94 %   02/27/20 0545 (!) 172/79 -- -- 87 28 94 %   02/27/20 0530 -- -- -- 84 24 95 %   02/27/20 0515 -- -- -- 94 21 95 %   02/27/20 0500 -- -- -- 77 28 94 %   02/27/20 0445 (!) 143/66 -- -- 80 23 94 %   02/27/20 0430 -- -- -- 74 22 95 %   02/27/20 0415 -- -- -- 81 29 96 %   02/27/20 0400 -- -- -- 77 29 95 %   02/27/20 0345 (!) 151/74 97.2 °F (36.2 °C) Oral 79 28 95 %   02/27/20 0330 -- -- -- 76 29 95 %   02/27/20 0315 -- -- -- 79 29 95 %   02/27/20 0300 -- -- -- 80 29 95 %   02/27/20 0245 (!) 146/76 -- -- 80 (!) 31 95 %   02/27/20 0230 -- -- -- 82 (!) 32 95 %   02/27/20 0215 -- -- -- 90 23 95 %   02/27/20 0200 -- -- -- 92 25 95 %   02/27/20 0145 (!) 149/85 -- -- 95 25 95 %   02/27/20 0130 -- -- -- 96 25 95 %   02/27/20 0115 -- -- -- 91 30 95 %   02/27/20 0100 13.6 10.2* 13.8   HCT 41.3 32.2* 41.2   MCV 86.6 85.9 86.2    289 336     Recent Labs     02/25/20  0432 02/26/20  0322 02/27/20  0450   * 143 140   K 3.9 3.6* 3.4*   * 107 107   CO2 20 21 20   BUN 24* 17 19   CREATININE 1.29* 1.03 1.01     No results found for: PSA    Additional labs:    Urinalysis:   Recent Labs     02/27/20  0939   COLORU YELLOW   PHUR 5.0   WBCUA 0 TO 2   RBCUA 0 TO 2   MUCUS 1+*   TRICHOMONAS NOT REPORTED   YEAST NOT REPORTED   BACTERIA NOT REPORTED   SPECGRAV 1.018   LEUKOCYTESUR NEGATIVE   UROBILINOGEN Normal   BILIRUBINUR NEGATIVE        Culture Results:  Ordered 2/27/20  -----------------------------------------------------------------  Imaging Results:  CTAP 2/26/20 reviewed by me. No obvious renal stones or masses, no hydro. EXAMINATION:   CT OF THE ABDOMEN AND PELVIS WITHOUT CONTRAST 2/26/2020 4:18 am       TECHNIQUE:   CT of the abdomen and pelvis was performed without the administration of   intravenous contrast. Multiplanar reformatted images are provided for review. Dose modulation, iterative reconstruction, and/or weight based adjustment of   the mA/kV was utilized to reduce the radiation dose to as low as reasonably   achievable.       COMPARISON:   None.       HISTORY:   ORDERING SYSTEM PROVIDED HISTORY: tender R groin post R femoral anigo, oozing   of blood, r/o retro peritonial hematoma   TECHNOLOGIST PROVIDED HISTORY:   tender R groin post R femoral anigo, oozing of blood, r/o retro peritonial   hematoma       Reason for Exam: tender rt groin,poss retroperitoneal bleed   Acuity: Unknown   Type of Exam: Unknown       FINDINGS:   Lower Chest:  Visualized portion of the lower chest demonstrates no acute   abnormality.       Organs:  The visualized portions of the liver, gallbladder, spleen, pancreas   and adrenal glands appear unremarkable within the constraints of a   noncontrast exam.  No biliary ductal dilatation.  The kidneys appear normal   in size without evidence of a contour distorting mass.  No renal stone or   hydronephrosis.  No perinephric stranding.  Excretory phase contrast is   present from prior study.       GI/Bowel: No bowel dilatation to suggest obstruction.  No evidence of acute   appendicitis.       Pelvis: The urinary bladder appears unremarkable.  The pelvic organs   demonstrate no acute abnormality.       Peritoneum/Retroperitoneum: The abdominal aorta is normal in caliber.  No   retroperitoneal hematoma.  Right femoral catheter sheath in place, without   apparent complication.       Bones/Soft Tissues: No acute findings. Xr Chest (single View Frontal)    Result Date: 2/24/2020  EXAMINATION: ONE XRAY VIEW OF THE CHEST 2/24/2020 7:22 pm COMPARISON: February 13, 2020 HISTORY: ORDERING SYSTEM PROVIDED HISTORY: Concern for hypertensive emergency patient has RASHMI recieving large amounts of fluid with respiratory distress TECHNOLOGIST PROVIDED HISTORY: Concern for hypertensive emergency patient has RASHMI recieving large amounts of fluid with respiratory distress Reason for Exam: Concern for hypertensive emergency; patient has RASHMI recieving large amounts of fluid with respiratory distress. H/O hypertension. Acuity: Unknown Type of Exam: Unknown Additional signs and symptoms: Concern for hypertensive emergency; patient has RASHMI recieving large amounts of fluid with respiratory distress. H/O hypertension. FINDINGS: Lung volumes are low. Heart size is stable. Mediastinal contours are stable. Ill-defined increased density in the left lung base is noted, greatest in the retrocardiac region. There is no consolidation otherwise. Ill-defined increased density in the left lung base, atelectasis versus pneumonia.  No findings diagnostic of pulmonary edema     Ct Head Wo Contrast    Result Date: 2/25/2020  EXAMINATION: CTA OF THE HEAD AND NECK WITH CONTRAST; CT OF THE HEAD WITHOUT CONTRAST 2/25/2020 1:37 am; 2/25/2020 1:31 am: TECHNIQUE: CTA of the head and neck was performed with the administration of intravenous contrast. Multiplanar reformatted images are provided for review. MIP images are provided for review. Stenosis of the internal carotid arteries measured using NASCET criteria. Dose modulation, iterative reconstruction, and/or weight based adjustment of the mA/kV was utilized to reduce the radiation dose to as low as reasonably achievable.; CT of the head was performed without the administration of intravenous contrast. Dose modulation, iterative reconstruction, and/or weight based adjustment of the mA/kV was utilized to reduce the radiation dose to as low as reasonably achievable. Noncontrast CT of the head with reconstructed 2-D images are also provided for review. COMPARISON: 02/15/2020. HISTORY: ORDERING SYSTEM PROVIDED HISTORY: Left hemiplegia TECHNOLOGIST PROVIDED HISTORY: Please add to VIZ clovis Left hemiplegia Reason for Exam: lt hemiplegia Acuity: Unknown Type of Exam: Unknown; ORDERING SYSTEM PROVIDED HISTORY: Left hemiplegia TECHNOLOGIST PROVIDED HISTORY: Please add to VIZ clovis Left hemiplegia Reason for Exam: lt hemiplegia Acuity: Unknown Type of Exam: Unknown FINDINGS: CT HEAD: BRAIN/VENTRICLES:  No acute intracranial hemorrhage or extraaxial fluid collection. Patchy areas of hypodensity in mayuri, right side greater than left. No evidence of mass, mass effect or midline shift. No evidence of hydrocephalus. ORBITS: The visualized portion of the orbits demonstrate no acute abnormality. SINUSES:  The visualized paranasal sinuses and mastoid air cells demonstrate no acute abnormality. SOFT TISSUES/SKULL: No acute abnormality of the visualized skull or soft tissues. CTA NECK: AORTIC ARCH/ARCH VESSELS: No dissection or arterial injury. No significant stenosis of the brachiocephalic or subclavian arteries. CAROTID ARTERIES: No dissection, arterial injury, or hemodynamically significant stenosis by NASCET criteria.  VERTEBRAL ARTERIES: No dissection, arterial injury, or significant stenosis. SOFT TISSUES: The lung apices are clear. No cervical or superior mediastinal lymphadenopathy. The larynx and pharynx are unremarkable. No acute abnormality of the salivary and thyroid glands. BONES: No acute osseous abnormality. CTA HEAD: ANTERIOR CIRCULATION: No significant stenosis of the intracranial internal carotid, anterior cerebral, or middle cerebral arteries. No aneurysm. Congenital absent left A1 segment, anatomic variant. POSTERIOR CIRCULATION: Bilateral fetal supply. 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 No aneurysm. OTHER: No dural venous sinus thrombosis on this non-dedicated study. CT PERFUSION: No significant asymmetry of color maps. No evidence of penumbra. 1. Hypodense areas in the mayuri concordant with acute infarct visualized at MR. 2. No perfusion mismatch. 3. 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 4. Otherwise patent cervical and cerebral vasculature     Ct Head Wo Contrast    Result Date: 2/24/2020  EXAMINATION: CT OF THE HEAD WITHOUT CONTRAST  2/24/2020 2:10 pm TECHNIQUE: CT of the head was performed without the administration of intravenous contrast. Dose modulation, iterative reconstruction, and/or weight based adjustment of the mA/kV was utilized to reduce the radiation dose to as low as reasonably achievable. COMPARISON: 02/22/2020. HISTORY: ORDERING SYSTEM PROVIDED HISTORY: HTN, Aphasia TECHNOLOGIST PROVIDED HISTORY: HTN, Aphasia Reason for Exam: HTN, Aphasia Acuity: Acute Type of Exam: Initial FINDINGS: BRAIN/VENTRICLES: There is no acute intracranial hemorrhage, mass effect or midline shift. No abnormal extra-axial fluid collection. The gray-white differentiation is maintained. The redemonstrated is low attenuation within the right mayuri. There is no evidence of hydrocephalus.  ORBITS: The visualized portion of the orbits demonstrate no acute abnormality. SINUSES: The visualized paranasal sinuses and mastoid air cells demonstrate no acute abnormality. There is mucosal disease involving the maxillary, ethmoid and sphenoid sinuses. SOFT TISSUES/SKULL:  No acute abnormality of the visualized skull or soft tissues. 1. Stable CT scan of the brain with subacute right pontine infarct. Ir Angiogram Carotid C Erebral Bilateral    Result Date: 2/26/2020  Date of procedure: 2/25/2020 Procedure: Diagnostic cerebral angiogram Indication: Basilar artery occlusion Procedures: 1. Selective right common carotid artery angiogram 2. Selective right internal carotid artery cerebral angiogram 3. Selective left common carotid artery angiogram 4. Selective left internal carotid artery cerebral angiogram 5. Selective left vertebral artery cerebral angiogram 6. Right common femoral artery angiogram Neurointerventionalist: Mihir Recio M.D. [place]: Dr. Shmuel Hampton Comparison: None Fluoroscopy time: minutes Contrast: 115 cc Visipaque-270 50/50 Side of Access: Right common femoral Closure device: 25 cm 5 Malay sheath was sutured in Sedation: MAC Consent: After explaining the risks and benefits to the patient and the patient's family, including but not limited to stroke, coma, death, vessel injury, dissection, tear, occlusion, and X-ray dye allergic type reaction, a signed consent form was obtained. Clinical History: 53 yo gentleman with htn, hld, recent diminutive posterior circulation and basilar occlusion with right pontine stroke and minimal deficits at rehab with decreased appetite and increased neuro deficits with sbp in the 130s. On aspirin and Brilinta. Tx to  [facility] icu for worsening bilateral brainstem stroke in setting of acute renal failure.  He was referred for a cerebral angiogram with possible intervention Description and findings: The patient's right groin was prepped and draped in standard sterile fashion and under local anesthesia with conscious sedation, the right common femoral artery was accessed with a micropuncture kit needle technique, and a 5 Mongolian intravascular sheath was placed within the right common femoral artery, establishing arterial access. A 5 Mongolian multipurpose catheter was then advanced over a guide wire to the level of the aortic arch, and used to selectively catheterize the right common carotid artery. Right CCA technique: The right common carotid artery was selectively catheterized under fluoroscopic guidance and digital subtraction angiography images were obtained in biplane projections of the right cervical carotid artery. Interpretation: The right common carotid artery injection demonstrates normal antegrade flow into the external and internal carotid arteries with normal filling of the external carotid artery branches. Course and caliber of the cervical portion of the right internal carotid artery are unremarkable. Further inspection demonstrates no evidence of dissection, stenosis, aneurysm, or other vascular abnormality within the right CCA into the cervical segment of the right ICA. Right ICA technique: The right internal carotid artery was then selectively catheterized, and digital subtraction angiography of the intracranial right internal carotid circulation was performed in frontal, and lateral projections. Interpretation: There is normal antegrade filling of the distal internal carotid artery, ophthalmic artery, anterior cerebral artery, middle cerebral artery and the distal branches. Inspection of the remaining right internal carotid circulation revealed large dominant right P-comm with retrograde opacification to the superior cerebellar artery due to filling from the anterior circulation. No other evidence of cerebral aneurysm, arteriovenous malformation, or arterial stenosis. Capillary and venous phase  images were also unremarkable, with no evidence of veno-occlusive disease.  No of cerebral aneurysm, arteriovenous malformation. Capillary and venous phase images were unremarkable. Right CFA technique: A right common femoral artery angiogram was performed and demonstrated arterial catheterization proximal to the bifurcation. There was no evidence of dissection or occlusion within the right common femoral artery. The sheath was exchanged with [facility] and sutured in place . Impression: 1. 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 2. Bilateral dominant P-comm 3. Dominant left vertebral artery Dr. Randolph Coker dictated this invasive procedure. Dr. Armando Chan was present for all procedural and imaging components of this case. Examination was reviewed and reported findings confirmed and edited by Dr. Armando Chan . Dr. Armando Chan supervised and interpreted this procedure. Artur Solis MD Final report electronically signed by Artur Solis M.D. on 2/26/2020 11:04 PM    Ct Brain Perfusion    Result Date: 2/25/2020  EXAMINATION: CTA OF THE HEAD WITH CONTRAST WITH PERFUSION 2/25/2020 1:31 am: TECHNIQUE: CTA of the head/brain was performed with the administration of intravenous contrast. Multiplanar reformatted images are provided for review. MIP images are provided for review. Dose modulation, iterative reconstruction, and/or weight based adjustment of the mA/kV was utilized to reduce the radiation dose to as low as reasonably achievable. COMPARISON: None. HISTORY: ORDERING SYSTEM PROVIDED HISTORY: Left hemiplegia TECHNOLOGIST PROVIDED HISTORY: Please add to VIZ clovis Left hemiplegia Reason for Exam: lt hemiplegia Acuity: Unknown Type of Exam: Unknown FINDINGS: CT PERFUSION: Reported in conjunction with CTA head     As above .      Xr Abdomen For Ng/og/ne Tube Placement    Result Date: 2/25/2020  EXAMINATION: ONE SUPINE XRAY VIEW(S) OF THE ABDOMEN 2/25/2020 2:49 am COMPARISON: None. HISTORY: ORDERING SYSTEM PROVIDED HISTORY: Confirmation of course of NG/OG/NE tube and location of tip of tube TECHNOLOGIST PROVIDED HISTORY: Confirmation of course of NG/OG/NE tube and location of tip of tube Portable? ->Yes Reason for Exam: NG placement   supine port    1  FINDINGS: There is an enteric tube with its tip projecting over the body of the stomach. Proximal port is within the gastric body. No evidence of bowel obstruction. Enteric tube in the stomach as above. No evidence of bowel obstruction. Cta Head Neck W Contrast    Result Date: 2/25/2020  EXAMINATION: CTA OF THE HEAD AND NECK WITH CONTRAST; CT OF THE HEAD WITHOUT CONTRAST 2/25/2020 1:37 am; 2/25/2020 1:31 am: TECHNIQUE: CTA of the head and neck was performed with the administration of intravenous contrast. Multiplanar reformatted images are provided for review. MIP images are provided for review. Stenosis of the internal carotid arteries measured using NASCET criteria. Dose modulation, iterative reconstruction, and/or weight based adjustment of the mA/kV was utilized to reduce the radiation dose to as low as reasonably achievable.; CT of the head was performed without the administration of intravenous contrast. Dose modulation, iterative reconstruction, and/or weight based adjustment of the mA/kV was utilized to reduce the radiation dose to as low as reasonably achievable. Noncontrast CT of the head with reconstructed 2-D images are also provided for review. COMPARISON: 02/15/2020.  HISTORY: ORDERING SYSTEM PROVIDED HISTORY: Left hemiplegia TECHNOLOGIST PROVIDED HISTORY: Please add to VIZ clovis Left hemiplegia Reason for Exam: lt hemiplegia Acuity: Unknown Type of Exam: Unknown; ORDERING SYSTEM PROVIDED HISTORY: Left hemiplegia TECHNOLOGIST PROVIDED HISTORY: Please add to VIZ clovis Left hemiplegia Reason for Exam: lt hemiplegia Acuity: Unknown Type of Exam: Unknown FINDINGS: CT HEAD: BRAIN/VENTRICLES:  No acute intracranial hemorrhage or extraaxial fluid collection. Patchy areas of hypodensity in mayuri, right side greater than left. No evidence of mass, mass effect or midline shift. No evidence of hydrocephalus. ORBITS: The visualized portion of the orbits demonstrate no acute abnormality. SINUSES:  The visualized paranasal sinuses and mastoid air cells demonstrate no acute abnormality. SOFT TISSUES/SKULL: No acute abnormality of the visualized skull or soft tissues. CTA NECK: AORTIC ARCH/ARCH VESSELS: No dissection or arterial injury. No significant stenosis of the brachiocephalic or subclavian arteries. CAROTID ARTERIES: No dissection, arterial injury, or hemodynamically significant stenosis by NASCET criteria. VERTEBRAL ARTERIES: No dissection, arterial injury, or significant stenosis. SOFT TISSUES: The lung apices are clear. No cervical or superior mediastinal lymphadenopathy. The larynx and pharynx are unremarkable. No acute abnormality of the salivary and thyroid glands. BONES: No acute osseous abnormality. CTA HEAD: ANTERIOR CIRCULATION: No significant stenosis of the intracranial internal carotid, anterior cerebral, or middle cerebral arteries. No aneurysm. Congenital absent left A1 segment, anatomic variant. POSTERIOR CIRCULATION: Bilateral fetal supply. 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 No aneurysm. OTHER: No dural venous sinus thrombosis on this non-dedicated study. CT PERFUSION: No significant asymmetry of color maps. No evidence of penumbra. 1. Hypodense areas in the mayuri concordant with acute infarct visualized at MR. 2. No perfusion mismatch. 3. 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  4. Otherwise patent cervical and cerebral vasculature     Mri Brain Wo Contrast    Result Date: 2/24/2020  EXAMINATION: MRI OF THE BRAIN

## 2020-02-27 NOTE — PROGRESS NOTES
assistance  Transfers  Comment: unsafe to attempt, modA EOB, minimal participation  Ambulation  Ambulation?: No  More Ambulation?: No  Stairs/Curb  Stairs?: No     Balance  Posture: Poor  Sitting - Static: +;Poor  Sitting - Dynamic: Poor  Comments: modA EOB ~6min  Exercises  Comments: EOB ~6min modA, cues to lift head/ sit upright with poor response. max verbal and tactile cueing for pt to move R fingers/ toes a little. Pt would look up intermittant at son in room with son prompting. Plan   Plan  Times per week: 5-6x/wk  Current Treatment Recommendations: Strengthening, Balance Training, Functional Mobility Training, Home Exercise Program, Safety Education & Training, Patient/Caregiver Education & Training, Transfer Training  Safety Devices  Type of devices:  All fall risk precautions in place, Call light within reach, Patient at risk for falls, Left in bed, Nurse notified  Restraints  Initially in place: No    AM-PAC Score     AM-PAC Inpatient Mobility without Stair Climbing Raw Score : 7 (02/27/20 1001)  AM-PAC Inpatient without Stair Climbing T-Scale Score : 28.66 (02/27/20 1001)  Mobility Inpatient CMS 0-100% Score: 86.29 (02/27/20 1001)  Mobility Inpatient without Stair CMS G-Code Modifier : CM (02/27/20 1001)       Goals  Short term goals  Time Frame for Short term goals: 14 visits  Short term goal 1: Pt will be Briseida to EOB  Short term goal 2: Pt will be CGA while EOB 4min  Short term goal 3: Pt will be modA to transfer with victoriano steady  Short term goal 4: Pt will be safe to attempt ambulation       Therapy Time   Individual Concurrent Group Co-treatment   Time In 0850         Time Out 0907         Minutes 17         Timed Code Treatment Minutes: 8 Minutes       Mitcheal Ore, PT

## 2020-02-27 NOTE — PROGRESS NOTES
TODAY:      AWAKE & FOLLOWING COMMANDS:  [] No   [x] Yes    INTUBATED:   [x] No   [] Yes    SEDATION/ANALGESIA:    [] Propofol gtt  [] Versed gtt  [] Ativan gtt   [x] No Sedation  Pain medications:      FEEDING: Able to take PO?  [] No:  [x] NPO for: Swallow study [] NG/OG [] PEG  Tube Feeds:      [] Yes:  Diet:   DVT Prophylaxis:  [] Yes:           [x] No rationale: heparin discontinued      Stress Ulcer Prophylaxis: [] Yes:   [x] Not indicated    VASOPRESSORS:  [x] No    [] Yes  [] Levophed [] Dopamine [] Vasopressin  [] Dobutamine [] Phenylephrine [] Epinephrine    CENTRAL/ARTERIAL LINES:  [x] No    [] Yes:  Location: , Date placed: , Indication:     MARTIN CATHETER: [] No    [x] Yes:  Location: bladder, Date placed: 2/27 , Indication: hematuria, hemoglobin drop, straight cath x1     DRAINS: [x] No    [] Yes:  Location: , Date placed: , Output:     Head of Bed: [x] Elevated:          [] Flat    Glucose management: [x] Not indicated, consistently less than 180 [] Sliding Scale :            [] Long Acting:    Secondary Stroke PPX: [x] Antiplatelet:  ASA, brilinta              [x] Statin:    Lipitor

## 2020-02-27 NOTE — CONSULTS
Palliative Care Inpatient Consult    NAME:  Ludmila Abarca RECORD NUMBER:  [id number]  AGE: 52 y.o. GENDER: male  : 1970  TODAY'S DATE:  2020    Reasons for Consultation:    Symptom and/or pain management  Provision of information regarding PC and/or hospice philosophies  Complex, time-intensive communication and interdisciplinary psychosocial support  Clarification of goals of care and/or assistance with difficult decision-making  Guidance in regards to resources and transition(s)    Members of PC team contributing to this consultation are :  Dr. Ruben Clarke palliative care attending  History of Present Illness     The patient is a 52 y.o. Non-/non  male who presents with No chief complaint on file. Referred to Palliative Care by   [x] Physician   [] Nursing  [] Family Request   [] Other:       He was admitted to the neuro ICU service for Brain stem infarction ([facility] [address] [I63.9]. His hospital course has been associated with <principal problem not specified>. The patient has a complicated medical history and has been hospitalized since 2020  9:40 PM.  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 Patient developed worsening of speech and then hypertensive emergency and was sent to ED and imaging confirmed extension of pontine infarct with be paramedian pontine infarcts and left lateral cerebellar infarct. Patient was transferred to [facility] neuro ICU for further management. 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 Hematology was consulted for hypercoagulability. It of care consulted for POA discussion.     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]     Brain stem infarction ([facility] [address] [I63.9] 02/24/2020    Hypertensive emergency [I16.1] 02/13/2020       PAST MEDICAL HISTORY      Diagnosis Date    Heart attack ([facility] [address]     Hyperlipidemia     Hypertension     [facility] 02/13/2020       PAST SURGICAL HISTORY  Past Surgical History:   Procedure Laterality Date    CARDIAC SURGERY      CORONARY ANGIOPLASTY WITH STENT PLACEMENT  2019    x 1 stent for LAD    FOOT SURGERY Bilateral     KNEE SURGERY Right        SOCIAL HISTORY  Social History     Tobacco Use    Smoking status: Never Smoker    Smokeless tobacco: Never Used   Substance Use Topics    Alcohol use:  Yes    Drug use: Never       ALLERGIES  No Known Allergies      MEDICATIONS  Current Medications    aspirin  81 mg Oral Daily    ticagrelor  90 mg Oral BID    atorvastatin  80 mg Oral Nightly    FLUoxetine  20 mg Per NG tube Daily    chlorhexidine  15 mL Mouth/Throat BID    sodium chloride flush  10 mL Intravenous 2 times per day    ondansetron  4 mg Intravenous Once    sennosides-docusate sodium  2 tablet Oral Daily    sodium chloride flush  10 mL Intravenous 2 times per day     sodium chloride flush, acetaminophen, hydrALAZINE, labetalol, magnesium hydroxide, acetaminophen, sodium chloride flush, polyethylene glycol, ondansetron, promethazine  IV Drips/Infusions   niCARdipine 7.5 mg/hr (02/27/20 0513)    sodium chloride 125 mL/hr at 02/25/20 0146    heparin (porcine) Stopped (02/26/20 1356)     Home Medications  Current Facility-Administered Medications on File Prior to Encounter   Medication Dose Route Frequency Provider Last Rate Last Dose    sodium chloride flush 0.9 % injection 10 mL  10 mL Intravenous 2 times per day Sp Thornton MD        sodium chloride flush 0.9 % injection 10 mL  10 mL Intravenous PRN Sp Thornton MD         Current Outpatient Medications on File Prior to Encounter   Medication Sig Dispense Refill    meloxicam (MOBIC) 7.5 MG tablet Take 1

## 2020-02-27 NOTE — PROGRESS NOTES
Patient was subsequently started on heparin. Work-up from previous admission shows compound heterozygous MTHFR mutation. Other hypercoagulable work-up was unremarkable for factor V Leyden mutation, prothrombin gene mutation and lupus antibodies. Past Medical History:   has a past medical history of Heart attack ([facility] [address] Hyperlipidemia, Hypertension, and Stroke ([facility] [address] Past Surgical History:   has a past surgical history that includes knee surgery (Right); Foot surgery (Bilateral); Coronary angioplasty with stent (2019); and Cardiac surgery. Medications:    Prior to Admission medications    Medication Sig Start Date End Date Taking? Authorizing Provider   meloxicam (MOBIC) 7.5 MG tablet Take 1 tablet by mouth 2 times daily as needed for Pain 2/10/20 2/24/20  Anthony Randall PA-C   tadalafil (CIALIS) 20 MG tablet Take 1 tablet by mouth as needed for Erectile Dysfunction 2/10/20   Anthony Randall PA-C   lisinopril (PRINIVIL;ZESTRIL) 40 MG tablet Take 1 tablet by mouth daily 2/10/20   Anthony Randall PA-C   aspirin 81 MG EC tablet Take 1 tablet by mouth daily 5/26/19   Rhianna Courtney MD   nitroGLYCERIN (NITROSTAT) 0.4 MG SL tablet up to max of 3 total doses.  If no relief after 1 dose, call 911. 5/26/19   Rhianna Courtney MD   atorvastatin (LIPITOR) 80 MG tablet Take 1 tablet by mouth nightly 5/26/19   Rhianna Courtney MD   amLODIPine (NORVASC) 10 MG tablet Take 1 tablet by mouth daily 5/26/19   Rhianna Courtney MD   ticagrelor (BRILINTA) 90 MG TABS tablet Take 1 tablet by mouth 2 times daily 5/26/19   Rhianna Courtney MD     Current Facility-Administered Medications   Medication Dose Route Frequency Provider Last Rate Last Dose    amLODIPine (NORVASC) tablet 10 mg  10 mg Oral Daily Dora Arriaga MD   10 mg at 02/27/20 1310    propofol injection  10 mcg/kg/min Intravenous Titrated BALTAZAR Hemphill - CNP 6.5 mL/hr at 02/27/20 1400 10 mcg/kg/min at 02/27/20 1400    niCARdipine (CARDENE) 25 mg in dextrose 5 % 250 mL infusion  5 mg/hr Intravenous Continuous Mollie Eunice, APRN - CNP 75 mL/hr at 02/27/20 0513 7.5 mg/hr at 02/27/20 0513    aspirin chewable tablet 81 mg  81 mg Oral Daily Mollie Eunice, APRN - CNP   81 mg at 02/27/20 0855    ticagrelor (BRILINTA) tablet 90 mg  90 mg Oral BID Mollie Candida, APRN - CNP   90 mg at 02/27/20 0855    atorvastatin (LIPITOR) tablet 80 mg  80 mg Oral Nightly Marcia Bustos MD   80 mg at 02/26/20 1937    FLUoxetine (PROZAC) 20 MG/5ML solution 20 mg  20 mg Per NG tube Daily Marcia Bustos MD   20 mg at 02/27/20 0855    chlorhexidine (PERIDEX) 0.12 % solution 15 mL  15 mL Mouth/Throat BID Mollie Eunice, APRN - CNP   15 mL at 02/27/20 0855    sodium chloride flush 0.9 % injection 10 mL  10 mL Intravenous 2 times per day Edwin Silva MD   10 mL at 02/25/20 2102    sodium chloride flush 0.9 % injection 10 mL  10 mL Intravenous PRN Edwin Silva MD        [facility] 4 mg  4 mg Intravenous Once Edwin Silva MD        acetaminophen (TYLENOL) tablet 650 mg  650 mg Oral Q4H PRN Mollie Candida, APRN - CNP        hydrALAZINE (APRESOLINE) injection 10 mg  10 mg Intravenous Q2H PRN Mollie Candida, APRN - CNP   10 mg at 02/27/20 0055    labetalol (NORMODYNE;TRANDATE) injection syringe 10 mg  10 mg Intravenous Q1H PRN Mollie Candida, APRN - CNP   10 mg at 02/26/20 1228    sennosides-docusate sodium (SENOKOT-S) 8.6-50 MG tablet 2 tablet  2 tablet Oral Daily Mollie Candida, APRN - CNP   2 tablet at 02/26/20 0844    magnesium hydroxide (MILK OF MAGNESIA) 400 MG/5ML suspension 30 mL  30 mL Oral Daily PRN Mollie Candida, APRN - CNP        acetaminophen (TYLENOL) tablet 650 mg  650 mg Oral Q4H PRN Shay Tolentino MD   650 mg at 02/25/20 2101    sodium chloride flush 0.9 % injection 10 mL  10 mL Intravenous 2 times per day Samer Chris Rolle MD        sodium chloride flush 0.9 % injection 10 mL  10 mL Intravenous PRN Soila Carter MD        polyethylene mg/dL    Bun/Cre Ratio NOT REPORTED 9 - 20    Calcium 9.5 8.6 - 10.4 mg/dL    Sodium 145 (H) 135 - 144 mmol/L    Potassium 3.7 3.7 - 5.3 mmol/L    Chloride 110 (H) 98 - 107 mmol/L    CO2 21 20 - 31 mmol/L    Anion Gap 14 9 - 17 mmol/L    GFR Non-African American 55 (L) >60 mL/min    GFR African American >60 >60 mL/min    GFR Comment          GFR Staging NOT REPORTED    Troponin   Result Value Ref Range    Troponin, High Sensitivity 36 (H) 0 - 22 ng/L    Troponin T NOT REPORTED <0.03 ng/mL    Troponin Interp NOT REPORTED    Troponin   Result Value Ref Range    Troponin, High Sensitivity 32 (H) 0 - 22 ng/L    Troponin T NOT REPORTED <0.03 ng/mL    Troponin Interp NOT REPORTED    APTT   Result Value Ref Range    PTT 28.1 20.5 - 30.5 sec   APTT   Result Value Ref Range    PTT 35.7 (H) 20.5 - 30.5 sec   APTT   Result Value Ref Range    PTT 43.7 (H) 20.5 - 30.5 sec   APTT   Result Value Ref Range    PTT 26.7 20.5 - 30.5 sec   Basic Metabolic Panel   Result Value Ref Range    Glucose 91 70 - 99 mg/dL    BUN 24 (H) 6 - 20 mg/dL    CREATININE 1.29 (H) 0.70 - 1.20 mg/dL    Bun/Cre Ratio NOT REPORTED 9 - 20    Calcium 8.9 8.6 - 10.4 mg/dL    Sodium 146 (H) 135 - 144 mmol/L    Potassium 3.9 3.7 - 5.3 mmol/L    Chloride 112 (H) 98 - 107 mmol/L    CO2 20 20 - 31 mmol/L    Anion Gap 14 9 - 17 mmol/L    GFR Non-African American 59 (L) >60 mL/min    GFR African American >60 >60 mL/min    GFR Comment          GFR Staging NOT REPORTED    CK   Result Value Ref Range    Total  39 - 308 U/L   Procalcitonin   Result Value Ref Range    Procalcitonin 0.06 <0.09 ng/mL   APTT   Result Value Ref Range    PTT 29.4 20.5 - 30.5 sec   CBC   Result Value Ref Range    WBC 15.6 (H) 3.5 - 11.3 k/uL    RBC 4.94 4.21 - 5.77 m/uL    Hemoglobin 14.1 13.0 - 17.0 g/dL    Hematocrit 42.9 40.7 - 50.3 %    MCV 86.8 82.6 - 102.9 fL    MCH 28.5 25.2 - 33.5 pg    MCHC 32.9 28.4 - 34.8 g/dL    RDW 12.4 11.8 - 14.4 %    Platelets 313 990 - 532 k/uL    MPV RBC 4.77 4.21 - 5.77 m/uL    Hemoglobin 13.6 13.0 - 17.0 g/dL    Hematocrit 41.3 40.7 - 50.3 %    MCV 86.6 82.6 - 102.9 fL    MCH 28.5 25.2 - 33.5 pg    MCHC 32.9 28.4 - 34.8 g/dL    RDW 12.4 11.8 - 14.4 %    Platelets 794 316 - 360 k/uL    MPV 10.5 8.1 - 13.5 fL    NRBC Automated 0.0 0.0 per 100 WBC   URINALYSIS   Result Value Ref Range    Color, UA YELLOW YELLOW    Turbidity UA CLEAR CLEAR    Glucose, Ur NEGATIVE NEGATIVE    Bilirubin Urine NEGATIVE NEGATIVE    Ketones, Urine NEGATIVE NEGATIVE    Specific Gravity, UA 1.018 1.005 - 1.030    Urine Hgb NEGATIVE NEGATIVE    pH, UA 5.0 5.0 - 8.0    Protein, UA 1+ (A) NEGATIVE    Urobilinogen, Urine Normal Normal    Nitrite, Urine NEGATIVE NEGATIVE    Leukocyte Esterase, Urine NEGATIVE NEGATIVE    Urinalysis Comments NOT REPORTED    Microscopic Urinalysis   Result Value Ref Range    -          WBC, UA 0 TO 2 0 - 5 /HPF    RBC, UA 0 TO 2 0 - 2 /HPF    Casts UA NOT REPORTED 0 - 2 /LPF    Crystals, UA NOT REPORTED None /HPF    Epithelial Cells UA 2 TO 5 0 - 5 /HPF    Renal Epithelial, UA NOT REPORTED 0 /HPF    Bacteria, UA NOT REPORTED None    Mucus, UA 1+ (A) None    Trichomonas, UA NOT REPORTED None    Amorphous, UA NOT REPORTED None    Other Observations UA NOT REPORTED NOT REQ.     Yeast, UA NOT REPORTED None   CBC WITH AUTO DIFFERENTIAL   Result Value Ref Range    WBC 23.2 (H) 3.5 - 11.3 k/uL    RBC 4.78 4.21 - 5.77 m/uL    Hemoglobin 13.8 13.0 - 17.0 g/dL    Hematocrit 41.2 40.7 - 50.3 %    MCV 86.2 82.6 - 102.9 fL    MCH 28.9 25.2 - 33.5 pg    MCHC 33.5 28.4 - 34.8 g/dL    RDW 12.5 11.8 - 14.4 %    Platelets 111 465 - 532 k/uL    MPV 10.4 8.1 - 13.5 fL    NRBC Automated 0.0 0.0 per 100 WBC    Differential Type NOT REPORTED     WBC Morphology NOT REPORTED     RBC Morphology NOT REPORTED     Platelet Estimate NOT REPORTED     Immature Granulocytes 0 0 %    Seg Neutrophils 84 (H) 36 - 66 %    Lymphocytes 7 (L) 24 - 44 %    Monocytes 7 1 - 7 %    Eosinophils % 2 1 - 4 % Basophils 0 0 - 2 %    Absolute Immature Granulocyte 0.00 0.00 - 0.30 k/uL    Segs Absolute 19.50 (H) 1.8 - 7.7 k/uL    Absolute Lymph # 1.62 1.0 - 4.8 k/uL    Absolute Mono # 1.62 (H) 0.1 - 0.8 k/uL    Absolute Eos # 0.46 (H) 0.0 - 0.4 k/uL    Basophils Absolute 0.00 0.0 - 0.2 k/uL    Morphology Normal    Arterial Blood Gas, POC   Result Value Ref Range    POC pH 7.434 7.350 - 7.450    POC pCO2 35.2 35.0 - 48.0 mm Hg    POC PO2 121.0 (H) 83.0 - 108.0 mm Hg    POC HCO3 23.6 21.0 - 28.0 mmol/L    TCO2 (calc), Art 25 22.0 - 29.0 mmol/L    Negative Base Excess, Art NOT REPORTED 0.0 - 2.0    Positive Base Excess, Art 0 0.0 - 3.0    POC O2 SAT 99 (H) 94.0 - 98.0 %    O2 Device/Flow/% O2 Mask     Glenn Test NOT REPORTED     Sample Site Arterial Line     Mode PRVC     FIO2 40.0     Pt Temp NOT REPORTED     POC pH Temp NOT REPORTED     POC pCO2 Temp NOT REPORTED mm Hg    POC pO2 Temp NOT REPORTED mm Hg   Lactic Acid, POC   Result Value Ref Range    POC Lactic Acid 0.77 0.56 - 1.39 mmol/L   EKG 12 Lead   Result Value Ref Range    Ventricular Rate 73 BPM    Atrial Rate 73 BPM    P-R Interval 166 ms    QRS Duration 132 ms    Q-T Interval 404 ms    QTc Calculation (Bazett) 445 ms    P Axis 35 degrees    R Axis 3 degrees    T Axis 5 degrees   EKG 12 Lead   Result Value Ref Range    Ventricular Rate 60 BPM    Atrial Rate 60 BPM    P-R Interval 166 ms    QRS Duration 132 ms    Q-T Interval 436 ms    QTc Calculation (Bazett) 436 ms    P Axis 48 degrees    R Axis 22 degrees    T Axis -7 degrees       IMAGING DATA:    Xr Chest (single View Frontal)    Result Date: 2/24/2020  EXAMINATION: ONE XRAY VIEW OF THE CHEST 2/24/2020 7:22 pm COMPARISON: February 13, 2020 HISTORY: ORDERING SYSTEM PROVIDED HISTORY: Concern for hypertensive emergency patient has RASHMI recieving large amounts of fluid with respiratory distress TECHNOLOGIST PROVIDED HISTORY: Concern for hypertensive emergency patient has RASHMI recieving large amounts of fluid with respiratory distress Reason for Exam: Concern for hypertensive emergency; patient has RASHMI recieving large amounts of fluid with respiratory distress. H/O hypertension. Acuity: Unknown Type of Exam: Unknown Additional signs and symptoms: Concern for hypertensive emergency; patient has RASHMI recieving large amounts of fluid with respiratory distress. H/O hypertension. FINDINGS: Lung volumes are low. Heart size is stable. Mediastinal contours are stable. Ill-defined increased density in the left lung base is noted, greatest in the retrocardiac region. There is no consolidation otherwise. Ill-defined increased density in the left lung base, atelectasis versus pneumonia. No findings diagnostic of pulmonary edema     Xr Chest Standard (2 Vw)    Result Date: 2/13/2020  EXAMINATION: TWO XRAY VIEWS OF THE CHEST 2/13/2020 3:50 pm COMPARISON: Chest 05/24/2019 HISTORY: ORDERING SYSTEM PROVIDED HISTORY: CVA workup, new heart murmur TECHNOLOGIST PROVIDED HISTORY: CVA workup, new heart murmur Reason for Exam: CVA workup Acuity: Unknown Type of Exam: Unknown FINDINGS: The cardiac silhouette is enlarged. The cardiothoracic ratio is 16.8/31.1 cm. Indeterminate prominence of the right hilar silhouette. The mediastinal and left hilar silhouettes appear unremarkable. The lungs appear clear. No pleural effusion. No pneumothorax is seen. No acute osseous abnormality is identified. Indeterminate prominence of the right hilar shadow could relate to adenopathy, mass, adjacent consolidation or pulmonary artery enlargement. Correlation with IV contrast-enhanced CT chest recommended. Cardiomegaly.  RECOMMENDATION: IV contrast-enhanced CT chest     Ct Head Wo Contrast    Result Date: 2/25/2020  EXAMINATION: CTA OF THE HEAD AND NECK WITH CONTRAST; CT OF THE HEAD WITHOUT CONTRAST 2/25/2020 1:37 am; 2/25/2020 1:31 am: TECHNIQUE: CTA of the head and neck was performed with the administration of intravenous contrast. Multiplanar reformatted or superior mediastinal lymphadenopathy. The larynx and pharynx are unremarkable. No acute abnormality of the salivary and thyroid glands. BONES: No acute osseous abnormality. CTA HEAD: ANTERIOR CIRCULATION: No significant stenosis of the intracranial internal carotid, anterior cerebral, or middle cerebral arteries. No aneurysm. Congenital absent left A1 segment, anatomic variant. POSTERIOR CIRCULATION: Bilateral fetal supply. 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 No aneurysm. OTHER: No dural venous sinus thrombosis on this non-dedicated study. CT PERFUSION: No significant asymmetry of color maps. No evidence of penumbra. 1. Hypodense areas in the mayuri concordant with acute infarct visualized at MR. 2. No perfusion mismatch. 3. 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 4. Otherwise patent cervical and cerebral vasculature     Ct Head Wo Contrast    Result Date: 2/24/2020  EXAMINATION: CT OF THE HEAD WITHOUT CONTRAST  2/24/2020 2:10 pm TECHNIQUE: CT of the head was performed without the administration of intravenous contrast. Dose modulation, iterative reconstruction, and/or weight based adjustment of the mA/kV was utilized to reduce the radiation dose to as low as reasonably achievable. COMPARISON: 02/22/2020. HISTORY: ORDERING SYSTEM PROVIDED HISTORY: HTN, Aphasia TECHNOLOGIST PROVIDED HISTORY: HTN, Aphasia Reason for Exam: HTN, Aphasia Acuity: Acute Type of Exam: Initial FINDINGS: BRAIN/VENTRICLES: There is no acute intracranial hemorrhage, mass effect or midline shift. No abnormal extra-axial fluid collection. The gray-white differentiation is maintained. The redemonstrated is low attenuation within the right mayuri. There is no evidence of hydrocephalus. ORBITS: The visualized portion of the orbits demonstrate no acute abnormality.  SINUSES: The visualized paranasal sinuses and mastoid air cells demonstrate no acute abnormality. There is mucosal disease involving the maxillary, ethmoid and sphenoid sinuses. SOFT TISSUES/SKULL:  No acute abnormality of the visualized skull or soft tissues. 1. Stable CT scan of the brain with subacute right pontine infarct. Ct Head Wo Contrast    Result Date: 2/22/2020  EXAMINATION: CT OF THE HEAD WITHOUT CONTRAST  2/22/2020 9:57 pm TECHNIQUE: CT of the head was performed without the administration of intravenous contrast. Dose modulation, iterative reconstruction, and/or weight based adjustment of the mA/kV was utilized to reduce the radiation dose to as low as reasonably achievable. COMPARISON: CTA of the head and neck February 15, 2020 and CT head February 13, 2029 and MR brain February 13, 2020 HISTORY: ORDERING SYSTEM PROVIDED HISTORY: Change in Mentation. r/o stroke redemonstration/extension. TECHNOLOGIST PROVIDED HISTORY: Change in Mentation. r/o stroke redemonstration/extension. Reason for Exam: Change in Mentation. r/o stroke redemonstration/extension. FINDINGS: BRAIN/VENTRICLES: There is no acute intracranial hemorrhage, mass effect or midline shift. No abnormal extra-axial fluid collection. The gray-white differentiation is maintained without evidence of an acute infarct. There is no evidence of hydrocephalus. Hypodensity right mayuri unchanged. Intracranial atherosclerosis. ORBITS: The visualized portion of the orbits demonstrate no acute abnormality. SINUSES: Air-fluid level right maxillary sinus. SOFT TISSUES/SKULL:  No acute abnormality of the visualized skull or soft tissues. No change right pontine infarct. No acute disease.      Ct Head Wo Contrast    Result Date: 2/13/2020  EXAMINATION: CT OF THE HEAD WITHOUT CONTRAST  2/13/2020 10:03 pm TECHNIQUE: CT of the head was performed without the administration of intravenous contrast. Dose modulation, iterative reconstruction, and/or weight based adjustment of the mA/kV was utilized to reduce the radiation dose to as low as reasonably achievable. COMPARISON: CT and MRI 02/13/2020 HISTORY: ORDERING SYSTEM PROVIDED HISTORY: worsening left weakness, was on heparin TECHNOLOGIST PROVIDED HISTORY: worsening left weakness, was on heparin FINDINGS: BRAIN/VENTRICLES: There is no acute intracranial hemorrhage, mass effect or midline shift. No abnormal extra-axial fluid collection. Right paramedian pontine infarct not well visualized on this exam.  There is no evidence of hydrocephalus. Intracranial vascular calcifications. ORBITS: The visualized portion of the orbits demonstrate no acute abnormality. SINUSES: Moderate right sphenoid sinus air-fluid level. Moderate ethmoid sinus mucosal thickening. Mild-to-moderate mucosal thickening involving maxillary sinuses. Mastoids are clear SOFT TISSUES/SKULL:  No acute abnormality of the visualized skull or soft tissues. No evidence of hemorrhagic transformation of the right paramedian pontine infarct. Moderate sinus disease. Ct Head Wo Contrast    Result Date: 2/13/2020  EXAMINATION: CT OF THE HEAD WITHOUT CONTRAST  2/13/2020 3:26 pm TECHNIQUE: CT of the head was performed without the administration of intravenous contrast. Dose modulation, iterative reconstruction, and/or weight based adjustment of the mA/kV was utilized to reduce the radiation dose to as low as reasonably achievable. COMPARISON: None. HISTORY: ORDERING SYSTEM PROVIDED HISTORY: left sided numbness, weakness, slurred speech TECHNOLOGIST PROVIDED HISTORY: left sided numbness, weakness, slurred speech FINDINGS: BRAIN/VENTRICLES: There is no acute intracranial hemorrhage, mass effect, or midline shift. There is satisfactory overall gray-white matter differentiation. The ventricular structures are symmetric and unremarkable. The infratentorial structures are unremarkable. ORBITS: The visualized portion of the orbits demonstrate no acute abnormality. SINUSES: There is chronic sinusitis.   The mastoid air cells are normally aerated. SOFT TISSUES/SKULL:  No acute abnormality of the visualized skull or soft tissues. No acute intracranial abnormality. Findings were discussed with Dr. Rajinder Short At 3:41 pm on 2/13/2020. Ct Chest W Contrast    Result Date: 2/13/2020  EXAMINATION: CT OF THE CHEST WITH CONTRAST 2/13/2020 4:50 pm TECHNIQUE: CT of the chest was performed with the administration of intravenous contrast. Multiplanar reformatted images are provided for review. Dose modulation, iterative reconstruction, and/or weight based adjustment of the mA/kV was utilized to reduce the radiation dose to as low as reasonably achievable. COMPARISON: Chest radiograph done today HISTORY: ORDERING SYSTEM PROVIDED HISTORY: right hilar shadow on CXR TECHNOLOGIST PROVIDED HISTORY: right hilar shadow on CXR Reason for Exam: right hilar shadow on CXR FINDINGS: Mediastinum: No mediastinal or hilar masses. Coronary artery calcifications. No pericardial effusion. The aorta is mildly tortuous accounting for the density seen on chest x-ray. Lungs/pleura: No acute airspace disease. No pleural or pericardial effusion Upper Abdomen: Unremarkable. Soft Tissues/Bones: No acute fracture. No lytic or blastic lesion. Degenerative changes seen in the spine. 1. No acute abnormalities seen in the chest 2. Tortuous ascending thoracic aorta accounts for the density seen on the chest radiograph 3. Coronary artery calcifications     Us Renal Complete    Result Date: 2/17/2020  EXAMINATION: RETROPERITONEAL ULTRASOUND OF THE KIDNEYS AND URINARY BLADDER 2/17/2020 COMPARISON: None HISTORY: ORDERING SYSTEM PROVIDED HISTORY: uncontrolled HTN TECHNOLOGIST PROVIDED HISTORY: uncontrolled HTN FINDINGS: Kidneys: The right kidney measures 11.6 cm in length and the left kidney measures 11.6 cm in length. Kidneys demonstrate normal cortical echogenicity. No evidence of hydronephrosis or intrarenal stones.  Bladder: Urinary bladder is decompressed and not well visualized. Patient voided prior to the examination. Unremarkable ultrasound of the kidneys. Ir Angiogram Carotid C Erebral Bilateral    Result Date: 2/25/2020  Date of procedure: 2/25/2020 Procedure: Diagnostic cerebral angiogram Indication: Basilar artery occlusion Procedures: 1. Selective right common carotid artery angiogram 2. Selective right internal carotid artery cerebral angiogram 3. Selective left common carotid artery angiogram 4. Selective left internal carotid artery cerebral angiogram 5. Selective left vertebral artery cerebral angiogram 6. Right common femoral artery angiogram Neurointerventionalist: Bing Heard M.D. [place]: Dr. Barry Lewis Comparison: None Fluoroscopy time: minutes Contrast: 115 cc Visipaque-[id number] 50/50 Side of Access: Right common femoral Closure device: 25 cm 5 Cuban sheath was sutured in Sedation: MAC Consent: After explaining the risks and benefits to the patient and the patient's family, including but not limited to stroke, coma, death, vessel injury, dissection, tear, occlusion, and X-ray dye allergic type reaction, a signed consent form was obtained. Clinical History: 53 yo gentleman with htn, hld, recent diminutive posterior circulation and basilar occlusion with right pontine stroke and minimal deficits at rehab with decreased appetite and increased neuro deficits with sbp in the 130s. On aspirin and brilinta. Tx to  [facility] icu for worsening bilateral brainstem stroke. Description and findings: The patient's right groin was prepped and draped in standard sterile fashion and under local anesthesia with conscious sedation, the right common femoral artery was accessed with a micropuncture kit needle technique, and a 5 Cuban intravascular sheath was placed within the right common femoral artery, establishing arterial access.  A 5 Cuban multipurpose catheter was then advanced over a guide wire to the level of the aortic arch, and used to selectively carotid artery branches. Caliber and lumen contour of the cervical portion of the left internal carotid artery are unremarkable. Left ICA technique: The left internal carotid artery was then selectively catheterized, and digital subtraction angiography of the intracranial left internal carotid artery circulation was performed in frontal and lateral projections. Interpretation: There is normal antegrade filling of the distal internal carotid artery, ophthalmic artery, anterior cerebral artery, middle cerebral artery and distal branches. Inspection of the remaining left internal carotid artery circulation revealed dominant left P-comm with retrograde to the SCA filling from the anterior circulation. No other evidence of cerebral aneurysm, arteriovenous malformation. Capillary and venous phase images were also unremarkable, with no evidence of veno-occlusive disease. Left VA technique: The left subclavian artery was then selectively catheterized and the left vertebral artery was selectively catheterized with roadmap guidance. Digital subtraction angiography of the intracranial left vertebrobasilar run-off was obtained in frontal and lateral projections. Interpretation: The left vertebral artery injection demonstrates basilar occlusion immediately distal to the AICA origin. Contrast opacification of the contralateral right V4 segment was achieved and demonstrated no evidence of an aneurysm at the origin of the right PICA. There was no evidence of cerebral aneurysm, arteriovenous malformation. Capillary and venous phase images were unremarkable. Right CFA technique: A right common femoral artery angiogram was performed and demonstrated arterial catheterization proximal to the bifurcation. There was no evidence of dissection or occlusion within the right common femoral artery. The sheath was exchanged with 25 cm sheath 5 wutabout and sutured in place . Impression: 1.  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 Multiplanar reformatted images are provided for review. MIP images are provided for review. Stenosis of the internal carotid arteries measured using NASCET criteria. Dose modulation, iterative reconstruction, and/or weight based adjustment of the mA/kV was utilized to reduce the radiation dose to as low as reasonably achievable.; CT of the head was performed without the administration of intravenous contrast. Dose modulation, iterative reconstruction, and/or weight based adjustment of the mA/kV was utilized to reduce the radiation dose to as low as reasonably achievable. Noncontrast CT of the head with reconstructed 2-D images are also provided for review. COMPARISON: 02/15/2020. HISTORY: ORDERING SYSTEM PROVIDED HISTORY: Left hemiplegia TECHNOLOGIST PROVIDED HISTORY: Please add to VIZ clovis Left hemiplegia Reason for Exam: lt hemiplegia Acuity: Unknown Type of Exam: Unknown; ORDERING SYSTEM PROVIDED HISTORY: Left hemiplegia TECHNOLOGIST PROVIDED HISTORY: Please add to VIZ clovis Left hemiplegia Reason for Exam: lt hemiplegia Acuity: Unknown Type of Exam: Unknown FINDINGS: CT HEAD: BRAIN/VENTRICLES:  No acute intracranial hemorrhage or extraaxial fluid collection. Patchy areas of hypodensity in mayuri, right side greater than left. No evidence of mass, mass effect or midline shift. No evidence of hydrocephalus. ORBITS: The visualized portion of the orbits demonstrate no acute abnormality. SINUSES:  The visualized paranasal sinuses and mastoid air cells demonstrate no acute abnormality. SOFT TISSUES/SKULL: No acute abnormality of the visualized skull or soft tissues. CTA NECK: AORTIC ARCH/ARCH VESSELS: No dissection or arterial injury.   No significant stenosis of the bilaterally. The superior cerebellar arteries also appear to arise from the posterior cerebral arteries. Mri Brain Wo Contrast    Result Date: 2/24/2020  EXAMINATION: MRI OF THE BRAIN WITHOUT CONTRAST  2/24/2020 5:35 pm TECHNIQUE: Multiplanar multisequence MRI of the brain was performed without the administration of intravenous contrast. COMPARISON: February 13 HISTORY: ORDERING SYSTEM PROVIDED HISTORY: worsening speech over past 3 days, CT brain showed no changes from known infarct TECHNOLOGIST PROVIDED HISTORY: worsening speech over past 3 days, CT brain showed no changes from known infarct Reason for Exam: worsening speech over past 3 days, CT brain showed no changes from known infarct Acuity: Unknown Type of Exam: Ongoing Additional signs and symptoms: S/P Rapid response on 02/24/2020 - HBP. Relevant Medical/Surgical History: Hx - HBP, Acute CVA and NSTEMI. No relevant surgical hx to area of interest FINDINGS: INTRACRANIAL STRUCTURES/VENTRICLES: Increasing multifocal high signal throughout the mayuri is noted. Multifocal high T2 and FLAIR signal is again noted bilaterally in the periventricular and the subcortical white matter. This is stable. Ventricles and sulci are stable. Small focus of stable high T2 signal in the periphery of the left cerebellar hemisphere is again noted on image 5. Additional punctate focus of high T2 signal in the anterolateral periphery of the left cerebellar hemisphere was not definitively present on the prior. Heterogeneous signal in the basilar artery flow void is noted. Similar findings were noted on the previous exam.  Punctate foci of dark gradient echo signal in the left cerebellar hemisphere are not as well seen compared to the prior. No new areas of dark gradient echo signal are noted. Numerous areas of high diffusion signal throughout the mayuri are noted, increased from the prior exam.  This is greatest right of midline.   Punctate new focus of high diffusion signal in errors including those of syntax and sound a like substitutions which may escape proof reading. It such instances, actual meaning can be extrapolated by contextual diversion.

## 2020-02-27 NOTE — PROCEDURES
PROCEDURE NOTE - EMERGENCY INTUBATION    PATIENT NAME: Ludmila Abarca RECORD NO. [id number]  DATE: 2/27/2020  ATTENDING PHYSICIAN: Dr. Amina Pagan DIAGNOSIS:  Acute Respiratory Failure  POSTOPERATIVE DIAGNOSIS:  Same  PROCEDURE PERFORMED:  Emergency endotracheal intubation  PERFORMING PHYSICIAN: Swati Owens MD    MEDICATIONS: etomidate intravenously and succinycholine intravenously    DISCUSSION:  Kae Harding is a 52y.o.-year-old male who requires intubation and ventilatory support due to impending respiratory failure. The history and physical examination were reviewed and confirmed. CONSENT: The family members were counseled regarding the procedure, its indications, risks, potential complications and alternatives, and any questions were answered. Consent was obtained to proceed. PROCEDURE:  A timeout was initiated by the bedside nurse and was confirmed by those present. The patient was placed in the appropriate position. Cricoid pressure was not required. Intubation was performed by indirect laryngoscopy using a bronchoscope and an 8.0 cuffed endotracheal tube. The cuff was then inflated and the tube was secured appropriately at a distance of 26 cm to the dental ridge. Initial confirmation of placement included bilateral breath sounds, an end tidal CO2 detector and tube fogging. A chest x-ray to verify correct placement of the tube showed appropriate tube position. The patient tolerated the procedure with difficulty. COMPLICATIONS: The patient became hypertensive after succinylcholine administration to the 200s. He was also tachycardic to the 130s. His cardene gtt was increased during the procedure and he received labetalol 20 mg. First attempt by [place] Bahena. Minimal bleeding in the oropharynx. Chest x-ray showed ET tube-verónica, will advance 2 cm.      Swati Owens MD  2:11 PM, 2/27/20

## 2020-02-28 ENCOUNTER — APPOINTMENT (OUTPATIENT)
Dept: GENERAL RADIOLOGY | Age: 50
DRG: 005 | End: 2020-02-28
Attending: SPECIALIST
Payer: MEDICARE

## 2020-02-28 LAB
ALLEN TEST: ABNORMAL
ANION GAP SERPL CALCULATED.3IONS-SCNC: 12 MMOL/L (ref 9–17)
BUN BLDV-MCNC: 23 MG/DL (ref 6–20)
BUN/CREAT BLD: ABNORMAL (ref 9–20)
CALCIUM SERPL-MCNC: 8.3 MG/DL (ref 8.6–10.4)
CHLORIDE BLD-SCNC: 110 MMOL/L (ref 98–107)
CO2: 21 MMOL/L (ref 20–31)
CREAT SERPL-MCNC: 0.97 MG/DL (ref 0.7–1.2)
CULTURE: NO GROWTH
FIO2: 30
GFR AFRICAN AMERICAN: >60 ML/MIN
GFR NON-AFRICAN AMERICAN: >60 ML/MIN
GFR SERPL CREATININE-BSD FRML MDRD: ABNORMAL ML/MIN/{1.73_M2}
GFR SERPL CREATININE-BSD FRML MDRD: ABNORMAL ML/MIN/{1.73_M2}
GLUCOSE BLD-MCNC: 88 MG/DL (ref 75–110)
GLUCOSE BLD-MCNC: 89 MG/DL (ref 75–110)
GLUCOSE BLD-MCNC: 92 MG/DL (ref 75–110)
GLUCOSE BLD-MCNC: 96 MG/DL (ref 75–110)
GLUCOSE BLD-MCNC: 97 MG/DL (ref 70–99)
HCT VFR BLD CALC: 37.2 % (ref 40.7–50.3)
HCT VFR BLD CALC: 37.4 % (ref 40.7–50.3)
HEMOGLOBIN: 12 G/DL (ref 13–17)
HEMOGLOBIN: 12.2 G/DL (ref 13–17)
Lab: NORMAL
MCH RBC QN AUTO: 28.6 PG (ref 25.2–33.5)
MCH RBC QN AUTO: 29 PG (ref 25.2–33.5)
MCHC RBC AUTO-ENTMCNC: 32.1 G/DL (ref 28.4–34.8)
MCHC RBC AUTO-ENTMCNC: 32.8 G/DL (ref 28.4–34.8)
MCV RBC AUTO: 88.6 FL (ref 82.6–102.9)
MCV RBC AUTO: 89 FL (ref 82.6–102.9)
MODE: ABNORMAL
NEGATIVE BASE EXCESS, ART: 1 (ref 0–2)
NRBC AUTOMATED: 0 PER 100 WBC
NRBC AUTOMATED: 0 PER 100 WBC
O2 DEVICE/FLOW/%: ABNORMAL
PARTIAL THROMBOPLASTIN TIME: 24.2 SEC (ref 20.5–30.5)
PARTIAL THROMBOPLASTIN TIME: 28 SEC (ref 20.5–30.5)
PARTIAL THROMBOPLASTIN TIME: 34.7 SEC (ref 20.5–30.5)
PARTIAL THROMBOPLASTIN TIME: 38.4 SEC (ref 20.5–30.5)
PATIENT TEMP: ABNORMAL
PDW BLD-RTO: 12.8 % (ref 11.8–14.4)
PDW BLD-RTO: 12.8 % (ref 11.8–14.4)
PLATELET # BLD: 302 K/UL (ref 138–453)
PLATELET # BLD: 303 K/UL (ref 138–453)
PMV BLD AUTO: 10.5 FL (ref 8.1–13.5)
PMV BLD AUTO: 10.6 FL (ref 8.1–13.5)
POC HCO3: 22.9 MMOL/L (ref 21–28)
POC O2 SATURATION: 98 % (ref 94–98)
POC PCO2 TEMP: ABNORMAL MM HG
POC PCO2: 34.8 MM HG (ref 35–48)
POC PH TEMP: ABNORMAL
POC PH: 7.43 (ref 7.35–7.45)
POC PO2 TEMP: ABNORMAL MM HG
POC PO2: 102.3 MM HG (ref 83–108)
POSITIVE BASE EXCESS, ART: ABNORMAL (ref 0–3)
POTASSIUM SERPL-SCNC: 3.8 MMOL/L (ref 3.7–5.3)
RBC # BLD: 4.2 M/UL (ref 4.21–5.77)
RBC # BLD: 4.2 M/UL (ref 4.21–5.77)
SAMPLE SITE: ABNORMAL
SODIUM BLD-SCNC: 143 MMOL/L (ref 135–144)
SPECIMEN DESCRIPTION: NORMAL
TCO2 (CALC), ART: 24 MMOL/L (ref 22–29)
TROPONIN INTERP: ABNORMAL
TROPONIN T: ABNORMAL NG/ML
TROPONIN, HIGH SENSITIVITY: 44 NG/L (ref 0–22)
WBC # BLD: 16.6 K/UL (ref 3.5–11.3)
WBC # BLD: 17.5 K/UL (ref 3.5–11.3)

## 2020-02-28 PROCEDURE — 2500000003 HC RX 250 WO HCPCS: Performed by: NURSE PRACTITIONER

## 2020-02-28 PROCEDURE — 99232 SBSQ HOSP IP/OBS MODERATE 35: CPT | Performed by: INTERNAL MEDICINE

## 2020-02-28 PROCEDURE — 99291 CRITICAL CARE FIRST HOUR: CPT | Performed by: PSYCHIATRY & NEUROLOGY

## 2020-02-28 PROCEDURE — 84484 ASSAY OF TROPONIN QUANT: CPT

## 2020-02-28 PROCEDURE — 6370000000 HC RX 637 (ALT 250 FOR IP): Performed by: PSYCHIATRY & NEUROLOGY

## 2020-02-28 PROCEDURE — 6370000000 HC RX 637 (ALT 250 FOR IP): Performed by: NURSE PRACTITIONER

## 2020-02-28 PROCEDURE — 82803 BLOOD GASES ANY COMBINATION: CPT

## 2020-02-28 PROCEDURE — 94003 VENT MGMT INPAT SUBQ DAY: CPT

## 2020-02-28 PROCEDURE — 2580000003 HC RX 258: Performed by: STUDENT IN AN ORGANIZED HEALTH CARE EDUCATION/TRAINING PROGRAM

## 2020-02-28 PROCEDURE — 94761 N-INVAS EAR/PLS OXIMETRY MLT: CPT

## 2020-02-28 PROCEDURE — 97110 THERAPEUTIC EXERCISES: CPT

## 2020-02-28 PROCEDURE — 85730 THROMBOPLASTIN TIME PARTIAL: CPT

## 2020-02-28 PROCEDURE — 2700000000 HC OXYGEN THERAPY PER DAY

## 2020-02-28 PROCEDURE — 6360000002 HC RX W HCPCS: Performed by: STUDENT IN AN ORGANIZED HEALTH CARE EDUCATION/TRAINING PROGRAM

## 2020-02-28 PROCEDURE — 80048 BASIC METABOLIC PNL TOTAL CA: CPT

## 2020-02-28 PROCEDURE — 6370000000 HC RX 637 (ALT 250 FOR IP): Performed by: STUDENT IN AN ORGANIZED HEALTH CARE EDUCATION/TRAINING PROGRAM

## 2020-02-28 PROCEDURE — 2000000003 HC NEURO ICU R&B

## 2020-02-28 PROCEDURE — 82947 ASSAY GLUCOSE BLOOD QUANT: CPT

## 2020-02-28 PROCEDURE — 2580000003 HC RX 258: Performed by: NURSE PRACTITIONER

## 2020-02-28 PROCEDURE — 71045 X-RAY EXAM CHEST 1 VIEW: CPT

## 2020-02-28 PROCEDURE — 94770 HC ETCO2 MONITOR DAILY: CPT

## 2020-02-28 PROCEDURE — 37799 UNLISTED PX VASCULAR SURGERY: CPT

## 2020-02-28 PROCEDURE — 51798 US URINE CAPACITY MEASURE: CPT

## 2020-02-28 PROCEDURE — 6360000002 HC RX W HCPCS: Performed by: NURSE PRACTITIONER

## 2020-02-28 PROCEDURE — 85027 COMPLETE CBC AUTOMATED: CPT

## 2020-02-28 PROCEDURE — 51701 INSERT BLADDER CATHETER: CPT

## 2020-02-28 PROCEDURE — APPNB45 APP NON BILLABLE 31-45 MINUTES: Performed by: NURSE PRACTITIONER

## 2020-02-28 PROCEDURE — 2580000003 HC RX 258: Performed by: ANESTHESIOLOGY

## 2020-02-28 RX ORDER — HEPARIN SODIUM 1000 [USP'U]/ML
2000 INJECTION, SOLUTION INTRAVENOUS; SUBCUTANEOUS PRN
Status: DISCONTINUED | OUTPATIENT
Start: 2020-02-28 | End: 2020-02-28

## 2020-02-28 RX ORDER — HEPARIN SODIUM 1000 [USP'U]/ML
4000 INJECTION, SOLUTION INTRAVENOUS; SUBCUTANEOUS PRN
Status: DISCONTINUED | OUTPATIENT
Start: 2020-02-28 | End: 2020-02-28

## 2020-02-28 RX ORDER — HEPARIN SODIUM 10000 [USP'U]/100ML
9.2 INJECTION, SOLUTION INTRAVENOUS CONTINUOUS
Status: DISCONTINUED | OUTPATIENT
Start: 2020-02-28 | End: 2020-03-04

## 2020-02-28 RX ORDER — HYDRALAZINE HYDROCHLORIDE 20 MG/ML
10 INJECTION INTRAMUSCULAR; INTRAVENOUS
Status: DISCONTINUED | OUTPATIENT
Start: 2020-02-28 | End: 2020-03-11 | Stop reason: HOSPADM

## 2020-02-28 RX ORDER — CLONIDINE 0.2 MG/24H
1 PATCH, EXTENDED RELEASE TRANSDERMAL WEEKLY
Status: DISCONTINUED | OUTPATIENT
Start: 2020-02-28 | End: 2020-02-29

## 2020-02-28 RX ORDER — LABETALOL 20 MG/4 ML (5 MG/ML) INTRAVENOUS SYRINGE
10
Status: DISCONTINUED | OUTPATIENT
Start: 2020-02-28 | End: 2020-03-11 | Stop reason: HOSPADM

## 2020-02-28 RX ORDER — GLYCOPYRROLATE 1 MG/1
1 TABLET ORAL 3 TIMES DAILY
Status: DISCONTINUED | OUTPATIENT
Start: 2020-02-28 | End: 2020-03-05

## 2020-02-28 RX ADMIN — FAMOTIDINE 20 MG: 10 INJECTION INTRAVENOUS at 08:26

## 2020-02-28 RX ADMIN — AMPICILLIN SODIUM AND SULBACTAM SODIUM 1.5 G: 1; .5 INJECTION, POWDER, FOR SOLUTION INTRAMUSCULAR; INTRAVENOUS at 12:29

## 2020-02-28 RX ADMIN — HEPARIN SODIUM 9.2 UNITS/KG/HR: 10000 INJECTION, SOLUTION INTRAVENOUS at 04:06

## 2020-02-28 RX ADMIN — Medication 20 MG: at 09:47

## 2020-02-28 RX ADMIN — GLYCOPYRROLATE 1 MG: 1 TABLET ORAL at 21:50

## 2020-02-28 RX ADMIN — AMPICILLIN SODIUM AND SULBACTAM SODIUM 1.5 G: 1; .5 INJECTION, POWDER, FOR SOLUTION INTRAMUSCULAR; INTRAVENOUS at 20:47

## 2020-02-28 RX ADMIN — Medication 10 MG: at 18:41

## 2020-02-28 RX ADMIN — PROPOFOL 10 MCG/KG/MIN: 10 INJECTION, EMULSION INTRAVENOUS at 03:17

## 2020-02-28 RX ADMIN — HYDRALAZINE HYDROCHLORIDE 10 MG: 20 INJECTION INTRAMUSCULAR; INTRAVENOUS at 23:35

## 2020-02-28 RX ADMIN — Medication 10 ML: at 20:29

## 2020-02-28 RX ADMIN — STANDARDIZED SENNA CONCENTRATE AND DOCUSATE SODIUM 2 TABLET: 8.6; 5 TABLET ORAL at 08:26

## 2020-02-28 RX ADMIN — AMLODIPINE BESYLATE 10 MG: 10 TABLET ORAL at 08:27

## 2020-02-28 RX ADMIN — Medication 10 MG: at 12:26

## 2020-02-28 RX ADMIN — PROPOFOL 10 MCG/KG/MIN: 10 INJECTION, EMULSION INTRAVENOUS at 16:03

## 2020-02-28 RX ADMIN — Medication 10 MG: at 15:07

## 2020-02-28 RX ADMIN — ATORVASTATIN CALCIUM 80 MG: 80 TABLET, FILM COATED ORAL at 20:29

## 2020-02-28 RX ADMIN — Medication 10 ML: at 08:28

## 2020-02-28 RX ADMIN — Medication 10 MG: at 08:28

## 2020-02-28 RX ADMIN — SODIUM CHLORIDE, PRESERVATIVE FREE 10 ML: 5 INJECTION INTRAVENOUS at 08:28

## 2020-02-28 RX ADMIN — POLYETHYLENE GLYCOL 3350 17 G: 17 POWDER, FOR SOLUTION ORAL at 08:27

## 2020-02-28 RX ADMIN — CALCIUM GLUCONATE 1 G: 98 INJECTION, SOLUTION INTRAVENOUS at 09:47

## 2020-02-28 RX ADMIN — HEPARIN SODIUM 15.2 UNITS/KG/HR: 10000 INJECTION, SOLUTION INTRAVENOUS at 22:08

## 2020-02-28 RX ADMIN — Medication 15 ML: at 08:35

## 2020-02-28 RX ADMIN — AMPICILLIN SODIUM AND SULBACTAM SODIUM 1.5 G: 1; .5 INJECTION, POWDER, FOR SOLUTION INTRAMUSCULAR; INTRAVENOUS at 03:41

## 2020-02-28 RX ADMIN — FAMOTIDINE 20 MG: 10 INJECTION INTRAVENOUS at 20:29

## 2020-02-28 RX ADMIN — Medication 10 MG: at 10:35

## 2020-02-28 RX ADMIN — Medication 15 ML: at 20:29

## 2020-02-28 ASSESSMENT — PULMONARY FUNCTION TESTS
PIF_VALUE: 18
PIF_VALUE: 31
PIF_VALUE: 22
PIF_VALUE: 21
PIF_VALUE: 22
PIF_VALUE: 39
PIF_VALUE: 18
PIF_VALUE: 19
PIF_VALUE: 23
PIF_VALUE: 28
PIF_VALUE: 17
PIF_VALUE: 22
PIF_VALUE: 21
PIF_VALUE: 25
PIF_VALUE: 21
PIF_VALUE: 38
PIF_VALUE: 21
PIF_VALUE: 18
PIF_VALUE: 23
PIF_VALUE: 20
PIF_VALUE: 30

## 2020-02-28 ASSESSMENT — PAIN SCALES - GENERAL
PAINLEVEL_OUTOF10: 0

## 2020-02-28 NOTE — FLOWSHEET NOTE
DATE: 2020  NAME: Maria T Moreira  MRN: [id number]   : 1970    Discharge Recommendations: Continue to Assess (pending progress)     Subjective: RN ok'd PROM  Pain: RANJITH, no facial grimacing   Patient follows: No Commands  Is patient on ventilator: Yes  Is patient on sedation: Yes  Precautions: B wrist restraints, R radial art line, SBP Goal 150-180    Therapeutic exercises:  UE/LE(s)  Bilateral Passive range of motion all planes x 10 reps (No R wrist flex/ext d/t art line)  bilateral gastrocnemius stretching 3 reps x 30 seconds    Goals  Short Term Goals  Short term goal 1: Pt will be Briseida to EOB  Short term goal 2: Pt will be CGA while EOB 4min  Short term goal 3: Pt will be modA to transfer with victoriano steady  Short term goal 4: Pt will be safe to attempt ambulation       Plan: Progress functional mobility as medically appropriate.    Time In: 1432   Time Out: 1445  Time Coded Minutes (treatment minutes): 13 mins  Rehab Potential: CTA  Treatments/week: 2-3x per wk    Tracy Lewis, PTA

## 2020-02-28 NOTE — PROGRESS NOTES
Today's Date: 2/28/2020  Patient Name: Igor Sarabia  Date of admission: 2/24/2020  9:40 PM  Patient's age: 52 y. o., 1970  Admission Dx: Brain stem infarction [facility]) [I63.9]    Reason for Consult: management recommendations  Requesting Physician: Joe Matthews MD    CHIEF COMPLAINT: Recurrent ischemic stroke. MTHFR mutation. Mildly elevated homocystine    History Obtained From:  patient, electronic medical record    Interval hx     Patient seen and examined. Labs and vitals reviewed. Patient remains intubated. Continues to be on anticoagulation  He has slightly blood-tinged output from the ET tube        HISTORY OF PRESENT ILLNESS:      The patient is a 52 y.o.  male With past medical history of hypertension coronary artery disease status post stent placement January 2019 and recent pontine infarction was transferred from [facility] due to complaints of aphasia and left hemiparesis. Patient was recently patient was recently admitted to [place] on 2/13/2020 after presenting with dysarthria and left hemiparesis and found to have a right pontine infarction. He did not receive TPA. 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 Patient was discharged on aspirin and Brilinta. On 2/22 he was noted by the nurse to have decreased word output and coughing with liquids. CT head performed showed stable right pontine infarction. Patient was sent to the [facility] ED on 2/24 due to hypertensive urgency with blood pressure 237/130. Repeat CT showed stable right pontine infarction. Brain MRI showed increasing multifocal infarction in the mayuri extending to the middle and new punctate infarct left cerebellar hemisphere. Patient was then transferred to [facility] ICU. 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 CT brain perfusion with no perfusion mismatch. Patient was subsequently started on heparin. Work-up from previous admission shows compound heterozygous MTHFR mutation. Other hypercoagulable work-up was unremarkable for factor V Leyden mutation, prothrombin gene mutation and lupus antibodies. Past Medical History:   has a past medical history of Heart attack ([facility] [address] Hyperlipidemia, Hypertension, and Stroke ([facility] [address] Past Surgical History:   has a past surgical history that includes knee surgery (Right); Foot surgery (Bilateral); Coronary angioplasty with stent (2019); and Cardiac surgery. Medications:    Prior to Admission medications    Medication Sig Start Date End Date Taking? Authorizing Provider   meloxicam (MOBIC) 7.5 MG tablet Take 1 tablet by mouth 2 times daily as needed for Pain 2/10/20 2/24/20  Shamika Rolle PA-C   tadalafil (CIALIS) 20 MG tablet Take 1 tablet by mouth as needed for Erectile Dysfunction 2/10/20   Shamika Rolle PA-C   lisinopril (PRINIVIL;ZESTRIL) 40 MG tablet Take 1 tablet by mouth daily 2/10/20   Shamika Rolle PA-C   aspirin 81 MG EC tablet Take 1 tablet by mouth daily 5/26/19   Cristina Heck MD   nitroGLYCERIN (NITROSTAT) 0.4 MG SL tablet up to max of 3 total doses.  If no relief after 1 dose, call 911. 5/26/19   Cristina Heck MD   atorvastatin (LIPITOR) 80 MG tablet Take 1 tablet by mouth nightly 5/26/19   Cristina Heck MD   amLODIPine (NORVASC) 10 MG tablet Take 1 tablet by mouth daily 5/26/19   Cristina Heck MD   ticagrelor (BRILINTA) 90 MG TABS tablet Take 1 tablet by mouth 2 times daily 5/26/19   Cristina Heck MD     Current Facility-Administered Medications   Medication Dose Route Frequency Provider Last Rate Last Dose    heparin 25,000 units in dextrose 5% 250 mL infusion  9.2 Units/kg/hr Intravenous Continuous Samer Malcom Landa MD 14.3 mL/hr at 02/28/20 1200 13.2 Units/kg/hr at 02/28/20 1200    famotidine (PEPCID) injection 20 mg  20 mg Intravenous BID BALTAZAR Hewitt - CNP   20 mg at 02/28/20 17.0 g/dL    Hematocrit 42.9 40.7 - 50.3 %    MCV 86.8 82.6 - 102.9 fL    MCH 28.5 25.2 - 33.5 pg    MCHC 32.9 28.4 - 34.8 g/dL    RDW 12.4 11.8 - 14.4 %    Platelets 423 178 - 616 k/uL    MPV 10.4 8.1 - 13.5 fL    NRBC Automated 0.0 0.0 per 100 WBC   Basic Metabolic Panel   Result Value Ref Range    Glucose 106 (H) 70 - 99 mg/dL    BUN 17 6 - 20 mg/dL    CREATININE 1.03 0.70 - 1.20 mg/dL    Bun/Cre Ratio NOT REPORTED 9 - 20    Calcium 9.4 8.6 - 10.4 mg/dL    Sodium 143 135 - 144 mmol/L    Potassium 3.6 (L) 3.7 - 5.3 mmol/L    Chloride 107 98 - 107 mmol/L    CO2 21 20 - 31 mmol/L    Anion Gap 15 9 - 17 mmol/L    GFR Non-African American >60 >60 mL/min    GFR African American >60 >60 mL/min    GFR Comment          GFR Staging NOT REPORTED    HOMOCYSTEINE, SERUM   Result Value Ref Range    Homocysteine 9.4 <15.0 umol/L   Vitamin B12 & Folate   Result Value Ref Range    Vitamin B-12 653 232 - 1245 pg/mL    Folate 16.9 >4.8 ng/mL   APTT   Result Value Ref Range    PTT 25.0 20.5 - 30.5 sec   APTT   Result Value Ref Range    PTT 40.3 (H) 20.5 - 30.5 sec   Procalcitonin   Result Value Ref Range    Procalcitonin 0.09 (H) <0.09 ng/mL   APTT   Result Value Ref Range    PTT 24.4 20.5 - 30.5 sec   CBC   Result Value Ref Range    WBC 7.4 3.5 - 11.3 k/uL    RBC 3.75 (L) 4.21 - 5.77 m/uL    Hemoglobin 10.2 (L) 13.0 - 17.0 g/dL    Hematocrit 32.2 (L) 40.7 - 50.3 %    MCV 85.9 82.6 - 102.9 fL    MCH 27.2 25.2 - 33.5 pg    MCHC 31.7 28.4 - 34.8 g/dL    RDW 13.7 11.8 - 14.4 %    Platelets 972 779 - 438 k/uL    MPV 9.6 8.1 - 13.5 fL    NRBC Automated 0.0 0.0 per 100 WBC   Basic Metabolic Panel   Result Value Ref Range    Glucose 115 (H) 70 - 99 mg/dL    BUN 19 6 - 20 mg/dL    CREATININE 1.01 0.70 - 1.20 mg/dL    Bun/Cre Ratio NOT REPORTED 9 - 20    Calcium 9.0 8.6 - 10.4 mg/dL    Sodium 140 135 - 144 mmol/L    Potassium 3.4 (L) 3.7 - 5.3 mmol/L    Chloride 107 98 - 107 mmol/L    CO2 20 20 - 31 mmol/L    Anion Gap 13 9 - 17 mmol/L NOT REPORTED     Platelet Estimate NOT REPORTED     Immature Granulocytes 0 0 %    Seg Neutrophils 84 (H) 36 - 66 %    Lymphocytes 7 (L) 24 - 44 %    Monocytes 7 1 - 7 %    Eosinophils % 2 1 - 4 %    Basophils 0 0 - 2 %    Absolute Immature Granulocyte 0.00 0.00 - 0.30 k/uL    Segs Absolute 19.50 (H) 1.8 - 7.7 k/uL    Absolute Lymph # 1.62 1.0 - 4.8 k/uL    Absolute Mono # 1.62 (H) 0.1 - 0.8 k/uL    Absolute Eos # 0.46 (H) 0.0 - 0.4 k/uL    Basophils Absolute 0.00 0.0 - 0.2 k/uL    Morphology Normal    Troponin   Result Value Ref Range    Troponin, High Sensitivity 61 (HH) 0 - 22 ng/L    Troponin T NOT REPORTED <0.03 ng/mL    Troponin Interp NOT REPORTED    Troponin   Result Value Ref Range    Troponin, High Sensitivity 63 (HH) 0 - 22 ng/L    Troponin T NOT REPORTED <0.03 ng/mL    Troponin Interp NOT REPORTED    CK   Result Value Ref Range    Total  39 - 308 U/L   MYOGLOBIN, SERUM   Result Value Ref Range    Myoglobin 142 (H) 28 - 72 ng/mL   Troponin   Result Value Ref Range    Troponin, High Sensitivity 44 (H) 0 - 22 ng/L    Troponin T NOT REPORTED <0.03 ng/mL    Troponin Interp NOT REPORTED    Procalcitonin   Result Value Ref Range    Procalcitonin 0.21 (H) <0.09 ng/mL   CBC   Result Value Ref Range    WBC 16.6 (H) 3.5 - 11.3 k/uL    RBC 4.20 (L) 4.21 - 5.77 m/uL    Hemoglobin 12.0 (L) 13.0 - 17.0 g/dL    Hematocrit 37.4 (L) 40.7 - 50.3 %    MCV 89.0 82.6 - 102.9 fL    MCH 28.6 25.2 - 33.5 pg    MCHC 32.1 28.4 - 34.8 g/dL    RDW 12.8 11.8 - 14.4 %    Platelets 733 541 - 283 k/uL    MPV 10.5 8.1 - 13.5 fL    NRBC Automated 0.0 0.0 per 100 WBC   Basic Metabolic Panel   Result Value Ref Range    Glucose 97 70 - 99 mg/dL    BUN 23 (H) 6 - 20 mg/dL    CREATININE 0.97 0.70 - 1.20 mg/dL    Bun/Cre Ratio NOT REPORTED 9 - 20    Calcium 8.3 (L) 8.6 - 10.4 mg/dL    Sodium 143 135 - 144 mmol/L    Potassium 3.8 3.7 - 5.3 mmol/L    Chloride 110 (H) 98 - 107 mmol/L    CO2 21 20 - 31 mmol/L    Anion Gap 12 9 - 17 mmol/L heart murmur TECHNOLOGIST PROVIDED HISTORY: CVA workup, new heart murmur Reason for Exam: CVA workup Acuity: Unknown Type of Exam: Unknown FINDINGS: The cardiac silhouette is enlarged. The cardiothoracic ratio is 16.8/31.1 cm. Indeterminate prominence of the right hilar silhouette. The mediastinal and left hilar silhouettes appear unremarkable. The lungs appear clear. No pleural effusion. No pneumothorax is seen. No acute osseous abnormality is identified. Indeterminate prominence of the right hilar shadow could relate to adenopathy, mass, adjacent consolidation or pulmonary artery enlargement. Correlation with IV contrast-enhanced CT chest recommended. Cardiomegaly. RECOMMENDATION: IV contrast-enhanced CT chest     Ct Head Wo Contrast    Result Date: 2/25/2020  EXAMINATION: CTA OF THE HEAD AND NECK WITH CONTRAST; CT OF THE HEAD WITHOUT CONTRAST 2/25/2020 1:37 am; 2/25/2020 1:31 am: TECHNIQUE: CTA of the head and neck was performed with the administration of intravenous contrast. Multiplanar reformatted images are provided for review. MIP images are provided for review. Stenosis of the internal carotid arteries measured using NASCET criteria. Dose modulation, iterative reconstruction, and/or weight based adjustment of the mA/kV was utilized to reduce the radiation dose to as low as reasonably achievable.; CT of the head was performed without the administration of intravenous contrast. Dose modulation, iterative reconstruction, and/or weight based adjustment of the mA/kV was utilized to reduce the radiation dose to as low as reasonably achievable. Noncontrast CT of the head with reconstructed 2-D images are also provided for review. COMPARISON: 02/15/2020.  HISTORY: ORDERING SYSTEM PROVIDED HISTORY: Left hemiplegia TECHNOLOGIST PROVIDED HISTORY: Please add to VIZ clovis Left hemiplegia Reason for Exam: lt hemiplegia Acuity: Unknown Type of Exam: Unknown; ORDERING SYSTEM PROVIDED HISTORY: Left hemiplegia TECHNOLOGIST PROVIDED HISTORY: Please add to VIZ clovis Left hemiplegia Reason for Exam: lt hemiplegia Acuity: Unknown Type of Exam: Unknown FINDINGS: CT HEAD: BRAIN/VENTRICLES:  No acute intracranial hemorrhage or extraaxial fluid collection. Patchy areas of hypodensity in mayuri, right side greater than left. No evidence of mass, mass effect or midline shift. No evidence of hydrocephalus. ORBITS: The visualized portion of the orbits demonstrate no acute abnormality. SINUSES:  The visualized paranasal sinuses and mastoid air cells demonstrate no acute abnormality. SOFT TISSUES/SKULL: No acute abnormality of the visualized skull or soft tissues. CTA NECK: AORTIC ARCH/ARCH VESSELS: No dissection or arterial injury. No significant stenosis of the brachiocephalic or subclavian arteries. CAROTID ARTERIES: No dissection, arterial injury, or hemodynamically significant stenosis by NASCET criteria. VERTEBRAL ARTERIES: No dissection, arterial injury, or significant stenosis. SOFT TISSUES: The lung apices are clear. No cervical or superior mediastinal lymphadenopathy. The larynx and pharynx are unremarkable. No acute abnormality of the salivary and thyroid glands. BONES: No acute osseous abnormality. CTA HEAD: ANTERIOR CIRCULATION: No significant stenosis of the intracranial internal carotid, anterior cerebral, or middle cerebral arteries. No aneurysm. Congenital absent left A1 segment, anatomic variant. POSTERIOR CIRCULATION: Bilateral fetal supply. 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 No aneurysm. OTHER: No dural venous sinus thrombosis on this non-dedicated study. CT PERFUSION: No significant asymmetry of color maps. No evidence of penumbra. 1. Hypodense areas in the mayuri concordant with acute infarct visualized at MR. 2. No perfusion mismatch. 3. 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  4. Otherwise patent cervical and cerebral vasculature     Ct Head Wo Contrast    Result Date: 2/24/2020  EXAMINATION: CT OF THE HEAD WITHOUT CONTRAST  2/24/2020 2:10 pm TECHNIQUE: CT of the head was performed without the administration of intravenous contrast. Dose modulation, iterative reconstruction, and/or weight based adjustment of the mA/kV was utilized to reduce the radiation dose to as low as reasonably achievable. COMPARISON: 02/22/2020. HISTORY: ORDERING SYSTEM PROVIDED HISTORY: HTN, Aphasia TECHNOLOGIST PROVIDED HISTORY: HTN, Aphasia Reason for Exam: HTN, Aphasia Acuity: Acute Type of Exam: Initial FINDINGS: BRAIN/VENTRICLES: There is no acute intracranial hemorrhage, mass effect or midline shift. No abnormal extra-axial fluid collection. The gray-white differentiation is maintained. The redemonstrated is low attenuation within the right mayuri. There is no evidence of hydrocephalus. ORBITS: The visualized portion of the orbits demonstrate no acute abnormality. SINUSES: The visualized paranasal sinuses and mastoid air cells demonstrate no acute abnormality. There is mucosal disease involving the maxillary, ethmoid and sphenoid sinuses. SOFT TISSUES/SKULL:  No acute abnormality of the visualized skull or soft tissues. 1. Stable CT scan of the brain with subacute right pontine infarct. Ct Head Wo Contrast    Result Date: 2/22/2020  EXAMINATION: CT OF THE HEAD WITHOUT CONTRAST  2/22/2020 9:57 pm TECHNIQUE: CT of the head was performed without the administration of intravenous contrast. Dose modulation, iterative reconstruction, and/or weight based adjustment of the mA/kV was utilized to reduce the radiation dose to as low as reasonably achievable. COMPARISON: CTA of the head and neck February 15, 2020 and CT head February 13, 2029 and MR brain February 13, 2020 HISTORY: ORDERING SYSTEM PROVIDED HISTORY: Change in Mentation. r/o stroke redemonstration/extension. TECHNOLOGIST PROVIDED HISTORY: Change in Mentation.  r/o stroke redemonstration/extension. Reason for Exam: Change in Mentation. r/o stroke redemonstration/extension. FINDINGS: BRAIN/VENTRICLES: There is no acute intracranial hemorrhage, mass effect or midline shift. No abnormal extra-axial fluid collection. The gray-white differentiation is maintained without evidence of an acute infarct. There is no evidence of hydrocephalus. Hypodensity right mayuri unchanged. Intracranial atherosclerosis. ORBITS: The visualized portion of the orbits demonstrate no acute abnormality. SINUSES: Air-fluid level right maxillary sinus. SOFT TISSUES/SKULL:  No acute abnormality of the visualized skull or soft tissues. No change right pontine infarct. No acute disease. Ct Head Wo Contrast    Result Date: 2/13/2020  EXAMINATION: CT OF THE HEAD WITHOUT CONTRAST  2/13/2020 10:03 pm TECHNIQUE: CT of the head was performed without the administration of intravenous contrast. Dose modulation, iterative reconstruction, and/or weight based adjustment of the mA/kV was utilized to reduce the radiation dose to as low as reasonably achievable. COMPARISON: CT and MRI 02/13/2020 HISTORY: ORDERING SYSTEM PROVIDED HISTORY: worsening left weakness, was on heparin TECHNOLOGIST PROVIDED HISTORY: worsening left weakness, was on heparin FINDINGS: BRAIN/VENTRICLES: There is no acute intracranial hemorrhage, mass effect or midline shift. No abnormal extra-axial fluid collection. Right paramedian pontine infarct not well visualized on this exam.  There is no evidence of hydrocephalus. Intracranial vascular calcifications. ORBITS: The visualized portion of the orbits demonstrate no acute abnormality. SINUSES: Moderate right sphenoid sinus air-fluid level. Moderate ethmoid sinus mucosal thickening. Mild-to-moderate mucosal thickening involving maxillary sinuses. Mastoids are clear SOFT TISSUES/SKULL:  No acute abnormality of the visualized skull or soft tissues.      No evidence of hemorrhagic transformation of the right paramedian pontine infarct. Moderate sinus disease. Ct Head Wo Contrast    Result Date: 2/13/2020  EXAMINATION: CT OF THE HEAD WITHOUT CONTRAST  2/13/2020 3:26 pm TECHNIQUE: CT of the head was performed without the administration of intravenous contrast. Dose modulation, iterative reconstruction, and/or weight based adjustment of the mA/kV was utilized to reduce the radiation dose to as low as reasonably achievable. COMPARISON: None. HISTORY: ORDERING SYSTEM PROVIDED HISTORY: left sided numbness, weakness, slurred speech TECHNOLOGIST PROVIDED HISTORY: left sided numbness, weakness, slurred speech FINDINGS: BRAIN/VENTRICLES: There is no acute intracranial hemorrhage, mass effect, or midline shift. There is satisfactory overall gray-white matter differentiation. The ventricular structures are symmetric and unremarkable. The infratentorial structures are unremarkable. ORBITS: The visualized portion of the orbits demonstrate no acute abnormality. SINUSES: There is chronic sinusitis. The mastoid air cells are normally aerated. SOFT TISSUES/SKULL:  No acute abnormality of the visualized skull or soft tissues. No acute intracranial abnormality. Findings were discussed with Dr. Rajinder Short At 3:41 pm on 2/13/2020. Ct Chest W Contrast    Result Date: 2/13/2020  EXAMINATION: CT OF THE CHEST WITH CONTRAST 2/13/2020 4:50 pm TECHNIQUE: CT of the chest was performed with the administration of intravenous contrast. Multiplanar reformatted images are provided for review. Dose modulation, iterative reconstruction, and/or weight based adjustment of the mA/kV was utilized to reduce the radiation dose to as low as reasonably achievable. COMPARISON: Chest radiograph done today HISTORY: ORDERING SYSTEM PROVIDED HISTORY: right hilar shadow on CXR TECHNOLOGIST PROVIDED HISTORY: right hilar shadow on CXR Reason for Exam: right hilar shadow on CXR FINDINGS: Mediastinum: No mediastinal or hilar masses.   Coronary artery artery was then selectively catheterized and the left vertebral artery was selectively catheterized with roadmap guidance. Digital subtraction angiography of the intracranial left vertebrobasilar run-off was obtained in frontal and lateral projections. Interpretation: The left vertebral artery injection demonstrates basilar occlusion immediately distal to the AICA origin. Contrast opacification of the contralateral right V4 segment was achieved and demonstrated no evidence of an aneurysm at the origin of the right PICA. There was no evidence of cerebral aneurysm, arteriovenous malformation. Capillary and venous phase images were unremarkable. Right CFA technique: A right common femoral artery angiogram was performed and demonstrated arterial catheterization proximal to the bifurcation. There was no evidence of dissection or occlusion within the right common femoral artery. The sheath was exchanged with 25 cm sheath 5 [facility] and sutured in place . Impression: 1. 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 2. Bilateral dominant P-comm 3. Dominant left vertebral artery Dr. Gloria Cardenas dictated this invasive procedure. Dr. Alonso Beasley was present for all procedural and imaging components of this case. Examination was reviewed and reported findings confirmed and edited by Dr. Alonso Beasley . Dr. Alonso Beasley supervised and interpreted this procedure. Ct Brain Perfusion    Result Date: 2/25/2020  EXAMINATION: CTA OF THE HEAD WITH CONTRAST WITH PERFUSION 2/25/2020 1:31 am: TECHNIQUE: CTA of the head/brain was performed with the administration of intravenous contrast. Multiplanar reformatted images are provided for review. MIP images are provided for review.  Dose modulation, iterative reconstruction, and/or weight based adjustment of the mA/kV was utilized to reduce the radiation dose to as significant asymmetry of color maps. No evidence of penumbra. 1. Hypodense areas in the mayuri concordant with acute infarct visualized at MR. 2. No perfusion mismatch. 3. 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 4. Otherwise patent cervical and cerebral vasculature     Cta Head Neck W Contrast    Result Date: 2/15/2020  EXAMINATION: CTA OF THE HEAD AND NECK WITH CONTRAST 2/15/2020 8:11 pm: TECHNIQUE: CTA of the head and neck was performed with the administration of intravenous contrast. Multiplanar reformatted images are provided for review. MIP images are provided for review. Stenosis of the internal carotid arteries measured using NASCET criteria. Dose modulation, iterative reconstruction, and/or weight based adjustment of the mA/kV was utilized to reduce the radiation dose to as low as reasonably achievable. COMPARISON: None. HISTORY: ORDERING SYSTEM PROVIDED HISTORY: f/u basilar atery filling defect TECHNOLOGIST PROVIDED HISTORY: f/u basilar atery filling defect Reason for Exam: basilar artery filling defect Acuity: Unknown Type of Exam: Unknown FINDINGS: CTA NECK: AORTIC ARCH/ARCH VESSELS: No dissection or arterial injury. No significant stenosis of the brachiocephalic or subclavian arteries. CAROTID ARTERIES: No dissection, arterial injury, or hemodynamically significant stenosis by NASCET criteria. VERTEBRAL ARTERIES: No dissection, arterial injury, or significant stenosis. SOFT TISSUES: The lung apices are clear. No cervical or superior mediastinal lymphadenopathy. The larynx and pharynx are unremarkable. No acute abnormality of the salivary and thyroid glands. BONES: No acute osseous abnormality. CTA HEAD: ANTERIOR CIRCULATION: No significant stenosis of the intracranial internal carotid, anterior cerebral, or middle cerebral arteries. No aneurysm.  POSTERIOR CIRCULATION: Severe diffuse stenosis or hypoplasia of the intracranial vertebral arteries distal to the posterior inferior cerebellar artery origins. Persistent occlusion of the basilar artery, which appears hypoplastic. The posterior cerebral arteries are predominantly supplied by the posterior communicating arteries with retrograde filling of the P1 segment supplying the superior cerebellar arteries. There is diffuse moderate irregularity of the P2 and P3 segments posterior cerebral arteries. No aneurysm. OTHER: No dural venous sinus thrombosis on this non-dedicated study. BRAIN: No mass effect or midline shift. No extra-axial fluid collection. The gray-white differentiation is maintained. 1. No new arterial abnormality in the head or neck. 2. 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 3. Moderate diffuse irregularity of the P2 and P3 segments posterior cerebral arteries, which are supplied by the posterior communicating arteries. 4. No hemodynamically significant arterial stenosis in the neck. Cta Head Neck W Contrast    Result Date: 2/13/2020  EXAMINATION: CTA OF THE HEAD AND NECK WITH CONTRAST 2/13/2020 3:27 pm: TECHNIQUE: CTA of the head and neck was performed with the administration of intravenous contrast. Multiplanar reformatted images are provided for review. MIP images are provided for review. Stenosis of the internal carotid arteries measured using NASCET criteria. Dose modulation, iterative reconstruction, and/or weight based adjustment of the mA/kV was utilized to reduce the radiation dose to as low as reasonably achievable. COMPARISON: None. HISTORY: ORDERING SYSTEM PROVIDED HISTORY: left sided deficits TECHNOLOGIST PROVIDED HISTORY: left sided deficits Reason for Exam: lt sided numbness weakness slurred speech FINDINGS: CTA NECK: AORTIC ARCH/ARCH VESSELS: No dissection or arterial injury. No significant stenosis of the brachiocephalic or subclavian arteries. CAROTID ARTERIES: No dissection, arterial injury, or hemodynamically significant stenosis by NASCET criteria.  VERTEBRAL ARTERIES: intravenous contrast. COMPARISON: CT head neck 02/13/2020 and CT head 02/13/2020 HISTORY: ORDERING SYSTEM PROVIDED HISTORY: left sided weakness TECHNOLOGIST PROVIDED HISTORY: left sided weakness FINDINGS: INTRACRANIAL STRUCTURES/VENTRICLES: There is a right paramedian pontine acute stroke. No obvious T2 prolongation. No mass effect or midline shift. No evidence of an acute intracranial hemorrhage. The ventricles and sulci are normal in size and configuration. Mild non-specific periventricular and subcortical T2 prolongation identified may be sequelae of chronic small ischemic disease versus demyelinating process. Wedge-shaped focus of T2 prolongation identified left posterior cerebellum without corresponding restricted diffusion suggestive of prior stroke. The sellar/suprasellar regions appear unremarkable. There is normal flow of the normal flow void involving the basilar artery. The Remainder of the normal signal voids within the major intracranial vessels appear maintained. Foci of hemosiderin left posterior cerebellum noted of uncertain clinical significance ORBITS: The visualized portion of the orbits demonstrate no acute abnormality. SINUSES: Moderate maxillary sinus disease. Air-fluid level right sphenoid sinus. Small air-fluid level right maxillary sinus. BONES/SOFT TISSUES: The bone marrow signal intensity appears normal. The soft tissues demonstrate no acute abnormality. Acute right paramedian pontine stroke. 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 Remote left posterior cerebellar stroke. Mild non-specific white matter changes favor chronic small ischemic disease. Demyelinating process may also be considered in differential although thought less likely.      IMPRESSION:   Primary Problem  <principal problem not specified>    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]     Brain stem infarction ([facility] [address] [I63.9] 02/24/2020   

## 2020-02-28 NOTE — CARE COORDINATION
SW consult received due to pt's family asking to speak to SW. Met with pt's 2 adult sons Emy Self and Batsheva Carrillo. Emy Self lives in [place] and [place] lives in [place]. Sons were concerned about how to pay pt's bills. Sons were both aware that they will make all medical decisions but were asking about financial POA. Explained to them that pt is the only person that can name a financial POA. Explained that without pt's consent the only other way to get control over his finances is to petition for guardianship  Informed family of the steps of applying for guardianship. Sons do not seem to be interested in doing this at this time.

## 2020-02-28 NOTE — PLAN OF CARE
Problem: HEMODYNAMIC STATUS  Goal: Patient has stable vital signs and fluid balance  2/28/2020 1330 by Carlitos Johnston RN  Outcome: Ongoing  2/28/2020 0455 by Renae Carcamo RN  Outcome: Met This Shift     Problem: ACTIVITY INTOLERANCE/IMPAIRED MOBILITY  Goal: Mobility/activity is maintained at optimum level for patient  Outcome: Ongoing     Problem: COMMUNICATION IMPAIRMENT  Goal: Ability to express needs and understand communication  Outcome: Ongoing     Problem: Restraint Use - Nonviolent/Non-Self-Destructive Behavior:  Goal: Absence of restraint indications  Description  Absence of restraint indications   Outcome: Ongoing  Goal: Absence of restraint-related injury  Description  Absence of restraint-related injury   2/28/2020 1330 by Carlitos Johnston RN  Outcome: Ongoing  2/28/2020 0455 by Renae Carcamo RN  Outcome: Met This Shift     Problem: Musculor/Skeletal Functional Status  Goal: Highest potential functional level  Outcome: Ongoing     Problem: MECHANICAL VENTILATION  Goal: Mobility/activity is maintained at optimum level for patient  Outcome: Ongoing  Goal: Ability to express needs and understand communication  Outcome: Ongoing  Goal: Patient will maintain patent airway  Outcome: Ongoing  Goal: Oral health is maintained or improved  Outcome: Ongoing  Goal: ET tube will be managed safely  Outcome: Ongoing     Problem: Nutrition  Goal: Optimal nutrition therapy  Outcome: Ongoing     Problem: Falls - Risk of:  Goal: Will remain free from falls  Description  Will remain free from falls  2/28/2020 1330 by Carlitos Johnston RN  Outcome: Ongoing  2/28/2020 0455 by Renae Carcamo RN  Outcome: Met This Shift  Goal: Absence of physical injury  Description  Absence of physical injury  Outcome: Ongoing     Problem: Risk for Impaired Skin Integrity  Goal: Tissue integrity - skin and mucous membranes  Description  Structural intactness and normal physiological function of skin and  mucous membranes.   2/28/2020 1330 by Rosales Campos RN  Outcome: Ongoing  2/28/2020 0455 by Carlton Butts RN  Outcome: Met This Shift

## 2020-02-28 NOTE — PROGRESS NOTES
Daily Progress Note  Neuro Critical Care    Patient Name: Shelli Sadler  Patient : 1970  Room/Bed: 4851/8358-33  Code Status: FULL  Allergies: No Known Allergies    CHIEF COMPLAINT:      Aphasia, left sided weakness     INTERVAL HISTORY    Initial Presentation (Admitted 20): The patient is a 53 yo with a history of HTN, CAD s/p stent (2019), and recent right pontine infarct who presented as a transfer from [facility] for worsening aphasia, dysphagia, and left hemiparesis. Patient was recently admitted to [facility] on 20 after presenting with dysarthria and left hemiparesis and found to have a right pontine infarction. He was out of window for tPA. 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 He was maintained on his home ASA and Brilinta and was treated with a low dose heparin infusion for 48h. Repeat CTA Head/Neck on 2/15 remained stable and patient's clinical exam remained unchanged after heparin was stopped. He was discharged to [facility] on . At the time of discharge he had a left facial droop and mild to moderate left sided weakness (LUE 3/5, LLE 4/5). For the last few days prior to re-admission, family states they started to notice patient's speech started to decline and he was having more difficulty swallowing. On , he was noted by the nurse to have decreased word output and to be coughing with liquids. CT Head performed which showed a stable right pontine infarct. On , patient was sent to [facility] ED due to hypertensive urgency (237/130). Given 20mg IV Labetalol with improvement of BP down to 152/99. Started on IV fluids for RASHMI. Repeat CT head showed stable right pontine infarct. MRI Brain showed increasing multifocal infarct in the mayuri extending to medulla and new punctate infarct left cerebellar hemisphere.   Patient initially admitted to the ICU at [facility] but then decision made to transfer to [facility] Neuro ICU for further evaluation and management. 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 CT Brain perfusion with no perfusion mismatch. Started on a low dose heparin infusion with 4000u bolus and maintained on Aspirin and Brilinta. Hospital Course:  2/25: Low intensity heparin infusion, PTT this AM 35.7. Loaded with 180mg Brilinta around 0300 this AM via NG tube. 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 Diagnostic cerebral angiogram revealed mid basilar artery occlusion distal to anterior inferior cerebellar artery and proximal to superior cerebellar artery with retrograde filling to the superior cerebral artery from anterior circulation. Sheath remained in place post op.  2/26: Right groin with mild oozing and tenderness overnight. Drowsy and not following commands overnight. STAT CT head stable. CT abd/pelvis negative for hematoma. Sheath removed. Family meeting for update on severity of deficits. 2/27: Urology consulted for hematuria. Heparin held. Intubated for airway protection and impending respiratory failure. Gen Surg consulted for trach/PEG. Antiplatelets held. Started on Unasyn for pneumonia. Last 24h:  Jacques removed, straight cath this morning without hematuria. Heparin infusion restarted. Remains intubated and on Propofol 10mcg/kg/min to help with coughing. Given Hydralazine x1 for SBP>180. Clonidine patch 0.2mg/24h started for improved BP control. Propofol held for exam this morning.   No significant change in exam.      CURRENT MEDICATIONS:  SCHEDULED MEDICATIONS:   famotidine (PEPCID) injection  20 mg Intravenous BID    calcium gluconate IVPB  1 g Intravenous Once    amLODIPine  10 mg Oral Daily    ampicillin-sulbactam  1.5 g Intravenous Q8H    atorvastatin  80 mg Oral Nightly    FLUoxetine  20 mg Per NG tube Daily    of this encounter: 6' 1\" (1.854 m). Weight as of this encounter: 238 lb 12.1 oz (108.3 kg).  []<16 Severe malnutrition  []16-16.99 Moderate malnutrition  []17-18.49 Mild malnutrition  []18.5-24.9 Normal  []25-29.9 Overweight (not obese)  [x]30-34.9 Obese class 1 (Low Risk)  []35-39.9 Obese class 2 (Moderate Risk)  []?40 Obese class 3 (High Risk)    RECENT LABS:   Lab Results   Component Value Date    WBC 16.6 (H) 02/28/2020    HGB 12.0 (L) 02/28/2020    HCT 37.4 (L) 02/28/2020     02/28/2020    CHOL 140 02/14/2020    TRIG 40 02/14/2020    HDL 41 02/14/2020    ALT 57 (H) 05/25/2019    AST 29 05/25/2019     02/28/2020    K 3.8 02/28/2020     (H) 02/28/2020    CREATININE 0.97 02/28/2020    BUN 23 (H) 02/28/2020    CO2 21 02/28/2020    INR 1.1 02/24/2020    LABA1C 5.6 02/14/2020     24 HOUR INTAKE/OUTPUT:    Intake/Output Summary (Last 24 hours) at 2/28/2020 0759  Last data filed at 2/28/2020 8636  Gross per 24 hour   Intake 3850 ml   Output 1230 ml   Net 2620 ml       IMAGING:  Ct Head Wo Contrast  Result Date: 2/27/2020  Stable known pontine infarct without acute hemorrhage or other acute intracranial finding. Slightly worsened sinus disease, as above. Xr Chest Portable  Result Date: 2/28/2020  1. Endotracheal tube remains in appropriate position. 2. Vascular congestion without overt edema. Ct Head Wo Contrast  Result Date: 2/25/2020  1. Hypodense areas in the mayuri concordant with acute infarct visualized at MR. 2. No perfusion mismatch. 3. 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 4. Otherwise patent cervical and cerebral vasculature     Ct Head Wo Contrast  Result Date: 2/24/2020  1. Stable CT scan of the brain with subacute right pontine infarct. Ct Head Wo Contrast  Result Date: 2/22/2020  No change right pontine infarct. No acute disease. Ct Brain Perfusion, Cta Head Neck W Contrast  Result Date: 2/25/2020  1.  Hypodense areas in the mayuri concordant with acute infarct visualized at MR. 2. No perfusion mismatch. 3. 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 4. Otherwise patent cervical and cerebral vasculature     Mri Brain Wo Contrast  Result Date: 2/24/2020  Increasing multifocal acute infarct in the mayuri. New punctate acute infarct left anterolateral cerebellar hemisphere Multifocal periventricular and subcortical white matter small vessel ischemic changes are again noted bilaterally. Labs and Images reviewed with:  [x] Dr. Jyoti Brooks. Bobbi    [] Dr. Ary Haro  [] Dr. Vashti Prasad  [] There are no new interval images to review. PHYSICAL EXAM       CONSTITUTIONAL:  Intubated, sedation held for exam.  Opens eyes to voice. Tracks appropriately. HEAD:  normocephalic, atraumatic    EYES:  PERRL, EOMI   ENT:  moist mucous membranes   NECK:  supple, symmetric   LUNGS:  Equal air entry bilaterally, rhonchi throughout   CARDIOVASCULAR:  normal s1 / s2, RRR, distal pulses intact. Right groin site intact without hematoma or drainage   ABDOMEN:  Soft, no rigidity, normal bowel sounds   NEUROLOGIC:  Mental Status:  Intubated, sedation held for exam.  Opens eyes to voice. Tracks appropriately. Cranial Nerves:    III: Pupils:  equal, round, reactive to light  III,IV,VI: Extra Ocular Movements: intact  V: Facial sensation:  intact  VII: Facial strength: abnormal - left facial droop  Not able to stick out tongue    Motor Exam:    Flicker to pain in left upper extremity. Left lower extremity flexes to pain. Localizes in right upper extremity. Withdraws to pain in right lower extremity. Wiggles right toes very weakly on command intermittently. DRAINS:  [x] There are no drains for Neuro Critical Care to monitor at this time.      ASSESSMENT AND PLAN:       The patient is a 51 yo male with a history of HTN, CAD s/p stent (January 2019), and recent right pontine infarct who presented as a transfer from [facility] for worsening aphasia, dysphagia, and left hemiparesis. Found to have increasing multifocal acute infarctions in the mayuri extending to the medulla as well as a small acute infarct in the left cerebellar infarction. 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  - POD # 3 s/p diagnostic cerebral angiogram showing mid basilar artery occlusion  - Lesion thought to be stenotic in nature rather than thrombus per Endovascular   - Aspirin and Plavix on hold for trach/PEG, Continue low dose heparin infusion for now  - Lipitor 80mg QHS  - Hypercoag panel reviewed; +Hetero MTHFR mutations, Hem/Onc following  - Neuro Endovascular following; no further intervention planned  - Goal -180  - Propofol for sedation  - Neuro checks per protocol    CARDIOVASCULAR:  - Goal -180  - PRN Hydralazine/Labetalol  - Continue Norvasc 10mg QD, start Clonidine patch 0.2mg/24h  - History of CAD s/p stent 2019  - Asprin and Brilinta on hold for trach/PEG  - Mild troponin leak (initially 36), down to 32, EKG unchanged  - Echo  EF 55%, severe LVH, aortic root dilation with mild aortic insufficiency  - Outpatient Cardiology follow up  - Hyperlipidemia, recent lipid panel; LDL 91, Cholesterol 140  - Lipitor 80mg QHS  - Continue telemetry    PULMONARY:  - Intubated  for airway protection and impending respiratory failure  - Vent Settings: PRVC 30/14/640/5  - AB.42/34.8/102.3/22.9  - Gen Surg following for trach/PEG  - CXR : vascular congestion without overt edema, possible developing consolidation right hilar  - Large amounts of oral secretions, thin and clear per nursing  - F/U Respiratory culture    RENAL/FLUID/ELECTROLYTE:  - RASHMI resolved  - BUN 23/ Creatinine 0.97  - Monitor I&O; 6010/1230  - Urology following for hematuria which has resolved  - IVF: [email or web address], stop when tube feeds at goal  - Hypocalcemia, 8.3, give 1g calcium gluconate  - Replace electrolytes PRN  - Daily BMP    GI/NUTRITION:  NUTRITION:  - Start tube feeds per dietician recs  - Formed BM 2/26   - Bowel regimen: Senokot-S daily, Milk of Mag and Dulcolax suppository PRN  - GI prophylaxis: Pepcid while intubated    ID:  - Afebrile, Tmax 37.3  - Leukocytosis improving, WBC 16.6  - CXR 2/28 reviewed, possible developing consolidation right hilar  - Procalcitonin 0.21 on 2/27, repeat tomorrow  - Continue Unasyn for suspected pneumonia, day 2/7  - F/U respiratory culture  - Continue to monitor for fevers  - Daily CBC    HEME:   - H&H 12.0/37.4  - Platelets 248  - Heterozygous MTHFR mutation  - Negative prothrombin, Factor V Leiden, lupus anticoagulant  - Hem/Onc following  - Repeat homocysteine normal   - Daily CBC    ENDOCRINE:  - Continue to monitor blood glucose, goal <180  - Recent Hemoglobin A1C 5.6    OTHER:  - Prozac 10mg QD for depression  - Palliative Care following  - PT/OT/ST  - PM&R as appropriate  - Code Status: FULL    PROPHYLAXIS:  Stress ulcer: Pepcid    DVT PROPHYLAXIS:  - SCD sleeves - Thigh High   - On Heparin infusion    DISPOSITION:  [x] To remain ICU for close neurological monitoring, vent management. We will continue to follow along. For any changes in exam or patient status please contact Neuro Critical Care.       BALTAZAR Tate - [place]  Neuro Critical Care  Pager [phone]  2/28/2020     7:59 AM

## 2020-02-28 NOTE — PLAN OF CARE
Problem: MECHANICAL VENTILATION  Goal: Mobility/activity is maintained at optimum level for patient  2/28/2020 1537 by Figueroa Chaves RCP  Outcome: Ongoing  2/28/2020 1330 by Jeffry Marley RN  Outcome: Ongoing  Goal: Ability to express needs and understand communication  2/28/2020 1537 by Figueroa Chaves RCP  Outcome: Ongoing  2/28/2020 1330 by Jeffry Marley RN  Outcome: Ongoing  Goal: Patient will maintain patent airway  2/28/2020 1537 by Figueroa Chaves RCP  Outcome: Ongoing  2/28/2020 1330 by Jeffry Marley RN  Outcome: Ongoing  Goal: Oral health is maintained or improved  2/28/2020 1537 by Figueroa Chaves RCP  Outcome: Ongoing  2/28/2020 1330 by Jeffry Marley RN  Outcome: Ongoing  Goal: ET tube will be managed safely  2/28/2020 1537 by Figueroa Chaves RCP  Outcome: Ongoing  2/28/2020 1330 by Jeffry Marley RN  Outcome: Ongoing

## 2020-02-28 NOTE — PLAN OF CARE
Problem: HEMODYNAMIC STATUS  Goal: Patient has stable vital signs and fluid balance  Outcome: Met This Shift     Problem: Restraint Use - Nonviolent/Non-Self-Destructive Behavior:  Goal: Absence of restraint-related injury  Description  Absence of restraint-related injury   2/28/2020 0455 by Anthony Mays RN  Outcome: Met This Shift     Problem: Falls - Risk of:  Goal: Will remain free from falls  Description  Will remain free from falls  Outcome: Met This Shift     Problem: Risk for Impaired Skin Integrity  Goal: Tissue integrity - skin and mucous membranes  Description  Structural intactness and normal physiological function of skin and  mucous membranes.   Outcome: Met This Shift

## 2020-02-28 NOTE — PROGRESS NOTES
-- -- 87 27 94 % --       Intake/Output Summary (Last 24 hours) at 2/28/2020 0557  Last data filed at 2/28/2020 0552  Gross per 24 hour   Intake 6323 ml   Output 1230 ml   Net 5093 ml       Recent Labs     02/27/20  0528 02/27/20  1218 02/28/20  0400   WBC 7.4 23.2* 17.5*   HGB 10.2* 13.8 12.2*   HCT 32.2* 41.2 37.2*   MCV 85.9 86.2 88.6    336 303     Recent Labs     02/26/20  0322 02/27/20  0450    140   K 3.6* 3.4*    107   CO2 21 20   BUN 17 19   CREATININE 1.03 1.01       Recent Labs     02/27/20  0939   COLORU YELLOW   PHUR 5.0   WBCUA 0 TO 2   RBCUA 0 TO 2   MUCUS 1+*   TRICHOMONAS NOT REPORTED   YEAST NOT REPORTED   BACTERIA NOT REPORTED   SPECGRAV 1.018   LEUKOCYTESUR NEGATIVE   UROBILINOGEN Normal   BILIRUBINUR NEGATIVE       Additional Lab/culture results:    Physical Exam:  Intubated and sedated  Chest: mechanically ventilated, equal rise. Heart perfusing well, peripheral pulse palpable  Abdomen soft, non distended. : circumcised. Scrotal exam normal.       Interval Imaging Findings:    Impression:    Patient Active Problem List   Diagnosis    STEMI (ST elevation myocardial infarction) ([address]    Coronary artery disease involving native coronary artery of native heart without angina pectoris    Acute ST segment elevation myocardial infarction ([address]    Essential hypertension    Class 1 obesity due to excess calories with body mass index (BMI) of 32.0 to 32.9 in adult    Stroke-like symptoms    Ischemic stroke ([address]    Hypertensive emergency    Numbness    Basilar artery stenosis/occlusion with infarction ([address]    Right arm weakness    Right pontine stroke ([address]    Left-sided weakness    Systolic murmur    Acute CVA (cerebrovascular accident) ([address]    Adjustment disorder with depressed mood    Hypertensive urgency    Brainstem infarction ([address]    Brain stem infarction ([address]    Basilar artery stenosis     Stroke with neurogenic bladder. Currently managed with [facility].

## 2020-02-29 ENCOUNTER — APPOINTMENT (OUTPATIENT)
Dept: GENERAL RADIOLOGY | Age: 50
DRG: 005 | End: 2020-02-29
Attending: SPECIALIST
Payer: MEDICARE

## 2020-02-29 LAB
ALLEN TEST: ABNORMAL
ANION GAP SERPL CALCULATED.3IONS-SCNC: 14 MMOL/L (ref 9–17)
BUN BLDV-MCNC: 25 MG/DL (ref 6–20)
BUN/CREAT BLD: ABNORMAL (ref 9–20)
C-REACTIVE PROTEIN: 91.1 MG/L (ref 0–5)
CALCIUM IONIZED: 1.14 MMOL/L (ref 1.13–1.33)
CALCIUM SERPL-MCNC: 8.7 MG/DL (ref 8.6–10.4)
CHLORIDE BLD-SCNC: 111 MMOL/L (ref 98–107)
CO2: 19 MMOL/L (ref 20–31)
COMPLEMENT C3: 156 MG/DL (ref 90–180)
CREAT SERPL-MCNC: 1.05 MG/DL (ref 0.7–1.2)
FIO2: 30
GFR AFRICAN AMERICAN: >60 ML/MIN
GFR NON-AFRICAN AMERICAN: >60 ML/MIN
GFR SERPL CREATININE-BSD FRML MDRD: ABNORMAL ML/MIN/{1.73_M2}
GFR SERPL CREATININE-BSD FRML MDRD: ABNORMAL ML/MIN/{1.73_M2}
GLUCOSE BLD-MCNC: 120 MG/DL (ref 70–99)
HAV IGM SER IA-ACNC: NONREACTIVE
HCT VFR BLD CALC: 37.3 % (ref 40.7–50.3)
HEMOGLOBIN: 11.8 G/DL (ref 13–17)
HEPATITIS B CORE IGM ANTIBODY: NONREACTIVE
HEPATITIS B SURFACE ANTIGEN: NONREACTIVE
HEPATITIS C ANTIBODY: NONREACTIVE
HIV AG/AB: NONREACTIVE
IGE: 142 IU/ML
MCH RBC QN AUTO: 28.4 PG (ref 25.2–33.5)
MCHC RBC AUTO-ENTMCNC: 31.6 G/DL (ref 28.4–34.8)
MCV RBC AUTO: 89.7 FL (ref 82.6–102.9)
MODE: ABNORMAL
NEGATIVE BASE EXCESS, ART: 2 (ref 0–2)
NRBC AUTOMATED: 0 PER 100 WBC
O2 DEVICE/FLOW/%: ABNORMAL
PARTIAL THROMBOPLASTIN TIME: 45.2 SEC (ref 20.5–30.5)
PARTIAL THROMBOPLASTIN TIME: 63.3 SEC (ref 20.5–30.5)
PARTIAL THROMBOPLASTIN TIME: 64 SEC (ref 20.5–30.5)
PARTIAL THROMBOPLASTIN TIME: 65.4 SEC (ref 20.5–30.5)
PATIENT TEMP: ABNORMAL
PDW BLD-RTO: 12.7 % (ref 11.8–14.4)
PLATELET # BLD: 295 K/UL (ref 138–453)
PMV BLD AUTO: 11 FL (ref 8.1–13.5)
POC HCO3: 22.1 MMOL/L (ref 21–28)
POC O2 SATURATION: 96 % (ref 94–98)
POC PCO2 TEMP: ABNORMAL MM HG
POC PCO2: 32.6 MM HG (ref 35–48)
POC PH TEMP: ABNORMAL
POC PH: 7.44 (ref 7.35–7.45)
POC PO2 TEMP: ABNORMAL MM HG
POC PO2: 76.5 MM HG (ref 83–108)
POSITIVE BASE EXCESS, ART: ABNORMAL (ref 0–3)
POTASSIUM SERPL-SCNC: 3.6 MMOL/L (ref 3.7–5.3)
PROCALCITONIN: 0.18 NG/ML
RBC # BLD: 4.16 M/UL (ref 4.21–5.77)
RHEUMATOID FACTOR: 24.3 IU/ML
SAMPLE SITE: ABNORMAL
SEDIMENTATION RATE, ERYTHROCYTE: 45 MM (ref 0–10)
SODIUM BLD-SCNC: 144 MMOL/L (ref 135–144)
TCO2 (CALC), ART: 23 MMOL/L (ref 22–29)
TRIGL SERPL-MCNC: 70 MG/DL
WBC # BLD: 23.3 K/UL (ref 3.5–11.3)

## 2020-02-29 PROCEDURE — 6370000000 HC RX 637 (ALT 250 FOR IP): Performed by: STUDENT IN AN ORGANIZED HEALTH CARE EDUCATION/TRAINING PROGRAM

## 2020-02-29 PROCEDURE — 80074 ACUTE HEPATITIS PANEL: CPT

## 2020-02-29 PROCEDURE — 6360000002 HC RX W HCPCS: Performed by: STUDENT IN AN ORGANIZED HEALTH CARE EDUCATION/TRAINING PROGRAM

## 2020-02-29 PROCEDURE — 87389 HIV-1 AG W/HIV-1&-2 AB AG IA: CPT

## 2020-02-29 PROCEDURE — 84478 ASSAY OF TRIGLYCERIDES: CPT

## 2020-02-29 PROCEDURE — 82803 BLOOD GASES ANY COMBINATION: CPT

## 2020-02-29 PROCEDURE — 6370000000 HC RX 637 (ALT 250 FOR IP): Performed by: NURSE PRACTITIONER

## 2020-02-29 PROCEDURE — 83516 IMMUNOASSAY NONANTIBODY: CPT

## 2020-02-29 PROCEDURE — 85027 COMPLETE CBC AUTOMATED: CPT

## 2020-02-29 PROCEDURE — 2580000003 HC RX 258: Performed by: STUDENT IN AN ORGANIZED HEALTH CARE EDUCATION/TRAINING PROGRAM

## 2020-02-29 PROCEDURE — 87070 CULTURE OTHR SPECIMN AEROBIC: CPT

## 2020-02-29 PROCEDURE — 85730 THROMBOPLASTIN TIME PARTIAL: CPT

## 2020-02-29 PROCEDURE — 94003 VENT MGMT INPAT SUBQ DAY: CPT

## 2020-02-29 PROCEDURE — 82595 ASSAY OF CRYOGLOBULIN: CPT

## 2020-02-29 PROCEDURE — 87040 BLOOD CULTURE FOR BACTERIA: CPT

## 2020-02-29 PROCEDURE — 84145 PROCALCITONIN (PCT): CPT

## 2020-02-29 PROCEDURE — 82785 ASSAY OF IGE: CPT

## 2020-02-29 PROCEDURE — 87205 SMEAR GRAM STAIN: CPT

## 2020-02-29 PROCEDURE — 86140 C-REACTIVE PROTEIN: CPT

## 2020-02-29 PROCEDURE — 94770 HC ETCO2 MONITOR DAILY: CPT

## 2020-02-29 PROCEDURE — 99233 SBSQ HOSP IP/OBS HIGH 50: CPT | Performed by: PSYCHIATRY & NEUROLOGY

## 2020-02-29 PROCEDURE — 2500000003 HC RX 250 WO HCPCS: Performed by: NURSE PRACTITIONER

## 2020-02-29 PROCEDURE — 2580000003 HC RX 258: Performed by: ANESTHESIOLOGY

## 2020-02-29 PROCEDURE — 51701 INSERT BLADDER CATHETER: CPT

## 2020-02-29 PROCEDURE — 80048 BASIC METABOLIC PNL TOTAL CA: CPT

## 2020-02-29 PROCEDURE — 85651 RBC SED RATE NONAUTOMATED: CPT

## 2020-02-29 PROCEDURE — 86038 ANTINUCLEAR ANTIBODIES: CPT

## 2020-02-29 PROCEDURE — 86431 RHEUMATOID FACTOR QUANT: CPT

## 2020-02-29 PROCEDURE — 51798 US URINE CAPACITY MEASURE: CPT

## 2020-02-29 PROCEDURE — 99291 CRITICAL CARE FIRST HOUR: CPT | Performed by: PSYCHIATRY & NEUROLOGY

## 2020-02-29 PROCEDURE — 71045 X-RAY EXAM CHEST 1 VIEW: CPT

## 2020-02-29 PROCEDURE — 2000000003 HC NEURO ICU R&B

## 2020-02-29 PROCEDURE — 37799 UNLISTED PX VASCULAR SURGERY: CPT

## 2020-02-29 PROCEDURE — 6370000000 HC RX 637 (ALT 250 FOR IP): Performed by: PSYCHIATRY & NEUROLOGY

## 2020-02-29 PROCEDURE — 86160 COMPLEMENT ANTIGEN: CPT

## 2020-02-29 PROCEDURE — 82330 ASSAY OF CALCIUM: CPT

## 2020-02-29 PROCEDURE — 94761 N-INVAS EAR/PLS OXIMETRY MLT: CPT

## 2020-02-29 PROCEDURE — 86235 NUCLEAR ANTIGEN ANTIBODY: CPT

## 2020-02-29 PROCEDURE — 2700000000 HC OXYGEN THERAPY PER DAY

## 2020-02-29 PROCEDURE — 6360000002 HC RX W HCPCS: Performed by: NURSE PRACTITIONER

## 2020-02-29 PROCEDURE — 36415 COLL VENOUS BLD VENIPUNCTURE: CPT

## 2020-02-29 PROCEDURE — 89220 SPUTUM SPECIMEN COLLECTION: CPT

## 2020-02-29 PROCEDURE — 99232 SBSQ HOSP IP/OBS MODERATE 35: CPT | Performed by: INTERNAL MEDICINE

## 2020-02-29 RX ORDER — ROSUVASTATIN CALCIUM 10 MG/1
40 TABLET, COATED ORAL NIGHTLY
Status: DISCONTINUED | OUTPATIENT
Start: 2020-02-29 | End: 2020-03-11 | Stop reason: HOSPADM

## 2020-02-29 RX ORDER — FENTANYL CITRATE 50 UG/ML
25 INJECTION, SOLUTION INTRAMUSCULAR; INTRAVENOUS
Status: COMPLETED | OUTPATIENT
Start: 2020-02-29 | End: 2020-03-01

## 2020-02-29 RX ORDER — FUROSEMIDE 10 MG/ML
20 INJECTION INTRAMUSCULAR; INTRAVENOUS ONCE
Status: COMPLETED | OUTPATIENT
Start: 2020-02-29 | End: 2020-02-29

## 2020-02-29 RX ORDER — 0.9 % SODIUM CHLORIDE 0.9 %
500 INTRAVENOUS SOLUTION INTRAVENOUS
Status: ACTIVE | OUTPATIENT
Start: 2020-02-29 | End: 2020-02-29

## 2020-02-29 RX ORDER — CLONIDINE 0.3 MG/24H
1 PATCH, EXTENDED RELEASE TRANSDERMAL WEEKLY
Status: DISCONTINUED | OUTPATIENT
Start: 2020-02-29 | End: 2020-03-01

## 2020-02-29 RX ORDER — FOLIC ACID 1 MG/1
1 TABLET ORAL 2 TIMES DAILY
Status: DISCONTINUED | OUTPATIENT
Start: 2020-02-29 | End: 2020-03-11 | Stop reason: HOSPADM

## 2020-02-29 RX ADMIN — FOLIC ACID 1 MG: 1 TABLET ORAL at 19:46

## 2020-02-29 RX ADMIN — FAMOTIDINE 20 MG: 10 INJECTION INTRAVENOUS at 19:46

## 2020-02-29 RX ADMIN — ACETAMINOPHEN 650 MG: 325 TABLET ORAL at 16:42

## 2020-02-29 RX ADMIN — AMPICILLIN SODIUM AND SULBACTAM SODIUM 1.5 G: 1; .5 INJECTION, POWDER, FOR SOLUTION INTRAMUSCULAR; INTRAVENOUS at 15:14

## 2020-02-29 RX ADMIN — STANDARDIZED SENNA CONCENTRATE AND DOCUSATE SODIUM 2 TABLET: 8.6; 5 TABLET ORAL at 08:34

## 2020-02-29 RX ADMIN — Medication 10 ML: at 08:35

## 2020-02-29 RX ADMIN — FAMOTIDINE 20 MG: 10 INJECTION INTRAVENOUS at 08:34

## 2020-02-29 RX ADMIN — FENTANYL CITRATE 25 MCG: 50 INJECTION, SOLUTION INTRAMUSCULAR; INTRAVENOUS at 22:04

## 2020-02-29 RX ADMIN — GLYCOPYRROLATE 1 MG: 1 TABLET ORAL at 08:34

## 2020-02-29 RX ADMIN — HEPARIN SODIUM 19.2 UNITS/KG/HR: 10000 INJECTION, SOLUTION INTRAVENOUS at 12:02

## 2020-02-29 RX ADMIN — ACETAMINOPHEN 650 MG: 325 TABLET ORAL at 00:55

## 2020-02-29 RX ADMIN — SODIUM CHLORIDE, PRESERVATIVE FREE 10 ML: 5 INJECTION INTRAVENOUS at 08:44

## 2020-02-29 RX ADMIN — AMPICILLIN SODIUM AND SULBACTAM SODIUM 1.5 G: 1; .5 INJECTION, POWDER, FOR SOLUTION INTRAMUSCULAR; INTRAVENOUS at 04:36

## 2020-02-29 RX ADMIN — ROSUVASTATIN CALCIUM 40 MG: 20 TABLET, FILM COATED ORAL at 19:46

## 2020-02-29 RX ADMIN — AMLODIPINE BESYLATE 10 MG: 10 TABLET ORAL at 08:34

## 2020-02-29 RX ADMIN — HYDRALAZINE HYDROCHLORIDE 10 MG: 20 INJECTION INTRAMUSCULAR; INTRAVENOUS at 21:25

## 2020-02-29 RX ADMIN — Medication 15 ML: at 21:28

## 2020-02-29 RX ADMIN — Medication 10 MG: at 19:45

## 2020-02-29 RX ADMIN — AMPICILLIN SODIUM AND SULBACTAM SODIUM 1.5 G: 1; .5 INJECTION, POWDER, FOR SOLUTION INTRAMUSCULAR; INTRAVENOUS at 18:36

## 2020-02-29 RX ADMIN — GLYCOPYRROLATE 1 MG: 1 TABLET ORAL at 15:14

## 2020-02-29 RX ADMIN — GLYCOPYRROLATE 1 MG: 1 TABLET ORAL at 19:46

## 2020-02-29 RX ADMIN — Medication 10 MG: at 00:37

## 2020-02-29 RX ADMIN — SODIUM CHLORIDE, PRESERVATIVE FREE 10 ML: 5 INJECTION INTRAVENOUS at 19:45

## 2020-02-29 RX ADMIN — Medication 20 MG: at 08:35

## 2020-02-29 RX ADMIN — FUROSEMIDE 20 MG: 10 INJECTION, SOLUTION INTRAMUSCULAR; INTRAVENOUS at 13:25

## 2020-02-29 RX ADMIN — Medication 15 ML: at 08:44

## 2020-02-29 ASSESSMENT — PULMONARY FUNCTION TESTS
PIF_VALUE: 21
PIF_VALUE: 16
PIF_VALUE: 23
PIF_VALUE: 19
PIF_VALUE: 27
PIF_VALUE: 28
PIF_VALUE: 21

## 2020-02-29 NOTE — PLAN OF CARE
Problem: MECHANICAL VENTILATION  Goal: Mobility/activity is maintained at optimum level for patient  2/29/2020 1424 by Flor Caldwell RCP  Outcome: Ongoing  2/29/2020 0657 by Chuckie Cao RN  Outcome: Ongoing  Goal: Ability to express needs and understand communication  Outcome: Ongoing  Goal: Patient will maintain patent airway  Outcome: Ongoing  Goal: Oral health is maintained or improved  Outcome: Ongoing  Goal: ET tube will be managed safely  Outcome: Ongoing

## 2020-02-29 NOTE — PROGRESS NOTES
Today's Date: 2/29/2020  Patient Name: Evert Cruz  Patient's age: 52 y. o., 1970      Reason for Consult: management recommendations    CHIEF COMPLAINT: Recurrent ischemic stroke. MTHFR mutation. Mildly elevated homocystine     History Obtained From:  patient, electronic medical record    Interim History:  Patient is currently intubated, slight bloody tinged output from ET tube. Remains on heparin drip per trauma surgery. One episode of fever overnight 101.4 approximately midnight. Permissive hypertension per primary. Hypokalemia, BUN slightly elevated compared to yesterday up from 23-25. Creatinine also slightly increased from 0.97-1.05 still with a normalized. Leukocytosis increased from 23.2-16 0.6-23. 3. Hemoglobin slowly trending downwards but stable overall 13.8-12.0-11. 8. HISTORY OF PRESENT ILLNESS:      The patient is a 52 y.o.  male With past medical history of hypertension coronary artery disease status post stent placement January 2019 and recent pontine infarction was transferred from [facility] due to complaints of aphasia and left hemiparesis. Patient was recently patient was recently admitted to [facility] on 2/13/2020 after presenting with dysarthria and left hemiparesis and found to have a right pontine infarction. He did not receive TPA. 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 Patient was discharged on aspirin and Brilinta. On 2/22 he was noted by the nurse to have decreased word output and coughing with liquids. CT head performed showed stable right pontine infarction. Patient was sent to the [facility] ED on 2/24 due to hypertensive urgency with blood pressure 237/130. Repeat CT showed stable right pontine infarction. Brain MRI showed increasing multifocal infarction in the mayuri extending to the middle and new punctate infarct left cerebellar hemisphere.   Patient was then 25,000 units in dextrose 5% 250 mL infusion  9.2 Units/kg/hr Intravenous Continuous Mertr Rock Ira MD 20.8 mL/hr at 02/29/20 0644 19.2 Units/kg/hr at 02/29/20 0644    famotidine (PEPCID) injection 20 mg  20 mg Intravenous BID BALTAZAR Gonsales CNP   20 mg at 02/29/20 8151    cloNIDine (CATAPRES) 0.2 MG/24HR 1 patch  1 patch Transdermal Weekly Jesus Lara MD   1 patch at 02/28/20 1226    hydrALAZINE (APRESOLINE) injection 10 mg  10 mg Intravenous Q2H PRN BALTAZAR Gonsales - CNP   10 mg at 02/28/20 2335    labetalol (NORMODYNE;TRANDATE) injection syringe 10 mg  10 mg Intravenous Q1H PRN BALTAZAR Gonsales CNP   10 mg at 02/29/20 0037    glycopyrrolate (ROBINUL) tablet 1 mg  1 mg Oral TID Alex Roth MD   1 mg at 02/29/20 0834    amLODIPine (NORVASC) tablet 10 mg  10 mg Oral Daily Dora Arriaga MD   10 mg at 02/29/20 4356    propofol injection  10 mcg/kg/min Intravenous Titrated BALTAZAR Drake CNP   Stopped at 02/29/20 0230    ampicillin-sulbactam (UNASYN) 1.5 g IVPB minibag  1.5 g Intravenous Q8H Alex Roth MD   Stopped at 02/29/20 0506    atorvastatin (LIPITOR) tablet 80 mg  80 mg Oral Nightly Jesus Lara MD   80 mg at 02/28/20 2029    FLUoxetine (PROZAC) 20 MG/5ML solution 20 mg  20 mg Per NG tube Daily Jesus Lara MD   20 mg at 02/29/20 0835    chlorhexidine (PERIDEX) 0.12 % solution 15 mL  15 mL Mouth/Throat BID BALTAZAR Gonsales CNP   15 mL at 02/29/20 0844    sodium chloride flush 0.9 % injection 10 mL  10 mL Intravenous 2 times per day Justine Gage MD   10 mL at 02/29/20 0835    sodium chloride flush 0.9 % injection 10 mL  10 mL Intravenous PRN Justine Gage MD        ondansetron [facility]) injection 4 mg  4 mg Intravenous Once Justine Gage MD        acetaminophen (TYLENOL) tablet 650 mg  650 mg Oral Q4H PRN Mary Canales APRN - CNP        sennosides-docusate sodium (SENOKOT-S) 8.6-50 MG tablet 2 tablet  2 tablet Oral Daily dyspnea, palpitations, orthopnea, PND   Gastrointestinal: negative for nausea, vomiting, diarrhea, constipation, abdominal pain, Dysphagia, hematemesis and hematochezia   Genitourinary: negative for frequency, dysuria, nocturia, urinary incontinence, and hematuria   Integument: negative for rash, skin lesions, bruises. Hematologic/Lymphatic: negative for easy bruising, bleeding, lymphadenopathy, or petechiae   Endocrine: negative for heat or cold intolerance,weight changes, change in bowel habits and hair loss   Musculoskeletal: negative for myalgias, arthralgias, pain, joint swelling,and bone pain   Neurological: negative for headaches, dizziness, seizures, weakness, numbness    PHYSICAL EXAM:        BP (!) 172/74   Pulse 66   Temp 99 °F (37.2 °C)   Resp 23   Ht 6' 1\" (1.854 m)   Wt 238 lb 12.1 oz (108.3 kg)   SpO2 97%   BMI 31.50 kg/m²    Temp (24hrs), Av.2 °F (37.3 °C), Min:97.5 °F (36.4 °C), Max:101.4 °F (38.6 °C)      General appearance Intubated and off sedation  Mental status -intubated, however following some commands  Eyes - pupils equal and reactive, extraocular eye movements intact grossly  Ears - bilateral TM's and external ear canals normal   Mouth -ET tube appears grossly intact  Neck -trachea midline  Lymphatics - no palpable lymphadenopathy, no hepatosplenomegaly   Chest -ventilated breath sounds, slightly decreased on right side  Heart - normal rate, regular rhythm, normal S1, S2, no murmurs  Abdomen - soft, nontender, nondistended, no masses or organomegaly   Neurological - alert, intubated, off sedation, following some commands in upper and lower extremities. Grossly weak.   Musculoskeletal - no joint tenderness, deformity or swelling   Extremities - peripheral pulses normal, no pedal edema, no clubbing or cyanosis   Skin - normal coloration and turgor, no rashes, no suspicious skin lesions noted ,      DATA:      Labs:     CBC:   Recent Labs     20  0601 20  0455   WBC

## 2020-02-29 NOTE — PROGRESS NOTES
Daily Progress Note  Neuro Critical Care    Patient Name: Yazmin Crisostomo  Patient : 1970  Room/Bed: 4902/5271-47  Code Status: FULL  Allergies: No Known Allergies    CHIEF COMPLAINT:      Aphasia, left sided weakness     INTERVAL HISTORY    Initial Presentation (Admitted 20): The patient is a 53 yo with a history of HTN, CAD s/p stent (2019), and recent right pontine infarct who presented as a transfer from [facility] for worsening aphasia, dysphagia, and left hemiparesis. Patient was recently admitted to [facility] on 20 after presenting with dysarthria and left hemiparesis and found to have a right pontine infarction. He was out of window for tPA. 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 He was maintained on his home ASA and Brilinta and was treated with a low dose heparin infusion for 48h. Repeat CTA Head/Neck on 2/15 remained stable and patient's clinical exam remained unchanged after heparin was stopped. He was discharged to [facility] on . At the time of discharge he had a left facial droop and mild to moderate left sided weakness (LUE 3/5, LLE 4/5). For the last few days prior to re-admission, family states they started to notice patient's speech started to decline and he was having more difficulty swallowing. On , he was noted by the nurse to have decreased word output and to be coughing with liquids. CT Head performed which showed a stable right pontine infarct. On , patient was sent to [facility] ED due to hypertensive urgency (237/130). Given 20mg IV Labetalol with improvement of BP down to 152/99. Started on IV fluids for RASHMI. Repeat CT head showed stable right pontine infarct. MRI Brain showed increasing multifocal infarct in the mayuri extending to medulla and new punctate infarct left cerebellar hemisphere.   Patient initially admitted to the ICU at [facility] but then decision made to transfer to [address] [place] [address] Neuro ICU for further evaluation and management. On arrival to [facility] showed hypoplastic basilar artery with no flow in the mid to distal portion. CT Brain perfusion with no perfusion mismatch. Started on a low dose heparin infusion with 4000u bolus and maintained on Aspirin and Brilinta. Hospital Course:  2/25: Low intensity heparin infusion, PTT this AM 35.7. Loaded with 180mg Brilinta around 0300 this AM via NG tube. 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 Diagnostic cerebral angiogram revealed mid basilar artery occlusion distal to anterior inferior cerebellar artery and proximal to superior cerebellar artery with retrograde filling to the superior cerebral artery from anterior circulation. Sheath remained in place post op.  2/26: Right groin with mild oozing and tenderness overnight. Drowsy and not following commands overnight. STAT CT head stable. CT abd/pelvis negative for hematoma. Sheath removed. Family meeting for update on severity of deficits. 2/27: Urology consulted for hematuria. Heparin held. Intubated for airway protection and impending respiratory failure. Gen Surg consulted for trach/PEG. Antiplatelets held. Started on Unasyn for pneumonia. 2/28: Jacques removed, no further hematuria. Heparin infusion restarted. Clonidine patch 0.2 mg/24hr started for improved blood pressure control    Last 24h:  Febrile overnight with T-max of 38.6. Did require 500 cc bolus for blood pressure less than 150. Did have pink frothy sputum noted by her respiratory but did not have any increase. Leukocytosis increased from 16-23. 3. Propofol held this am. Neuro exam remains unchanged.       CURRENT MEDICATIONS:  SCHEDULED MEDICATIONS:   cloNIDine  1 patch Transdermal Weekly    furosemide  20 mg Intravenous Once    ampicillin-sulbactam  1.5 g Intravenous Q6H    famotidine (PEPCID) injection  20 mg Intravenous BID    glycopyrrolate  1 mg Oral TID    amLODIPine  10 mg Oral Daily    atorvastatin  80 mg Oral Nightly    FLUoxetine  20 mg Per NG tube Daily    chlorhexidine  15 mL Mouth/Throat BID    sennosides-docusate sodium  2 tablet Oral Daily    sodium chloride flush  10 mL Intravenous 2 times per day     CONTINUOUS INFUSIONS:   heparin (porcine) 19.2 Units/kg/hr (20 1202)    propofol Stopped (20)    sodium chloride Stopped (20)     PRN MEDICATIONS:   sodium chloride, hydrALAZINE, labetalol, acetaminophen, magnesium hydroxide, sodium chloride flush, polyethylene glycol, ondansetron, promethazine    VITALS:  Temperature Range: Temp: 99 °F (37.2 °C) Temp  Av.6 °F (37.6 °C)  Min: 98.9 °F (37.2 °C)  Max: 101.4 °F (38.6 °C)  BP Range: Systolic (82JEH), YHL:785 , Min:145 , RKI:599     Diastolic (94TWB), IXT:77, Min:67, Max:95    Pulse Range: Pulse  Av.2  Min: 57  Max: 83  Respiration Range: Resp  Av.8  Min: 15  Max: 23  Current Pulse Ox: SpO2: 91 %  24HR Pulse Ox Range: SpO2  Av.3 %  Min: 90 %  Max: 97 %  Patient Vitals for the past 12 hrs:   BP Temp Pulse Resp SpO2   20 1121 -- -- 81 20 91 %   20 0834 (!) 172/74 -- -- -- --   20 0820 -- -- -- 23 97 %   20 0807 -- -- 66 18 95 %   20 0800 -- -- 67 -- --   20 0622 -- -- -- -- 95 %   20 0522 -- -- 71 20 94 %   20 0422 (!) 145/74 -- 75 21 91 %   20 0332 -- -- -- 17 95 %   20 0327 -- 99 °F (37.2 °C) 79 19 94 %   20 (!) 145/67 -- 77 19 94 %   20 -- -- 83 20 96 %   20 -- -- 72 19 97 %     Estimated body mass index is 31.5 kg/m² as calculated from the following:    Height as of this encounter: 6' 1\" (1.854 m).     Weight as of this encounter: 238 lb 12.1 oz (108.3 kg).  []<16 Severe malnutrition  []16-16.99 Moderate malnutrition  []17-18.49 Mild malnutrition  []18.5-24.9 Normal  []25-29.9 Overweight (not obese)  [x]30-34.9 2/24/2020  Increasing multifocal acute infarct in the mayuri. New punctate acute infarct left anterolateral cerebellar hemisphere Multifocal periventricular and subcortical white matter small vessel ischemic changes are again noted bilaterally. Labs and Images reviewed with:  [x] Dr. Mia Griggs. Bobbi    [] Dr. Rosaura Duffy  [] Dr. Winnie Díaz  [] There are no new interval images to review. PHYSICAL EXAM       CONSTITUTIONAL:  Intubated, sedation held for exam.  Opens eyes to voice. Tracks appropriately. HEAD:  normocephalic, atraumatic    EYES:  PERRL, EOMI   ENT:  moist mucous membranes   NECK:  supple, symmetric   LUNGS:  Equal air entry bilaterally, rhonchi throughout   CARDIOVASCULAR:  normal s1 / s2, RRR, distal pulses intact. Right groin site intact without hematoma or drainage   ABDOMEN:  Soft, no rigidity, normal bowel sounds   NEUROLOGIC:  Mental Status:  Intubated, sedation held for exam.  Opens eyes to voice. Tracks appropriately. Cranial Nerves:    III: Pupils:  equal, round, reactive to light  III,IV,VI: Extra Ocular Movements: intact  V: Facial sensation:  intact  VII: Facial strength: abnormal - left facial droop  Not able to stick out tongue    Motor Exam:    Flicker to pain in left upper extremity. Left lower extremity flexes to pain. Localizes in right upper extremity. Withdraws to pain in right lower extremity. Wiggles right toes very weakly on command intermittently. DRAINS:  [x] There are no drains for Neuro Critical Care to monitor at this time. ASSESSMENT AND PLAN:       The patient is a 51 yo male with a history of HTN, CAD s/p stent (January 2019), and recent right pontine infarct who presented as a transfer from [facility] for worsening aphasia, dysphagia, and left hemiparesis. Found to have increasing multifocal acute infarctions in the mayuri extending to the medulla as well as a small acute infarct in the left cerebellar infarction.   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  - POD # 4 s/p diagnostic cerebral angiogram showing mid basilar artery occlusion  - Lesion thought to be stenotic in nature rather than thrombus per Endovascular   - Aspirin and Plavix on hold for trach/PEG, Continue low dose heparin infusion for now  - Lipitor 80mg QHS  - Hypercoag panel reviewed; +Hetero MTHFR mutations, Hem/Onc following  - Neuro Endovascular following; no further intervention planned  - Goal -180, decrease to 140-180  - Propofol for sedation as needed  - Neuro checks per protocol    CARDIOVASCULAR:  - Goal -180  - PRN Hydralazine/Labetalol  - Continue Norvasc 10mg QD, start Clonidine patch 0.2mg/24h  - History of CAD s/p stent 2019  - Asprin and Brilinta on hold for trach/PEG  - Mild troponin leak (initially 36), down to 32, EKG unchanged  - Echo  EF 55%, severe LVH, aortic root dilation with mild aortic insufficiency  - Outpatient Cardiology follow up  - Hyperlipidemia, recent lipid panel; LDL 91, Cholesterol 140  - Lipitor 80mg QHS  - Continue telemetry    PULMONARY:  - Intubated  for airway protection and impending respiratory failure  - Vent Settings: PRVC 30/14/640/5  - AB.459/76.5/32/30  - Gen Surg following for trach/PEG  - CXR : Stable cardiomegaly and pulmonary vascular congestion  - Large amounts of oral secretions, pinky, frothy  - Bedside US show small numbers of b lines - will give 20mg IV lasix  - F/U Respiratory culture    RENAL/FLUID/ELECTROLYTE:  - RASHMI resolved  - BUN 25/ Creatinine 0.1.05  - Monitor I&O  - Urology following for hematuria which has resolved  - IVF: dc as patient is at goal TFs  - Replace electrolytes PRN  - Daily BMP    GI/NUTRITION:  NUTRITION:  - Continue TFs  - Formed BM 2/26   - Bowel regimen: Senokot-S daily, Milk of Mag and Dulcolax suppository PRN  - GI prophylaxis: Pepcid while intubated    ID:  - Febrile, Tmax 38.6  - Leukocytosis increased, 16 ->23.3  - CXR 2/29: Stable from prior  - Procalcitonin 0.21 -> 0.18  - Continue Unasyn for suspected pneumonia, day 3/7, will verify pneumonia dose   -Will increase Unasyn to 1.5mg q6hrs  - F/U respiratory culture, blood cx, and UA  - Continue to monitor for fevers  - Daily CBC    HEME:   - H&H 11.8  - Platelets 795  - Heterozygous MTHFR mutation  - Negative prothrombin, Factor V Leiden, lupus anticoagulant  - Hem/Onc following  - Repeat homocysteine normal   - Daily CBC    ENDOCRINE:  - Continue to monitor blood glucose, goal <180  - Recent Hemoglobin A1C 5.6    OTHER:  - Prozac 10mg QD for depression  - Palliative Care following  - PT/OT/ST  - PM&R as appropriate  - Code Status: FULL    PROPHYLAXIS:  Stress ulcer: Pepcid    DVT PROPHYLAXIS:  - SCD sleeves - Thigh High   - On Heparin infusion    DISPOSITION:  [x] To remain ICU for close neurological monitoring, vent management. We will continue to follow along. For any changes in exam or patient status please contact Neuro Critical Care.       Pearl Richard DO  Neuro Critical Care  Pager [phone]  2/29/2020     1:17 PM

## 2020-03-01 ENCOUNTER — APPOINTMENT (OUTPATIENT)
Dept: GENERAL RADIOLOGY | Age: 50
DRG: 005 | End: 2020-03-01
Attending: SPECIALIST
Payer: MEDICARE

## 2020-03-01 LAB
-: ABNORMAL
AMORPHOUS: ABNORMAL
ANION GAP SERPL CALCULATED.3IONS-SCNC: 13 MMOL/L (ref 9–17)
BACTERIA: ABNORMAL
BILIRUBIN URINE: NEGATIVE
BUN BLDV-MCNC: 21 MG/DL (ref 6–20)
BUN/CREAT BLD: ABNORMAL (ref 9–20)
CALCIUM IONIZED: 1.18 MMOL/L (ref 1.13–1.33)
CALCIUM SERPL-MCNC: 8.6 MG/DL (ref 8.6–10.4)
CASTS UA: ABNORMAL /LPF (ref 0–8)
CHLORIDE BLD-SCNC: 110 MMOL/L (ref 98–107)
CO2: 23 MMOL/L (ref 20–31)
COLOR: YELLOW
CREAT SERPL-MCNC: 1.01 MG/DL (ref 0.7–1.2)
CRYSTALS, UA: ABNORMAL /HPF
CULTURE: ABNORMAL
DIRECT EXAM: ABNORMAL
EPITHELIAL CELLS UA: ABNORMAL /HPF (ref 0–5)
GFR AFRICAN AMERICAN: >60 ML/MIN
GFR NON-AFRICAN AMERICAN: >60 ML/MIN
GFR SERPL CREATININE-BSD FRML MDRD: ABNORMAL ML/MIN/{1.73_M2}
GFR SERPL CREATININE-BSD FRML MDRD: ABNORMAL ML/MIN/{1.73_M2}
GLUCOSE BLD-MCNC: 118 MG/DL (ref 70–99)
GLUCOSE URINE: NEGATIVE
HCT VFR BLD CALC: 34.5 % (ref 40.7–50.3)
HEMOGLOBIN: 11 G/DL (ref 13–17)
KETONES, URINE: NEGATIVE
LEUKOCYTE ESTERASE, URINE: NEGATIVE
Lab: ABNORMAL
MCH RBC QN AUTO: 28.8 PG (ref 25.2–33.5)
MCHC RBC AUTO-ENTMCNC: 31.9 G/DL (ref 28.4–34.8)
MCV RBC AUTO: 90.3 FL (ref 82.6–102.9)
MUCUS: ABNORMAL
NITRITE, URINE: NEGATIVE
NRBC AUTOMATED: 0 PER 100 WBC
OTHER OBSERVATIONS UA: ABNORMAL
PARTIAL THROMBOPLASTIN TIME: 47.6 SEC (ref 20.5–30.5)
PARTIAL THROMBOPLASTIN TIME: 56.7 SEC (ref 20.5–30.5)
PARTIAL THROMBOPLASTIN TIME: 66.9 SEC (ref 20.5–30.5)
PARTIAL THROMBOPLASTIN TIME: 72.9 SEC (ref 20.5–30.5)
PDW BLD-RTO: 12.7 % (ref 11.8–14.4)
PH UA: 7 (ref 5–8)
PLATELET # BLD: 264 K/UL (ref 138–453)
PLATELET # BLD: 271 K/UL (ref 138–453)
PMV BLD AUTO: 11 FL (ref 8.1–13.5)
POTASSIUM SERPL-SCNC: 3.2 MMOL/L (ref 3.7–5.3)
PROTEIN UA: ABNORMAL
RBC # BLD: 3.82 M/UL (ref 4.21–5.77)
RBC UA: ABNORMAL /HPF (ref 0–4)
RENAL EPITHELIAL, UA: ABNORMAL /HPF
SODIUM BLD-SCNC: 146 MMOL/L (ref 135–144)
SPECIFIC GRAVITY UA: 1.02 (ref 1–1.03)
SPECIMEN DESCRIPTION: ABNORMAL
TRICHOMONAS: ABNORMAL
TURBIDITY: CLEAR
URINE HGB: NEGATIVE
UROBILINOGEN, URINE: ABNORMAL
WBC # BLD: 17 K/UL (ref 3.5–11.3)
WBC UA: ABNORMAL /HPF (ref 0–5)
YEAST: ABNORMAL

## 2020-03-01 PROCEDURE — 82330 ASSAY OF CALCIUM: CPT

## 2020-03-01 PROCEDURE — 6370000000 HC RX 637 (ALT 250 FOR IP): Performed by: STUDENT IN AN ORGANIZED HEALTH CARE EDUCATION/TRAINING PROGRAM

## 2020-03-01 PROCEDURE — 80048 BASIC METABOLIC PNL TOTAL CA: CPT

## 2020-03-01 PROCEDURE — 2500000003 HC RX 250 WO HCPCS: Performed by: NURSE PRACTITIONER

## 2020-03-01 PROCEDURE — 6360000002 HC RX W HCPCS: Performed by: STUDENT IN AN ORGANIZED HEALTH CARE EDUCATION/TRAINING PROGRAM

## 2020-03-01 PROCEDURE — 2580000003 HC RX 258: Performed by: STUDENT IN AN ORGANIZED HEALTH CARE EDUCATION/TRAINING PROGRAM

## 2020-03-01 PROCEDURE — 6360000002 HC RX W HCPCS: Performed by: NURSE PRACTITIONER

## 2020-03-01 PROCEDURE — 94003 VENT MGMT INPAT SUBQ DAY: CPT

## 2020-03-01 PROCEDURE — 85027 COMPLETE CBC AUTOMATED: CPT

## 2020-03-01 PROCEDURE — 85049 AUTOMATED PLATELET COUNT: CPT

## 2020-03-01 PROCEDURE — 85730 THROMBOPLASTIN TIME PARTIAL: CPT

## 2020-03-01 PROCEDURE — 51701 INSERT BLADDER CATHETER: CPT

## 2020-03-01 PROCEDURE — 2000000003 HC NEURO ICU R&B

## 2020-03-01 PROCEDURE — 36415 COLL VENOUS BLD VENIPUNCTURE: CPT

## 2020-03-01 PROCEDURE — 99291 CRITICAL CARE FIRST HOUR: CPT | Performed by: PSYCHIATRY & NEUROLOGY

## 2020-03-01 PROCEDURE — 71045 X-RAY EXAM CHEST 1 VIEW: CPT

## 2020-03-01 PROCEDURE — 87040 BLOOD CULTURE FOR BACTERIA: CPT

## 2020-03-01 PROCEDURE — 6370000000 HC RX 637 (ALT 250 FOR IP): Performed by: PSYCHIATRY & NEUROLOGY

## 2020-03-01 PROCEDURE — 81001 URINALYSIS AUTO W/SCOPE: CPT

## 2020-03-01 PROCEDURE — 99232 SBSQ HOSP IP/OBS MODERATE 35: CPT | Performed by: INTERNAL MEDICINE

## 2020-03-01 PROCEDURE — 99233 SBSQ HOSP IP/OBS HIGH 50: CPT | Performed by: PSYCHIATRY & NEUROLOGY

## 2020-03-01 PROCEDURE — 51798 US URINE CAPACITY MEASURE: CPT

## 2020-03-01 PROCEDURE — 6370000000 HC RX 637 (ALT 250 FOR IP): Performed by: NURSE PRACTITIONER

## 2020-03-01 RX ORDER — ACETAMINOPHEN 10 MG/ML
1000 INJECTION, SOLUTION INTRAVENOUS ONCE
Status: CANCELLED | OUTPATIENT
Start: 2020-03-01 | End: 2020-03-01

## 2020-03-01 RX ORDER — CLONIDINE HYDROCHLORIDE 0.2 MG/1
0.2 TABLET ORAL 2 TIMES DAILY
Status: DISCONTINUED | OUTPATIENT
Start: 2020-03-01 | End: 2020-03-04

## 2020-03-01 RX ORDER — LISINOPRIL 20 MG/1
40 TABLET ORAL DAILY
Status: DISCONTINUED | OUTPATIENT
Start: 2020-03-01 | End: 2020-03-11 | Stop reason: HOSPADM

## 2020-03-01 RX ORDER — POTASSIUM CHLORIDE 20 MEQ/1
40 TABLET, EXTENDED RELEASE ORAL ONCE
Status: DISCONTINUED | OUTPATIENT
Start: 2020-03-01 | End: 2020-03-01

## 2020-03-01 RX ORDER — ACETAMINOPHEN 10 MG/ML
1000 INJECTION, SOLUTION INTRAVENOUS ONCE
Status: COMPLETED | OUTPATIENT
Start: 2020-03-01 | End: 2020-03-01

## 2020-03-01 RX ORDER — KETOROLAC TROMETHAMINE 30 MG/ML
30 INJECTION, SOLUTION INTRAMUSCULAR; INTRAVENOUS
Status: DISCONTINUED | OUTPATIENT
Start: 2020-03-01 | End: 2020-03-01

## 2020-03-01 RX ADMIN — CLONIDINE HYDROCHLORIDE 0.2 MG: 0.2 TABLET ORAL at 14:28

## 2020-03-01 RX ADMIN — Medication 15 ML: at 08:44

## 2020-03-01 RX ADMIN — AMPICILLIN SODIUM AND SULBACTAM SODIUM 1.5 G: 1; .5 INJECTION, POWDER, FOR SOLUTION INTRAMUSCULAR; INTRAVENOUS at 17:44

## 2020-03-01 RX ADMIN — GLYCOPYRROLATE 1 MG: 1 TABLET ORAL at 08:31

## 2020-03-01 RX ADMIN — FAMOTIDINE 20 MG: 10 INJECTION INTRAVENOUS at 08:31

## 2020-03-01 RX ADMIN — AMPICILLIN SODIUM AND SULBACTAM SODIUM 1.5 G: 1; .5 INJECTION, POWDER, FOR SOLUTION INTRAMUSCULAR; INTRAVENOUS at 05:43

## 2020-03-01 RX ADMIN — HYDRALAZINE HYDROCHLORIDE 10 MG: 20 INJECTION INTRAMUSCULAR; INTRAVENOUS at 09:31

## 2020-03-01 RX ADMIN — AMLODIPINE BESYLATE 10 MG: 10 TABLET ORAL at 08:31

## 2020-03-01 RX ADMIN — FOLIC ACID 1 MG: 1 TABLET ORAL at 19:59

## 2020-03-01 RX ADMIN — GLYCOPYRROLATE 1 MG: 1 TABLET ORAL at 19:59

## 2020-03-01 RX ADMIN — FENTANYL CITRATE 25 MCG: 50 INJECTION, SOLUTION INTRAMUSCULAR; INTRAVENOUS at 22:54

## 2020-03-01 RX ADMIN — Medication 10 MG: at 20:01

## 2020-03-01 RX ADMIN — FENTANYL CITRATE 25 MCG: 50 INJECTION, SOLUTION INTRAMUSCULAR; INTRAVENOUS at 10:46

## 2020-03-01 RX ADMIN — FOLIC ACID 1 MG: 1 TABLET ORAL at 08:30

## 2020-03-01 RX ADMIN — CLONIDINE HYDROCHLORIDE 0.2 MG: 0.2 TABLET ORAL at 19:59

## 2020-03-01 RX ADMIN — ACETAMINOPHEN 650 MG: 325 TABLET ORAL at 00:45

## 2020-03-01 RX ADMIN — AMPICILLIN SODIUM AND SULBACTAM SODIUM 1.5 G: 1; .5 INJECTION, POWDER, FOR SOLUTION INTRAMUSCULAR; INTRAVENOUS at 11:30

## 2020-03-01 RX ADMIN — Medication 10 MG: at 12:32

## 2020-03-01 RX ADMIN — Medication 20 MG: at 08:31

## 2020-03-01 RX ADMIN — LISINOPRIL 40 MG: 20 TABLET ORAL at 13:22

## 2020-03-01 RX ADMIN — ACETAMINOPHEN 650 MG: 325 TABLET ORAL at 16:00

## 2020-03-01 RX ADMIN — Medication 15 ML: at 19:59

## 2020-03-01 RX ADMIN — AMPICILLIN SODIUM AND SULBACTAM SODIUM 1.5 G: 1; .5 INJECTION, POWDER, FOR SOLUTION INTRAMUSCULAR; INTRAVENOUS at 00:19

## 2020-03-01 RX ADMIN — ACETAMINOPHEN 650 MG: 325 TABLET ORAL at 10:55

## 2020-03-01 RX ADMIN — ACETAMINOPHEN 1000 MG: 10 INJECTION, SOLUTION INTRAVENOUS at 03:00

## 2020-03-01 RX ADMIN — HEPARIN SODIUM 17.2 UNITS/KG/HR: 10000 INJECTION, SOLUTION INTRAVENOUS at 13:33

## 2020-03-01 RX ADMIN — POTASSIUM BICARBONATE 40 MEQ: 782 TABLET, EFFERVESCENT ORAL at 08:31

## 2020-03-01 RX ADMIN — ROSUVASTATIN CALCIUM 40 MG: 20 TABLET, FILM COATED ORAL at 19:59

## 2020-03-01 RX ADMIN — FAMOTIDINE 20 MG: 10 INJECTION INTRAVENOUS at 19:59

## 2020-03-01 RX ADMIN — Medication 10 MG: at 22:09

## 2020-03-01 RX ADMIN — SODIUM CHLORIDE, PRESERVATIVE FREE 10 ML: 5 INJECTION INTRAVENOUS at 08:31

## 2020-03-01 RX ADMIN — HEPARIN SODIUM 19.2 UNITS/KG/HR: 10000 INJECTION, SOLUTION INTRAVENOUS at 01:08

## 2020-03-01 RX ADMIN — GLYCOPYRROLATE 1 MG: 1 TABLET ORAL at 13:24

## 2020-03-01 ASSESSMENT — PULMONARY FUNCTION TESTS
PIF_VALUE: 17
PIF_VALUE: 28
PIF_VALUE: 22
PIF_VALUE: 15
PIF_VALUE: 12
PIF_VALUE: 24
PIF_VALUE: 28

## 2020-03-01 ASSESSMENT — PAIN SCALES - GENERAL
PAINLEVEL_OUTOF10: 9
PAINLEVEL_OUTOF10: 0

## 2020-03-01 NOTE — PROGRESS NOTES
ENDOVASCULAR NEUROSURGERY PROGRESS NOTE  3/1/2020 4:09 PM  Subjective:   Admit Date: 2/24/2020  PCP: Jose Luis Contreras PA-C    Patient is still intubated, vasculitis work-up sent    Objective:   Vitals: BP (!) 153/77   Pulse 70   Temp 98.8 °F (37.1 °C)   Resp 19   Ht 6' 1\" (1.854 m)   Wt 238 lb 12.1 oz (108.3 kg)   SpO2 95%   BMI 31.50 kg/m²   General appearance: Lying in bed, NAD. Lungs: No respiratory distress noted. Heart: In sinus rhythm. Abdomen: Soft nontender. Extremities: No lower limb edema noted.     Neurologic: He is sleepy, intubated, he is intermittently following simple commands  CN: He is able to track in the room, face is symmetric. MOTOR:  He is weak on the left body side.   Able to wiggle fingers on the left side and wiggle toes on the left side and able to withdraw to noxious stimuli on the left side, right side patient appears stronger than the left.       Medications and labs:   Scheduled Meds:   cloNIDine  0.2 mg Oral BID    lisinopril  40 mg Oral Daily    ampicillin-sulbactam  1.5 g Intravenous W4H    folic acid  1 mg Oral BID    rosuvastatin  40 mg Oral Nightly    famotidine (PEPCID) injection  20 mg Intravenous BID    glycopyrrolate  1 mg Oral TID    amLODIPine  10 mg Oral Daily    FLUoxetine  20 mg Per NG tube Daily    chlorhexidine  15 mL Mouth/Throat BID    sennosides-docusate sodium  2 tablet Oral Daily    sodium chloride flush  10 mL Intravenous 2 times per day     Continuous Infusions:   heparin (porcine) 17.2 Units/kg/hr (03/01/20 1333)    propofol Stopped (02/29/20 0230)     CBC:   Recent Labs     02/28/20  0601 02/29/20  0455 03/01/20  0420 03/01/20  0606   WBC 16.6* 23.3*  --  17.0*   HGB 12.0* 11.8*  --  11.0*    295 264 271     BMP:    Recent Labs     02/28/20  0601 02/29/20  0455 03/01/20  0420    144 146*   K 3.8 3.6* 3.2*   * 111* 110*   CO2 21 19* 23   BUN 23* 25* 21*   CREATININE 0.97 1.05 1.01   GLUCOSE 97 120* 118* Hepatic: No results for input(s): AST, ALT, ALB, BILITOT, ALKPHOS in the last 72 hours. Troponin: No results for input(s): TROPONINI in the last 72 hours. BNP: No results for input(s): BNP in the last 72 hours. Lipids: No results for input(s): CHOL, HDL in the last 72 hours. Invalid input(s): LDLCALCU  INR:   No results for input(s): INR in the last 72 hours. Assessment and Recommendations:   52 y.o. male with past medical history including hypertension, hyperlipidemia, recent history of right pontine stroke with residual left-sided weakness. He was recently discharged on aspirin 81 and Brilinta 90 mg twice daily. During physical therapy session at the rehab he had worsening speech/dysphagia and worsening left-sided weakness. He was sent to [facility] emergency department with systolic blood pressure to 130s. Subsequently he was transferred to neuro ICU for further evaluation. Patient was found to have RASHMI due to decreased p.o. intake and dehydration     He underwent CTH head showing tiny hypodensity in the mayuri. 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     diagnostic angiogram on February 25  Impression:    1. 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 2. Bilateral dominant P-comm   3. Dominant left vertebral artery         Right groin was closed on February 26.    1. Patient now is off of dual antiplatelet therapy with aspirin and Brilinta for trach and PEG on Monday  2. High intensity statin-changed to Crestor 40 mg daily  3. Added folic acid 1 mg twice daily  4.  Vasculitis work-up -sent  5. On goal LDL less than 70, goal A1c less than 6.5  6. Keep IV fluid  7. Management of the possible aspiration pneumonia per neuro ICU  8. Continue heparin drip for now and can be stopped once aspirin and Brilinta is resumed  9.  Avoid symptomatic hypotension -keep blood pressure above 140       Clifton Plasencia MD  Stroke, [facility]  [address] [place]  Electronically signed 3/1/2020 at 4:09 PM

## 2020-03-01 NOTE — PLAN OF CARE
Problem: HEMODYNAMIC STATUS  Goal: Patient has stable vital signs and fluid balance  Outcome: Ongoing     Problem: ACTIVITY INTOLERANCE/IMPAIRED MOBILITY  Goal: Mobility/activity is maintained at optimum level for patient  3/1/2020 0645 by Pebbles Bruner RN  Outcome: Ongoing     Problem: MECHANICAL VENTILATION  Goal: Mobility/activity is maintained at optimum level for patient  3/1/2020 0645 by Pebbles Bruner RN  Outcome: Ongoing     Problem: Falls - Risk of:  Goal: Will remain free from falls  Description  Will remain free from falls  Outcome: Ongoing     Problem: Risk for Impaired Skin Integrity  Goal: Tissue integrity - skin and mucous membranes  Description  Structural intactness and normal physiological function of skin and  mucous membranes.   Outcome: Ongoing     Problem: Restraint Use - Nonviolent/Non-Self-Destructive Behavior:  Goal: Absence of restraint indications  Description  Absence of restraint indications   Outcome: Not Met This Shift

## 2020-03-02 ENCOUNTER — APPOINTMENT (OUTPATIENT)
Dept: GENERAL RADIOLOGY | Age: 50
DRG: 005 | End: 2020-03-02
Attending: SPECIALIST
Payer: MEDICARE

## 2020-03-02 LAB
ALLEN TEST: NORMAL
ANION GAP SERPL CALCULATED.3IONS-SCNC: 12 MMOL/L (ref 9–17)
ANTI SSA: 5 U/ML
ANTI SSB: 10 U/ML
ANTI-NUCLEAR ANTIBODY (ANA): NEGATIVE
BUN BLDV-MCNC: 23 MG/DL (ref 6–20)
BUN/CREAT BLD: ABNORMAL (ref 9–20)
CALCIUM IONIZED: 1.18 MMOL/L (ref 1.13–1.33)
CALCIUM SERPL-MCNC: 8.9 MG/DL (ref 8.6–10.4)
CHLORIDE BLD-SCNC: 109 MMOL/L (ref 98–107)
CO2: 25 MMOL/L (ref 20–31)
CREAT SERPL-MCNC: 0.94 MG/DL (ref 0.7–1.2)
FIO2: 30
GFR AFRICAN AMERICAN: >60 ML/MIN
GFR NON-AFRICAN AMERICAN: >60 ML/MIN
GFR SERPL CREATININE-BSD FRML MDRD: ABNORMAL ML/MIN/{1.73_M2}
GFR SERPL CREATININE-BSD FRML MDRD: ABNORMAL ML/MIN/{1.73_M2}
GLUCOSE BLD-MCNC: 107 MG/DL (ref 70–99)
HCT VFR BLD CALC: 33.1 % (ref 40.7–50.3)
HEMOGLOBIN: 10.6 G/DL (ref 13–17)
MCH RBC QN AUTO: 28.8 PG (ref 25.2–33.5)
MCHC RBC AUTO-ENTMCNC: 32 G/DL (ref 28.4–34.8)
MCV RBC AUTO: 89.9 FL (ref 82.6–102.9)
MODE: NORMAL
NEGATIVE BASE EXCESS, ART: NORMAL (ref 0–2)
NRBC AUTOMATED: 0 PER 100 WBC
O2 DEVICE/FLOW/%: NORMAL
PARTIAL THROMBOPLASTIN TIME: 55.3 SEC (ref 20.5–30.5)
PARTIAL THROMBOPLASTIN TIME: 60.8 SEC (ref 20.5–30.5)
PARTIAL THROMBOPLASTIN TIME: 63.2 SEC (ref 20.5–30.5)
PARTIAL THROMBOPLASTIN TIME: 71.5 SEC (ref 20.5–30.5)
PATIENT TEMP: NORMAL
PDW BLD-RTO: 12.7 % (ref 11.8–14.4)
PLATELET # BLD: 259 K/UL (ref 138–453)
PMV BLD AUTO: 11.4 FL (ref 8.1–13.5)
POC HCO3: 27.2 MMOL/L (ref 21–28)
POC O2 SATURATION: 98 % (ref 94–98)
POC PCO2 TEMP: NORMAL MM HG
POC PCO2: 40.5 MM HG (ref 35–48)
POC PH TEMP: NORMAL
POC PH: 7.43 (ref 7.35–7.45)
POC PO2 TEMP: NORMAL MM HG
POC PO2: 93.1 MM HG (ref 83–108)
POSITIVE BASE EXCESS, ART: 3 (ref 0–3)
POTASSIUM SERPL-SCNC: 3.9 MMOL/L (ref 3.7–5.3)
RBC # BLD: 3.68 M/UL (ref 4.21–5.77)
SAMPLE SITE: NORMAL
SODIUM BLD-SCNC: 146 MMOL/L (ref 135–144)
TCO2 (CALC), ART: 29 MMOL/L (ref 22–29)
WBC # BLD: 17.6 K/UL (ref 3.5–11.3)

## 2020-03-02 PROCEDURE — 6360000002 HC RX W HCPCS: Performed by: STUDENT IN AN ORGANIZED HEALTH CARE EDUCATION/TRAINING PROGRAM

## 2020-03-02 PROCEDURE — 82803 BLOOD GASES ANY COMBINATION: CPT

## 2020-03-02 PROCEDURE — 84484 ASSAY OF TROPONIN QUANT: CPT

## 2020-03-02 PROCEDURE — 2500000003 HC RX 250 WO HCPCS: Performed by: NURSE PRACTITIONER

## 2020-03-02 PROCEDURE — 2580000003 HC RX 258: Performed by: STUDENT IN AN ORGANIZED HEALTH CARE EDUCATION/TRAINING PROGRAM

## 2020-03-02 PROCEDURE — 6370000000 HC RX 637 (ALT 250 FOR IP): Performed by: STUDENT IN AN ORGANIZED HEALTH CARE EDUCATION/TRAINING PROGRAM

## 2020-03-02 PROCEDURE — 83735 ASSAY OF MAGNESIUM: CPT

## 2020-03-02 PROCEDURE — 94003 VENT MGMT INPAT SUBQ DAY: CPT

## 2020-03-02 PROCEDURE — 6360000002 HC RX W HCPCS: Performed by: NURSE PRACTITIONER

## 2020-03-02 PROCEDURE — 87186 SC STD MICRODIL/AGAR DIL: CPT

## 2020-03-02 PROCEDURE — 94761 N-INVAS EAR/PLS OXIMETRY MLT: CPT

## 2020-03-02 PROCEDURE — 85730 THROMBOPLASTIN TIME PARTIAL: CPT

## 2020-03-02 PROCEDURE — 6370000000 HC RX 637 (ALT 250 FOR IP): Performed by: PSYCHIATRY & NEUROLOGY

## 2020-03-02 PROCEDURE — 87070 CULTURE OTHR SPECIMN AEROBIC: CPT

## 2020-03-02 PROCEDURE — 71045 X-RAY EXAM CHEST 1 VIEW: CPT

## 2020-03-02 PROCEDURE — 6370000000 HC RX 637 (ALT 250 FOR IP): Performed by: NURSE PRACTITIONER

## 2020-03-02 PROCEDURE — 2000000003 HC NEURO ICU R&B

## 2020-03-02 PROCEDURE — 84132 ASSAY OF SERUM POTASSIUM: CPT

## 2020-03-02 PROCEDURE — 85027 COMPLETE CBC AUTOMATED: CPT

## 2020-03-02 PROCEDURE — 94770 HC ETCO2 MONITOR DAILY: CPT

## 2020-03-02 PROCEDURE — 99231 SBSQ HOSP IP/OBS SF/LOW 25: CPT | Performed by: INTERNAL MEDICINE

## 2020-03-02 PROCEDURE — 82330 ASSAY OF CALCIUM: CPT

## 2020-03-02 PROCEDURE — 97110 THERAPEUTIC EXERCISES: CPT

## 2020-03-02 PROCEDURE — 37799 UNLISTED PX VASCULAR SURGERY: CPT

## 2020-03-02 PROCEDURE — 99291 CRITICAL CARE FIRST HOUR: CPT | Performed by: PSYCHIATRY & NEUROLOGY

## 2020-03-02 PROCEDURE — 2700000000 HC OXYGEN THERAPY PER DAY

## 2020-03-02 PROCEDURE — 6360000002 HC RX W HCPCS: Performed by: PSYCHIATRY & NEUROLOGY

## 2020-03-02 PROCEDURE — 87205 SMEAR GRAM STAIN: CPT

## 2020-03-02 PROCEDURE — 80048 BASIC METABOLIC PNL TOTAL CA: CPT

## 2020-03-02 PROCEDURE — 2580000003 HC RX 258: Performed by: PSYCHIATRY & NEUROLOGY

## 2020-03-02 PROCEDURE — 87077 CULTURE AEROBIC IDENTIFY: CPT

## 2020-03-02 RX ORDER — FENTANYL CITRATE 50 UG/ML
50 INJECTION, SOLUTION INTRAMUSCULAR; INTRAVENOUS
Status: DISCONTINUED | OUTPATIENT
Start: 2020-03-02 | End: 2020-03-11 | Stop reason: HOSPADM

## 2020-03-02 RX ORDER — 0.9 % SODIUM CHLORIDE 0.9 %
500 INTRAVENOUS SOLUTION INTRAVENOUS ONCE
Status: COMPLETED | OUTPATIENT
Start: 2020-03-02 | End: 2020-03-02

## 2020-03-02 RX ORDER — MIDAZOLAM HYDROCHLORIDE 1 MG/ML
2 INJECTION INTRAMUSCULAR; INTRAVENOUS ONCE
Status: COMPLETED | OUTPATIENT
Start: 2020-03-02 | End: 2020-03-02

## 2020-03-02 RX ORDER — SODIUM CHLORIDE 9 MG/ML
INJECTION, SOLUTION INTRAVENOUS CONTINUOUS
Status: DISCONTINUED | OUTPATIENT
Start: 2020-03-02 | End: 2020-03-02

## 2020-03-02 RX ADMIN — HEPARIN SODIUM 17.2 UNITS/KG/HR: 10000 INJECTION, SOLUTION INTRAVENOUS at 17:09

## 2020-03-02 RX ADMIN — STANDARDIZED SENNA CONCENTRATE AND DOCUSATE SODIUM 2 TABLET: 8.6; 5 TABLET ORAL at 08:17

## 2020-03-02 RX ADMIN — SODIUM CHLORIDE, PRESERVATIVE FREE 10 ML: 5 INJECTION INTRAVENOUS at 20:40

## 2020-03-02 RX ADMIN — HYDRALAZINE HYDROCHLORIDE 10 MG: 20 INJECTION INTRAMUSCULAR; INTRAVENOUS at 10:58

## 2020-03-02 RX ADMIN — AMPICILLIN SODIUM AND SULBACTAM SODIUM 1.5 G: 1; .5 INJECTION, POWDER, FOR SOLUTION INTRAMUSCULAR; INTRAVENOUS at 00:56

## 2020-03-02 RX ADMIN — GLYCOPYRROLATE 1 MG: 1 TABLET ORAL at 14:07

## 2020-03-02 RX ADMIN — LISINOPRIL 40 MG: 20 TABLET ORAL at 08:16

## 2020-03-02 RX ADMIN — SODIUM CHLORIDE 500 ML: 9 INJECTION, SOLUTION INTRAVENOUS at 02:37

## 2020-03-02 RX ADMIN — CLONIDINE HYDROCHLORIDE 0.2 MG: 0.2 TABLET ORAL at 08:17

## 2020-03-02 RX ADMIN — AMPICILLIN SODIUM AND SULBACTAM SODIUM 1.5 G: 1; .5 INJECTION, POWDER, FOR SOLUTION INTRAMUSCULAR; INTRAVENOUS at 05:51

## 2020-03-02 RX ADMIN — FOLIC ACID 1 MG: 1 TABLET ORAL at 20:39

## 2020-03-02 RX ADMIN — FENTANYL CITRATE 50 MCG: 50 INJECTION, SOLUTION INTRAMUSCULAR; INTRAVENOUS at 20:40

## 2020-03-02 RX ADMIN — PIPERACILLIN AND TAZOBACTAM 3.38 G: 3; .375 INJECTION, POWDER, LYOPHILIZED, FOR SOLUTION INTRAVENOUS at 14:09

## 2020-03-02 RX ADMIN — FOLIC ACID 1 MG: 1 TABLET ORAL at 08:16

## 2020-03-02 RX ADMIN — GLYCOPYRROLATE 1 MG: 1 TABLET ORAL at 20:39

## 2020-03-02 RX ADMIN — CLONIDINE HYDROCHLORIDE 0.2 MG: 0.2 TABLET ORAL at 20:39

## 2020-03-02 RX ADMIN — GLYCOPYRROLATE 1 MG: 1 TABLET ORAL at 08:16

## 2020-03-02 RX ADMIN — Medication 15 ML: at 08:19

## 2020-03-02 RX ADMIN — FAMOTIDINE 20 MG: 10 INJECTION INTRAVENOUS at 20:39

## 2020-03-02 RX ADMIN — HEPARIN SODIUM 19.2 UNITS/KG/HR: 10000 INJECTION, SOLUTION INTRAVENOUS at 03:05

## 2020-03-02 RX ADMIN — PIPERACILLIN AND TAZOBACTAM 3.38 G: 3; .375 INJECTION, POWDER, LYOPHILIZED, FOR SOLUTION INTRAVENOUS at 20:39

## 2020-03-02 RX ADMIN — Medication 20 MG: at 08:18

## 2020-03-02 RX ADMIN — ROSUVASTATIN CALCIUM 40 MG: 20 TABLET, FILM COATED ORAL at 20:40

## 2020-03-02 RX ADMIN — VANCOMYCIN HYDROCHLORIDE 1500 MG: 10 INJECTION, POWDER, LYOPHILIZED, FOR SOLUTION INTRAVENOUS at 12:14

## 2020-03-02 RX ADMIN — FAMOTIDINE 20 MG: 10 INJECTION INTRAVENOUS at 08:15

## 2020-03-02 RX ADMIN — SODIUM CHLORIDE, PRESERVATIVE FREE 10 ML: 5 INJECTION INTRAVENOUS at 08:18

## 2020-03-02 RX ADMIN — SODIUM CHLORIDE: 9 INJECTION, SOLUTION INTRAVENOUS at 02:37

## 2020-03-02 RX ADMIN — ACETAMINOPHEN 650 MG: 325 TABLET ORAL at 16:25

## 2020-03-02 RX ADMIN — Medication 15 ML: at 20:40

## 2020-03-02 RX ADMIN — AMLODIPINE BESYLATE 10 MG: 10 TABLET ORAL at 08:17

## 2020-03-02 RX ADMIN — MIDAZOLAM HYDROCHLORIDE 2 MG: 1 INJECTION, SOLUTION INTRAMUSCULAR; INTRAVENOUS at 17:14

## 2020-03-02 ASSESSMENT — PULMONARY FUNCTION TESTS
PIF_VALUE: 19
PIF_VALUE: 19
PIF_VALUE: 21
PIF_VALUE: 18
PIF_VALUE: 13
PIF_VALUE: 24
PIF_VALUE: 23
PIF_VALUE: 23
PIF_VALUE: 19
PIF_VALUE: 13
PIF_VALUE: 13
PIF_VALUE: 23
PIF_VALUE: 19
PIF_VALUE: 20
PIF_VALUE: 22

## 2020-03-02 NOTE — PROGRESS NOTES
cerebral vasculature     Mri Brain Wo Contrast  Result Date: 2/24/2020  Increasing multifocal acute infarct in the mayuri. New punctate acute infarct left anterolateral cerebellar hemisphere Multifocal periventricular and subcortical white matter small vessel ischemic changes are again noted bilaterally. Labs and Images reviewed with:  [] Dr. Melida Webster. Bobbi    [x] Dr. Ephraim Russo  [] Dr. Marino Perry  [] There are no new interval images to review. PHYSICAL EXAM       CONSTITUTIONAL:  Intubated, off sedation. Opens eyes to voice. Tracks bilaterally. HEAD:  normocephalic, atraumatic    EYES:  PERRL, EOMI   ENT:  moist mucous membranes   NECK:  supple, symmetric   LUNGS:  Equal air entry bilaterally, rhonchi throughout   CARDIOVASCULAR:  normal s1 / s2, distal pulses intact   ABDOMEN:  Soft, no rigidity, normal bowel sounds   NEUROLOGIC:  Mental Status:  Intubated, off sedation. Opens eyes to voice. Cranial Nerves:    III: Pupils:  equal, round, reactive to light  III,IV,VI: Extra Ocular Movements: intact  V: Facial sensation:  intact   VII: Facial strength: abnormal - left facial droop  Not able to stick out tongue    Motor Exam:    No movement to pain in left upper extremity. Left lower extremity flexes to pain. Able to move his right index finger weakly to command, does not localize in the right upper extremity. Withdraws to pain in right lower extremity. Did not wiggle toes. DRAINS:  [x] There are no drains for Neuro Critical Care to monitor at this time. ASSESSMENT AND PLAN:       The patient is a 51 yo male with a history of HTN, CAD s/p stent (January 2019), and recent right pontine infarct who presented as a transfer from [facility] for worsening aphasia, dysphagia, and left hemiparesis. Found to have increasing multifocal acute infarctions in the mayuri extending to the medulla as well as a small acute infarct in the left cerebellar infarction.   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  - POD # 6 s/p diagnostic cerebral angiogram showing mid basilar artery occlusion  - Lesion thought to be stenotic in nature rather than thrombus per Endovascular   - Aspirin and Plavix on hold for trach/PEG, Continue low dose heparin infusion for now  - Lipitor 80mg QHS  - Hypercoag panel reviewed; +Hetero MTHFR mutations, Hem/Onc following  - Vasculitis panel pending; elevated rheumatoid factor, consider Rheumatology consultation  - Neuro Endovascular following; no further intervention planned  - Goal -180  - Neuro checks per protocol    CARDIOVASCULAR:  - Goal -180  - PRN Hydralazine/Labetalol  - Continue Norvasc 10mg QD, Clonidine patch 0.2mg BID, and Lisinopril 40mg QD  - History of CAD s/p stent 2019  - Asprin and Brilinta on hold for trach/PEG  - Mild troponin leak (initially 36), down to 32, EKG unchanged  - Echo  EF 55%, severe LVH, aortic root dilation with mild aortic insufficiency  - Outpatient Cardiology follow up  - Hyperlipidemia, recent lipid panel; LDL 91, Cholesterol 140  - Lipitor 80mg QHS  - Continue telemetry    PULMONARY:  - Intubated  for airway protection and impending respiratory failure  - Vent Settings: PRVC 30/14/640/5  - AB.43/40.5/93.1/27.2  - CXR 3/2 showed mild central vascular congestion, small left pleural effusion  - Large amounts of oral secretions, thick and yellow  - Repeat respiratory culture    RENAL/FLUID/ELECTROLYTE:  - Normal renal functioning  - BUN 23/ Creatinine 0.94  - Monitor I&O; /  - Urology evaluated for neurogenic bladder and hematuria; Bladder scan Qshift and straight cath for PVR>400ml, hematuria resolved  - IVF: Restart tube feeds and hold IVF  - Mild hypernatremia, sodium 146  - Start free water flushes 150ml Q6h  - Replace electrolytes PRN  - Daily BMP    GI/NUTRITION:  NUTRITION:  - Tolerating tube feeds at goal, held at midnight for possible trach/PEG  - Restart tube feeds today  - Last BM 3/1/20  - Bowel regimen: Senokot-S daily, Milk of Mag and Dulcolax suppository PRN  - GI prophylaxis: Pepcid while intubated  - Gen Surgery following for possible trach/PEG    ID:  - Continue intermittent fevers, Tmax 38.6 (3/1 @1700)  - Persistent leukocytosis, WBC 17.6  - Change Unasyn to Vanc/Zosyn for suspected tracheobronchitis  - Sputum culture 2/29 with normal respiratory inez  - Urinalysis 3/1 negative  - Blood cultures 3/1 with no growth to date  - Repeat respiratory culture   - Continue to monitor for fevers  - Daily CBC    HEME:   - H&H 10.6/33.1, slow downtrend noted  - Platelets 265  - Heterozygous MTHFR mutation  - Negative prothrombin, Factor V Leiden, lupus anticoagulant  - Hem/Onc following  - Repeat homocysteine normal   - Daily CBC    ENDOCRINE:  - Continue to monitor blood glucose, goal <180  - Recent Hemoglobin A1C 5.6    OTHER:  - Prozac 10mg QD for depression  - Palliative Care following   - PT/OT/ST  - PM&R as appropriate  - Code Status: FULL    PROPHYLAXIS:  Stress ulcer: Pepcid    DVT PROPHYLAXIS:  - SCD sleeves - Thigh High   - On Heparin infusion    DISPOSITION:  [x] To remain ICU for close neurological monitoring, vent management. We will continue to follow along. For any changes in exam or patient status please contact Neuro Critical Care.       BALTAZAR Fletcher - [place]  Neuro Critical Care  Pager [phone]  3/2/2020     7:03 AM

## 2020-03-02 NOTE — PROGRESS NOTES
Today's Date: 3/1/2020  Patient Name: Carmen Beck  Patient's age: 52 y. o., 1970      Reason for Consult: management recommendations    CHIEF COMPLAINT: Recurrent ischemic stroke. MTHFR mutation. Mildly elevated homocystine     History Obtained From:  patient, electronic medical record    Interim History:  Patient is currently intubated, slight bloody tinged output from ET tube. Remains on heparin drip per trauma surgery. Plan for surgery tomorrow   HISTORY OF PRESENT ILLNESS:      The patient is a 52 y.o.  male With past medical history of hypertension coronary artery disease status post stent placement January 2019 and recent pontine infarction was transferred from [facility] due to complaints of aphasia and left hemiparesis. Patient was recently patient was recently admitted to [facility] on 2/13/2020 after presenting with dysarthria and left hemiparesis and found to have a right pontine infarction. He did not receive TPA. 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 Patient was discharged on aspirin and Brilinta. On 2/22 he was noted by the nurse to have decreased word output and coughing with liquids. CT head performed showed stable right pontine infarction. Patient was sent to the [facility] ED on 2/24 due to hypertensive urgency with blood pressure 237/130. Repeat CT showed stable right pontine infarction. Brain MRI showed increasing multifocal infarction in the mayuri extending to the middle and new punctate infarct left cerebellar hemisphere. Patient was then transferred to [facility] ICU. 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 CT brain perfusion with no perfusion mismatch. Patient was subsequently started on heparin.       Work-up from previous admission shows compound heterozygous MTHFR mutation.   Other hypercoagulable work-up was unremarkable for factor V Leyden mutation, prothrombin gene mutation and lupus antibodies. Past Medical History:   has a past medical history of Heart attack ([address] Hyperlipidemia, Hypertension, and Stroke ([address] Past Surgical History:   has a past surgical history that includes knee surgery (Right); Foot surgery (Bilateral); Coronary angioplasty with stent (2019); and Cardiac surgery. Medications:    Reviewed     Allergies:  Patient has no known allergies. Social History:   reports that he has never smoked. He has never used smokeless tobacco. He reports current alcohol use. He reports that he does not use drugs. Family History: family history includes High Blood Pressure in his father and mother; Hypertension in his brother and sister. REVIEW OF SYSTEMS:      Constitutional: No fever or chills. No night sweats, no weight loss   Eyes: No eye discharge, double vision, or eye pain   HEENT: negative for sore mouth, sore throat, hoarseness and voice change   Respiratory: negative for cough , sputum, dyspnea, wheezing, hemoptysis, chest pain   Cardiovascular: negative for chest pain, dyspnea, palpitations, orthopnea, PND   Gastrointestinal: negative for nausea, vomiting, diarrhea, constipation, abdominal pain, Dysphagia, hematemesis and hematochezia   Genitourinary: negative for frequency, dysuria, nocturia, urinary incontinence, and hematuria   Integument: negative for rash, skin lesions, bruises.    Hematologic/Lymphatic: negative for easy bruising, bleeding, lymphadenopathy, or petechiae   Endocrine: negative for heat or cold intolerance,weight changes, change in bowel habits and hair loss   Musculoskeletal: negative for myalgias, arthralgias, pain, joint swelling,and bone pain   Neurological: negative for headaches, dizziness, seizures, weakness, numbness    PHYSICAL EXAM:        BP (!) 166/90   Pulse 65   Temp 99.9 °F (37.7 °C)   Resp 20   Ht 6' 1\" (1.854 m)   Wt 238 lb 12.1 oz (108.3

## 2020-03-02 NOTE — PROGRESS NOTES
Called son Marisol Perez 2nd to discuss trach and peg. He is requesting additional day to discuss prognosis with family. When decision has been made please page surgery if family is wanting to pursue trach/peg so this can be arranged. In interm time cont to hold anti plt medication. Cont hep ggt. , if surgery planned will place orders to hold.      Miki Presley PGY4

## 2020-03-02 NOTE — PROGRESS NOTES
Pharmacy Note  Vancomycin Consult    Shelli Sadler is a 52 y.o. male started on Vancomycin for pneumonia; consult received from Dr. Sue Mittal to manage therapy. Also receiving the following antibiotics: zosyn. Patient Active Problem List   Diagnosis    STEMI (ST elevation myocardial infarction) ([facility]    Coronary artery disease involving native coronary artery of native heart without angina pectoris    Acute ST segment elevation myocardial infarction [facility])    Essential hypertension    Class 1 obesity due to excess calories with body mass index (BMI) of 32.0 to 32.9 in adult    Stroke-like symptoms    Ischemic stroke ([facility]    Hypertensive emergency    Numbness    Basilar artery stenosis/occlusion with infarction (HCC)    Right arm weakness    Right pontine stroke ([facility])    Left-sided weakness    Systolic murmur    Acute CVA (cerebrovascular accident) ([facility]    Adjustment disorder with depressed mood    Hypertensive urgency    Brainstem infarction [facility])    Brain stem infarction ([facility]    Basilar artery stenosis    Acute respiratory failure with hypoxia ([facility]       Allergies:  Patient has no known allergies.      Temp max: 100.9    Recent Labs     03/01/20  0420 03/02/20  0610   BUN 21* 23*       Recent Labs     03/01/20  0420 03/02/20  0610   CREATININE 1.01 0.94       Recent Labs     03/01/20  0606 03/02/20  0610   WBC 17.0* 17.6*         Intake/Output Summary (Last 24 hours) at 3/2/2020 1032  Last data filed at 3/2/2020 0603  Gross per 24 hour   Intake 2587 ml   Output 2080 ml   Net 507 ml       Culture Date      Source                       Results  2/24/20                nasal screen           mrsa positive  2/27/20                  urine                      neg to date  2/29/20                resp           heavy growth normal inez   2/29/20                 blood                     NGD2  3/1/20                   blood                     NG at 12 hrs    Ht Readings from Last 1 Encounters:   02/27/20 6' 1\" (1.854

## 2020-03-02 NOTE — PROGRESS NOTES
Patient switched to ventilator with heated humidity without incident due to copious secretions. Patient bagged with 100% O2 during switch  Ventilator synced with epic and ventilator check performed. Patient on previous settings as charted.

## 2020-03-02 NOTE — PLAN OF CARE
Problem: ACTIVITY INTOLERANCE/IMPAIRED MOBILITY  Goal: Mobility/activity is maintained at optimum level for patient  3/1/2020 1923 by Suhail Lott RCP  Outcome: Ongoing     Problem: COMMUNICATION IMPAIRMENT  Goal: Ability to express needs and understand communication  Outcome: Ongoing     Problem: MECHANICAL VENTILATION  Goal: Mobility/activity is maintained at optimum level for patient  3/1/2020 1923 by Suhail Lott RCP  Outcome: Ongoing     Problem: MECHANICAL VENTILATION  Goal: Ability to express needs and understand communication  Outcome: Ongoing     Problem: MECHANICAL VENTILATION  Goal: Patient will maintain patent airway  Outcome: Ongoing     Problem: MECHANICAL VENTILATION  Goal: Oral health is maintained or improved  Outcome: Ongoing     Problem: MECHANICAL VENTILATION  Goal: ET tube will be managed safely  Outcome: Ongoing

## 2020-03-02 NOTE — PROGRESS NOTES
Daily Progress Note  Neuro Critical Care    Patient Name: Alejo Dakin  Patient : 1970  Room/Bed: 2428/0366-15  Code Status: Full   Allergies: No Known Allergies    CHIEF COMPLAINT:     Aphasia, L sided weakness      INTERVAL HISTORY    Initial Presentation (Admitted 2020 ):  Initial Presentation (Admitted 20):  53 yo male , PMH HTN, CAD s/p stent (2019), and recent right pontine infarct, who presented as a transfer from [facility] for worsening aphasia, dysphagia, and left hemiparesis. Patient was recently admitted to [facility] on 20 after presenting with dysarthria and left hemiparesis and found to have a right pontine infarction. He was out of window for tPA. 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 He was maintained on his home ASA and Brilinta and was treated with a low dose heparin infusion for 48h. Repeat CTA Head/Neck on 2/15 remained stable and patient's clinical exam remained unchanged after heparin was stopped. He was discharged to [facility] on . At the time of discharge he had a left facial droop and mild to moderate left sided weakness (LUE 3/5, LLE 4/5). For the last few days prior to re-admission, family states they started to notice patient's speech started to decline and he was having more difficulty swallowing. On , he was noted by the nurse to have decreased word output and to be coughing with liquids. CT Head performed which showed a stable right pontine infarct. On , patient was sent to [facility] ED due to hypertensive urgency (237/130). Given 20mg IV Labetalol with improvement of BP down to 152/99. Started on IV fluids for RASHMI. Repeat CT head showed stable right pontine infarct. MRI Brain showed increasing multifocal infarct in the mayuri extending to medulla and new punctate infarct left cerebellar hemisphere.   Patient initially admitted to the ICU at [facility] but then famotidine (PEPCID) injection  20 mg Intravenous BID    glycopyrrolate  1 mg Oral TID    amLODIPine  10 mg Oral Daily    FLUoxetine  20 mg Per NG tube Daily    chlorhexidine  15 mL Mouth/Throat BID    sennosides-docusate sodium  2 tablet Oral Daily    sodium chloride flush  10 mL Intravenous 2 times per day     CONTINUOUS INFUSIONS:   heparin (porcine) 17.2 Units/kg/hr (20)    propofol Stopped (20 023)     PRN MEDICATIONS:   fentanNYL, hydrALAZINE, labetalol, acetaminophen, magnesium hydroxide, sodium chloride flush, polyethylene glycol, ondansetron, promethazine    VITALS:  Temperature Range: Temp: 99.9 °F (37.7 °C) Temp  Av.1 °F (37.8 °C)  Min: 97.3 °F (36.3 °C)  Max: 101.5 °F (38.6 °C)  BP Range: Systolic (87WDE), JRE:386 , Min:145 , DAMASO:645     Diastolic (99CFX), LPE:99, Min:64, Max:90    Pulse Range: Pulse  Av.4  Min: 60  Max: 95  Respiration Range: Resp  Av  Min: 15  Max: 28  Current Pulse Ox: SpO2: 96 %  24HR Pulse Ox Range: SpO2  Av.8 %  Min: 92 %  Max: 99 %  Patient Vitals for the past 12 hrs:   BP Temp Pulse Resp SpO2   20 -- -- 65 -- 96 %   20 1903 (!) 166/90 -- 69 -- --   20 1803 (!) 145/68 -- 61 -- 92 %   20 180 -- 99.9 °F (37.7 °C) -- -- --   20 1716 -- 101.5 °F (38.6 °C) 62 -- --   20 1703 (!) 146/68 101.2 °F (38.4 °C) 71 20 --   20 1700 -- -- 65 -- 97 %   20 1625 -- -- 74 21 97 %   20 1603 (!) 154/64 100.9 °F (38.3 °C) 64 19 93 %   20 1600 -- -- 64 -- --   20 1503 (!) 153/77 -- 70 19 --   20 1500 -- -- 64 20 95 %   20 1428 (!) 190/73 -- -- -- --   20 1403 (!) 176/82 -- 66 19 95 %   20 1400 -- -- 66 18 96 %   20 1327 -- -- 81 21 94 %   20 1322 (!) 196/77 -- -- -- --   20 1303 (!) 171/82 98.8 °F (37.1 °C) 73 24 --   20 1300 -- -- 74 20 94 %   20 1203 (!) 179/84 100.4 °F (38 °C) 77 23 --   20 1200 -- -- 83 24 95 %   20 rigidity   NEUROLOGIC:  Mental Status: intubated, opens eyes to voice, no spontaneous upper or lower extremity movement            Motor Exam: no spontaneous movement LUE and LLE, withdraws to pain upper and lower extremities, intermittently follows commands RUE and RLE    Plantar Response:  Right:  downgoing  Left:  upgoing  Clonus:  absent         DRAINS:  [x] There are no drains for Neuro Critical Care to monitor at this time.      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, MRI significant for increasing multifocal acute infarct in the mayuri and new acute infarct left cerebellar hemisphere  -Aspirin/Brilinta held for the past 3 days for trach /PEG  -Continue heparin drip, continue Crestor  -Neuro endovascular consulted, patient underwent diagnostic angiogram on 2/25  - Goal -180  - Neuro checks per protocol    CARDIOVASCULAR:  - Goal -180  -Continue clonidine, Norvasc and labetalol for blood pressure control  - Continue telemetry    PULMONARY:  - Vent Settings: 14/640/5  - Daily ABG pending  - CXR negative for any acute abnormalities    RENAL/FLUID/ELECTROLYTE:  - BUN 21/ Creatinine 1.01  - Urine output per nursing the patient has been soaking briefs, external yen output not accurate   - IVF:none  -replaced K  -sp lasix 20 mg yesterday   - Replace electrolytes PRN  - Daily BMP    GI/NUTRITION:  NUTRITION:  DIET TUBE FEED CONTINUOUS/CYCLIC NPO; Semi-elemental; Nasogastric; Continuous; 25; 65; 24; Exceptions are: Sips with Meds  - Bowel regimen: senokot   - GI prophylaxis: pepcid     ID:  - Tmax 101.1 F  - WBC 17, downtrending  - Infectious work up UA negative, CXR negative for consolidations, BC negative x 1 day  -Continue unasyn  - Continue to monitor for fevers  - Daily CBC    HEME:   - H&H 11  - Platelets 004  - Daily CBC    ENDOCRINE:  - Continue to monitor blood glucose, goal <180    OTHER:  - PT/OT/ST will evaluate when appropriate  - Code Status: full    PROPHYLAXIS:  Stress ulcer: pepcid    DVT PROPHYLAXIS:  -heparin gtt     DISPOSITION:  [x] To remain ICU: plan for trach/PEG   [] OK for out of ICU from Neuro Critical Care standpoint    We will continue to follow along. For any changes in exam or patient status please contact Neuro Critical Care.       Madhu Mahajan MD  Neuro Critical Care  Pager [phone]  3/1/2020     7:39 PM

## 2020-03-02 NOTE — PROGRESS NOTES
Today's Date: 3/2/2020  Patient Name: Talya Resendez  Patient's age: 52 y. o., 1970      Reason for Consult: management recommendations    CHIEF COMPLAINT: Recurrent ischemic stroke. MTHFR mutation. Mildly elevated homocystine     History Obtained From:  patient, electronic medical record    Interim History:  Patient remains intubated, no bloody output seen from ET tube, still on heparin drip per surgery. Patient has had multiple filter changes due to purulent fluid suspected pneumonia. Possible trach and PEG planned for today per nursing staff. HISTORY OF PRESENT ILLNESS:      The patient is a 52 y.o.  male With past medical history of hypertension coronary artery disease status post stent placement January 2019 and recent pontine infarction was transferred from [facility] due to complaints of aphasia and left hemiparesis. Patient was recently patient was recently admitted to [facility] on 2/13/2020 after presenting with dysarthria and left hemiparesis and found to have a right pontine infarction. He did not receive TPA. 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 Patient was discharged on aspirin and Brilinta. On 2/22 he was noted by the nurse to have decreased word output and coughing with liquids. CT head performed showed stable right pontine infarction. Patient was sent to the [facility] ED on 2/24 due to hypertensive urgency with blood pressure 237/130. Repeat CT showed stable right pontine infarction. Brain MRI showed increasing multifocal infarction in the mayuri extending to the middle and new punctate infarct left cerebellar hemisphere. Patient was then transferred to [facility] ICU. 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 CT brain perfusion with no perfusion mismatch.   Patient was subsequently started on heparin.       Work-up from previous admission shows compound heterozygous MTHFR mutation. Other hypercoagulable work-up was unremarkable for factor V Leyden mutation, prothrombin gene mutation and lupus antibodies. Past Medical History:   has a past medical history of Heart attack ([address] Hyperlipidemia, Hypertension, and Stroke ([address] Past Surgical History:   has a past surgical history that includes knee surgery (Right); Foot surgery (Bilateral); Coronary angioplasty with stent (2019); and Cardiac surgery. Medications:    Reviewed     Allergies:  Patient has no known allergies. Social History:   reports that he has never smoked. He has never used smokeless tobacco. He reports current alcohol use. He reports that he does not use drugs. Family History: family history includes High Blood Pressure in his father and mother; Hypertension in his brother and sister. REVIEW OF SYSTEMS:      Constitutional: No fever or chills. No night sweats, no weight loss   Eyes: No eye discharge, double vision, or eye pain   HEENT: negative for sore mouth, sore throat, hoarseness and voice change   Respiratory: negative for cough , sputum, dyspnea, wheezing, hemoptysis, chest pain   Cardiovascular: negative for chest pain, dyspnea, palpitations, orthopnea, PND   Gastrointestinal: negative for nausea, vomiting, diarrhea, constipation, abdominal pain, Dysphagia, hematemesis and hematochezia   Genitourinary: negative for frequency, dysuria, nocturia, urinary incontinence, and hematuria   Integument: negative for rash, skin lesions, bruises.    Hematologic/Lymphatic: negative for easy bruising, bleeding, lymphadenopathy, or petechiae   Endocrine: negative for heat or cold intolerance,weight changes, change in bowel habits and hair loss   Musculoskeletal: negative for myalgias, arthralgias, pain, joint swelling,and bone pain   Neurological: negative for headaches, dizziness, seizures, weakness, numbness    PHYSICAL EXAM:        BP (!) 166/70 Pulse 70   Temp 100 °F (37.8 °C)   Resp 21   Ht 6' 1\" (1.854 m)   Wt 238 lb 12.1 oz (108.3 kg)   SpO2 98%   BMI 31.50 kg/m²    Temp (24hrs), Av.9 °F (37.7 °C), Min:97.7 °F (36.5 °C), Max:101.5 °F (38.6 °C)      General appearance Intubated and off sedation  Mental status -intubated, however following some commands  Eyes - pupils equal and reactive, extraocular eye movements intact grossly  Ears - bilateral TM's and external ear canals normal   Mouth -ET tube appears grossly intact  Neck -trachea midline  Lymphatics - no palpable lymphadenopathy, no hepatosplenomegaly   Chest -ventilated breath sounds, slightly decreased on right side  Heart - normal rate, regular rhythm, normal S1, S2, no murmurs  Abdomen - soft, nontender, nondistended, no masses or organomegaly   Neurological - alert, intubated, off sedation, following some commands in upper and lower extremities. Grossly weak. Musculoskeletal - no joint tenderness, deformity or swelling   Extremities - peripheral pulses normal, no pedal edema, no clubbing or cyanosis   Skin - normal coloration and turgor, no rashes, no suspicious skin lesions noted ,      DATA:      Labs:     CBC:   Recent Labs     20  0606 20  0610   WBC 17.0* 17.6*   HGB 11.0* 10.6*   HCT 34.5* 33.1*    259     BMP:   Recent Labs     20  0420 20  0610   * 146*   K 3.2* 3.9   CO2 23 25   BUN 21* 23*   CREATININE 1.01 0.94   LABGLOM >60 >60   GLUCOSE 118* 107*     PT/INR: No results for input(s): PROTIME, INR in the last 72 hours. APTT:  Recent Labs     20  2306 20  0420   APTT 47.6* 60.8*     LIVER PROFILE:No results for input(s): AST, ALT, LABALBU in the last 72 hours.     IMAGING DATA:  Chest x-ray 2020:  No significant change from prior study.  Stable cardiomegaly and pulmonary   vascular congestion.         CT head 2020  Stable known pontine infarct without acute hemorrhage or other acute   intracranial finding.

## 2020-03-03 ENCOUNTER — APPOINTMENT (OUTPATIENT)
Dept: GENERAL RADIOLOGY | Age: 50
DRG: 005 | End: 2020-03-03
Attending: SPECIALIST
Payer: MEDICARE

## 2020-03-03 ENCOUNTER — ANESTHESIA EVENT (OUTPATIENT)
Dept: OPERATING ROOM | Age: 50
DRG: 005 | End: 2020-03-03
Payer: MEDICARE

## 2020-03-03 LAB
-: NORMAL
ALLEN TEST: ABNORMAL
ANCA MYELOPEROXIDASE: 7 AU/ML
ANCA PROTEINASE 3: 5 AU/ML
ANION GAP SERPL CALCULATED.3IONS-SCNC: 10 MMOL/L (ref 9–17)
BUN BLDV-MCNC: 25 MG/DL (ref 6–20)
BUN/CREAT BLD: ABNORMAL (ref 9–20)
CALCIUM IONIZED: 1.13 MMOL/L (ref 1.13–1.33)
CALCIUM IONIZED: 1.17 MMOL/L (ref 1.13–1.33)
CALCIUM SERPL-MCNC: 8.3 MG/DL (ref 8.6–10.4)
CHLORIDE BLD-SCNC: 106 MMOL/L (ref 98–107)
CO2: 25 MMOL/L (ref 20–31)
CREAT SERPL-MCNC: 1.09 MG/DL (ref 0.7–1.2)
EKG ATRIAL RATE: 53 BPM
EKG P AXIS: 5 DEGREES
EKG P-R INTERVAL: 154 MS
EKG Q-T INTERVAL: 424 MS
EKG QRS DURATION: 122 MS
EKG QTC CALCULATION (BAZETT): 397 MS
EKG R AXIS: 13 DEGREES
EKG T AXIS: -10 DEGREES
EKG VENTRICULAR RATE: 53 BPM
FIO2: 30
GFR AFRICAN AMERICAN: >60 ML/MIN
GFR NON-AFRICAN AMERICAN: >60 ML/MIN
GFR SERPL CREATININE-BSD FRML MDRD: ABNORMAL ML/MIN/{1.73_M2}
GFR SERPL CREATININE-BSD FRML MDRD: ABNORMAL ML/MIN/{1.73_M2}
GLUCOSE BLD-MCNC: 111 MG/DL (ref 70–99)
HCT VFR BLD CALC: 34.9 % (ref 40.7–50.3)
HEMOGLOBIN: 10.9 G/DL (ref 13–17)
MAGNESIUM: 2.1 MG/DL (ref 1.6–2.6)
MCH RBC QN AUTO: 28.2 PG (ref 25.2–33.5)
MCHC RBC AUTO-ENTMCNC: 31.2 G/DL (ref 28.4–34.8)
MCV RBC AUTO: 90.4 FL (ref 82.6–102.9)
MODE: ABNORMAL
NEGATIVE BASE EXCESS, ART: ABNORMAL (ref 0–2)
NRBC AUTOMATED: 0 PER 100 WBC
O2 DEVICE/FLOW/%: ABNORMAL
PARTIAL THROMBOPLASTIN TIME: 35.2 SEC (ref 20.5–30.5)
PARTIAL THROMBOPLASTIN TIME: 49.4 SEC (ref 20.5–30.5)
PARTIAL THROMBOPLASTIN TIME: 60.6 SEC (ref 20.5–30.5)
PARTIAL THROMBOPLASTIN TIME: 62.4 SEC (ref 20.5–30.5)
PATIENT TEMP: ABNORMAL
PDW BLD-RTO: 12.6 % (ref 11.8–14.4)
PLATELET # BLD: 231 K/UL (ref 138–453)
PMV BLD AUTO: 11.7 FL (ref 8.1–13.5)
POC HCO3: 27.3 MMOL/L (ref 21–28)
POC O2 SATURATION: 98 % (ref 94–98)
POC PCO2 TEMP: ABNORMAL MM HG
POC PCO2: 38.4 MM HG (ref 35–48)
POC PH TEMP: ABNORMAL
POC PH: 7.46 (ref 7.35–7.45)
POC PO2 TEMP: ABNORMAL MM HG
POC PO2: 95.8 MM HG (ref 83–108)
POSITIVE BASE EXCESS, ART: 3 (ref 0–3)
POTASSIUM SERPL-SCNC: 3.5 MMOL/L (ref 3.7–5.3)
POTASSIUM SERPL-SCNC: 4 MMOL/L (ref 3.7–5.3)
RBC # BLD: 3.86 M/UL (ref 4.21–5.77)
REASON FOR REJECTION: NORMAL
SAMPLE SITE: ABNORMAL
SODIUM BLD-SCNC: 141 MMOL/L (ref 135–144)
TCO2 (CALC), ART: 29 MMOL/L (ref 22–29)
TROPONIN INTERP: ABNORMAL
TROPONIN INTERP: ABNORMAL
TROPONIN T: ABNORMAL NG/ML
TROPONIN T: ABNORMAL NG/ML
TROPONIN, HIGH SENSITIVITY: 28 NG/L (ref 0–22)
TROPONIN, HIGH SENSITIVITY: 29 NG/L (ref 0–22)
WBC # BLD: 15 K/UL (ref 3.5–11.3)
ZZ NTE CLEAN UP: ORDERED TEST: NORMAL
ZZ NTE WITH NAME CLEAN UP: SPECIMEN SOURCE: NORMAL

## 2020-03-03 PROCEDURE — 6360000002 HC RX W HCPCS: Performed by: STUDENT IN AN ORGANIZED HEALTH CARE EDUCATION/TRAINING PROGRAM

## 2020-03-03 PROCEDURE — 2580000003 HC RX 258: Performed by: PSYCHIATRY & NEUROLOGY

## 2020-03-03 PROCEDURE — APPNB60 APP NON BILLABLE TIME 46-60 MINS: Performed by: NURSE PRACTITIONER

## 2020-03-03 PROCEDURE — 2500000003 HC RX 250 WO HCPCS: Performed by: NURSE PRACTITIONER

## 2020-03-03 PROCEDURE — 6370000000 HC RX 637 (ALT 250 FOR IP): Performed by: STUDENT IN AN ORGANIZED HEALTH CARE EDUCATION/TRAINING PROGRAM

## 2020-03-03 PROCEDURE — 6370000000 HC RX 637 (ALT 250 FOR IP): Performed by: PSYCHIATRY & NEUROLOGY

## 2020-03-03 PROCEDURE — 82330 ASSAY OF CALCIUM: CPT

## 2020-03-03 PROCEDURE — 6360000002 HC RX W HCPCS: Performed by: NURSE PRACTITIONER

## 2020-03-03 PROCEDURE — 85730 THROMBOPLASTIN TIME PARTIAL: CPT

## 2020-03-03 PROCEDURE — 2000000003 HC NEURO ICU R&B

## 2020-03-03 PROCEDURE — 80048 BASIC METABOLIC PNL TOTAL CA: CPT

## 2020-03-03 PROCEDURE — 2700000000 HC OXYGEN THERAPY PER DAY

## 2020-03-03 PROCEDURE — 37799 UNLISTED PX VASCULAR SURGERY: CPT

## 2020-03-03 PROCEDURE — 6370000000 HC RX 637 (ALT 250 FOR IP): Performed by: NURSE PRACTITIONER

## 2020-03-03 PROCEDURE — 6360000002 HC RX W HCPCS: Performed by: PSYCHIATRY & NEUROLOGY

## 2020-03-03 PROCEDURE — 94003 VENT MGMT INPAT SUBQ DAY: CPT

## 2020-03-03 PROCEDURE — 71045 X-RAY EXAM CHEST 1 VIEW: CPT

## 2020-03-03 PROCEDURE — 84484 ASSAY OF TROPONIN QUANT: CPT

## 2020-03-03 PROCEDURE — 85027 COMPLETE CBC AUTOMATED: CPT

## 2020-03-03 PROCEDURE — 94761 N-INVAS EAR/PLS OXIMETRY MLT: CPT

## 2020-03-03 PROCEDURE — 94770 HC ETCO2 MONITOR DAILY: CPT

## 2020-03-03 PROCEDURE — 93005 ELECTROCARDIOGRAM TRACING: CPT | Performed by: STUDENT IN AN ORGANIZED HEALTH CARE EDUCATION/TRAINING PROGRAM

## 2020-03-03 PROCEDURE — 82803 BLOOD GASES ANY COMBINATION: CPT

## 2020-03-03 PROCEDURE — 99291 CRITICAL CARE FIRST HOUR: CPT | Performed by: PSYCHIATRY & NEUROLOGY

## 2020-03-03 RX ORDER — POTASSIUM CHLORIDE 7.45 MG/ML
30 INJECTION INTRAVENOUS ONCE
Status: COMPLETED | OUTPATIENT
Start: 2020-03-03 | End: 2020-03-03

## 2020-03-03 RX ORDER — FLUTICASONE PROPIONATE 50 MCG
2 SPRAY, SUSPENSION (ML) NASAL DAILY
Status: DISCONTINUED | OUTPATIENT
Start: 2020-03-03 | End: 2020-03-11 | Stop reason: HOSPADM

## 2020-03-03 RX ORDER — ECHINACEA PURPUREA EXTRACT 125 MG
2 TABLET ORAL DAILY
Status: DISCONTINUED | OUTPATIENT
Start: 2020-03-03 | End: 2020-03-11 | Stop reason: HOSPADM

## 2020-03-03 RX ADMIN — ACETAMINOPHEN 650 MG: 325 TABLET ORAL at 20:06

## 2020-03-03 RX ADMIN — PIPERACILLIN AND TAZOBACTAM 3.38 G: 3; .375 INJECTION, POWDER, LYOPHILIZED, FOR SOLUTION INTRAVENOUS at 20:54

## 2020-03-03 RX ADMIN — VANCOMYCIN HYDROCHLORIDE 1500 MG: 10 INJECTION, POWDER, LYOPHILIZED, FOR SOLUTION INTRAVENOUS at 00:48

## 2020-03-03 RX ADMIN — STANDARDIZED SENNA CONCENTRATE AND DOCUSATE SODIUM 2 TABLET: 8.6; 5 TABLET ORAL at 08:11

## 2020-03-03 RX ADMIN — GLYCOPYRROLATE 1 MG: 1 TABLET ORAL at 08:11

## 2020-03-03 RX ADMIN — GLYCOPYRROLATE 1 MG: 1 TABLET ORAL at 13:14

## 2020-03-03 RX ADMIN — PIPERACILLIN AND TAZOBACTAM 3.38 G: 3; .375 INJECTION, POWDER, LYOPHILIZED, FOR SOLUTION INTRAVENOUS at 13:14

## 2020-03-03 RX ADMIN — POTASSIUM CHLORIDE 30 MEQ: 7.46 INJECTION, SOLUTION INTRAVENOUS at 01:35

## 2020-03-03 RX ADMIN — SALINE NASAL SPRAY 2 SPRAY: 1.5 SOLUTION NASAL at 19:57

## 2020-03-03 RX ADMIN — AMLODIPINE BESYLATE 10 MG: 10 TABLET ORAL at 08:12

## 2020-03-03 RX ADMIN — CLONIDINE HYDROCHLORIDE 0.2 MG: 0.2 TABLET ORAL at 08:12

## 2020-03-03 RX ADMIN — FAMOTIDINE 20 MG: 10 INJECTION INTRAVENOUS at 19:58

## 2020-03-03 RX ADMIN — PIPERACILLIN AND TAZOBACTAM 3.38 G: 3; .375 INJECTION, POWDER, LYOPHILIZED, FOR SOLUTION INTRAVENOUS at 06:14

## 2020-03-03 RX ADMIN — FOLIC ACID 1 MG: 1 TABLET ORAL at 08:11

## 2020-03-03 RX ADMIN — HEPARIN SODIUM 21.2 UNITS/KG/HR: 10000 INJECTION, SOLUTION INTRAVENOUS at 19:12

## 2020-03-03 RX ADMIN — GLYCOPYRROLATE 1 MG: 1 TABLET ORAL at 19:57

## 2020-03-03 RX ADMIN — Medication 15 ML: at 08:12

## 2020-03-03 RX ADMIN — LISINOPRIL 40 MG: 20 TABLET ORAL at 08:11

## 2020-03-03 RX ADMIN — Medication 15 ML: at 19:57

## 2020-03-03 RX ADMIN — HEPARIN SODIUM 17.2 UNITS/KG/HR: 10000 INJECTION, SOLUTION INTRAVENOUS at 06:14

## 2020-03-03 RX ADMIN — CLONIDINE HYDROCHLORIDE 0.2 MG: 0.2 TABLET ORAL at 19:57

## 2020-03-03 RX ADMIN — FLUTICASONE PROPIONATE 2 SPRAY: 50 SPRAY, METERED NASAL at 19:57

## 2020-03-03 RX ADMIN — Medication 20 MG: at 08:12

## 2020-03-03 RX ADMIN — FAMOTIDINE 20 MG: 10 INJECTION INTRAVENOUS at 08:12

## 2020-03-03 RX ADMIN — FOLIC ACID 1 MG: 1 TABLET ORAL at 19:57

## 2020-03-03 RX ADMIN — ROSUVASTATIN CALCIUM 40 MG: 20 TABLET, FILM COATED ORAL at 19:57

## 2020-03-03 RX ADMIN — VANCOMYCIN HYDROCHLORIDE 1500 MG: 10 INJECTION, POWDER, LYOPHILIZED, FOR SOLUTION INTRAVENOUS at 11:32

## 2020-03-03 ASSESSMENT — PULMONARY FUNCTION TESTS
PIF_VALUE: 20
PIF_VALUE: 23
PIF_VALUE: 19
PIF_VALUE: 28
PIF_VALUE: 19
PIF_VALUE: 22
PIF_VALUE: 19
PIF_VALUE: 26
PIF_VALUE: 23
PIF_VALUE: 29
PIF_VALUE: 23
PIF_VALUE: 20
PIF_VALUE: 20
PIF_VALUE: 19
PIF_VALUE: 23
PIF_VALUE: 26
PIF_VALUE: 23
PIF_VALUE: 20
PIF_VALUE: 21
PIF_VALUE: 23

## 2020-03-03 ASSESSMENT — PAIN SCALES - GENERAL: PAINLEVEL_OUTOF10: 6

## 2020-03-03 NOTE — PLAN OF CARE
Problem: HEMODYNAMIC STATUS  Goal: Patient has stable vital signs and fluid balance  3/3/2020 1814 by Sonali Walsh RN  Outcome: Ongoing  3/3/2020 3765 by Selene Nash RN  Outcome: Ongoing     Problem: ACTIVITY INTOLERANCE/IMPAIRED MOBILITY  Goal: Mobility/activity is maintained at optimum level for patient  3/3/2020 1814 by Sonali Walsh RN  Outcome: Ongoing  3/3/2020 1006 by Courtney Dennis RCP  Outcome: Ongoing  3/3/2020 0652 by Selene Nash RN  Outcome: Ongoing  Note:   Pt. Maintained optimum level of activity and mobility. Problem: COMMUNICATION IMPAIRMENT  Goal: Ability to express needs and understand communication  3/3/2020 1814 by Sonali Walsh RN  Outcome: Ongoing  3/3/2020 1006 by Courtney Dennis RCP  Outcome: Ongoing  3/3/2020 0652 by Selene Nash RN  Outcome: Ongoing  Note:   Pt. Intubated, unable to express need. Unable to follow commands at this time. Problem: Musculor/Skeletal Functional Status  Goal: Highest potential functional level  Outcome: Ongoing     Problem: MECHANICAL VENTILATION  Goal: Mobility/activity is maintained at optimum level for patient  3/3/2020 1814 by Sonali Walsh RN  Outcome: Ongoing  3/3/2020 1006 by Courtney Dennis RCP  Outcome: Ongoing  3/3/2020 0652 by Selene Nash RN  Outcome: Ongoing  Note:   Pt. Maintained optimum level of activity and mobility. Goal: Ability to express needs and understand communication  3/3/2020 1814 by Sonali Walsh RN  Outcome: Ongoing  3/3/2020 1006 by Courtney Dennis RCP  Outcome: Ongoing  3/3/2020 0652 by Selene Nash RN  Outcome: Ongoing  Note:   Pt. Intubated, unable to express need. Unable to follow commands at this time.   Goal: Patient will maintain patent airway  3/3/2020 1814 by Sonali Walsh RN  Outcome: Ongoing  3/3/2020 1006 by Courtney Dennis RCP  Outcome: Ongoing  Goal: Oral health is maintained or improved  3/3/2020 1814 by Sonali Walsh RN  Outcome: Ongoing  3/3/2020

## 2020-03-03 NOTE — PROGRESS NOTES
Was called by primary team that family has decided to proceed with tracheostomy and PEG. Called son Katie Hook who stated that he and his brother wish to proceed with tracheostomy and PEG at this time. The risk, benefits and alternatives were discussed with her, all questions answered and informed consent was witnessed by Asad LEDESMA. Patient scheduled for OR on 3/4/20 at 7:30 am for tracheostomy and PEG tube placement. Hold TF at midnight.  Hold heparin at 3:30 am.     Elise Ascencio PGY4

## 2020-03-03 NOTE — PLAN OF CARE
Problem: MECHANICAL VENTILATION  Goal: Mobility/activity is maintained at optimum level for patient  3/3/2020 1006 by Marah Klein RCP  Outcome: Ongoing  3/3/2020 0652 by Julia Garcia RN  Outcome: Ongoing  Note:   Pt. Maintained optimum level of activity and mobility. Problem: MECHANICAL VENTILATION  Goal: Ability to express needs and understand communication  3/3/2020 1006 by Marah Klein RCP  Outcome: Ongoing  3/3/2020 0652 by Julia Garcia RN  Outcome: Ongoing  Note:   Pt. Intubated, unable to express need. Unable to follow commands at this time.      Problem: MECHANICAL VENTILATION  Goal: Patient will maintain patent airway  Outcome: Ongoing     Problem: MECHANICAL VENTILATION  Goal: Oral health is maintained or improved  Outcome: Ongoing     Problem: MECHANICAL VENTILATION  Goal: ET tube will be managed safely  Outcome: Ongoing

## 2020-03-03 NOTE — PLAN OF CARE
Problem: ACTIVITY INTOLERANCE/IMPAIRED MOBILITY  Goal: Mobility/activity is maintained at optimum level for patient  3/2/2020 2004 by Kj Varela RCP  Outcome: Ongoing     Problem: COMMUNICATION IMPAIRMENT  Goal: Ability to express needs and understand communication  3/2/2020 2004 by Kj Varela RCP  Outcome: Ongoing     Problem: MECHANICAL VENTILATION  Goal: Mobility/activity is maintained at optimum level for patient  3/2/2020 2004 by Kj Varela RCP  Outcome: Ongoing     Problem: MECHANICAL VENTILATION  Goal: Ability to express needs and understand communication  3/2/2020 2004 by Kj Varela RCP  Outcome: Ongoing     Problem: MECHANICAL VENTILATION  Goal: Patient will maintain patent airway  3/2/2020 2004 by Kj Varela RCP  Outcome: Ongoing     Problem: MECHANICAL VENTILATION  Goal: Oral health is maintained or improved  3/2/2020 2004 by Kj Varela RCP  Outcome: Ongoing     Problem: MECHANICAL VENTILATION  Goal: ET tube will be managed safely  3/2/2020 2004 by Kj Varela RCP  Outcome: Ongoing

## 2020-03-03 NOTE — PROGRESS NOTES
Ventilator Bronchodilator assessment    Breath sounds: diminished  Inspiratory Pressure: 23  Plateau Pressure: 16    Patient assessed at level 1        []    Bronchodilator Assessment    BRONCHODILATOR ASSESSMENT SCORE  Score 0 (Home) 1 2 3 4   Breath Sounds   []  Chronic Ventilator: Patient at baseline [x]  Mild Wheezes/ Clear []  Intermittent wheezes with good air entry []  Bilateral/unilateral wheezing with diminished air entry []  Insp/Exp wheeze and/or poor aeration   Ventilator Pressures   []  Chronic Ventilator [x]  Insp. Pressure less than 25 cm H20 []  Insp. Pressure less than 25 cm H20 []  Insp. Pressure exceeds 25 cm H20 []  Insp.  Pressure exceeds 30 cm H20   Plateau Pressure []  NA   []  Plateau Pressure less than 4  []  Plateau Pressure less than or equal to 5 [x]  Plateau Pressure greater than or equal to 6 []  Plateau Pressure greater than or equal to 8       [employer]  10:08 AM

## 2020-03-03 NOTE — PROGRESS NOTES
for further management. On arrival to [facility]. 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 CT brain perfusion with no perfusion mismatch. Started on low dose heparin infusion with 4000 unit bolus and maintained on aspirin and brilinta. Hospital Course:   2/25: Low intensity heparin infusion, PTT this AM 35.7. Loaded with 180mg Brilinta around 0300 this AM via NG tube. 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 Diagnostic cerebral angiogram revealed mid basilar artery occlusion distal to anterior inferior cerebellar artery and proximal to superior cerebellar artery with retrograde filling to the superior cerebral artery from anterior circulation. Sheath remained in place post op.  2/26: Right groin with mild oozing and tenderness. STAT CT head stable. CT abd/pelvis negative for hematoma. Family meeting for update on severity of deficits. 2/27: Urology consulted for hematuria. Heparin held. Intubated for airway protection and impending respiratory failure. Gen Surg consulted for trach/PEG. Antiplatelets held. Started on Unasyn for pneumonia. 2/28: Jacques removed, hematuria cleared. Heparin infusion restarted. Remains intubated and on Propofol 10mcg/kg/min to help with coughing. Clonidine patch 0.2mg/24h started for improved BP control. 2/29: Off propofol. Febrile. Pink frothy sputum noted by respiratory.   3/1: Febrile, resolved with IV Tylenol. Clonidine increased to 0.2mg BID, home Lisinopril 40mg QD restarted. 3/2: Free water flushes started, 150 mL q6h. Discussion regarding trach/peg, awaiting family decision. Last 24h:   No acute events overnight. Family discussion held, decision to change code status to [facility] and proceed with trach/peg. General surgery contacted, informed consent obtained, surgery planned for 3/4 at 0730.  NPO after midnight, hold heparin at 0330 am tomorrow in preparation in appropriate position. 2. Vascular congestion without overt edema. XR CHEST PORTABLE   Final Result   1. Endotracheal and enteric tubes in place. 2.  Unchanged mild vascular congestion. No radiographic findings to indicate   significant pulmonary edema. CT HEAD WO CONTRAST   Final Result   Stable known pontine infarct without acute hemorrhage or other acute   intracranial finding. Slightly worsened sinus disease, as above. XR CHEST PORTABLE   Final Result   Endotracheal tube tip projects approximately 7 cm above the verónica. Advancement by 2 cm suggested for optimal positioning. XR CHEST PORTABLE   Final Result   Vascular congestion without overt edema. XR CHEST PORTABLE   Final Result   No acute cardiopulmonary disease         CT ABDOMEN PELVIS WO CONTRAST Additional Contrast? None   Final Result   No evidence of retroperitoneal hematoma. CT HEAD WO CONTRAST   Final Result   Pontine infarcts without evidence for hemorrhagic conversion. No additional   findings compared to recent prior study. .         IR ANGIOGRAM CAROTID C EREBRAL BILATERAL   Final Result      XR ABDOMEN FOR NG/OG/NE TUBE PLACEMENT   Final Result   Enteric tube in the stomach as above. No evidence of bowel obstruction. CT BRAIN PERFUSION   Final Result   As above . CTA HEAD NECK W CONTRAST   Final Result   1. Hypodense areas in the mayuri concordant with acute infarct visualized at MR.   2. No perfusion mismatch. 3. 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 4. Otherwise patent cervical and cerebral vasculature         CT HEAD WO CONTRAST   Final Result   1. Hypodense areas in the mayuri concordant with acute infarct visualized at MR.   2. No perfusion mismatch. 3. 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    4. Otherwise patent cervical and cerebral vasculature         XR CHEST PORTABLE    (Results Pending)   XR CHEST PORTABLE    (Results Pending)       Labs and Images reviewed with:  [] Dr. Sean Samuels. Bobbi    [x] Dr. Dhiraj Duff  [] Dr. Swati Galan  [] There are no new interval images to review. PHYSICAL EXAM       CONSTITUTIONAL:  Intubated, opens eyes to verbal stimulation briefly. Required continued stimulation to attend. Does not follow commands. HEAD:  normocephalic, atraumatic    EYES:  PERRLA, EOMI.   ENT:  moist mucous membranes   NECK:  supple, symmetric   LUNGS:  Equal air entry bilaterally, rhonchi bilaterally   CARDIOVASCULAR:  normal s1 / s2, RRR, distal pulses intact   ABDOMEN:  Soft, no rigidity, active bowel sounds   NEUROLOGIC:  Mental Status: Intubated             Cranial Nerves:    III: Pupils:  equal, round, reactive to light  III,IV,VI: Extra Ocular Movements: intact  VII: Facial strength: abnormal left facial weakness    Motor Exam:    No movement in extremities to noxious stimulation, no spontaneous movement. Nursing reports patient did wiggle toes on right one time this morning. DRAINS:  [x] There are no drains for Neuro Critical Care to monitor at this time. ASSESSMENT AND PLAN:       The patient is a 53 yo male with a history of HTN, CAD s/p stent (January 2019), and recent right pontine infarct who presented as a transfer from [address] [place] [address] for worsening aphasia, dysphagia, and left hemiparesis. Found to have increasing multifocal acute infarctions in the mayuri extending to the medulla as well as a small acute infarct in the left cerebellar infarction.   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  - POD # 7 s/p diagnostic cerebral angiogram showing mid basilar artery occlusion  - Lesion thought to be stenotic in nature rather than thrombus per Endovascular   - Aspirin and Plavix on hold for trach/PEG, Continue low dose heparin infusion for now  - Lipitor 80mg QHS  - Hypercoag panel reviewed; +Hetero MTHFR mutations, mildly elevated homocysteine level, Hem/Onc following  - Neuro Endovascular following; no further intervention planned  - Propofol for sedation  - Goal -180  - Neuro checks per protocol     CARDIOVASCULAR:  - Goal -180, avoid hypotension  - PRN Cardene Hydralazine/Labetalol  - Resume home Norvasc 10 mg QD, Clonidine 0.2 mg BID, Lisinopril 40 mg QD  - History of CAD s/p stent May 2019  - Asprin and Brilinta on hold for Trach/Peg  - Mild troponin leak (initially 36), down to 32, EKG unchanged  - Echo  EF 55%, severe LVH, aortic root dilation with mild aortic insufficiency  - Outpatient Cardiology follow up  - Hyperlipidemia, recent lipid panel; LDL 91, Cholesterol 140  - Lipitor 80mg QHS  - Continue telemetry     PULMONARY:  - Intubated for airway protection and impending respiratory failure  - Vent settings: 30/14/640/5  - AB.46/27.3/38.4/95  - Copious secretions, Continue Robinol 1 mg TID  - Daily ABG and CXR while intubated  - Plan for trach/peg 3/4     RENAL/FLUID/ELECTROLYTE:  - Normal renal functioning  - BUN 25/ Creatinine 1.09  - Monitor I&O  - Urology evaluated for neurogenic bladder and hematuria; Bladder scan Qshift and straight cath for PVR>400ml, hematuria resolved  - Continue free water flushes 150ml Q6h  - Replace electrolytes PRN  - Daily BMP     GI/NUTRITION:  NUTRITION:  - Tolerating tube feeds  - NPO after midnight for trach/peg tomorrow  - Formed BM 3/1  - Bowel regimen: Senokot-S daily, Milk of Mag and Dulcolax suppository PRN  - GI prophylaxis: Pepcid 20 mg BID     ID:  - Febrile, Tmax 38.4  - Tylenol prn  - Leukocytosis improving, WBC 15  - Sputum cx: pseudomonas growing  - Continue Zosyn, discontinue vancomycin  - Continue to monitor for fevers  - Daily CBC     HEME: - H&H 10.9/34.9  - Platelets 003  - Heterozygous MTHFR mutation, mildly elevated homocysteine levels  - Negative prothrombin, Factor V Leiden, lupus anticoagulant  - Hem/Onc following  - Repeat homocysteine normal   - Daily CBC     ENDOCRINE:  - Continue to monitor blood glucose, goal <180  - Recent Hemoglobin A1C 5.6     OTHER:  - Prozac 20mg QD for depression  - Palliative Care following  - PT/OT/ST  - PM&R as appropriate  - Code Status: DNR-CCA     PROPHYLAXIS:  Stress ulcer: Pepcid     DVT PROPHYLAXIS:  - SCD sleeves - Thigh High   - Heparin infusion    DISPOSITION:  [x] To remain ICU: close neurological monitoring and vent management  [] OK for out of ICU from Neuro Critical Care standpoint    We will continue to follow along. For any changes in exam or patient status please contact Neuro Critical Care.       BALTAZAR Drake - CNP  Neuro Critical Care  Pager [phone]  3/3/2020     7:40 AM

## 2020-03-03 NOTE — CARE COORDINATION
Family has decided on trach and peg, surgery consulted. Called Son Cee Jimenes, he states that he had to fly back to [place] and did not get to tour facilities. He asked if I could give patient's girlfriend Lia Pepe the information and maybe she could look at them. I agreed. Awaiting surgery date and LTACH choice. Left voice mail for Lia Pepe regarding LTACH choices. Awaiting return call    [facility] called information about LTACHS given, she states she will try to go to the facilities tomorrow.

## 2020-03-04 ENCOUNTER — APPOINTMENT (OUTPATIENT)
Dept: GENERAL RADIOLOGY | Age: 50
DRG: 005 | End: 2020-03-04
Attending: SPECIALIST
Payer: MEDICARE

## 2020-03-04 ENCOUNTER — ANESTHESIA (OUTPATIENT)
Dept: OPERATING ROOM | Age: 50
DRG: 005 | End: 2020-03-04
Payer: MEDICARE

## 2020-03-04 VITALS
RESPIRATION RATE: 16 BRPM | DIASTOLIC BLOOD PRESSURE: 60 MMHG | SYSTOLIC BLOOD PRESSURE: 97 MMHG | TEMPERATURE: 98.4 F | OXYGEN SATURATION: 98 %

## 2020-03-04 LAB
ALLEN TEST: ABNORMAL
ANION GAP SERPL CALCULATED.3IONS-SCNC: 10 MMOL/L (ref 9–17)
BUN BLDV-MCNC: 23 MG/DL (ref 6–20)
BUN/CREAT BLD: ABNORMAL (ref 9–20)
CALCIUM IONIZED: 1.17 MMOL/L (ref 1.13–1.33)
CALCIUM SERPL-MCNC: 8.8 MG/DL (ref 8.6–10.4)
CHLORIDE BLD-SCNC: 107 MMOL/L (ref 98–107)
CO2: 24 MMOL/L (ref 20–31)
CREAT SERPL-MCNC: 0.91 MG/DL (ref 0.7–1.2)
CULTURE: ABNORMAL
CULTURE: ABNORMAL
DIRECT EXAM: ABNORMAL
FIO2: 30
GFR AFRICAN AMERICAN: >60 ML/MIN
GFR NON-AFRICAN AMERICAN: >60 ML/MIN
GFR SERPL CREATININE-BSD FRML MDRD: ABNORMAL ML/MIN/{1.73_M2}
GFR SERPL CREATININE-BSD FRML MDRD: ABNORMAL ML/MIN/{1.73_M2}
GLUCOSE BLD-MCNC: 101 MG/DL (ref 70–99)
HCT VFR BLD CALC: 31 % (ref 40.7–50.3)
HEMOGLOBIN: 9.9 G/DL (ref 13–17)
Lab: ABNORMAL
MCH RBC QN AUTO: 28.7 PG (ref 25.2–33.5)
MCHC RBC AUTO-ENTMCNC: 31.9 G/DL (ref 28.4–34.8)
MCV RBC AUTO: 89.9 FL (ref 82.6–102.9)
MODE: ABNORMAL
NEGATIVE BASE EXCESS, ART: ABNORMAL (ref 0–2)
NRBC AUTOMATED: 0 PER 100 WBC
O2 DEVICE/FLOW/%: ABNORMAL
PARTIAL THROMBOPLASTIN TIME: 67.7 SEC (ref 20.5–30.5)
PATIENT TEMP: ABNORMAL
PDW BLD-RTO: 12.5 % (ref 11.8–14.4)
PLATELET # BLD: 260 K/UL (ref 138–453)
PMV BLD AUTO: 11.3 FL (ref 8.1–13.5)
POC HCO3: 28.2 MMOL/L (ref 21–28)
POC O2 SATURATION: 90 % (ref 94–98)
POC PCO2 TEMP: ABNORMAL MM HG
POC PCO2: 40.2 MM HG (ref 35–48)
POC PH TEMP: ABNORMAL
POC PH: 7.45 (ref 7.35–7.45)
POC PO2 TEMP: ABNORMAL MM HG
POC PO2: 55.6 MM HG (ref 83–108)
POSITIVE BASE EXCESS, ART: 4 (ref 0–3)
POTASSIUM SERPL-SCNC: 3.6 MMOL/L (ref 3.7–5.3)
RBC # BLD: 3.45 M/UL (ref 4.21–5.77)
SAMPLE SITE: ABNORMAL
SODIUM BLD-SCNC: 141 MMOL/L (ref 135–144)
SPECIMEN DESCRIPTION: ABNORMAL
TCO2 (CALC), ART: 29 MMOL/L (ref 22–29)
WBC # BLD: 14.7 K/UL (ref 3.5–11.3)

## 2020-03-04 PROCEDURE — 2580000003 HC RX 258: Performed by: STUDENT IN AN ORGANIZED HEALTH CARE EDUCATION/TRAINING PROGRAM

## 2020-03-04 PROCEDURE — 85027 COMPLETE CBC AUTOMATED: CPT

## 2020-03-04 PROCEDURE — 2500000003 HC RX 250 WO HCPCS: Performed by: NURSE ANESTHETIST, CERTIFIED REGISTERED

## 2020-03-04 PROCEDURE — 2709999900 HC NON-CHARGEABLE SUPPLY: Performed by: SURGERY

## 2020-03-04 PROCEDURE — 99291 CRITICAL CARE FIRST HOUR: CPT | Performed by: PSYCHIATRY & NEUROLOGY

## 2020-03-04 PROCEDURE — 2580000003 HC RX 258: Performed by: PSYCHIATRY & NEUROLOGY

## 2020-03-04 PROCEDURE — 3609013300 HC EGD TUBE PLACEMENT: Performed by: SURGERY

## 2020-03-04 PROCEDURE — 6360000002 HC RX W HCPCS: Performed by: PSYCHIATRY & NEUROLOGY

## 2020-03-04 PROCEDURE — 3700000001 HC ADD 15 MINUTES (ANESTHESIA): Performed by: SURGERY

## 2020-03-04 PROCEDURE — 82330 ASSAY OF CALCIUM: CPT

## 2020-03-04 PROCEDURE — 94761 N-INVAS EAR/PLS OXIMETRY MLT: CPT

## 2020-03-04 PROCEDURE — 71045 X-RAY EXAM CHEST 1 VIEW: CPT

## 2020-03-04 PROCEDURE — 0DH63UZ INSERTION OF FEEDING DEVICE INTO STOMACH, PERCUTANEOUS APPROACH: ICD-10-PCS | Performed by: SURGERY

## 2020-03-04 PROCEDURE — 85730 THROMBOPLASTIN TIME PARTIAL: CPT

## 2020-03-04 PROCEDURE — 2700000000 HC OXYGEN THERAPY PER DAY

## 2020-03-04 PROCEDURE — 6370000000 HC RX 637 (ALT 250 FOR IP): Performed by: STUDENT IN AN ORGANIZED HEALTH CARE EDUCATION/TRAINING PROGRAM

## 2020-03-04 PROCEDURE — 6360000002 HC RX W HCPCS: Performed by: STUDENT IN AN ORGANIZED HEALTH CARE EDUCATION/TRAINING PROGRAM

## 2020-03-04 PROCEDURE — 0B110F4 BYPASS TRACHEA TO CUTANEOUS WITH TRACHEOSTOMY DEVICE, OPEN APPROACH: ICD-10-PCS | Performed by: SURGERY

## 2020-03-04 PROCEDURE — 82803 BLOOD GASES ANY COMBINATION: CPT

## 2020-03-04 PROCEDURE — 3700000000 HC ANESTHESIA ATTENDED CARE: Performed by: SURGERY

## 2020-03-04 PROCEDURE — 6370000000 HC RX 637 (ALT 250 FOR IP): Performed by: SURGERY

## 2020-03-04 PROCEDURE — 2720000010 HC SURG SUPPLY STERILE: Performed by: SURGERY

## 2020-03-04 PROCEDURE — 94770 HC ETCO2 MONITOR DAILY: CPT

## 2020-03-04 PROCEDURE — 6360000002 HC RX W HCPCS: Performed by: NURSE ANESTHETIST, CERTIFIED REGISTERED

## 2020-03-04 PROCEDURE — 94003 VENT MGMT INPAT SUBQ DAY: CPT

## 2020-03-04 PROCEDURE — 2580000003 HC RX 258: Performed by: SURGERY

## 2020-03-04 PROCEDURE — 2580000003 HC RX 258: Performed by: NURSE PRACTITIONER

## 2020-03-04 PROCEDURE — 3600000030 HC TRACHEOSTOMY: Performed by: SURGERY

## 2020-03-04 PROCEDURE — 0DJ08ZZ INSPECTION OF UPPER INTESTINAL TRACT, VIA NATURAL OR ARTIFICIAL OPENING ENDOSCOPIC: ICD-10-PCS | Performed by: SURGERY

## 2020-03-04 PROCEDURE — 2000000003 HC NEURO ICU R&B

## 2020-03-04 PROCEDURE — 80048 BASIC METABOLIC PNL TOTAL CA: CPT

## 2020-03-04 PROCEDURE — 6370000000 HC RX 637 (ALT 250 FOR IP): Performed by: NURSE PRACTITIONER

## 2020-03-04 RX ORDER — FENTANYL CITRATE 50 UG/ML
INJECTION, SOLUTION INTRAMUSCULAR; INTRAVENOUS PRN
Status: DISCONTINUED | OUTPATIENT
Start: 2020-03-04 | End: 2020-03-04 | Stop reason: SDUPTHER

## 2020-03-04 RX ORDER — PANTOPRAZOLE SODIUM 40 MG/1
40 TABLET, DELAYED RELEASE ORAL
Status: DISCONTINUED | OUTPATIENT
Start: 2020-03-05 | End: 2020-03-11 | Stop reason: HOSPADM

## 2020-03-04 RX ORDER — ROCURONIUM BROMIDE 10 MG/ML
INJECTION, SOLUTION INTRAVENOUS PRN
Status: DISCONTINUED | OUTPATIENT
Start: 2020-03-04 | End: 2020-03-04 | Stop reason: SDUPTHER

## 2020-03-04 RX ORDER — CLONIDINE HYDROCHLORIDE 0.1 MG/1
0.1 TABLET ORAL 2 TIMES DAILY
Status: DISCONTINUED | OUTPATIENT
Start: 2020-03-04 | End: 2020-03-05

## 2020-03-04 RX ORDER — BISACODYL 10 MG
10 SUPPOSITORY, RECTAL RECTAL DAILY
Status: DISCONTINUED | OUTPATIENT
Start: 2020-03-04 | End: 2020-03-05

## 2020-03-04 RX ORDER — PROPOFOL 10 MG/ML
INJECTION, EMULSION INTRAVENOUS PRN
Status: DISCONTINUED | OUTPATIENT
Start: 2020-03-04 | End: 2020-03-04 | Stop reason: SDUPTHER

## 2020-03-04 RX ORDER — HYDRALAZINE HYDROCHLORIDE 25 MG/1
25 TABLET, FILM COATED ORAL EVERY 8 HOURS SCHEDULED
Status: DISCONTINUED | OUTPATIENT
Start: 2020-03-04 | End: 2020-03-05

## 2020-03-04 RX ORDER — ULTRASOUND COUPLING MEDIUM
GEL (GRAM) TOPICAL PRN
Status: DISCONTINUED | OUTPATIENT
Start: 2020-03-04 | End: 2020-03-04 | Stop reason: HOSPADM

## 2020-03-04 RX ORDER — MAGNESIUM HYDROXIDE 1200 MG/15ML
LIQUID ORAL CONTINUOUS PRN
Status: COMPLETED | OUTPATIENT
Start: 2020-03-04 | End: 2020-03-04

## 2020-03-04 RX ORDER — SODIUM CHLORIDE 9 MG/ML
INJECTION, SOLUTION INTRAVENOUS CONTINUOUS
Status: DISCONTINUED | OUTPATIENT
Start: 2020-03-04 | End: 2020-03-11

## 2020-03-04 RX ADMIN — BISACODYL 10 MG: 10 SUPPOSITORY RECTAL at 13:00

## 2020-03-04 RX ADMIN — POLYETHYLENE GLYCOL 3350 17 G: 17 POWDER, FOR SOLUTION ORAL at 15:02

## 2020-03-04 RX ADMIN — FENTANYL CITRATE 50 MCG: 50 INJECTION INTRAMUSCULAR; INTRAVENOUS at 08:42

## 2020-03-04 RX ADMIN — HYDRALAZINE HYDROCHLORIDE 10 MG: 20 INJECTION INTRAMUSCULAR; INTRAVENOUS at 13:00

## 2020-03-04 RX ADMIN — AMLODIPINE BESYLATE 10 MG: 10 TABLET ORAL at 15:01

## 2020-03-04 RX ADMIN — SODIUM CHLORIDE: 9 INJECTION, SOLUTION INTRAVENOUS at 10:17

## 2020-03-04 RX ADMIN — FENTANYL CITRATE 50 MCG: 50 INJECTION INTRAMUSCULAR; INTRAVENOUS at 08:51

## 2020-03-04 RX ADMIN — PIPERACILLIN AND TAZOBACTAM 3.38 G: 3; .375 INJECTION, POWDER, LYOPHILIZED, FOR SOLUTION INTRAVENOUS at 12:13

## 2020-03-04 RX ADMIN — FOLIC ACID 1 MG: 1 TABLET ORAL at 15:01

## 2020-03-04 RX ADMIN — SODIUM CHLORIDE: 9 INJECTION, SOLUTION INTRAVENOUS at 07:32

## 2020-03-04 RX ADMIN — Medication 15 ML: at 20:30

## 2020-03-04 RX ADMIN — FOLIC ACID 1 MG: 1 TABLET ORAL at 20:30

## 2020-03-04 RX ADMIN — GLYCOPYRROLATE 1 MG: 1 TABLET ORAL at 20:30

## 2020-03-04 RX ADMIN — PIPERACILLIN AND TAZOBACTAM 3.38 G: 3; .375 INJECTION, POWDER, LYOPHILIZED, FOR SOLUTION INTRAVENOUS at 20:30

## 2020-03-04 RX ADMIN — CLONIDINE HYDROCHLORIDE 0.1 MG: 0.1 TABLET ORAL at 20:30

## 2020-03-04 RX ADMIN — LISINOPRIL 40 MG: 20 TABLET ORAL at 15:01

## 2020-03-04 RX ADMIN — ACETAMINOPHEN 650 MG: 325 TABLET ORAL at 18:34

## 2020-03-04 RX ADMIN — FENTANYL CITRATE 50 MCG: 50 INJECTION INTRAMUSCULAR; INTRAVENOUS at 08:03

## 2020-03-04 RX ADMIN — ROCURONIUM BROMIDE 50 MG: 10 INJECTION INTRAVENOUS at 08:41

## 2020-03-04 RX ADMIN — ROCURONIUM BROMIDE 50 MG: 10 INJECTION INTRAVENOUS at 08:03

## 2020-03-04 RX ADMIN — HYDRALAZINE HYDROCHLORIDE 25 MG: 25 TABLET ORAL at 15:02

## 2020-03-04 RX ADMIN — PIPERACILLIN AND TAZOBACTAM 3.38 G: 3; .375 INJECTION, POWDER, LYOPHILIZED, FOR SOLUTION INTRAVENOUS at 03:50

## 2020-03-04 RX ADMIN — GLYCOPYRROLATE 1 MG: 1 TABLET ORAL at 15:02

## 2020-03-04 RX ADMIN — PROPOFOL 200 MG: 10 INJECTION, EMULSION INTRAVENOUS at 08:03

## 2020-03-04 RX ADMIN — SODIUM CHLORIDE, PRESERVATIVE FREE 10 ML: 5 INJECTION INTRAVENOUS at 20:31

## 2020-03-04 RX ADMIN — FENTANYL CITRATE 50 MCG: 50 INJECTION INTRAMUSCULAR; INTRAVENOUS at 08:20

## 2020-03-04 RX ADMIN — HYDRALAZINE HYDROCHLORIDE 25 MG: 25 TABLET ORAL at 20:30

## 2020-03-04 RX ADMIN — ROSUVASTATIN CALCIUM 40 MG: 20 TABLET, FILM COATED ORAL at 20:30

## 2020-03-04 ASSESSMENT — PULMONARY FUNCTION TESTS
PIF_VALUE: 26
PIF_VALUE: 20
PIF_VALUE: 25
PIF_VALUE: 50
PIF_VALUE: 26
PIF_VALUE: 28
PIF_VALUE: 21
PIF_VALUE: 27
PIF_VALUE: 24
PIF_VALUE: 28
PIF_VALUE: 26
PIF_VALUE: 27
PIF_VALUE: 26
PIF_VALUE: 26
PIF_VALUE: 27
PIF_VALUE: 21
PIF_VALUE: 27
PIF_VALUE: 27
PIF_VALUE: 22
PIF_VALUE: 25
PIF_VALUE: 26
PIF_VALUE: 24
PIF_VALUE: 26
PIF_VALUE: 19
PIF_VALUE: 24
PIF_VALUE: 21
PIF_VALUE: 28
PIF_VALUE: 22
PIF_VALUE: 27
PIF_VALUE: 28
PIF_VALUE: 28
PIF_VALUE: 27
PIF_VALUE: 28
PIF_VALUE: 26
PIF_VALUE: 24
PIF_VALUE: 22
PIF_VALUE: 28
PIF_VALUE: 22
PIF_VALUE: 24
PIF_VALUE: 26
PIF_VALUE: 18
PIF_VALUE: 28
PIF_VALUE: 27
PIF_VALUE: 28
PIF_VALUE: 28
PIF_VALUE: 22
PIF_VALUE: 26
PIF_VALUE: 29
PIF_VALUE: 27
PIF_VALUE: 20
PIF_VALUE: 27
PIF_VALUE: 1
PIF_VALUE: 26
PIF_VALUE: 22
PIF_VALUE: 21
PIF_VALUE: 27
PIF_VALUE: 26
PIF_VALUE: 27
PIF_VALUE: 21
PIF_VALUE: 28
PIF_VALUE: 27
PIF_VALUE: 25
PIF_VALUE: 26
PIF_VALUE: 23
PIF_VALUE: 23
PIF_VALUE: 27
PIF_VALUE: 6
PIF_VALUE: 27
PIF_VALUE: 25
PIF_VALUE: 26
PIF_VALUE: 25
PIF_VALUE: 27
PIF_VALUE: 27
PIF_VALUE: 22
PIF_VALUE: 16
PIF_VALUE: 1
PIF_VALUE: 27
PIF_VALUE: 24
PIF_VALUE: 24
PIF_VALUE: 25
PIF_VALUE: 26
PIF_VALUE: 27

## 2020-03-04 ASSESSMENT — PAIN SCALES - GENERAL: PAINLEVEL_OUTOF10: 3

## 2020-03-04 NOTE — PROGRESS NOTES
surgery for tracheostomy and PEG placement, tolerated well. Okay to resume medications in 6 hours, tube feeds tomorrow, resume antiplatelets (aspirin and brilinta) tomorrow. On exam, patient opens eyes to verbal stimulation and wiggles toes RLE to command one time.      CURRENT MEDICATIONS:  SCHEDULED MEDICATIONS:   fluticasone  2 spray Each Nostril Daily    sodium chloride  2 spray Each Nostril Daily    piperacillin-tazobactam  3.375 g Intravenous Q8H    cloNIDine  0.2 mg Oral BID    lisinopril  40 mg Oral Daily    folic acid  1 mg Oral BID    rosuvastatin  40 mg Oral Nightly    famotidine (PEPCID) injection  20 mg Intravenous BID    glycopyrrolate  1 mg Oral TID    amLODIPine  10 mg Oral Daily    FLUoxetine  20 mg Per NG tube Daily    chlorhexidine  15 mL Mouth/Throat BID    sennosides-docusate sodium  2 tablet Oral Daily    sodium chloride flush  10 mL Intravenous 2 times per day     CONTINUOUS INFUSIONS:   sodium chloride       PRN MEDICATIONS:   fentanNYL, hydrALAZINE, labetalol, acetaminophen, magnesium hydroxide, sodium chloride flush, polyethylene glycol, ondansetron, promethazine    VITALS:  Temperature Range: Temp: 98.8 °F (37.1 °C) Temp  Av.3 °F (37.4 °C)  Min: 98.6 °F (37 °C)  Max: 101 °F (38.3 °C)  BP Range: Systolic (95UZD), QLT:111 , Min:122 , YZE:130     Diastolic (87WOI), GCY:74, Min:53, Max:72    Pulse Range: Pulse  Av.3  Min: 46  Max: 72  Respiration Range: Resp  Av.6  Min: 16  Max: 21  Current Pulse Ox: SpO2: 96 %  24HR Pulse Ox Range: SpO2  Av %  Min: 90 %  Max: 96 %  Patient Vitals for the past 12 hrs:   BP Temp Temp src Pulse Resp SpO2   20 0652 -- -- -- (!) 47 -- 96 %   20 0603 129/64 -- -- 50 -- 95 %   20 0503 130/62 -- -- (!) 47 -- 96 %   20 0424 -- -- -- (!) 49 -- 93 %   20 0410 (!) 140/60 98.8 °F (37.1 °C) Oral 56 16 91 %   20 0303 (!) 123/55 -- -- (!) 47 -- 95 %   20 0203 128/61 -- -- (!) 48 -- 95 %   20 0103 (!) 123/58 -- -- (!) 49 -- 94 %   03/04/20 0003 (!) 141/63 98.9 °F (37.2 °C) Oral 55 21 94 %   03/03/20 2358 -- -- -- (!) 46 -- 96 %   03/03/20 2303 (!) 133/57 -- -- 51 -- 96 %   03/03/20 2203 (!) 122/53 99.7 °F (37.6 °C) -- 57 -- 93 %   03/03/20 2103 (!) 147/60 -- -- 61 -- 95 %   03/03/20 2014 -- -- -- 70 -- 93 %   03/03/20 2003 (!) 167/72 101 °F (38.3 °C) Oral 66 21 91 %   03/03/20 2000 -- -- -- 72 -- --     Estimated body mass index is 31.5 kg/m² as calculated from the following:    Height as of this encounter: 6' 1\" (1.854 m). Weight as of this encounter: 238 lb 12.1 oz (108.3 kg).  []<16 Severe malnutrition  []16-16.99 Moderate malnutrition  []17-18.49 Mild malnutrition  []18.5-24.9 Normal  []25-29.9 Overweight (not obese)  [x]30-34.9 Obese class 1 (Low Risk)  []35-39.9 Obese class 2 (Moderate Risk)  []?40 Obese class 3 (High Risk)    RECENT LABS:   Lab Results   Component Value Date    WBC 14.7 (H) 03/04/2020    HGB 9.9 (L) 03/04/2020    HCT 31.0 (L) 03/04/2020     03/04/2020    CHOL 140 02/14/2020    TRIG 70 02/29/2020    HDL 41 02/14/2020    ALT 57 (H) 05/25/2019    AST 29 05/25/2019     03/04/2020    K 3.6 (L) 03/04/2020     03/04/2020    CREATININE 0.91 03/04/2020    BUN 23 (H) 03/04/2020    CO2 24 03/04/2020    INR 1.1 02/24/2020    LABA1C 5.6 02/14/2020     24 HOUR INTAKE/OUTPUT:    Intake/Output Summary (Last 24 hours) at 3/4/2020 0720  Last data filed at 3/4/2020 0600  Gross per 24 hour   Intake 1944 ml   Output 1165 ml   Net 779 ml       IMAGING:   XR CHEST PORTABLE   Final Result   No significant interval change with redemonstration of bilateral   heterogeneous opacities. No new consolidation. XR CHEST PORTABLE   Final Result   Persistent perihilar opacities most suggestive of pulmonary vascular   congestion. XR CHEST PORTABLE   Final Result   Cardiomegaly and mild central vascular congestion. Small left pleural   effusion.          XR CHEST PORTABLE Final Result   *Tubes in satisfactory positions. *No significant change in chest findings. XR CHEST PORTABLE   Final Result   No significant change from prior study. Stable cardiomegaly and pulmonary   vascular congestion. XR CHEST PORTABLE   Final Result   1. Endotracheal tube remains in appropriate position. 2. Vascular congestion without overt edema. XR CHEST PORTABLE   Final Result   1. Endotracheal and enteric tubes in place. 2.  Unchanged mild vascular congestion. No radiographic findings to indicate   significant pulmonary edema. CT HEAD WO CONTRAST   Final Result   Stable known pontine infarct without acute hemorrhage or other acute   intracranial finding. Slightly worsened sinus disease, as above. XR CHEST PORTABLE   Final Result   Endotracheal tube tip projects approximately 7 cm above the verónica. Advancement by 2 cm suggested for optimal positioning. XR CHEST PORTABLE   Final Result   Vascular congestion without overt edema. XR CHEST PORTABLE   Final Result   No acute cardiopulmonary disease         CT ABDOMEN PELVIS WO CONTRAST Additional Contrast? None   Final Result   No evidence of retroperitoneal hematoma. CT HEAD WO CONTRAST   Final Result   Pontine infarcts without evidence for hemorrhagic conversion. No additional   findings compared to recent prior study. .         IR ANGIOGRAM CAROTID C EREBRAL BILATERAL   Final Result      XR ABDOMEN FOR NG/OG/NE TUBE PLACEMENT   Final Result   Enteric tube in the stomach as above. No evidence of bowel obstruction. CT BRAIN PERFUSION   Final Result   As above . CTA HEAD NECK W CONTRAST   Final Result   1. Hypodense areas in the mayuri concordant with acute infarct visualized at MR.   2. No perfusion mismatch. 3. 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    4. Otherwise patent cervical and cerebral vasculature         CT HEAD WO CONTRAST   Final Result   1. Hypodense areas in the mayuri concordant with acute infarct visualized at MR.   2. No perfusion mismatch. 3. 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 4. Otherwise patent cervical and cerebral vasculature         XR CHEST PORTABLE    (Results Pending)       Labs and Images reviewed with:  [] Dr. Yanni Martines    [x] Dr. Rene Caldwell  [] Dr. Mirtha Carrillo  [] There are no new interval images to review. PHYSICAL EXAM       CONSTITUTIONAL:  Intubated, opens eyes to verbal stimulation briefly. Required continued stimulation to attend. Does not follow commands. HEAD:  normocephalic, atraumatic    EYES:  PERRLA, EOMI.   ENT:  moist mucous membranes   NECK:  supple, symmetric   LUNGS:  Equal air entry bilaterally, rhonchi bilaterally   CARDIOVASCULAR:  normal s1 / s2, RRR, distal pulses intact   ABDOMEN:  Soft, no rigidity, active bowel sounds   NEUROLOGIC:  Mental Status: Intubated             Cranial Nerves:    III: Pupils:  equal, round, reactive to light  III,IV,VI: Extra Ocular Movements: intact  VII: Facial strength: abnormal left facial weakness    Motor Exam:    Wiggle toes RLE one time. No spontaneous movement. Flicker movement to noxious stimulation RUE, dense left hemiplegia      DRAINS:  [x] There are no drains for Neuro Critical Care to monitor at this time. ASSESSMENT AND PLAN:       The patient is a 53 yo male with a history of HTN, CAD s/p stent (January 2019), and recent right pontine infarct who presented as a transfer from [place] [id number] for worsening aphasia, dysphagia, and left hemiparesis. Found to have increasing multifocal acute infarctions in the mayuri extending to the medulla as well as a small acute infarct in the left cerebellar infarction.   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  - POD # 8 s/p diagnostic cerebral angiogram showing mid basilar artery occlusion  - Lesion thought to be stenotic in nature rather than thrombus per Endovascular   - Aspirin and Brilinta on hold for trach/PEG, resume tomorrow  - Lipitor 80mg QHS  - Hypercoag panel reviewed; +Hetero MTHFR mutations, mildly elevated homocysteine level, Hem/Onc following  - Neuro Endovascular following; no further intervention planned  - Propofol for sedation  - Goal -180  - Neuro checks per protocol     CARDIOVASCULAR:  - Goal -180, avoid hypotension  - PRN Cardene Hydralazine/Labetalol  - Resume home Norvasc 10 mg QD, Lisinopril 40 mg QD  - Bradycardia overnight, wean Clonidine 0.1 mg BID, start hydralazine 25 mg q8h  - History of CAD s/p stent May 2019  - Asprin and Brilinta on hold for Trach/Peg  - Mild troponin leak (initially 36), down to 32, EKG unchanged  - Echo 2/14 EF 55%, severe LVH, aortic root dilation with mild aortic insufficiency  - Outpatient Cardiology follow up  - Hyperlipidemia, recent lipid panel; LDL 91, Cholesterol 140  - Lipitor 80mg QHS  - Continue telemetry     PULMONARY:  - Intubated for airway protection and impending respiratory failure  - Vent settings: 30/14/640/5  - POD # 0 tracheostomy   - Copious secretions, Continue Robinol 1 mg TID     RENAL/FLUID/ELECTROLYTE:  - Normal renal functioning  - BUN 23/ Creatinine 0.91  - Monitor I&O  - Urology evaluated for neurogenic bladder and hematuria; Bladder scan Qshift and straight cath for PVR>400ml, hematuria resolved  - Discontinue free water flushes,   - Replace electrolytes PRN  - Daily BMP     GI/NUTRITION:  NUTRITION:  - POD # 0 PEG placement  - Resume tube feeds after 24 hours  - Formed BM 3/1  - Bowel regimen: Senokot-S daily, Milk of Mag, dulcolax suppository x 1  - GI prophylaxis: Pepcid 20 mg BID     ID:  - Febrile, Tmax

## 2020-03-04 NOTE — FLOWSHEET NOTE
Assessment:   made pre-surgery visit with patient and family while rounding. Patient is intubated and unresponsive. He is scheduled to have a trach and feeding tube inserted tomorrow. Patient's girlfriend, Jennifer Obrien, and brother, Daylin Yoons, were present at time of visit. Intervention:   provided compassionate listening as family shared patient's background. Family is anxious, but hopeful that patient will recover. They realize that it will be a slow process.  provided spiritual support through offering comforting words and hope. Family accepted 's offer of prayer and gathered around bed and held hands while  prayed. Outcome:  Family was appreciative of 's visit and were comforted by his presence. Chaplains will remain available for further support as needed. Daneil Class     03/03/20 2040   Encounter Summary   Services provided to: Family; Patient and family together;Patient not available   Referral/Consult From: [facility]   Support System Significant other;Children;Family members   Place of Taoism none   Continue Visiting   (3/3/2020)   Complexity of Encounter Moderate   Length of Encounter 15 minutes   Spiritual Assessment Completed Yes   Routine   Type Pre-procedure   Spiritual/Christianity   Type Spiritual support   Assessment Calm; Approachable; Hopeful;Coping;Peaceful   Intervention Active listening;Explored feelings, thoughts, concerns;Explored coping resources;Nurtured hope;Prayer   Outcome Comfort;Expressed gratitude;Expressed feelings/needs/concerns;Coping; Hopeful;Receptive

## 2020-03-04 NOTE — BRIEF OP NOTE
Brief Postoperative Note  ______________________________________________________________    Patient: Kiara Ag  YOB: 1970  MRN: [id number]  Date of Procedure: 3/4/2020    Pre-Op Diagnosis: RESPIRATORY FAILURE, DYSPHAGIA    Post-Op Diagnosis: Same       Procedure(s):  EGD ESOPHAGOGASTRODUODENOSCOPY PEG TUBE INSERTION  TRACHEOTOMY    Anesthesia: Anesthesia type not filed in the log. Surgeon(s):  MD Sarah Alexis MD    Assistant: Dorcas Borja PGY4    Estimated Blood Loss (mL): 5cc    IVF: 250cc crystalloid    Complications: None    Specimens:   * No specimens in log *    Implants:  * No implants in log *      Drains:   NG/OG/NJ/NE Tube Nasogastric Right nostril (Active)   Surrounding Skin Dry; Intact 3/4/2020  4:00 AM   Securement device Yes 3/4/2020  4:00 AM   Status Clamped 3/4/2020  4:00 AM   Placement Verified by Respiratory Status;by External Catheter Length 3/4/2020  4:00 AM   NG/OG/NJ/NE External Measurement (cm) 70 cm 3/4/2020  4:00 AM   Drainage Appearance Tan 3/3/2020  4:00 PM   Tube Feeding Semi-elemental 3/4/2020  4:00 AM   Tube Feeding Status Stopped 3/4/2020  4:00 AM   Rate/Schedule 0 mL/hr 3/4/2020  4:00 AM   Tube Feeding Intake (mL) 652 ml 3/3/2020  6:03 PM   Free Water Flush (mL) 150 mL 3/3/2020  8:00 PM   Residual Volume (ml) 0 ml 3/3/2020  8:00 PM   Output (mL) 0 ml 2/28/2020  4:00 PM       NG/OG/NJ/NE Tube Nasogastric Right nostril (Active)       Gastrostomy/Enterostomy/Jejunostomy PEG-Jejunostomy LUQ 1 20 fr (Active)       External Urinary Catheter (Active)   Catheter changed  Yes 3/4/2020  4:00 AM   Urine Color Yellow 3/4/2020  4:00 AM   Urine Appearance Clear 3/4/2020  4:00 AM   Urine Odor Malodorous 3/3/2020  4:00 PM   Output (mL) 590 mL 3/4/2020  6:00 AM   Suction- Female Only N/A 3/3/2020  4:00 PM   Placement Replaced 3/4/2020  4:00 AM   Skin Assessment No Injury 3/4/2020  4:00 AM       [REMOVED] Urethral Catheter (Removed)   Output (mL) 45 mL 2/27/2020 10:00 PM       [REMOVED] External Urinary Catheter (Removed)   Catheter changed  No 2/25/2020  8:00 PM   Urine Color Yellow 2/27/2020 12:00 AM   Urine Appearance Clear 2/27/2020 12:00 AM   Output (mL) 500 mL 2/26/2020  6:07 PM   Placement Replaced 2/26/2020  6:07 PM   Skin Assessment No Injury 2/27/2020 12:00 AM       [REMOVED] External Urinary Catheter (Removed)   Catheter changed  Yes 3/2/2020  4:00 PM       Findings: WC: II. Placement of #8 DCT shiley tracheostomy tube. Placement of 20Fr PEG.  PEG 4cm at skin     Joshua Cohn,   Date: 3/4/2020  Time: 8:57 AM

## 2020-03-04 NOTE — PLAN OF CARE
Problem: HEMODYNAMIC STATUS  Goal: Patient has stable vital signs and fluid balance  3/4/2020 0431 by Beverley Lyons RN  Outcome: Ongoing     Problem: ACTIVITY INTOLERANCE/IMPAIRED MOBILITY  Goal: Mobility/activity is maintained at optimum level for patient  3/4/2020 0431 by Beverley Lyons RN  Outcome: Ongoing     Problem: MECHANICAL VENTILATION  Goal: Mobility/activity is maintained at optimum level for patient  3/4/2020 0431 by Beverley Lyons RN  Outcome: Ongoing     Problem: MECHANICAL VENTILATION  Goal: Oral health is maintained or improved  3/4/2020 0431 by Beverley Lyons RN  Outcome: Ongoing     Problem: Falls - Risk of:  Goal: Will remain free from falls  Description  Will remain free from falls  3/4/2020 0431 by Beverley Lyons RN  Outcome: Ongoing     Problem: Bleeding:  Goal: Will show no signs and symptoms of excessive bleeding  Description  Will show no signs and symptoms of excessive bleeding  3/4/2020 0431 by Beverley Lyons RN  Outcome: Ongoing     Beverley Lyons RN

## 2020-03-04 NOTE — OP NOTE
Patient: Janki Jaimes  YOB: 1970  MRN: [id number]  Date of Procedure: 3/4/2020     Pre-Op Diagnosis: RESPIRATORY FAILURE, DYSPHAGIA     Post-Op Diagnosis: Same       Procedure(s):  EGD ESOPHAGOGASTRODUODENOSCOPY PEG TUBE INSERTION  TRACHEOTOMY     Anesthesia: Anesthesia type not filed in the log.     Surgeon(s):  MD Celio Abdullahi MD     Assistant: Anitha Wilkerson PGY4     Estimated Blood Loss (mL): 5cc     IVF: 250cc crystalloid     Complications: None     Specimens:   * No specimens in log *     Implants:  * No implants in log *      Drains:        NG/OG/NJ/NE Tube Nasogastric Right nostril (Active)   Surrounding Skin Dry; Intact 3/4/2020  4:00 AM   Securement device Yes 3/4/2020  4:00 AM   Status Clamped 3/4/2020  4:00 AM   Placement Verified by Respiratory Status;by External Catheter Length 3/4/2020  4:00 AM   NG/OG/NJ/NE External Measurement (cm) 70 cm 3/4/2020  4:00 AM   Drainage Appearance Tan 3/3/2020  4:00 PM   Tube Feeding Semi-elemental 3/4/2020  4:00 AM   Tube Feeding Status Stopped 3/4/2020  4:00 AM   Rate/Schedule 0 mL/hr 3/4/2020  4:00 AM   Tube Feeding Intake (mL) 652 ml 3/3/2020  6:03 PM   Free Water Flush (mL) 150 mL 3/3/2020  8:00 PM   Residual Volume (ml) 0 ml 3/3/2020  8:00 PM   Output (mL) 0 ml 2/28/2020  4:00 PM       NG/OG/NJ/NE Tube Nasogastric Right nostril (Active)       Gastrostomy/Enterostomy/Jejunostomy PEG-Jejunostomy LUQ 1 20 fr (Active)       External Urinary Catheter (Active)   Catheter changed  Yes 3/4/2020  4:00 AM   Urine Color Yellow 3/4/2020  4:00 AM   Urine Appearance Clear 3/4/2020  4:00 AM   Urine Odor Malodorous 3/3/2020  4:00 PM   Output (mL) 590 mL 3/4/2020  6:00 AM   Suction- Female Only N/A 3/3/2020  4:00 PM   Placement Replaced 3/4/2020  4:00 AM   Skin Assessment No Injury 3/4/2020  4:00 AM       [REMOVED] Urethral Catheter (Removed)   Output (mL) 45 mL 2/27/2020 10:00 PM       [REMOVED] External Urinary Catheter (Removed)

## 2020-03-04 NOTE — ANESTHESIA PRE PROCEDURE
Department of Anesthesiology  Preprocedure Note       Name:  Mitchell Gray   Age:  52 y.o.  :  1970                                          MRN:  [id number]         Date:  3/4/2020      Surgeon: Hiral Sanchez):  MD Blanca Nath MD    Procedure: EGD ESOPHAGOGASTRODUODENOSCOPY PEG TUBE INSERTION (N/A )  TRACHEOTOMY (N/A )    Medications prior to admission:   Prior to Admission medications    Medication Sig Start Date End Date Taking? Authorizing Provider   meloxicam (MOBIC) 7.5 MG tablet Take 1 tablet by mouth 2 times daily as needed for Pain 2/10/20 2/24/20  Kamille Bryant PA-C   tadalafil (CIALIS) 20 MG tablet Take 1 tablet by mouth as needed for Erectile Dysfunction 2/10/20   Kamille Bryant PA-C   lisinopril (PRINIVIL;ZESTRIL) 40 MG tablet Take 1 tablet by mouth daily 2/10/20   Kamille Bryant PA-C   aspirin 81 MG EC tablet Take 1 tablet by mouth daily 19   Rosales Wright MD   nitroGLYCERIN (NITROSTAT) 0.4 MG SL tablet up to max of 3 total doses. If no relief after 1 dose, call 911. 19   Rosales Wright MD   atorvastatin (LIPITOR) 80 MG tablet Take 1 tablet by mouth nightly 19   Rosales Wright MD   amLODIPine (NORVASC) 10 MG tablet Take 1 tablet by mouth daily 19   Rosales Wright MD   ticagrelor (BRILINTA) 90 MG TABS tablet Take 1 tablet by mouth 2 times daily 19   Rosales Wright MD       Current medications:    No current facility-administered medications for this visit. No current outpatient medications on file.      Facility-Administered Medications Ordered in Other Visits   Medication Dose Route Frequency Provider Last Rate Last Dose    fluticasone (FLONASE) 50 MCG/ACT nasal spray 2 spray  2 spray Each Nostril Daily Singh Shafer DO   2 spray at 20    sodium chloride (OCEAN) 0.65 % nasal spray 2 spray  2 spray Each Nostril Daily Singh Shafer DO   2 spray at 20    piperacillin-tazobactam (ZOSYN) 3.375 g in dextrose 5 % 50 mL IVPB extended infusion (mini-bag)  3.375 g Intravenous Q8H Joe Matthews MD 12.5 mL/hr at 03/04/20 0350 3.375 g at 03/04/20 0350    fentaNYL (SUBLIMAZE) injection 50 mcg  50 mcg Intravenous Q1H PRN Megan Whitehead DO   50 mcg at 03/02/20 2040    cloNIDine (CATAPRES) tablet 0.2 mg  0.2 mg Oral BID Dora Arriaga MD   0.2 mg at 03/03/20 1957    lisinopril (PRINIVIL;ZESTRIL) tablet 40 mg  40 mg Oral Daily Dora Arriaga MD   40 mg at 88/34/38 2664    folic acid (FOLVITE) tablet 1 mg  1 mg Oral BID Darlene Cochran MD   1 mg at 03/03/20 1957    rosuvastatin (CRESTOR) tablet 40 mg  40 mg Oral Nightly Darlene Cochran MD   40 mg at 03/03/20 1957    famotidine (PEPCID) injection 20 mg  20 mg Intravenous BID Lavinia Makenna, APRN - CNP   20 mg at 03/03/20 1958    hydrALAZINE (APRESOLINE) injection 10 mg  10 mg Intravenous Q2H PRN Lavinia Makenna, APRN - CNP   10 mg at 03/02/20 1058    labetalol (NORMODYNE;TRANDATE) injection syringe 10 mg  10 mg Intravenous Q1H PRN Lavinia Makenna, APRN - CNP   10 mg at 03/01/20 2209    glycopyrrolate (ROBINUL) tablet 1 mg  1 mg Oral TID Samer Malcom Landa MD   1 mg at 03/03/20 1957    amLODIPine (NORVASC) tablet 10 mg  10 mg Oral Daily Dora Arriaga MD   10 mg at 03/03/20 0812    FLUoxetine (PROZAC) 20 MG/5ML solution 20 mg  20 mg Per NG tube Daily Trev Torres MD   20 mg at 03/03/20 0812    chlorhexidine (PERIDEX) 0.12 % solution 15 mL  15 mL Mouth/Throat BID Lavinia Makenna, APRN - CNP   15 mL at 03/03/20 1957    acetaminophen (TYLENOL) tablet 650 mg  650 mg Oral Q4H PRN Lavinia Makenna, APRN - CNP   650 mg at 03/03/20 2006    sennosides-docusate sodium (SENOKOT-S) 8.6-50 MG tablet 2 tablet  2 tablet Oral Daily Lavinia Makenna, APRN - CNP   2 tablet at 03/03/20 0811    magnesium hydroxide (MILK OF MAGNESIA) 400 MG/5ML suspension 30 mL  30 mL Oral Daily PRN Lavinia Makenna, APRN - CNP        sodium chloride flush 0.9 % injection 10 mL  10 mL Intravenous 2 times per day Sosa Rothman MD   10 mL at 03/02/20 2040    sodium chloride flush 0.9 % injection 10 mL  10 mL Intravenous PRN Alex Scott MD        polyethylene glycol [facility]) packet 17 g  17 g Oral Daily PRN Alex Scott MD   17 g at 02/28/20 0827    ondansetron (ZOFRAN) injection 4 mg  4 mg Intravenous Q6H PRN Alex Scott MD        promethazine (PHENERGAN) tablet 12.5 mg  12.5 mg Oral Q6H PRN Alex Scott MD        sodium chloride flush 0.9 % injection 10 mL  10 mL Intravenous 2 times per day Samy Gregg MD        sodium chloride flush 0.9 % injection 10 mL  10 mL Intravenous PRN Samy Gregg MD           Allergies:  No Known Allergies    Problem List:    Patient Active Problem List   Diagnosis Code    STEMI (ST elevation myocardial infarction) ([facility] [address] I21.3    Coronary artery disease involving native coronary artery of native heart without angina pectoris I25.10    Acute ST segment elevation myocardial infarction ([address] I21.3    Essential hypertension I10    Class 1 obesity due to excess calories with body mass index (BMI) of 32.0 to 32.9 in adult E66.09, Z68.32    Stroke-like symptoms R29.90    Ischemic stroke ([address] I63.9    Hypertensive emergency I16.1    Numbness R20.0    Basilar artery stenosis/occlusion with infarction (HCC) I63.22    Right arm weakness R29.898    Right pontine stroke ([address] I63.50    Left-sided weakness Q66.9    Systolic murmur F74.8    Acute CVA (cerebrovascular accident) ([address] I63.9    Adjustment disorder with depressed mood F43.21    Hypertensive urgency I16.0    Brainstem infarction ([address] I63.9    Brain stem infarction ([address] I63.9    Basilar artery stenosis I65.1    Acute respiratory failure with hypoxia (HCC) J96.01       Past Medical History:        Diagnosis Date    Heart attack ([address]     Hyperlipidemia     Hypertension     Stroke ([address] 02/13/2020       Past Surgical History:        Procedure Laterality Date    CARDIAC SURGERY [address]    Anesthesia Evaluation  Patient summary reviewed and Nursing notes reviewed no history of anesthetic complications:   Airway: Mallampati: Unable to assess / NA  TM distance: >3 FB   Neck ROM: full  Comment: Existing ETT  Mouth opening: > = 3 FB Dental: normal exam         Pulmonary: breath sounds clear to auscultation                             Cardiovascular:  Exercise tolerance: good (>4 METS),   (+) hypertension:, valvular problems/murmurs: AI, past MI:, CAD: obstructive, hyperlipidemia      ECG reviewed  Rhythm: regular  Rate: normal  Echocardiogram reviewed    Cleared by cardiology     Beta Blocker:  Not on Beta Blocker      ROS comment: Aortic root and ascending aneurysm     Neuro/Psych:   (+) CVA: residual symptoms, TIA, psychiatric history:            GI/Hepatic/Renal:             Endo/Other:                     Abdominal:       Abdomen: soft. Vascular:                               EKG 2/24/2020  Sinus bradycardia  Left ventricular hypertrophy with QRS widening  Abnormal ECG  When compared with ECG of 03-MAR-2020 00:27,  No significant change was found    TTE 2/13/2020  Left ventricle is normal in size. Global left ventricular systolic function is normal. Estimated ejection fraction is 55 % . Severe left ventricular hypertrophy. No obvious wall motion abnormality seen. No shunt seen by color Doppler. At least moderate aortic insufficiency. Aortic root is moderately (>4.5cm) dilated. The ascending aorta is moderately dilated. Anesthesia Plan      general     ASA 3       Induction: intravenous. MIPS: Postoperative opioids intended and Prophylactic antiemetics administered. Anesthetic plan and risks discussed with patient.       Plan discussed with CRNA and surgical team.                Luisito Escobedo MD   3/4/2020

## 2020-03-05 ENCOUNTER — APPOINTMENT (OUTPATIENT)
Dept: GENERAL RADIOLOGY | Age: 50
DRG: 005 | End: 2020-03-05
Attending: SPECIALIST
Payer: MEDICARE

## 2020-03-05 LAB
-: ABNORMAL
ALBUMIN SERPL-MCNC: 2.8 G/DL (ref 3.5–5.2)
ALBUMIN/GLOBULIN RATIO: 0.8 (ref 1–2.5)
ALLEN TEST: ABNORMAL
ALLEN TEST: ABNORMAL
ALP BLD-CCNC: 98 U/L (ref 40–129)
ALT SERPL-CCNC: 57 U/L (ref 5–41)
AMORPHOUS: ABNORMAL
AMYLASE: 53 U/L (ref 28–100)
ANION GAP SERPL CALCULATED.3IONS-SCNC: 16 MMOL/L (ref 9–17)
AST SERPL-CCNC: 36 U/L
BACTERIA: ABNORMAL
BILIRUB SERPL-MCNC: 0.46 MG/DL (ref 0.3–1.2)
BILIRUBIN DIRECT: 0.13 MG/DL
BILIRUBIN URINE: NEGATIVE
BILIRUBIN, INDIRECT: 0.33 MG/DL (ref 0–1)
BUN BLDV-MCNC: 18 MG/DL (ref 6–20)
BUN/CREAT BLD: ABNORMAL (ref 9–20)
CALCIUM IONIZED: 1.14 MMOL/L (ref 1.13–1.33)
CALCIUM SERPL-MCNC: 9.2 MG/DL (ref 8.6–10.4)
CASTS UA: ABNORMAL /LPF (ref 0–2)
CHLORIDE BLD-SCNC: 104 MMOL/L (ref 98–107)
CO2: 22 MMOL/L (ref 20–31)
COLOR: YELLOW
COMMENT UA: ABNORMAL
CREAT SERPL-MCNC: 0.9 MG/DL (ref 0.7–1.2)
CRYOGLOBULIN: 0 MG/DL (ref 0–10)
CRYSTALS, UA: ABNORMAL /HPF
EPITHELIAL CELLS UA: ABNORMAL /HPF (ref 0–5)
FIO2: 40
FIO2: 60
GFR AFRICAN AMERICAN: >60 ML/MIN
GFR NON-AFRICAN AMERICAN: >60 ML/MIN
GFR SERPL CREATININE-BSD FRML MDRD: ABNORMAL ML/MIN/{1.73_M2}
GFR SERPL CREATININE-BSD FRML MDRD: ABNORMAL ML/MIN/{1.73_M2}
GLOBULIN: ABNORMAL G/DL (ref 1.5–3.8)
GLUCOSE BLD-MCNC: 100 MG/DL (ref 70–99)
GLUCOSE URINE: NEGATIVE
HCT VFR BLD CALC: 35.7 % (ref 40.7–50.3)
HEMOGLOBIN: 11.4 G/DL (ref 13–17)
KETONES, URINE: ABNORMAL
LEUKOCYTE ESTERASE, URINE: NEGATIVE
LIPASE: 25 U/L (ref 13–60)
MCH RBC QN AUTO: 28.5 PG (ref 25.2–33.5)
MCHC RBC AUTO-ENTMCNC: 31.9 G/DL (ref 28.4–34.8)
MCV RBC AUTO: 89.3 FL (ref 82.6–102.9)
MODE: ABNORMAL
MODE: ABNORMAL
MUCUS: ABNORMAL
NEGATIVE BASE EXCESS, ART: ABNORMAL (ref 0–2)
NEGATIVE BASE EXCESS, ART: ABNORMAL (ref 0–2)
NITRITE, URINE: NEGATIVE
NRBC AUTOMATED: 0 PER 100 WBC
O2 DEVICE/FLOW/%: ABNORMAL
O2 DEVICE/FLOW/%: ABNORMAL
OTHER OBSERVATIONS UA: ABNORMAL
PATIENT TEMP: ABNORMAL
PATIENT TEMP: ABNORMAL
PDW BLD-RTO: 12.4 % (ref 11.8–14.4)
PH UA: 5 (ref 5–8)
PLATELET # BLD: 302 K/UL (ref 138–453)
PMV BLD AUTO: 11.3 FL (ref 8.1–13.5)
POC HCO3: 25.8 MMOL/L (ref 21–28)
POC HCO3: 27.6 MMOL/L (ref 21–28)
POC O2 SATURATION: 98 % (ref 94–98)
POC O2 SATURATION: 98 % (ref 94–98)
POC PCO2 TEMP: ABNORMAL MM HG
POC PCO2 TEMP: ABNORMAL MM HG
POC PCO2: 32.8 MM HG (ref 35–48)
POC PCO2: 34.7 MM HG (ref 35–48)
POC PH TEMP: ABNORMAL
POC PH TEMP: ABNORMAL
POC PH: 7.5 (ref 7.35–7.45)
POC PH: 7.51 (ref 7.35–7.45)
POC PO2 TEMP: ABNORMAL MM HG
POC PO2 TEMP: ABNORMAL MM HG
POC PO2: 89.4 MM HG (ref 83–108)
POC PO2: 93 MM HG (ref 83–108)
POSITIVE BASE EXCESS, ART: 3 (ref 0–3)
POSITIVE BASE EXCESS, ART: 5 (ref 0–3)
POTASSIUM SERPL-SCNC: 3.9 MMOL/L (ref 3.7–5.3)
PROTEIN UA: ABNORMAL
RBC # BLD: 4 M/UL (ref 4.21–5.77)
RBC UA: ABNORMAL /HPF (ref 0–2)
RENAL EPITHELIAL, UA: ABNORMAL /HPF
SAMPLE SITE: ABNORMAL
SAMPLE SITE: ABNORMAL
SODIUM BLD-SCNC: 142 MMOL/L (ref 135–144)
SPECIFIC GRAVITY UA: 1.03 (ref 1–1.03)
TCO2 (CALC), ART: 27 MMOL/L (ref 22–29)
TCO2 (CALC), ART: 29 MMOL/L (ref 22–29)
TOTAL PROTEIN: 6.4 G/DL (ref 6.4–8.3)
TRICHOMONAS: ABNORMAL
TURBIDITY: ABNORMAL
URINE HGB: NEGATIVE
UROBILINOGEN, URINE: NORMAL
WBC # BLD: 20.7 K/UL (ref 3.5–11.3)
WBC UA: ABNORMAL /HPF (ref 0–5)
YEAST: ABNORMAL

## 2020-03-05 PROCEDURE — 37799 UNLISTED PX VASCULAR SURGERY: CPT

## 2020-03-05 PROCEDURE — 99291 CRITICAL CARE FIRST HOUR: CPT | Performed by: PSYCHIATRY & NEUROLOGY

## 2020-03-05 PROCEDURE — 6370000000 HC RX 637 (ALT 250 FOR IP): Performed by: NURSE PRACTITIONER

## 2020-03-05 PROCEDURE — 6370000000 HC RX 637 (ALT 250 FOR IP): Performed by: STUDENT IN AN ORGANIZED HEALTH CARE EDUCATION/TRAINING PROGRAM

## 2020-03-05 PROCEDURE — 99233 SBSQ HOSP IP/OBS HIGH 50: CPT | Performed by: PSYCHIATRY & NEUROLOGY

## 2020-03-05 PROCEDURE — 2580000003 HC RX 258: Performed by: STUDENT IN AN ORGANIZED HEALTH CARE EDUCATION/TRAINING PROGRAM

## 2020-03-05 PROCEDURE — 87205 SMEAR GRAM STAIN: CPT

## 2020-03-05 PROCEDURE — 85027 COMPLETE CBC AUTOMATED: CPT

## 2020-03-05 PROCEDURE — 80048 BASIC METABOLIC PNL TOTAL CA: CPT

## 2020-03-05 PROCEDURE — 82803 BLOOD GASES ANY COMBINATION: CPT

## 2020-03-05 PROCEDURE — 71045 X-RAY EXAM CHEST 1 VIEW: CPT

## 2020-03-05 PROCEDURE — 94770 HC ETCO2 MONITOR DAILY: CPT

## 2020-03-05 PROCEDURE — 87040 BLOOD CULTURE FOR BACTERIA: CPT

## 2020-03-05 PROCEDURE — 89220 SPUTUM SPECIMEN COLLECTION: CPT

## 2020-03-05 PROCEDURE — 82330 ASSAY OF CALCIUM: CPT

## 2020-03-05 PROCEDURE — 83690 ASSAY OF LIPASE: CPT

## 2020-03-05 PROCEDURE — 80076 HEPATIC FUNCTION PANEL: CPT

## 2020-03-05 PROCEDURE — 87070 CULTURE OTHR SPECIMN AEROBIC: CPT

## 2020-03-05 PROCEDURE — 6360000002 HC RX W HCPCS: Performed by: STUDENT IN AN ORGANIZED HEALTH CARE EDUCATION/TRAINING PROGRAM

## 2020-03-05 PROCEDURE — 94003 VENT MGMT INPAT SUBQ DAY: CPT

## 2020-03-05 PROCEDURE — 87077 CULTURE AEROBIC IDENTIFY: CPT

## 2020-03-05 PROCEDURE — 2700000000 HC OXYGEN THERAPY PER DAY

## 2020-03-05 PROCEDURE — 36415 COLL VENOUS BLD VENIPUNCTURE: CPT

## 2020-03-05 PROCEDURE — 2000000003 HC NEURO ICU R&B

## 2020-03-05 PROCEDURE — 97110 THERAPEUTIC EXERCISES: CPT

## 2020-03-05 PROCEDURE — 87186 SC STD MICRODIL/AGAR DIL: CPT

## 2020-03-05 PROCEDURE — 82150 ASSAY OF AMYLASE: CPT

## 2020-03-05 PROCEDURE — 81001 URINALYSIS AUTO W/SCOPE: CPT

## 2020-03-05 PROCEDURE — 94761 N-INVAS EAR/PLS OXIMETRY MLT: CPT

## 2020-03-05 PROCEDURE — 87184 SC STD DISK METHOD PER PLATE: CPT

## 2020-03-05 RX ORDER — HYDRALAZINE HYDROCHLORIDE 50 MG/1
50 TABLET, FILM COATED ORAL EVERY 8 HOURS SCHEDULED
Status: DISCONTINUED | OUTPATIENT
Start: 2020-03-05 | End: 2020-03-09

## 2020-03-05 RX ORDER — BISACODYL 10 MG
10 SUPPOSITORY, RECTAL RECTAL DAILY PRN
Status: DISCONTINUED | OUTPATIENT
Start: 2020-03-05 | End: 2020-03-11 | Stop reason: HOSPADM

## 2020-03-05 RX ORDER — GLYCOPYRROLATE 1 MG/1
1 TABLET ORAL 3 TIMES DAILY PRN
Status: DISCONTINUED | OUTPATIENT
Start: 2020-03-05 | End: 2020-03-06

## 2020-03-05 RX ORDER — ASPIRIN 81 MG/1
81 TABLET, CHEWABLE ORAL DAILY
Status: DISCONTINUED | OUTPATIENT
Start: 2020-03-05 | End: 2020-03-11 | Stop reason: HOSPADM

## 2020-03-05 RX ADMIN — STANDARDIZED SENNA CONCENTRATE AND DOCUSATE SODIUM 2 TABLET: 8.6; 5 TABLET ORAL at 09:07

## 2020-03-05 RX ADMIN — HYDRALAZINE HYDROCHLORIDE 50 MG: 50 TABLET, FILM COATED ORAL at 16:55

## 2020-03-05 RX ADMIN — PIPERACILLIN AND TAZOBACTAM 3.38 G: 3; .375 INJECTION, POWDER, LYOPHILIZED, FOR SOLUTION INTRAVENOUS at 04:03

## 2020-03-05 RX ADMIN — ACETAMINOPHEN 650 MG: 325 TABLET ORAL at 20:20

## 2020-03-05 RX ADMIN — SALINE NASAL SPRAY 2 SPRAY: 1.5 SOLUTION NASAL at 11:20

## 2020-03-05 RX ADMIN — HYDRALAZINE HYDROCHLORIDE 25 MG: 25 TABLET ORAL at 06:10

## 2020-03-05 RX ADMIN — FOLIC ACID 1 MG: 1 TABLET ORAL at 20:21

## 2020-03-05 RX ADMIN — ASPIRIN 81 MG: 81 TABLET, CHEWABLE ORAL at 11:12

## 2020-03-05 RX ADMIN — PIPERACILLIN AND TAZOBACTAM 3.38 G: 3; .375 INJECTION, POWDER, LYOPHILIZED, FOR SOLUTION INTRAVENOUS at 11:13

## 2020-03-05 RX ADMIN — Medication 15 ML: at 08:40

## 2020-03-05 RX ADMIN — HYDRALAZINE HYDROCHLORIDE 50 MG: 50 TABLET, FILM COATED ORAL at 22:40

## 2020-03-05 RX ADMIN — SODIUM CHLORIDE, PRESERVATIVE FREE 10 ML: 5 INJECTION INTRAVENOUS at 10:51

## 2020-03-05 RX ADMIN — TICAGRELOR 90 MG: 90 TABLET ORAL at 11:19

## 2020-03-05 RX ADMIN — ROSUVASTATIN CALCIUM 40 MG: 20 TABLET, FILM COATED ORAL at 20:22

## 2020-03-05 RX ADMIN — Medication 15 ML: at 20:19

## 2020-03-05 RX ADMIN — AMLODIPINE BESYLATE 10 MG: 10 TABLET ORAL at 09:05

## 2020-03-05 RX ADMIN — Medication 20 MG: at 09:05

## 2020-03-05 RX ADMIN — FENTANYL CITRATE 50 MCG: 50 INJECTION, SOLUTION INTRAMUSCULAR; INTRAVENOUS at 20:14

## 2020-03-05 RX ADMIN — FOLIC ACID 1 MG: 1 TABLET ORAL at 09:05

## 2020-03-05 RX ADMIN — GLYCOPYRROLATE 1 MG: 1 TABLET ORAL at 20:22

## 2020-03-05 RX ADMIN — TICAGRELOR 90 MG: 90 TABLET ORAL at 20:21

## 2020-03-05 RX ADMIN — LISINOPRIL 40 MG: 20 TABLET ORAL at 09:05

## 2020-03-05 RX ADMIN — FLUTICASONE PROPIONATE 2 SPRAY: 50 SPRAY, METERED NASAL at 11:20

## 2020-03-05 RX ADMIN — ENOXAPARIN SODIUM 30 MG: 30 INJECTION SUBCUTANEOUS at 11:19

## 2020-03-05 RX ADMIN — FENTANYL CITRATE 50 MCG: 50 INJECTION, SOLUTION INTRAMUSCULAR; INTRAVENOUS at 00:57

## 2020-03-05 RX ADMIN — PIPERACILLIN AND TAZOBACTAM 3.38 G: 3; .375 INJECTION, POWDER, LYOPHILIZED, FOR SOLUTION INTRAVENOUS at 20:19

## 2020-03-05 RX ADMIN — SODIUM CHLORIDE, PRESERVATIVE FREE 10 ML: 5 INJECTION INTRAVENOUS at 20:22

## 2020-03-05 RX ADMIN — HYDRALAZINE HYDROCHLORIDE 10 MG: 20 INJECTION INTRAMUSCULAR; INTRAVENOUS at 20:14

## 2020-03-05 RX ADMIN — ACETAMINOPHEN 650 MG: 325 TABLET ORAL at 12:04

## 2020-03-05 RX ADMIN — HYDRALAZINE HYDROCHLORIDE 10 MG: 20 INJECTION INTRAMUSCULAR; INTRAVENOUS at 00:27

## 2020-03-05 RX ADMIN — GLYCOPYRROLATE 1 MG: 1 TABLET ORAL at 09:05

## 2020-03-05 RX ADMIN — CLONIDINE HYDROCHLORIDE 0.1 MG: 0.1 TABLET ORAL at 09:05

## 2020-03-05 ASSESSMENT — PULMONARY FUNCTION TESTS
PIF_VALUE: 27
PIF_VALUE: 20
PIF_VALUE: 21
PIF_VALUE: 19
PIF_VALUE: 24
PIF_VALUE: 22
PIF_VALUE: 23
PIF_VALUE: 23
PIF_VALUE: 25
PIF_VALUE: 28
PIF_VALUE: 27
PIF_VALUE: 21

## 2020-03-05 ASSESSMENT — PAIN DESCRIPTION - LOCATION: LOCATION: BACK;HEAD

## 2020-03-05 ASSESSMENT — PAIN SCALES - GENERAL: PAINLEVEL_OUTOF10: 3

## 2020-03-05 ASSESSMENT — PAIN DESCRIPTION - PAIN TYPE: TYPE: ACUTE PAIN

## 2020-03-05 NOTE — PROGRESS NOTES
%  GENERAL: alert, no distress  NECK: tracheostomy intact, no bleeding. Dressing dry   LUNGS:Ventilated equal and symmetric chest rise/fall, non-labored   HEART: s1+s2  ABDOMEN: soft, nd, nt. Gastrostomy tube intact at 4cm skin edge. Area clean and dry. EXTERMITY: no cyanosis, clubbing or edema    I/O last 3 completed shifts: In: 0620 [I.V.:1680; NG/GT:130]  Out: 3365 [Urine:2700; Emesis/NG output:660; Blood:5]    Drain/tube output: In: 1160 [I.V.:1030; NG/GT:130]  Out: 2735 [Urine:2075]    LAB:  CBC:   Recent Labs     03/03/20  0504 03/04/20  0551 03/05/20  0556   WBC 15.0* 14.7* 20.7*   HGB 10.9* 9.9* 11.4*   HCT 34.9* 31.0* 35.7*   MCV 90.4 89.9 89.3    260 302     BMP:   Recent Labs     03/03/20  0504 03/04/20  0551 03/05/20  0556    141 142   K 4.0 3.6* 3.9    107 104   CO2 25 24 22   BUN 25* 23* 18   CREATININE 1.09 0.91 0.90   GLUCOSE 111* 101* 100*     COAGS:   Recent Labs     03/03/20  1835 03/03/20  2206 03/04/20  0350   APTT 35.2* 62.4* 67.7*       RADIOLOGY:        Mita Mckinley DO  3/5/20, 7:53 AM     Attending Note      I have reviewed the above GCS note(s) and I either performed the key elements of the medical history and physical exam or was present with the surgery resident when the key elements of the medical history and physical exam were performed. I have discussed the findings, established the care plan and recommendations with the surgical team.  Trach/peg sites OK. Start feeds and anti coag. Will sign off.     Galileo Wright MD  3/5/2020  8:08 AM

## 2020-03-05 NOTE — FLOWSHEET NOTE
DATE: 3/5/2020  NAME: Carmen Beck  MRN: [id number]   : 1970    Discharge Recommendations: Continue to Assess (pending progress)     Subjective: RN ok'd PROM   Pain: RANJITH, no facial grimacing   Patient follows: No Commands (pt opens his eyes to his name but follows no commands)  Is patient on ventilator: Yes (trach vent)  Is patient on sedation: No  Precautions: R radial art line    Therapeutic exercises:  UE/LE(s)  Bilateral Passive range of motion all planes x 10 reps  bilateral gastrocnemius stretching 3 reps x 30 seconds    Goals  Short Term Goals  Short term goal 1: Pt will be Briseida to EOB  Short term goal 2: Pt will be CGA while EOB 4min  Short term goal 3: Pt will be modA to transfer with victoriano steady  Short term goal 4: Pt will be safe to attempt ambulation       Plan: Progress functional mobility as medically appropriate.    Time In:840   Time Out: 857  Time Coded Minutes (treatment minutes): 17 minutes   Rehab Potential: RANJITH  Treatments/week: 2-3x per wk    Tracy Dallas PTA

## 2020-03-05 NOTE — DISCHARGE INSTR - COC
Continuity of Care Form    Patient Name: Jennifer Manley   :  1970  MRN:  [id number]    Admit date:  2020  Discharge date:  3/11/2020    Code Status Order: DNR-CCA   Advance Directives:     Admitting Physician:  Rosaura Duffy MD  PCP: Loren Wong PA-C    Discharging Nurse: Tasha 47 Unit/Room#: 3913/3685-83  Discharging Unit Phone Number: [phone]    Emergency Contact:   Extended Emergency Contact Information  Primary Emergency Contact: Kishan Mcbride, [address] Phone: [phone]  Relation: Other  Secondary Emergency Contact: Francie Ramirez Phone: [phone]  Relation: Child    Past Surgical History:  Past Surgical History:   Procedure Laterality Date    [facility] [address] WITH STENT PLACEMENT  2019    x 1 stent for LAD    FOOT SURGERY Bilateral     KNEE SURGERY Right        Immunization History: There is no immunization history on file for this patient.     Active Problems:  Patient Active Problem List   Diagnosis Code    STEMI (ST elevation myocardial infarction) ([facility] [address] I21.3    Coronary artery disease involving native coronary artery of native heart without angina pectoris I25.10    Acute ST segment elevation myocardial infarction ([address] I21.3    Essential hypertension I10    Class 1 obesity due to excess calories with body mass index (BMI) of 32.0 to 32.9 in adult E66.09, Z68.32    Stroke-like symptoms R29.90    Ischemic stroke ([address] I63.9    Hypertensive emergency I16.1    Numbness R20.0    Basilar artery stenosis/occlusion with infarction (HCC) I63.22    Right arm weakness R29.898    Right pontine stroke (HCC) I63.50    Left-sided weakness T68.3    Systolic murmur S60.7    Acute CVA (cerebrovascular accident) ([address] I63.9    Adjustment disorder with depressed mood F43.21    Hypertensive urgency I16.0    Brainstem infarction (HCC) I63.9    Brain stem infarction (HCC) I63.9    Basilar artery stenosis I65.1    Acute copd oxygen:[id number]}  Ventilator:    - Ventilator Settings:    Vt Ordered: 600 mL  Rate Set: 14 bmp  FiO2 : 35 %    PEEP/CPAP: 5  Pressure Support: 10 cmH20  - BiPAP    ,   continuous    Rehab Therapies: Physical Therapy, Occupational Therapy and Speech/Language Therapy  Weight Bearing Status/Restrictions: No weight bearing restirctions  Other Medical Equipment (for information only, NOT a DME order):  hospital bed  Other Treatments:     Patient's personal belongings (please select all that are sent with patient):  None    RN SIGNATURE:  Electronically signed by Gadiel Saab RN on 3/11/20 at 9:55 AM EDT    CASE MANAGEMENT/SOCIAL WORK SECTION    Inpatient Status Date: 2-    Readmission Risk Assessment Score:  Readmission Risk              Risk of Unplanned Readmission:        23           Discharging to Facility/ [address], [address] [place] [id number]       Phone: [phone]       Fax: [phone]            Dialysis Facility (if applicable)   · Name:  · Address:  · Dialysis Schedule:  · Phone:  · Fax:    / signature: Electronically signed by Valarie Irwin RN on 3/10/20 at 5:46 PM EDT    PHYSICIAN SECTION    Prognosis: Guarded    Condition at Discharge: Stable    Rehab Potential (if transferring to Rehab): N/A    Recommended Labs or Other Treatments After Discharge: Continue on Vancomycin and Cefepime for pseudomonas pneumonia with persistent fevers and leukocytosis, now improving. Infectious disease will follow at [facility] and manage antibiotics. Repeat CBC on Friday 3/13. Continue Bromocriptine 5mg BID x5 days then stop. Patient is to continue on Eliquis 5mg BID for 3 months. He will have follow up lower extremity duplex and see Vascular Surgery at that time to determine whether to continue anticoagulation. He will remain on aspirin with the Eliquis.   His home Brilinta 90mg BID (cardiac stent, intracranial athero) should be held until

## 2020-03-05 NOTE — PROGRESS NOTES
Body 130%  · BMI Classification: BMI 30.0 - 34.9 Obese Class I    Nutrition Interventions:   Continue current Tube Feeding  Continued Inpatient Monitoring, Education Not Indicated    Nutrition Evaluation:   · Evaluation: Progressing toward goals   · Goals: provide greater than 75% of nutrition needs   · Monitoring: TF Intake, TF Tolerance, Monitor Bowel Function      Electronically signed by Jennifer Wade RD, LD on 3/5/20 at 2:12 PM    Contact Number: [phone]

## 2020-03-05 NOTE — PROGRESS NOTES
3.9    107 104   CO2 25 24 22   BUN 25* 23* 18   CREATININE 1.09 0.91 0.90   GLUCOSE 111* 101* 100*     Hepatic: No results for input(s): AST, ALT, ALB, BILITOT, ALKPHOS in the last 72 hours. Troponin: No results for input(s): TROPONINI in the last 72 hours. BNP: No results for input(s): BNP in the last 72 hours. Lipids: No results for input(s): CHOL, HDL in the last 72 hours. Invalid input(s): LDLCALCU  INR:   No results for input(s): INR in the last 72 hours. Assessment and Recommendations:   52 y.o. male with past medical history including hypertension, hyperlipidemia, recent history of right pontine stroke with residual left-sided weakness. He was recently discharged on aspirin 81 and Brilinta 90 mg twice daily. During physical therapy session at the rehab he had worsening speech/dysphagia and worsening left-sided weakness. He was sent to [facility] emergency department with systolic blood pressure to 130s. Subsequently he was transferred to neuro ICU for further evaluation. Patient was found to have RASHMI due to decreased p.o. intake and dehydration     He underwent CTH head showing tiny hypodensity in the mayuri. 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     diagnostic angiogram on February 25  Impression:    1. 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 2. Bilateral dominant P-comm   3. Dominant left vertebral artery         Right groin was closed on February 26. Now s/p tracheostomy and PEG tube. 1. Plan to restart dual antiplatelet therapy with aspirin Plavix.   2. High intensity

## 2020-03-05 NOTE — PROGRESS NOTES
Occupational Therapy Not Seen Note    DATE: 3/5/2020  Name: Oscar Briones  : 1970  MRN: [id number]    Patient not available for Occupational Therapy due to:    Pt not following commands and not able to functionally participate.     Next Scheduled Treatment: 3/6/2020    Electronically signed by CRISTINO Kaur on 3/5/2020 at 4:16 PM

## 2020-03-06 ENCOUNTER — APPOINTMENT (OUTPATIENT)
Dept: CT IMAGING | Age: 50
DRG: 005 | End: 2020-03-06
Attending: SPECIALIST
Payer: MEDICARE

## 2020-03-06 ENCOUNTER — APPOINTMENT (OUTPATIENT)
Dept: GENERAL RADIOLOGY | Age: 50
DRG: 005 | End: 2020-03-06
Attending: SPECIALIST
Payer: MEDICARE

## 2020-03-06 LAB
ABSOLUTE EOS #: 0.39 K/UL (ref 0–0.4)
ABSOLUTE IMMATURE GRANULOCYTE: 0 K/UL (ref 0–0.3)
ABSOLUTE LYMPH #: 1.96 K/UL (ref 1–4.8)
ABSOLUTE MONO #: 1.96 K/UL (ref 0.1–0.8)
ALLEN TEST: POSITIVE
ANION GAP SERPL CALCULATED.3IONS-SCNC: 13 MMOL/L (ref 9–17)
BASOPHILS # BLD: 0 % (ref 0–2)
BASOPHILS ABSOLUTE: 0 K/UL (ref 0–0.2)
BUN BLDV-MCNC: 26 MG/DL (ref 6–20)
BUN/CREAT BLD: ABNORMAL (ref 9–20)
CALCIUM IONIZED: 1.17 MMOL/L (ref 1.13–1.33)
CALCIUM SERPL-MCNC: 8.8 MG/DL (ref 8.6–10.4)
CHLORIDE BLD-SCNC: 106 MMOL/L (ref 98–107)
CO2: 23 MMOL/L (ref 20–31)
CREAT SERPL-MCNC: 1.15 MG/DL (ref 0.7–1.2)
CULTURE: NORMAL
CULTURE: NORMAL
DIFFERENTIAL TYPE: ABNORMAL
EOSINOPHILS RELATIVE PERCENT: 1 % (ref 1–4)
FIO2: 60
GFR AFRICAN AMERICAN: >60 ML/MIN
GFR NON-AFRICAN AMERICAN: >60 ML/MIN
GFR SERPL CREATININE-BSD FRML MDRD: ABNORMAL ML/MIN/{1.73_M2}
GFR SERPL CREATININE-BSD FRML MDRD: ABNORMAL ML/MIN/{1.73_M2}
GLUCOSE BLD-MCNC: 138 MG/DL (ref 70–99)
HCT VFR BLD CALC: 34.1 % (ref 40.7–50.3)
HCT VFR BLD CALC: 34.9 % (ref 40.7–50.3)
HEMOGLOBIN: 10.8 G/DL (ref 13–17)
HEMOGLOBIN: 11.3 G/DL (ref 13–17)
IMMATURE GRANULOCYTES: 0 %
LYMPHOCYTES # BLD: 5 % (ref 24–44)
Lab: NORMAL
Lab: NORMAL
MCH RBC QN AUTO: 28.6 PG (ref 25.2–33.5)
MCH RBC QN AUTO: 29.5 PG (ref 25.2–33.5)
MCHC RBC AUTO-ENTMCNC: 31.7 G/DL (ref 28.4–34.8)
MCHC RBC AUTO-ENTMCNC: 32.4 G/DL (ref 28.4–34.8)
MCV RBC AUTO: 90.5 FL (ref 82.6–102.9)
MCV RBC AUTO: 91.1 FL (ref 82.6–102.9)
MODE: ABNORMAL
MONOCYTES # BLD: 5 % (ref 1–7)
MORPHOLOGY: NORMAL
NEGATIVE BASE EXCESS, ART: ABNORMAL (ref 0–2)
NRBC AUTOMATED: 0 PER 100 WBC
NRBC AUTOMATED: 0 PER 100 WBC
O2 DEVICE/FLOW/%: ABNORMAL
PARTIAL THROMBOPLASTIN TIME: 26 SEC (ref 20.5–30.5)
PARTIAL THROMBOPLASTIN TIME: 39.6 SEC (ref 20.5–30.5)
PARTIAL THROMBOPLASTIN TIME: 51 SEC (ref 20.5–30.5)
PATIENT TEMP: ABNORMAL
PDW BLD-RTO: 12.8 % (ref 11.8–14.4)
PDW BLD-RTO: 13.2 % (ref 11.8–14.4)
PLATELET # BLD: 286 K/UL (ref 138–453)
PLATELET # BLD: 317 K/UL (ref 138–453)
PLATELET ESTIMATE: ABNORMAL
PMV BLD AUTO: 10.8 FL (ref 8.1–13.5)
PMV BLD AUTO: 11.5 FL (ref 8.1–13.5)
POC HCO3: 25.7 MMOL/L (ref 21–28)
POC O2 SATURATION: 99 % (ref 94–98)
POC PCO2 TEMP: ABNORMAL MM HG
POC PCO2: 33.5 MM HG (ref 35–48)
POC PH TEMP: ABNORMAL
POC PH: 7.49 (ref 7.35–7.45)
POC PO2 TEMP: ABNORMAL MM HG
POC PO2: 110 MM HG (ref 83–108)
POSITIVE BASE EXCESS, ART: 3 (ref 0–3)
POTASSIUM SERPL-SCNC: 3.7 MMOL/L (ref 3.7–5.3)
RBC # BLD: 3.77 M/UL (ref 4.21–5.77)
RBC # BLD: 3.83 M/UL (ref 4.21–5.77)
RBC # BLD: ABNORMAL 10*6/UL
SAMPLE SITE: ABNORMAL
SEG NEUTROPHILS: 89 % (ref 36–66)
SEGMENTED NEUTROPHILS ABSOLUTE COUNT: 34.89 K/UL (ref 1.8–7.7)
SODIUM BLD-SCNC: 142 MMOL/L (ref 135–144)
SPECIMEN DESCRIPTION: NORMAL
SPECIMEN DESCRIPTION: NORMAL
TCO2 (CALC), ART: 27 MMOL/L (ref 22–29)
WBC # BLD: 37 K/UL (ref 3.5–11.3)
WBC # BLD: 39.2 K/UL (ref 3.5–11.3)
WBC # BLD: ABNORMAL 10*3/UL

## 2020-03-06 PROCEDURE — 80048 BASIC METABOLIC PNL TOTAL CA: CPT

## 2020-03-06 PROCEDURE — 6360000002 HC RX W HCPCS: Performed by: STUDENT IN AN ORGANIZED HEALTH CARE EDUCATION/TRAINING PROGRAM

## 2020-03-06 PROCEDURE — 99291 CRITICAL CARE FIRST HOUR: CPT | Performed by: PSYCHIATRY & NEUROLOGY

## 2020-03-06 PROCEDURE — 6370000000 HC RX 637 (ALT 250 FOR IP): Performed by: STUDENT IN AN ORGANIZED HEALTH CARE EDUCATION/TRAINING PROGRAM

## 2020-03-06 PROCEDURE — 71045 X-RAY EXAM CHEST 1 VIEW: CPT

## 2020-03-06 PROCEDURE — 71260 CT THORAX DX C+: CPT

## 2020-03-06 PROCEDURE — 70450 CT HEAD/BRAIN W/O DYE: CPT

## 2020-03-06 PROCEDURE — 94003 VENT MGMT INPAT SUBQ DAY: CPT

## 2020-03-06 PROCEDURE — 85027 COMPLETE CBC AUTOMATED: CPT

## 2020-03-06 PROCEDURE — 6360000002 HC RX W HCPCS: Performed by: PSYCHIATRY & NEUROLOGY

## 2020-03-06 PROCEDURE — 2000000003 HC NEURO ICU R&B

## 2020-03-06 PROCEDURE — 85025 COMPLETE CBC W/AUTO DIFF WBC: CPT

## 2020-03-06 PROCEDURE — 37799 UNLISTED PX VASCULAR SURGERY: CPT

## 2020-03-06 PROCEDURE — 94761 N-INVAS EAR/PLS OXIMETRY MLT: CPT

## 2020-03-06 PROCEDURE — 2580000003 HC RX 258: Performed by: STUDENT IN AN ORGANIZED HEALTH CARE EDUCATION/TRAINING PROGRAM

## 2020-03-06 PROCEDURE — 82803 BLOOD GASES ANY COMBINATION: CPT

## 2020-03-06 PROCEDURE — 2500000003 HC RX 250 WO HCPCS: Performed by: STUDENT IN AN ORGANIZED HEALTH CARE EDUCATION/TRAINING PROGRAM

## 2020-03-06 PROCEDURE — 6360000002 HC RX W HCPCS: Performed by: NURSE PRACTITIONER

## 2020-03-06 PROCEDURE — 6360000002 HC RX W HCPCS

## 2020-03-06 PROCEDURE — 82330 ASSAY OF CALCIUM: CPT

## 2020-03-06 PROCEDURE — 99254 IP/OBS CNSLTJ NEW/EST MOD 60: CPT | Performed by: INTERNAL MEDICINE

## 2020-03-06 PROCEDURE — 6360000002 HC RX W HCPCS: Performed by: INTERNAL MEDICINE

## 2020-03-06 PROCEDURE — 85730 THROMBOPLASTIN TIME PARTIAL: CPT

## 2020-03-06 PROCEDURE — 6360000004 HC RX CONTRAST MEDICATION: Performed by: STUDENT IN AN ORGANIZED HEALTH CARE EDUCATION/TRAINING PROGRAM

## 2020-03-06 PROCEDURE — 94770 HC ETCO2 MONITOR DAILY: CPT

## 2020-03-06 PROCEDURE — 6370000000 HC RX 637 (ALT 250 FOR IP): Performed by: NURSE PRACTITIONER

## 2020-03-06 PROCEDURE — 2580000003 HC RX 258: Performed by: INTERNAL MEDICINE

## 2020-03-06 PROCEDURE — 2580000003 HC RX 258: Performed by: NURSE PRACTITIONER

## 2020-03-06 PROCEDURE — 2700000000 HC OXYGEN THERAPY PER DAY

## 2020-03-06 PROCEDURE — 93970 EXTREMITY STUDY: CPT

## 2020-03-06 RX ORDER — HEPARIN SODIUM 10000 [USP'U]/100ML
18 INJECTION, SOLUTION INTRAVENOUS CONTINUOUS
Status: DISPENSED | OUTPATIENT
Start: 2020-03-06 | End: 2020-03-08

## 2020-03-06 RX ORDER — CHLORPROMAZINE HYDROCHLORIDE 25 MG/1
25 TABLET, FILM COATED ORAL 4 TIMES DAILY PRN
Status: DISCONTINUED | OUTPATIENT
Start: 2020-03-06 | End: 2020-03-11 | Stop reason: HOSPADM

## 2020-03-06 RX ORDER — BISACODYL 10 MG
10 SUPPOSITORY, RECTAL RECTAL ONCE
Status: DISCONTINUED | OUTPATIENT
Start: 2020-03-06 | End: 2020-03-11 | Stop reason: HOSPADM

## 2020-03-06 RX ORDER — MIDAZOLAM HYDROCHLORIDE 1 MG/ML
INJECTION INTRAMUSCULAR; INTRAVENOUS
Status: COMPLETED
Start: 2020-03-06 | End: 2020-03-06

## 2020-03-06 RX ORDER — ACETAMINOPHEN 325 MG/1
650 TABLET ORAL EVERY 4 HOURS
Status: DISCONTINUED | OUTPATIENT
Start: 2020-03-06 | End: 2020-03-07

## 2020-03-06 RX ADMIN — FENTANYL CITRATE 50 MCG: 50 INJECTION, SOLUTION INTRAMUSCULAR; INTRAVENOUS at 19:08

## 2020-03-06 RX ADMIN — PANTOPRAZOLE SODIUM 40 MG: 40 TABLET, DELAYED RELEASE ORAL at 10:01

## 2020-03-06 RX ADMIN — SALINE NASAL SPRAY 2 SPRAY: 1.5 SOLUTION NASAL at 08:49

## 2020-03-06 RX ADMIN — HYDRALAZINE HYDROCHLORIDE 50 MG: 50 TABLET, FILM COATED ORAL at 06:07

## 2020-03-06 RX ADMIN — HYDRALAZINE HYDROCHLORIDE 10 MG: 20 INJECTION INTRAMUSCULAR; INTRAVENOUS at 17:59

## 2020-03-06 RX ADMIN — FOLIC ACID 1 MG: 1 TABLET ORAL at 20:20

## 2020-03-06 RX ADMIN — Medication 10 MG: at 20:21

## 2020-03-06 RX ADMIN — Medication 15 ML: at 09:00

## 2020-03-06 RX ADMIN — MIDAZOLAM HYDROCHLORIDE 2 MG: 1 INJECTION, SOLUTION INTRAMUSCULAR; INTRAVENOUS at 10:52

## 2020-03-06 RX ADMIN — FLUTICASONE PROPIONATE 2 SPRAY: 50 SPRAY, METERED NASAL at 08:49

## 2020-03-06 RX ADMIN — STANDARDIZED SENNA CONCENTRATE AND DOCUSATE SODIUM 2 TABLET: 8.6; 5 TABLET ORAL at 08:47

## 2020-03-06 RX ADMIN — ACETAMINOPHEN 650 MG: 325 TABLET ORAL at 14:54

## 2020-03-06 RX ADMIN — HYDRALAZINE HYDROCHLORIDE 10 MG: 20 INJECTION INTRAMUSCULAR; INTRAVENOUS at 02:14

## 2020-03-06 RX ADMIN — AMLODIPINE BESYLATE 10 MG: 10 TABLET ORAL at 08:47

## 2020-03-06 RX ADMIN — Medication 1500 MG: at 17:26

## 2020-03-06 RX ADMIN — HYDRALAZINE HYDROCHLORIDE 50 MG: 50 TABLET, FILM COATED ORAL at 14:54

## 2020-03-06 RX ADMIN — IOHEXOL 75 ML: 350 INJECTION, SOLUTION INTRAVENOUS at 16:36

## 2020-03-06 RX ADMIN — CEFEPIME HYDROCHLORIDE 2 G: 2 INJECTION, POWDER, FOR SOLUTION INTRAVENOUS at 10:01

## 2020-03-06 RX ADMIN — CEFEPIME HYDROCHLORIDE 2 G: 2 INJECTION, POWDER, FOR SOLUTION INTRAVENOUS at 20:11

## 2020-03-06 RX ADMIN — FOLIC ACID 1 MG: 1 TABLET ORAL at 08:47

## 2020-03-06 RX ADMIN — ACETAMINOPHEN 650 MG: 325 TABLET ORAL at 04:04

## 2020-03-06 RX ADMIN — Medication 15 ML: at 20:20

## 2020-03-06 RX ADMIN — TICAGRELOR 90 MG: 90 TABLET ORAL at 08:47

## 2020-03-06 RX ADMIN — SODIUM CHLORIDE, PRESERVATIVE FREE 10 ML: 5 INJECTION INTRAVENOUS at 20:21

## 2020-03-06 RX ADMIN — GLYCOPYRROLATE 1 MG: 1 TABLET ORAL at 08:47

## 2020-03-06 RX ADMIN — ENOXAPARIN SODIUM 30 MG: 30 INJECTION SUBCUTANEOUS at 08:47

## 2020-03-06 RX ADMIN — ACETAMINOPHEN 650 MG: 325 TABLET ORAL at 11:06

## 2020-03-06 RX ADMIN — CHLORPROMAZINE HYDROCHLORIDE 25 MG: 25 TABLET, SUGAR COATED ORAL at 20:20

## 2020-03-06 RX ADMIN — LISINOPRIL 40 MG: 20 TABLET ORAL at 08:47

## 2020-03-06 RX ADMIN — Medication 20 MG: at 08:47

## 2020-03-06 RX ADMIN — FENTANYL CITRATE 50 MCG: 50 INJECTION, SOLUTION INTRAMUSCULAR; INTRAVENOUS at 21:05

## 2020-03-06 RX ADMIN — PIPERACILLIN AND TAZOBACTAM 3.38 G: 3; .375 INJECTION, POWDER, LYOPHILIZED, FOR SOLUTION INTRAVENOUS at 04:14

## 2020-03-06 RX ADMIN — ACETAMINOPHEN 650 MG: 325 TABLET ORAL at 23:01

## 2020-03-06 RX ADMIN — HEPARIN SODIUM 18 UNITS/KG/HR: 10000 INJECTION, SOLUTION INTRAVENOUS at 11:22

## 2020-03-06 RX ADMIN — ACETAMINOPHEN 650 MG: 325 TABLET ORAL at 21:05

## 2020-03-06 RX ADMIN — FENTANYL CITRATE 50 MCG: 50 INJECTION, SOLUTION INTRAMUSCULAR; INTRAVENOUS at 02:14

## 2020-03-06 RX ADMIN — HYDRALAZINE HYDROCHLORIDE 50 MG: 50 TABLET, FILM COATED ORAL at 20:20

## 2020-03-06 RX ADMIN — ROSUVASTATIN CALCIUM 40 MG: 20 TABLET, FILM COATED ORAL at 20:20

## 2020-03-06 RX ADMIN — ASPIRIN 81 MG: 81 TABLET, CHEWABLE ORAL at 08:47

## 2020-03-06 RX ADMIN — SODIUM CHLORIDE, PRESERVATIVE FREE 10 ML: 5 INJECTION INTRAVENOUS at 09:00

## 2020-03-06 ASSESSMENT — PULMONARY FUNCTION TESTS
PIF_VALUE: 26
PIF_VALUE: 23
PIF_VALUE: 25
PIF_VALUE: 23
PIF_VALUE: 27
PIF_VALUE: 23
PIF_VALUE: 23
PIF_VALUE: 25
PIF_VALUE: 23
PIF_VALUE: 19
PIF_VALUE: 23
PIF_VALUE: 27
PIF_VALUE: 24
PIF_VALUE: 28
PIF_VALUE: 14
PIF_VALUE: 19
PIF_VALUE: 13

## 2020-03-06 ASSESSMENT — PAIN SCALES - GENERAL: PAINLEVEL_OUTOF10: 9

## 2020-03-06 ASSESSMENT — PAIN SCALES - WONG BAKER
WONGBAKER_NUMERICALRESPONSE: 4

## 2020-03-06 NOTE — FLOWSHEET NOTE
DATE: 3/6/2020    NAME: Evert Cruz  MRN: [id number]   : 1970    Patient not seen this date for Physical Therapy due to:  [] Blood transfusion in progress  [] Hemodialysis  []  Patient Declined  [] Spine Precautions   [] Strict Bedrest  [] Surgery/ Procedure  [] Testing      [x] Other. Bilateral DVT's         [] PT being discontinued at this time. Patient independent. No further needs. [] PT being discontinued at this time as the patient has been transferred to palliative care. No further needs. Rosemarie Rose  SPTA  Treatment performed by Student PTA under the supervision of co-signing PTA who agrees with all treatment and documentation.    Alyx Ford PTA

## 2020-03-06 NOTE — PROGRESS NOTES
Occupational Therapy Not Seen Note    DATE: 3/6/2020  Name: Mitchell Gray  : 1970  MRN: [id number]    Patient not available for Occupational Therapy due to:  Bilateral DVTs        Next Scheduled Treatment: 3/9/2020    Electronically signed by CRISTINO Vigil on 3/6/2020 at 2:20 PM

## 2020-03-06 NOTE — CARE COORDINATION
Patient Trach/ ventillated/TF, Bethany Donald in from [place] relates can take patient when ready, asked him to start precert, patient with WBC 37 today, doppler results pending. Palliative following.  Plan remains the same [facility]

## 2020-03-06 NOTE — CONSULTS
[facility] - Initial Consult Note  Today's Date and Time: 3/6/2020, 10:19 AM    Impression :   · Pontine infarction   · MRSA carrier  · Reactive leukocytosis  · Fevers  · Pseudomonas in sputum. 3-2-20    Recommendations:   · Increase dose of cefepime to 2 gm IV q 8 hr  · Consider CT of abdomen  · If Abdominal CT is non yielding consider repeat MRI of brain. Medical Decision Making/Summary/Discussion:3/6/2020     · Patient with initial Rt pontine infarct 2-13-20  · Hypertensive urgency at [facility] on 2-24-20 MRI with increased number of infarcts   · Transferred back to [facility].  · Has developed leukemoid reaction and fevers. Cause unclear  · Abdomen tense. PEG in place. CT of abdomen would be helpful to exclude hematoma or fluid collection  · If CT non yielding repeat MRI should be considered to follow evolution of infarcts  Infection Control Recommendations   · [place] Precautions  · Contact Isolation MRSA    Antimicrobial Stewardship Recommendations     · Simplification of therapy  · Targeted therapy    Coordination of Outpatient Care:   · Estimated Length of IV antimicrobials:TBD  · Patient will need Midline Catheter Insertion: No  · Patient will need PICC line Insertion:No  · Patient will need: Home IV , [place],  SNF,  LTAC:TBD  · Patient will need outpatient wound care:No    Chief complaint/reason for consultation:   · Fevers, leukocytosis      History of Present Illness:   Kae Harding is a 52y.o.-year-old  male who was initially admitted on 2/24/2020. Patient seen at the request of . INITIAL HISTORY:    Patient previously admitted to [facility] on 2-13-20 with dysarthria and Lt hemiparesis secondary to Rt pontine infarction. Treated with heparin and then ASA and Brilinta. Transferred to [facility] on 2-19-20 where he exhibited some decline in speech and ability to swallow.      Transferred to [facility] on 2-24-20 with fluticasone  2 spray Each Nostril Daily    sodium chloride  2 spray Each Nostril Daily    lisinopril  40 mg Oral Daily    folic acid  1 mg Oral BID    rosuvastatin  40 mg Oral Nightly    amLODIPine  10 mg Oral Daily    FLUoxetine  20 mg Per NG tube Daily    chlorhexidine  15 mL Mouth/Throat BID    sennosides-docusate sodium  2 tablet Oral Daily    sodium chloride flush  10 mL Intravenous 2 times per day       Social History:     Social History     Socioeconomic History    Marital status: Single     Spouse name: Not on file    Number of children: Not on file    Years of education: Not on file    Highest education level: Not on file   Occupational History    Not on file   Social Needs    Financial resource strain: Not on file    Food insecurity:     Worry: Not on file     Inability: Not on file    Transportation needs:     Medical: Not on file     Non-medical: Not on file   Tobacco Use    Smoking status: Never Smoker    Smokeless tobacco: Never Used   Substance and Sexual Activity    Alcohol use:  Yes    Drug use: Never    Sexual activity: Not on file   Lifestyle    Physical activity:     Days per week: Not on file     Minutes per session: Not on file    Stress: Not on file   Relationships    Social connections:     Talks on phone: Not on file     Gets together: Not on file     Attends Anabaptism service: Not on file     Active member of club or organization: Not on file     Attends meetings of clubs or organizations: Not on file     Relationship status: Not on file    Intimate partner violence:     Fear of current or ex partner: Not on file     Emotionally abused: Not on file     Physically abused: Not on file     Forced sexual activity: Not on file   Other Topics Concern    Not on file   Social History Narrative    Not on file       Family History:     Family History   Problem Relation Age of Onset    High Blood Pressure Mother     High Blood Pressure Father     Hypertension Sister    Kitty Bryant Hypertension Brother         Allergies:   Patient has no known allergies. Review of Systems:     Unable to provide. Sedated on ventilator. 3/6/2020      Constitutional: No fevers or chills. No systemic complaints  Head: No headaches  Eyes: No double vision or blurry vision. No conjunctival inflammation. ENT: No sore throat or runny nose. . No hearing loss, tinnitus or vertigo. Cardiovascular: No chest pain or palpitations. No shortness of breath. No CRAWFORD  Lung: No shortness of breath or cough. No sputum production  Abdomen: No nausea, vomiting, diarrhea, or abdominal pain. Kamini Hazard No cramps. Genitourinary: No increased urinary frequency, or dysuria. No hematuria. No suprapubic or CVA pain  Musculoskeletal: No muscle aches or pains. No joint effusions, swelling or deformities  Hematologic: No bleeding or bruising. Neurologic: Headache, weakness  Integument: No rash, no ulcers. Psychiatric: No depression. Endocrine: No polyuria, no polydipsia, no polyphagia. Physical Examination :     Patient Vitals for the past 8 hrs:   BP Temp Temp src Pulse Resp SpO2   03/06/20 0952 -- -- -- 81 -- 97 %   03/06/20 0930 -- -- -- 78 -- 97 %   03/06/20 0847 (!) 164/73 -- -- -- -- --   03/06/20 0621 (!) 146/70 -- -- 92 -- 96 %   03/06/20 0522 138/68 -- -- 83 -- 96 %   03/06/20 0438 -- -- -- 87 -- 98 %   03/06/20 0428 -- -- -- 86 23 99 %   03/06/20 0421 (!) 143/70 102 °F (38.9 °C) Oral 88 18 96 %   03/06/20 0400 -- -- -- 98 -- --   03/06/20 0322 (!) 161/73 -- -- 96 -- 93 %     General Appearance: Awake, not interactive  Head:  Normocephalic, no trauma  Eyes: Pupils equal, round, reactive to light; sclera anicteric; conjunctivae pink. No embolic phenomena. ENT: Oropharynx clear, without erythema, exudate, or thrush. No tenderness of sinuses. Mouth/throat: mucosa pink and moist. No lesions. Dentition in good repair. Neck:Supple, without lymphadenopathy. Thyroid normal, No bruits.   Pulmonary/Chest: Clear to auscultation, without and/or   developing infection.  In part this is related to vascular crowding.  No   lobar consolidation is seen.  No large pleural effusions.  The patient is   slightly rotated.           Impression   Slight increase in subtle patchy perihilar opacities. Medical Decision Uzpjbl-Fzefesct-Lhtfa:     3/4/2020 10:08 AM - Eduardo, Jordan Incoming Lab Results From [employer]     Specimen Information: Endotracheal        Component Collected Lab   Specimen Description 03/02/2020 11:40  [address]   . ENDOTRACHEAL    Special Requests 03/02/2020 11:40  [address]   NOT REPORTED    Direct Exam 03/02/2020 11:40  [address]   < 10 EPITHELIAL CELLS/LPF    Direct Exam 03/02/2020 11:40  [address]   >25 NEUTROPHILS/LPF    Direct Exam Abnormal  03/02/2020 11:40  [address]   PREDOMINANT ORGANISM: GRAM NEGATIVE RODS    Direct Exam Abnormal  03/02/2020 11:40  [address]   MIXED BACTERIAL MORPHOTYPES ALSO PRESENT ON GRAM STAIN.     Culture Abnormal  03/02/2020 11:40 AM [address]    Culture 03/02/2020 11:40  [address]   NORMAL RESPIRATORY MIRELA HEAVY GROWTH    Testing Performed By     Lab - Abbreviation Name Director Address Valid Date Range   [address] [address] [place]azo [place] [address] [facility] [address] 08/30/17 0801-Present   Susceptibility     Pseudomonas aeruginosa (1)     Antibiotic Interpretation CINDY Status    amikacin   Final     NOT REPORTED   ceFAZolin   Final     NOT REPORTED   cefepime Sensitive  Final     2  SUSCEPTIBLE   ciprofloxacin Sensitive  Final     <=0.25  SUSCEPTIBLE   gentamicin Sensitive  Final     <=1  SUSCEPTIBLE   meropenem   Final     NOT REPORTED   tigecycline   Final     NOT REPORTED   tobramycin Sensitive  Final     <=1  SUSCEPTIBLE   piperacillin-tazobactam Sensitive  Final 8  SUSCEPTIBLE   Lab and Collection       Medical Decision Making-Other:     Note:  · Labs, medications, radiologic studies were reviewed with personal review of films  · Large amounts of data were reviewed  · Discussed with nursing Staff, Discharge planner  · Infection Control and Prevention measures reviewed  · All prior entries were reviewed  · Administer medications as ordered  · Prognosis: Guarded  · Discharge planning reviewed  · Follow up as outpatient. Thank you for allowing us to participate in the care of this patient. Please call with questions.     Paul Knapp MD  Pager: [phone] - Office: [phone]

## 2020-03-06 NOTE — PROGRESS NOTES
Daily Progress Note  Neuro Critical Care    Patient Name: Maricruz Collazo  Patient : 1970  Room/Bed: 6979/9780-34  Code Status: FULL  Allergies: No Known Allergies    CHIEF COMPLAINT:     Brainstem infarction     INTERVAL HISTORY    Initial Presentation (Admitted 20): The patient is a 51 yo with a history of HTN, CAD s/p stent (2019), and recent right pontine infarct who presented as a transfer from [place] [address] for worsening aphasia, dysphagia, and left hemiparesis. Patient was recently admitted to [facility] on 20 after presenting with dysarthria and left hemiparesis and found to have a right pontine infarction. He was out of window for tPA. 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 He was maintained on his home ASA and Brilinta and was treated with a low dose heparin infusion for 48h. Repeat CTA Head/Neck on 2/15 remained stable and patient's clinical exam remained unchanged after heparin was stopped. He was discharged to [facility] on . At the time of discharge he had a left facial droop and mild to moderate left sided weakness (LUE 3/5, LLE 4/5). For the last few days prior to re-admission, family states they started to notice patient's speech started to decline and he was having more difficulty swallowing. On , he was noted by the nurse to have decreased word output and to be coughing with liquids. CT Head performed which showed a stable right pontine infarct. On , patient was sent to [place] [address] ED due to hypertensive urgency (237/130). Given 20mg IV Labetalol with improvement of BP down to 152/99. Started on IV fluids for RASHMI. Repeat CT head showed stable right pontine infarct. MRI Brain showed increasing multifocal infarct in the mayuri extending to medulla and new punctate infarct left cerebellar hemisphere.   Patient initially admitted to the ICU at [place] [address] but then decision made to transfer to [facility] Neuro ICU for further evaluation and management. 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 CT Brain perfusion with no perfusion mismatch. Started on a low dose heparin infusion with 4000u bolus and maintained on Aspirin and Brilinta. Hospital Course:  2/25: Low intensity heparin infusion, PTT this AM 35.7. Loaded with 180mg Brilinta around 0300 this AM via NG tube. 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 Diagnostic cerebral angiogram revealed mid basilar artery occlusion distal to anterior inferior cerebellar artery and proximal to superior cerebellar artery with retrograde filling to the superior cerebral artery from anterior circulation. Sheath remained in place post op.  2/26: Right groin with mild oozing and tenderness overnight. Drowsy and not following commands overnight. STAT CT head stable. CT abd/pelvis negative for hematoma. Sheath removed. Family meeting for update on severity of deficits. 2/27: Urology consulted for hematuria. Heparin held. Intubated for airway protection and impending respiratory failure. Gen Surg consulted for trach/PEG. Antiplatelets held. Started on Unasyn for pneumonia. 2/28: Jacques removed, hematuria cleared. Heparin infusion restarted. Remains intubated and on Propofol 10mcg/kg/min to help with coughing. Clonidine patch 0.2mg/24h started for improved BP control. 2/29: Off propofol. Febrile. Pink frothy sputum noted by respiratory. 3/1: Febrile, resolved with IV Tylenol. Clonidine increased to 0.2mg BID, home Lisinopril 40mg QD restarted. 3/2: Continued fevers (Tmax 38.6C) and leukocytosis despite being on day 5 of Unasyn, changed to Vanc and Zosyn. Large amount of thick, yellowish secretions; repeat respiratory culture. Mild hypernatremia, sodium 146, start free water 150ml Q6h.   Discussion regarding trach/PE;, obese)  [x]30-34.9 Obese class 1 (Low Risk)  []35-39.9 Obese class 2 (Moderate Risk)  []?40 Obese class 3 (High Risk)    RECENT LABS:   Lab Results   Component Value Date    WBC 37.0 (HH) 03/06/2020    HGB 10.8 (L) 03/06/2020    HCT 34.1 (L) 03/06/2020     03/06/2020    CHOL 140 02/14/2020    TRIG 70 02/29/2020    HDL 41 02/14/2020    ALT 57 (H) 03/05/2020    AST 36 03/05/2020     03/06/2020    K 3.7 03/06/2020     03/06/2020    CREATININE 1.15 03/06/2020    BUN 26 (H) 03/06/2020    CO2 23 03/06/2020    INR 1.1 02/24/2020    LABA1C 5.6 02/14/2020     24 HOUR INTAKE/OUTPUT:    Intake/Output Summary (Last 24 hours) at 3/6/2020 0711  Last data filed at 3/6/2020 0546  Gross per 24 hour   Intake 2023 ml   Output 1000 ml   Net 1023 ml       IMAGING:  Xr Chest Portable  Result Date: 3/6/2020  Slight increase in subtle patchy perihilar opacities. Vl Dup Lower Extremity Venous Bilateral  Result Date: 3/6/2020  Conclusions   Summary   Simultaneous real time imaging utilizing B-Mode, color doppler and  spectral waveform analysis was performed on the bilateral lower  extremities for venous examination of the deep and superficial systems. Findings are:   Right:  Acute deep venous thrombosis identified in the gastrocnemius vein. Left:  Acute deep venous thrombosis extending from the common femoral vein into  the external iliac vein. Acute deep venous thrombosis identified in the gastrocnemius vein. Cta Head Neck W Contrast  Result Date: 2/25/2020  1. Hypodense areas in the mayuri concordant with acute infarct visualized at MR. 2. No perfusion mismatch. 3. 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 4. Otherwise patent cervical and cerebral vasculature     Mri Brain Wo Contrast  Result Date: 2/24/2020  Increasing multifocal acute infarct in the mayuri.  New punctate acute infarct left anterolateral cerebellar hemisphere Multifocal periventricular and subcortical white matter artery with no flow in the mid to distal portion  - POD # 10 s/p diagnostic cerebral angiogram showing mid basilar artery occlusion  - Lesion thought to be stenotic in nature rather than thrombus per Endovascular   - Continue Asprin 81mg QD and Crestor for secondary stroke prevention  - HOLD Brilinta 90mg BID as patient is being started on high dose heparin infusion for acute DVT  - Hypercoag panel reviewed; +Hetero MTHFR mutations, Hem/Onc following  - Neuro Endovascular following; no further intervention planned  - Goal -180  - Neuro checks per protocol    CARDIOVASCULAR:  - Goal -180  - PRN Hydralazine/Labetalol  - Continue Norvasc 10mg QD, Lisinopril 40mg QD, Hydralazine 50mg Q8h  - History of CAD s/p stent 2019  - Continue Aspirin, HOLD Brilinta (starting heparin gtt due to acute DVTs)  - Echo 2/ EF 55%, severe LVH, aortic root dilation with mild aortic insufficiency  - Outpatient Cardiology follow up  - Lower extremity duplex revealed bilateral acute DVTs involving R gastroc, L common femoral to external iliac, and left gastroc  - Start high intensity heparin infusion without boluses  - Hyperlipidemia, recent lipid panel; LDL 91, Cholesterol 140  - Crestor 40mg QHS  - Continue telemetry    PULMONARY:  - Intubated 2/27 for airway protection and impending respiratory failure  - POD #2 s/p trach  - Vent Settings: PRVC 60/14/500/10  - AB.44/38.9/86.5/26.5  - CXR 3/6 with slightly increased patchy perihilar opacities  - Repeat respiratory culture +pseudomonas, await sensitivities   - Changed to Cefepime for pseudomonas tracheobronchitis due to persistent fevers and worsening leukocytosis    RENAL/FLUID/ELECTROLYTE:  - Normal renal functioning  - BUN 26/ Creatinine 1.15  - Monitor I&O;   - Urology evaluated for neurogenic bladder and hematuria; Bladder scan Qshift and straight cath for PVR>400ml, hematuria resolved  - Total fluids 100cc/hr  - Replace electrolytes PRN  - Daily

## 2020-03-06 NOTE — PLAN OF CARE
Problem: MECHANICAL VENTILATION  Goal: Mobility/activity is maintained at optimum level for patient  Outcome: Ongoing     Problem: MECHANICAL VENTILATION  Goal: Ability to express needs and understand communication  Outcome: Ongoing     Problem: MECHANICAL VENTILATION  Goal: Patient will maintain patent airway  Outcome: Ongoing     Problem: MECHANICAL VENTILATION  Goal: Oral health is maintained or improved  Outcome: Ongoing     Problem: MECHANICAL VENTILATION  Goal: ET tube will be managed safely  Outcome: Ongoing

## 2020-03-06 NOTE — PROGRESS NOTES
Pharmacy Note  Vancomycin Consult    Nilay Priest is a 52 y.o. male started on Vancomycin for fever & worsening leukocytosis; consult received from [facility] on behalf of Dr. Humberto Ramirez to manage therapy. Also receiving the following antibiotics: cefepime. Patient Active Problem List   Diagnosis    STEMI (ST elevation myocardial infarction) ([facility]    Coronary artery disease involving native coronary artery of native heart without angina pectoris    Acute ST segment elevation myocardial infarction [facility])    Essential hypertension    Class 1 obesity due to excess calories with body mass index (BMI) of 32.0 to 32.9 in adult    Stroke-like symptoms    Ischemic stroke ([facility]    Hypertensive emergency    Numbness    Basilar artery stenosis/occlusion with infarction (HCC)    Right arm weakness    Right pontine stroke (HCC)    Left-sided weakness    Systolic murmur    Acute CVA (cerebrovascular accident) ([facility]    Adjustment disorder with depressed mood    Hypertensive urgency    Brainstem infarction [facility])    Brain stem infarction ([facility]    Basilar artery stenosis    Acute respiratory failure with hypoxia ([facility]    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      Temp max: 38.9 C    Recent Labs     03/05/20  0556 03/06/20  0449   BUN 18 26*       Recent Labs     03/05/20  0556 03/06/20  0449   CREATININE 0.90 1.15       Recent Labs     03/06/20  0449 03/06/20  1428   WBC 37.0* 39.2*         Intake/Output Summary (Last 24 hours) at 3/6/2020 1532  Last data filed at 3/6/2020 0546  Gross per 24 hour   Intake 2023 ml   Output 1000 ml   Net 1023 ml       Culture Date      Source                       Results  3/5                      Blood                           Pending  3/5                       Sputum                      Pseudomonas  2/24                    MRSA nasal swab         Positive    Ht Readings from Last 1 Encounters:   02/27/20 6' 1\" (1.854 m)        Wt Readings from Last 1 Encounters:   02/24/20 238 lb 12.1 oz (108.3 kg)         Body mass index is 31.5 kg/m². Estimated Creatinine Clearance: 101 mL/min (based on SCr of 1.15 mg/dL). Goal Trough Level: 10-20 mcg/mL    Assessment/Plan:  Will initiate vancomycin 1500 mg IV every 12 hours. Timing of trough level will be determined based on culture results, renal function, and clinical response. Thank you for the consult. Will continue to follow.     Tamiko SilvaD, ROSEANNE, BCPS 3/6/2020 3:35 PM

## 2020-03-07 ENCOUNTER — APPOINTMENT (OUTPATIENT)
Dept: GENERAL RADIOLOGY | Age: 50
DRG: 005 | End: 2020-03-07
Attending: SPECIALIST
Payer: MEDICARE

## 2020-03-07 LAB
-: NORMAL
ALLEN TEST: NORMAL
ANION GAP SERPL CALCULATED.3IONS-SCNC: 13 MMOL/L (ref 9–17)
BUN BLDV-MCNC: 26 MG/DL (ref 6–20)
BUN/CREAT BLD: ABNORMAL (ref 9–20)
CALCIUM IONIZED: 1.17 MMOL/L (ref 1.13–1.33)
CALCIUM SERPL-MCNC: 8.5 MG/DL (ref 8.6–10.4)
CHLORIDE BLD-SCNC: 108 MMOL/L (ref 98–107)
CO2: 23 MMOL/L (ref 20–31)
CREAT SERPL-MCNC: 0.88 MG/DL (ref 0.7–1.2)
CULTURE: NORMAL
CULTURE: NORMAL
FIO2: 40
GFR AFRICAN AMERICAN: >60 ML/MIN
GFR NON-AFRICAN AMERICAN: >60 ML/MIN
GFR SERPL CREATININE-BSD FRML MDRD: ABNORMAL ML/MIN/{1.73_M2}
GFR SERPL CREATININE-BSD FRML MDRD: ABNORMAL ML/MIN/{1.73_M2}
GLUCOSE BLD-MCNC: 126 MG/DL (ref 70–99)
HCT VFR BLD CALC: 30.6 % (ref 40.7–50.3)
HEMOGLOBIN: 9.6 G/DL (ref 13–17)
Lab: NORMAL
Lab: NORMAL
MCH RBC QN AUTO: 28.7 PG (ref 25.2–33.5)
MCHC RBC AUTO-ENTMCNC: 31.4 G/DL (ref 28.4–34.8)
MCV RBC AUTO: 91.3 FL (ref 82.6–102.9)
MODE: NORMAL
NEGATIVE BASE EXCESS, ART: NORMAL (ref 0–2)
NRBC AUTOMATED: 0 PER 100 WBC
O2 DEVICE/FLOW/%: NORMAL
PARTIAL THROMBOPLASTIN TIME: 60.3 SEC (ref 20.5–30.5)
PARTIAL THROMBOPLASTIN TIME: 62.1 SEC (ref 20.5–30.5)
PARTIAL THROMBOPLASTIN TIME: 77.8 SEC (ref 20.5–30.5)
PARTIAL THROMBOPLASTIN TIME: 84.3 SEC (ref 20.5–30.5)
PATIENT TEMP: NORMAL
PDW BLD-RTO: 13 % (ref 11.8–14.4)
PLATELET # BLD: 254 K/UL (ref 138–453)
PMV BLD AUTO: 10.9 FL (ref 8.1–13.5)
POC HCO3: 26.9 MMOL/L (ref 21–28)
POC O2 SATURATION: 98 % (ref 94–98)
POC PCO2 TEMP: NORMAL MM HG
POC PCO2: 40.9 MM HG (ref 35–48)
POC PH TEMP: NORMAL
POC PH: 7.43 (ref 7.35–7.45)
POC PO2 TEMP: NORMAL MM HG
POC PO2: 101.5 MM HG (ref 83–108)
POSITIVE BASE EXCESS, ART: 2 (ref 0–3)
POTASSIUM SERPL-SCNC: 3.5 MMOL/L (ref 3.7–5.3)
RBC # BLD: 3.35 M/UL (ref 4.21–5.77)
REASON FOR REJECTION: NORMAL
SAMPLE SITE: NORMAL
SODIUM BLD-SCNC: 144 MMOL/L (ref 135–144)
SPECIMEN DESCRIPTION: NORMAL
SPECIMEN DESCRIPTION: NORMAL
TCO2 (CALC), ART: 28 MMOL/L (ref 22–29)
WBC # BLD: 33.2 K/UL (ref 3.5–11.3)
ZZ NTE CLEAN UP: ORDERED TEST: NORMAL
ZZ NTE WITH NAME CLEAN UP: SPECIMEN SOURCE: NORMAL

## 2020-03-07 PROCEDURE — 6360000002 HC RX W HCPCS: Performed by: PSYCHIATRY & NEUROLOGY

## 2020-03-07 PROCEDURE — 94761 N-INVAS EAR/PLS OXIMETRY MLT: CPT

## 2020-03-07 PROCEDURE — 99233 SBSQ HOSP IP/OBS HIGH 50: CPT | Performed by: INTERNAL MEDICINE

## 2020-03-07 PROCEDURE — 6360000002 HC RX W HCPCS: Performed by: INTERNAL MEDICINE

## 2020-03-07 PROCEDURE — 6370000000 HC RX 637 (ALT 250 FOR IP): Performed by: STUDENT IN AN ORGANIZED HEALTH CARE EDUCATION/TRAINING PROGRAM

## 2020-03-07 PROCEDURE — 2000000003 HC NEURO ICU R&B

## 2020-03-07 PROCEDURE — 2700000000 HC OXYGEN THERAPY PER DAY

## 2020-03-07 PROCEDURE — 85027 COMPLETE CBC AUTOMATED: CPT

## 2020-03-07 PROCEDURE — 99291 CRITICAL CARE FIRST HOUR: CPT | Performed by: PSYCHIATRY & NEUROLOGY

## 2020-03-07 PROCEDURE — 6370000000 HC RX 637 (ALT 250 FOR IP): Performed by: NURSE PRACTITIONER

## 2020-03-07 PROCEDURE — 71045 X-RAY EXAM CHEST 1 VIEW: CPT

## 2020-03-07 PROCEDURE — 2580000003 HC RX 258: Performed by: PSYCHIATRY & NEUROLOGY

## 2020-03-07 PROCEDURE — 37799 UNLISTED PX VASCULAR SURGERY: CPT

## 2020-03-07 PROCEDURE — 82803 BLOOD GASES ANY COMBINATION: CPT

## 2020-03-07 PROCEDURE — 6360000002 HC RX W HCPCS: Performed by: STUDENT IN AN ORGANIZED HEALTH CARE EDUCATION/TRAINING PROGRAM

## 2020-03-07 PROCEDURE — 80048 BASIC METABOLIC PNL TOTAL CA: CPT

## 2020-03-07 PROCEDURE — 85730 THROMBOPLASTIN TIME PARTIAL: CPT

## 2020-03-07 PROCEDURE — 94003 VENT MGMT INPAT SUBQ DAY: CPT

## 2020-03-07 PROCEDURE — 2580000003 HC RX 258: Performed by: STUDENT IN AN ORGANIZED HEALTH CARE EDUCATION/TRAINING PROGRAM

## 2020-03-07 PROCEDURE — 2580000003 HC RX 258: Performed by: INTERNAL MEDICINE

## 2020-03-07 PROCEDURE — 82330 ASSAY OF CALCIUM: CPT

## 2020-03-07 PROCEDURE — 94770 HC ETCO2 MONITOR DAILY: CPT

## 2020-03-07 RX ORDER — ACETAMINOPHEN 325 MG/1
650 TABLET ORAL EVERY 4 HOURS PRN
Status: DISCONTINUED | OUTPATIENT
Start: 2020-03-07 | End: 2020-03-08

## 2020-03-07 RX ADMIN — HYDRALAZINE HYDROCHLORIDE 50 MG: 50 TABLET, FILM COATED ORAL at 14:33

## 2020-03-07 RX ADMIN — Medication 15 ML: at 09:15

## 2020-03-07 RX ADMIN — LISINOPRIL 40 MG: 20 TABLET ORAL at 09:12

## 2020-03-07 RX ADMIN — FLUTICASONE PROPIONATE 2 SPRAY: 50 SPRAY, METERED NASAL at 09:15

## 2020-03-07 RX ADMIN — CEFEPIME HYDROCHLORIDE 2 G: 2 INJECTION, POWDER, FOR SOLUTION INTRAVENOUS at 02:02

## 2020-03-07 RX ADMIN — Medication 1500 MG: at 09:14

## 2020-03-07 RX ADMIN — HYDRALAZINE HYDROCHLORIDE 50 MG: 50 TABLET, FILM COATED ORAL at 06:16

## 2020-03-07 RX ADMIN — FOLIC ACID 1 MG: 1 TABLET ORAL at 09:13

## 2020-03-07 RX ADMIN — PIPERACILLIN AND TAZOBACTAM 4.5 G: 4; .5 INJECTION, POWDER, LYOPHILIZED, FOR SOLUTION INTRAVENOUS; PARENTERAL at 11:38

## 2020-03-07 RX ADMIN — STANDARDIZED SENNA CONCENTRATE AND DOCUSATE SODIUM 2 TABLET: 8.6; 5 TABLET ORAL at 09:14

## 2020-03-07 RX ADMIN — PANTOPRAZOLE SODIUM 40 MG: 40 TABLET, DELAYED RELEASE ORAL at 06:16

## 2020-03-07 RX ADMIN — ROSUVASTATIN CALCIUM 40 MG: 20 TABLET, FILM COATED ORAL at 21:31

## 2020-03-07 RX ADMIN — FOLIC ACID 1 MG: 1 TABLET ORAL at 21:31

## 2020-03-07 RX ADMIN — Medication 1500 MG: at 21:32

## 2020-03-07 RX ADMIN — SODIUM CHLORIDE, PRESERVATIVE FREE 10 ML: 5 INJECTION INTRAVENOUS at 09:13

## 2020-03-07 RX ADMIN — Medication 20 MG: at 09:13

## 2020-03-07 RX ADMIN — HEPARIN SODIUM 22 UNITS/KG/HR: 10000 INJECTION, SOLUTION INTRAVENOUS at 11:39

## 2020-03-07 RX ADMIN — ACETAMINOPHEN 650 MG: 325 TABLET ORAL at 06:17

## 2020-03-07 RX ADMIN — ACETAMINOPHEN 650 MG: 325 TABLET ORAL at 11:37

## 2020-03-07 RX ADMIN — CHLORPROMAZINE HYDROCHLORIDE 25 MG: 25 TABLET, SUGAR COATED ORAL at 22:01

## 2020-03-07 RX ADMIN — AMLODIPINE BESYLATE 10 MG: 10 TABLET ORAL at 09:13

## 2020-03-07 RX ADMIN — Medication 15 ML: at 21:31

## 2020-03-07 RX ADMIN — PIPERACILLIN AND TAZOBACTAM 4.5 G: 4; .5 INJECTION, POWDER, LYOPHILIZED, FOR SOLUTION INTRAVENOUS; PARENTERAL at 16:31

## 2020-03-07 RX ADMIN — SALINE NASAL SPRAY 2 SPRAY: 1.5 SOLUTION NASAL at 09:14

## 2020-03-07 RX ADMIN — ACETAMINOPHEN 650 MG: 325 TABLET ORAL at 03:02

## 2020-03-07 RX ADMIN — ASPIRIN 81 MG: 81 TABLET, CHEWABLE ORAL at 09:13

## 2020-03-07 RX ADMIN — PIPERACILLIN AND TAZOBACTAM 4.5 G: 4; .5 INJECTION, POWDER, LYOPHILIZED, FOR SOLUTION INTRAVENOUS; PARENTERAL at 20:43

## 2020-03-07 RX ADMIN — HYDRALAZINE HYDROCHLORIDE 50 MG: 50 TABLET, FILM COATED ORAL at 21:31

## 2020-03-07 RX ADMIN — POTASSIUM BICARBONATE 40 MEQ: 782 TABLET, EFFERVESCENT ORAL at 09:45

## 2020-03-07 ASSESSMENT — PAIN SCALES - WONG BAKER
WONGBAKER_NUMERICALRESPONSE: 4

## 2020-03-07 ASSESSMENT — PULMONARY FUNCTION TESTS
PIF_VALUE: 20
PIF_VALUE: 19
PIF_VALUE: 22
PIF_VALUE: 16
PIF_VALUE: 16
PIF_VALUE: 24
PIF_VALUE: 21
PIF_VALUE: 22
PIF_VALUE: 17

## 2020-03-07 NOTE — CONSULTS
[facility] - Initial Consult Note  Today's Date and Time: 3/7/2020, 9:45 AM    Impression :   · Pontine infarction   · MRSA carrier  · Reactive leukocytosis  · Fevers  · Patchy areas of bronchopneumonia by CAT scan   · Pseudomonas in sputum. 3-2-20    Recommendations:   · D/C cefepime  · Continue Vancomycin because of MRSA carrier state  · Start Zosyn 4.5 gm q 6 hr    Medical Decision Making/Summary/Discussion:3/7/2020     · Patient with initial Rt pontine infarct 2-13-20  · Hypertensive urgency at [facility] on 2-24-20 MRI with increased number of infarcts   · Transferred back to [facility].  · Has developed leukemoid reaction and fevers. Cause unclear  · Abdomen tense. PEG in place. CT of abdomen would be helpful to exclude hematoma or fluid collection  · If CT non yielding repeat MRI should be considered to follow evolution of infarcts  · CT shows scattered areas of bronchopneumonia. Pseudomonas in sputum but also MRSA carrier  · Antibiotics altered to Vancomycin and Zosyn to add MRSA and anaerobic coverage  Infection Control Recommendations   · [place] Precautions  · Contact Isolation MRSA    Antimicrobial Stewardship Recommendations     · Simplification of therapy  · Targeted therapy    Coordination of Outpatient Care:   · Estimated Length of IV antimicrobials:TBD  · Patient will need Midline Catheter Insertion: No  · Patient will need PICC line Insertion:No  · Patient will need: Home IV , [place],  SNF,  LTAC:TBD  · Patient will need outpatient wound care:No    Chief complaint/reason for consultation:   · Fevers, leukocytosis      History of Present Illness:   Shelli Sadler is a 52y.o.-year-old  male who was initially admitted on 2/24/2020. Patient seen at the request of . INITIAL HISTORY:    Patient previously admitted to [address] [place] [address] on 2-13-20 with dysarthria and Lt hemiparesis secondary to Rt pontine infarction.  Treated with heparin and then ASA and Brilinta. Transferred to [facility] on 2-19-20 where he exhibited some decline in speech and ability to swallow. Transferred to [facility] on 2-24-20 with hypertensive urgency. MRI showed increasing numbers of infarcts in the mayuri extending to medulla and a new Lt cerebellar infarct. Transferred to [facility] on 2-24-20 where he has been continued on heparin, ASA and Brilinta. On 2-2720 felt to have pneumonia and started on Unasyn. Intermittent low grade temps to 100F were noted. Temp increased to 101-102 F on 3-5- and 3-6-20. Sputum culture 3-5-20 with Gram negative bacilli     Current exam does not suggest Pseudomonas pneumonia. Source of fevers and leukemoid reaction unclear. CT of the abdomen and possible repeat MRI of brain would be helpful. CURRENT EVALUATION : 3/7/2020    Patient evaluated and examined in the ICU. Afebrile to low grade fevers  VS stable    CT reviewed. No abdominal pathology. Chest with scattered areas of bronchopneumonia. Pseudomonas in sputum but also known MRSA carrier  Antibiotics altered to Vancomycin and Zosyn to add MRSA and anaerobic coverage        Labs, X rays reviewed: 3/7/2020    BUN:25-->18  Cr:1.09-->0.90    WBC: 17-->20.7-->37.0-->33.2  Hb:10.8-->9.6  Plat: 206-->254    Cultures:  Urine:  ·   Blood:  · 3-1-20: No growth   Sputum :  · 3-5-20: Pseudomonas  · 3-2-20: Pseudomonas  Wound:  ·   HIV- negative     Discussed with patient, RN, family. Dr Flossie Gottron. I have personally reviewed the past medical history, past surgical history, medications, social history, and family history, and I have updated the database accordingly.   Past Medical History:     Past Medical History:   Diagnosis Date    Heart attack ([facility] [address]     Hyperlipidemia     Hypertension     Stroke ([facility] [address] 02/13/2020       Past Surgical  History:     Past Surgical History:   Procedure Laterality Date    CARDIAC SURGERY      CORONARY ANGIOPLASTY WITH STENT PLACEMENT  2019    x 1 stent for LAD    FOOT SURGERY Bilateral     KNEE SURGERY Right     TRACHEOSTOMY N/A 3/4/2020    TRACHEOTOMY performed by Miroslava Duque MD at [address] N/A 3/4/2020    EGD ESOPHAGOGASTRODUODENOSCOPY PEG TUBE INSERTION performed by Miroslava Duque MD at [facility]       Medications:      potassium bicarb-citric acid  40 mEq Oral Once    acetaminophen  650 mg Oral Q4H    cefepime  2 g Intravenous Q8H    bisacodyl  10 mg Rectal Once    vancomycin  1,500 mg Intravenous Q12H    vancomycin (VANCOCIN) intermittent dosing (placeholder)   Other RX Placeholder    hydrALAZINE  50 mg Oral 3 times per day    aspirin  81 mg Oral Daily    [Held by provider] ticagrelor  90 mg Oral BID    pantoprazole  40 mg Oral QAM AC    fluticasone  2 spray Each Nostril Daily    sodium chloride  2 spray Each Nostril Daily    lisinopril  40 mg Oral Daily    folic acid  1 mg Oral BID    rosuvastatin  40 mg Oral Nightly    amLODIPine  10 mg Oral Daily    FLUoxetine  20 mg Per NG tube Daily    chlorhexidine  15 mL Mouth/Throat BID    sennosides-docusate sodium  2 tablet Oral Daily    sodium chloride flush  10 mL Intravenous 2 times per day       Social History:     Social History     Socioeconomic History    Marital status: Single     Spouse name: Not on file    Number of children: Not on file    Years of education: Not on file    Highest education level: Not on file   Occupational History    Not on file   Social Needs    Financial resource strain: Not on file    Food insecurity     Worry: Not on file     Inability: Not on file    Transportation needs     Medical: Not on file     Non-medical: Not on file   Tobacco Use    Smoking status: Never Smoker    Smokeless tobacco: Never Used   Substance and Sexual Activity    Alcohol use:  Yes    Drug use: Never    Sexual activity: Not on file   Lifestyle    Physical activity     Days per week: Not on file     Minutes per session: Not on file    Stress: Not on file   Relationships    Social connections     Talks on phone: Not on file     Gets together: Not on file     Attends Mandaeism service: Not on file     Active member of club or organization: Not on file     Attends meetings of clubs or organizations: Not on file     Relationship status: Not on file    Intimate partner violence     Fear of current or ex partner: Not on file     Emotionally abused: Not on file     Physically abused: Not on file     Forced sexual activity: Not on file   Other Topics Concern    Not on file   Social History Narrative    Not on file       Family History:     Family History   Problem Relation Age of Onset    High Blood Pressure Mother     High Blood Pressure Father     Hypertension Sister     Hypertension Brother         Allergies:   Patient has no known allergies. Review of Systems:     Unable to provide. Sedated on ventilator. 3/7/2020      Constitutional: No fevers or chills. No systemic complaints  Head: No headaches  Eyes: No double vision or blurry vision. No conjunctival inflammation. ENT: No sore throat or runny nose. . No hearing loss, tinnitus or vertigo. Cardiovascular: No chest pain or palpitations. No shortness of breath. No CRAWFORD  Lung: No shortness of breath or cough. No sputum production  Abdomen: No nausea, vomiting, diarrhea, or abdominal pain. Mart Blackwell No cramps. Genitourinary: No increased urinary frequency, or dysuria. No hematuria. No suprapubic or CVA pain  Musculoskeletal: No muscle aches or pains. No joint effusions, swelling or deformities  Hematologic: No bleeding or bruising. Neurologic: Headache, weakness  Integument: No rash, no ulcers. Psychiatric: No depression. Endocrine: No polyuria, no polydipsia, no polyphagia.     Physical Examination :     Patient Vitals for the past 8 hrs:   BP Temp Temp src Pulse Resp SpO2   03/07/20 0912 132/60 -- -- -- -- --   03/07/20 0903 -- -- -- 66 -- 97 %   03/07/20 0856 -- -- -- -- -- 95 %   03/07/20 0821 -- -- -- 65 -- 98 %   03/07/20 0655 124/61 -- -- 64 -- 96 %   03/07/20 0555 (!) 124/59 -- -- 67 -- 95 %   03/07/20 0456 134/69 98.5 °F (36.9 °C) Oral 64 23 96 %   03/07/20 0422 -- -- -- 61 -- 97 %   03/07/20 0355 (!) 148/73 -- -- 67 -- 99 %   03/07/20 0202 (!) 142/70 -- -- 64 23 96 %     General Appearance: Awake, not interactive  Head:  Normocephalic, no trauma  Eyes: Pupils equal, round, reactive to light; sclera anicteric; conjunctivae pink. No embolic phenomena. ENT: Oropharynx clear, without erythema, exudate, or thrush. No tenderness of sinuses. Mouth/throat: mucosa pink and moist. No lesions. Dentition in good repair. Neck:Supple, without lymphadenopathy. Thyroid normal, No bruits. Pulmonary/Chest: Clear to auscultation, without wheezes, rales, or rhonchi. No dullness to percussion. Cardiovascular: Regular rate and rhythm without murmurs, rubs, or gallops. Abdomen: Tense, no rebound. Bowel sounds normal. No organomegaly. PEG in place  All four Extremities: No cyanosis, clubbing, edema, or effusions. Neurologic: Lt side weakness  Skin: Warm and dry with good turgor. No signs of peripheral arterial or venous insufficiency. No ulcerations. No open wounds. Medical Decision Making -Laboratory:   I have independently reviewed/ordered the following labs:    CBC with Differential:   Recent Labs     03/06/20  1428 03/07/20  0510   WBC 39.2* 33.2*   HGB 11.3* 9.6*   HCT 34.9* 30.6*    254   LYMPHOPCT 5*  --    MONOPCT 5  --      BMP:   Recent Labs     03/06/20  0449 03/07/20  0510    144   K 3.7 3.5*    108*   CO2 23 23   BUN 26* 26*   CREATININE 1.15 0.88     Hepatic Function Panel:   Recent Labs     03/05/20  1639   PROT 6.4   LABALBU 2.8*   BILIDIR 0.13   IBILI 0.33   BILITOT 0.46   ALKPHOS 98   ALT 57*   AST 36     No results for input(s): RPR in the last 72 hours. No results for input(s): HIV in the last 72 hours.   No results for input(s): BC in the last 72 hours. Lab Results   Component Value Date    MUCUS 1+ 03/05/2020    RBC 3.35 03/07/2020    TRICHOMONAS NOT REPORTED 03/05/2020    WBC 33.2 03/07/2020    YEAST NOT REPORTED 03/05/2020    TURBIDITY CLOUDY 03/05/2020     Lab Results   Component Value Date    CREATININE 0.88 03/07/2020    GLUCOSE 126 03/07/2020       Medical Decision Making-Imaging:     EXAMINATION:   ONE XRAY VIEW OF THE CHEST       3/6/2020 7:26 am       COMPARISON:   03/05/2020       HISTORY:   ORDERING SYSTEM PROVIDED HISTORY: trach, large amount of secretions, fever,   leukocytosis   TECHNOLOGIST PROVIDED HISTORY:   trach, large amount of secretions, fever, leukocytosis       FINDINGS:   Tracheostomy tube remains in place.  The heart is stable in size.  Slight   increase in patchy perihilar opacities which may be due to atelectasis and/or   developing infection.  In part this is related to vascular crowding.  No   lobar consolidation is seen.  No large pleural effusions.  The patient is   slightly rotated.           Impression   Slight increase in subtle patchy perihilar opacities. EXAMINATION:   CT OF THE CHEST, ABDOMEN, AND PELVIS WITH CONTRAST 3/6/2020 4:18 pm       TECHNIQUE:   CT of the chest, abdomen and pelvis was performed with the administration of   intravenous contrast. Multiplanar reformatted images are provided for review. Dose modulation, iterative reconstruction, and/or weight based adjustment of   the mA/kV was utilized to reduce the radiation dose to as low as reasonably   achievable.       COMPARISON:   02/26/2020       HISTORY:   ORDERING SYSTEM PROVIDED HISTORY: leukocytosis, recent peg, fevers   TECHNOLOGIST PROVIDED HISTORY:   leukocytosis, recent peg, fevers       Reason for Exam: leukocytosis, recent peg, fevers       FINDINGS:       Chest:       Mediastinum: The thyroid is within normal limits.  Tracheostomy tube tip   below the clavicles.  No pericardial or pleural effusion.  The esophagus is   within normal limits. 03/02/2020 11:40  [address]   PSEUDOMONAS AERUGINOSA HEAVY GROWTH    Culture 03/02/2020 11:40  [address]   NORMAL RESPIRATORY MIRELA HEAVY GROWTH    Testing Performed By     Lab - Abbreviation Name Director Address Valid Date Range   [address] Razo [place] [address] [facility] [address] 08/30/17 0801-Present   Susceptibility     Pseudomonas aeruginosa (1)     Antibiotic Interpretation CINDY Status    amikacin   Final     NOT REPORTED   ceFAZolin   Final     NOT REPORTED   cefepime Sensitive  Final     2  SUSCEPTIBLE   ciprofloxacin Sensitive  Final     <=0.25  SUSCEPTIBLE   gentamicin Sensitive  Final     <=1  SUSCEPTIBLE   meropenem   Final     NOT REPORTED   tigecycline   Final     NOT REPORTED   tobramycin Sensitive  Final     <=1  SUSCEPTIBLE   piperacillin-tazobactam Sensitive  Final     8  SUSCEPTIBLE   Lab and Collection       Medical Decision Making-Other:     Note:  · Labs, medications, radiologic studies were reviewed with personal review of films  · Large amounts of data were reviewed  · Discussed with nursing Staff, Discharge planner  · Infection Control and Prevention measures reviewed  · All prior entries were reviewed  · Administer medications as ordered  · Prognosis: Guarded  · Discharge planning reviewed  · Follow up as outpatient. Thank you for allowing us to participate in the care of this patient. Please call with questions.     Tyrese Rae MD  Pager: [phone] - Office: [phone]

## 2020-03-08 LAB
ALLEN TEST: ABNORMAL
ANION GAP SERPL CALCULATED.3IONS-SCNC: 11 MMOL/L (ref 9–17)
BUN BLDV-MCNC: 24 MG/DL (ref 6–20)
BUN/CREAT BLD: ABNORMAL (ref 9–20)
CALCIUM IONIZED: 1.17 MMOL/L (ref 1.13–1.33)
CALCIUM SERPL-MCNC: 8.5 MG/DL (ref 8.6–10.4)
CHLORIDE BLD-SCNC: 107 MMOL/L (ref 98–107)
CO2: 23 MMOL/L (ref 20–31)
CREAT SERPL-MCNC: 1.04 MG/DL (ref 0.7–1.2)
CULTURE: ABNORMAL
CULTURE: ABNORMAL
DIRECT EXAM: ABNORMAL
FIO2: 35
GFR AFRICAN AMERICAN: >60 ML/MIN
GFR NON-AFRICAN AMERICAN: >60 ML/MIN
GFR SERPL CREATININE-BSD FRML MDRD: ABNORMAL ML/MIN/{1.73_M2}
GFR SERPL CREATININE-BSD FRML MDRD: ABNORMAL ML/MIN/{1.73_M2}
GLUCOSE BLD-MCNC: 117 MG/DL (ref 70–99)
HCT VFR BLD CALC: 29.8 % (ref 40.7–50.3)
HEMOGLOBIN: 9.2 G/DL (ref 13–17)
Lab: ABNORMAL
MCH RBC QN AUTO: 28.6 PG (ref 25.2–33.5)
MCHC RBC AUTO-ENTMCNC: 30.9 G/DL (ref 28.4–34.8)
MCV RBC AUTO: 92.5 FL (ref 82.6–102.9)
MODE: ABNORMAL
NEGATIVE BASE EXCESS, ART: ABNORMAL (ref 0–2)
NRBC AUTOMATED: 0 PER 100 WBC
O2 DEVICE/FLOW/%: ABNORMAL
PARTIAL THROMBOPLASTIN TIME: 59.2 SEC (ref 20.5–30.5)
PARTIAL THROMBOPLASTIN TIME: 72.5 SEC (ref 20.5–30.5)
PATIENT TEMP: ABNORMAL
PDW BLD-RTO: 13 % (ref 11.8–14.4)
PLATELET # BLD: 261 K/UL (ref 138–453)
PMV BLD AUTO: 11.2 FL (ref 8.1–13.5)
POC HCO3: 23.4 MMOL/L (ref 21–28)
POC O2 SATURATION: 97 % (ref 94–98)
POC PCO2 TEMP: ABNORMAL MM HG
POC PCO2: 34.6 MM HG (ref 35–48)
POC PH TEMP: ABNORMAL
POC PH: 7.44 (ref 7.35–7.45)
POC PO2 TEMP: ABNORMAL MM HG
POC PO2: 82.6 MM HG (ref 83–108)
POSITIVE BASE EXCESS, ART: 0 (ref 0–3)
POTASSIUM SERPL-SCNC: 3.9 MMOL/L (ref 3.7–5.3)
RBC # BLD: 3.22 M/UL (ref 4.21–5.77)
SAMPLE SITE: ABNORMAL
SODIUM BLD-SCNC: 141 MMOL/L (ref 135–144)
SPECIMEN DESCRIPTION: ABNORMAL
TCO2 (CALC), ART: 25 MMOL/L (ref 22–29)
VANCOMYCIN TROUGH DATE LAST DOSE: NORMAL
VANCOMYCIN TROUGH DOSE AMOUNT: NORMAL
VANCOMYCIN TROUGH TIME LAST DOSE: NORMAL
VANCOMYCIN TROUGH: 15.9 UG/ML (ref 10–20)
WBC # BLD: 21.9 K/UL (ref 3.5–11.3)

## 2020-03-08 PROCEDURE — 6370000000 HC RX 637 (ALT 250 FOR IP): Performed by: STUDENT IN AN ORGANIZED HEALTH CARE EDUCATION/TRAINING PROGRAM

## 2020-03-08 PROCEDURE — 82330 ASSAY OF CALCIUM: CPT

## 2020-03-08 PROCEDURE — 6360000002 HC RX W HCPCS: Performed by: INTERNAL MEDICINE

## 2020-03-08 PROCEDURE — 85027 COMPLETE CBC AUTOMATED: CPT

## 2020-03-08 PROCEDURE — 6360000002 HC RX W HCPCS: Performed by: STUDENT IN AN ORGANIZED HEALTH CARE EDUCATION/TRAINING PROGRAM

## 2020-03-08 PROCEDURE — 37799 UNLISTED PX VASCULAR SURGERY: CPT

## 2020-03-08 PROCEDURE — 6370000000 HC RX 637 (ALT 250 FOR IP): Performed by: NURSE PRACTITIONER

## 2020-03-08 PROCEDURE — 2580000003 HC RX 258: Performed by: PSYCHIATRY & NEUROLOGY

## 2020-03-08 PROCEDURE — 94003 VENT MGMT INPAT SUBQ DAY: CPT

## 2020-03-08 PROCEDURE — 2580000003 HC RX 258: Performed by: STUDENT IN AN ORGANIZED HEALTH CARE EDUCATION/TRAINING PROGRAM

## 2020-03-08 PROCEDURE — 80202 ASSAY OF VANCOMYCIN: CPT

## 2020-03-08 PROCEDURE — 94761 N-INVAS EAR/PLS OXIMETRY MLT: CPT

## 2020-03-08 PROCEDURE — 80048 BASIC METABOLIC PNL TOTAL CA: CPT

## 2020-03-08 PROCEDURE — C9113 INJ PANTOPRAZOLE SODIUM, VIA: HCPCS | Performed by: STUDENT IN AN ORGANIZED HEALTH CARE EDUCATION/TRAINING PROGRAM

## 2020-03-08 PROCEDURE — 6360000002 HC RX W HCPCS: Performed by: PSYCHIATRY & NEUROLOGY

## 2020-03-08 PROCEDURE — 82803 BLOOD GASES ANY COMBINATION: CPT

## 2020-03-08 PROCEDURE — 85730 THROMBOPLASTIN TIME PARTIAL: CPT

## 2020-03-08 PROCEDURE — 2700000000 HC OXYGEN THERAPY PER DAY

## 2020-03-08 PROCEDURE — 94770 HC ETCO2 MONITOR DAILY: CPT

## 2020-03-08 PROCEDURE — 2000000003 HC NEURO ICU R&B

## 2020-03-08 PROCEDURE — 99291 CRITICAL CARE FIRST HOUR: CPT | Performed by: PSYCHIATRY & NEUROLOGY

## 2020-03-08 PROCEDURE — 2580000003 HC RX 258: Performed by: INTERNAL MEDICINE

## 2020-03-08 PROCEDURE — 2500000003 HC RX 250 WO HCPCS: Performed by: STUDENT IN AN ORGANIZED HEALTH CARE EDUCATION/TRAINING PROGRAM

## 2020-03-08 PROCEDURE — 99233 SBSQ HOSP IP/OBS HIGH 50: CPT | Performed by: INTERNAL MEDICINE

## 2020-03-08 RX ORDER — PANTOPRAZOLE SODIUM 40 MG/10ML
40 INJECTION, POWDER, LYOPHILIZED, FOR SOLUTION INTRAVENOUS ONCE
Status: COMPLETED | OUTPATIENT
Start: 2020-03-08 | End: 2020-03-08

## 2020-03-08 RX ORDER — SODIUM CHLORIDE 9 MG/ML
10 INJECTION INTRAVENOUS ONCE
Status: COMPLETED | OUTPATIENT
Start: 2020-03-08 | End: 2020-03-08

## 2020-03-08 RX ORDER — ACETAMINOPHEN 10 MG/ML
1000 INJECTION, SOLUTION INTRAVENOUS EVERY 6 HOURS
Status: COMPLETED | OUTPATIENT
Start: 2020-03-08 | End: 2020-03-09

## 2020-03-08 RX ADMIN — AMLODIPINE BESYLATE 10 MG: 10 TABLET ORAL at 08:52

## 2020-03-08 RX ADMIN — ROSUVASTATIN CALCIUM 40 MG: 20 TABLET, FILM COATED ORAL at 21:31

## 2020-03-08 RX ADMIN — Medication 15 ML: at 08:56

## 2020-03-08 RX ADMIN — ACETAMINOPHEN 1000 MG: 10 INJECTION, SOLUTION INTRAVENOUS at 19:15

## 2020-03-08 RX ADMIN — FOLIC ACID 1 MG: 1 TABLET ORAL at 21:31

## 2020-03-08 RX ADMIN — CEFEPIME HYDROCHLORIDE 2 G: 2 INJECTION, POWDER, FOR SOLUTION INTRAVENOUS at 18:27

## 2020-03-08 RX ADMIN — ASPIRIN 81 MG: 81 TABLET, CHEWABLE ORAL at 08:52

## 2020-03-08 RX ADMIN — HYDRALAZINE HYDROCHLORIDE 10 MG: 20 INJECTION INTRAMUSCULAR; INTRAVENOUS at 17:09

## 2020-03-08 RX ADMIN — ACETAMINOPHEN 650 MG: 325 TABLET ORAL at 06:40

## 2020-03-08 RX ADMIN — ACETAMINOPHEN 650 MG: 325 TABLET ORAL at 16:10

## 2020-03-08 RX ADMIN — FENTANYL CITRATE 50 MCG: 50 INJECTION, SOLUTION INTRAMUSCULAR; INTRAVENOUS at 10:11

## 2020-03-08 RX ADMIN — Medication 15 ML: at 21:31

## 2020-03-08 RX ADMIN — Medication 1500 MG: at 21:31

## 2020-03-08 RX ADMIN — ACETAMINOPHEN 650 MG: 325 TABLET ORAL at 11:32

## 2020-03-08 RX ADMIN — PANTOPRAZOLE SODIUM 40 MG: 40 INJECTION, POWDER, FOR SOLUTION INTRAVENOUS at 06:40

## 2020-03-08 RX ADMIN — SALINE NASAL SPRAY 2 SPRAY: 1.5 SOLUTION NASAL at 08:52

## 2020-03-08 RX ADMIN — FOLIC ACID 1 MG: 1 TABLET ORAL at 08:52

## 2020-03-08 RX ADMIN — SODIUM CHLORIDE, PRESERVATIVE FREE 10 ML: 5 INJECTION INTRAVENOUS at 21:32

## 2020-03-08 RX ADMIN — CEFEPIME HYDROCHLORIDE 2 G: 2 INJECTION, POWDER, FOR SOLUTION INTRAVENOUS at 11:05

## 2020-03-08 RX ADMIN — HYDRALAZINE HYDROCHLORIDE 50 MG: 50 TABLET, FILM COATED ORAL at 14:17

## 2020-03-08 RX ADMIN — APIXABAN 5 MG: 5 TABLET, FILM COATED ORAL at 11:04

## 2020-03-08 RX ADMIN — APIXABAN 5 MG: 5 TABLET, FILM COATED ORAL at 21:31

## 2020-03-08 RX ADMIN — FLUTICASONE PROPIONATE 2 SPRAY: 50 SPRAY, METERED NASAL at 08:52

## 2020-03-08 RX ADMIN — Medication 10 MG: at 18:38

## 2020-03-08 RX ADMIN — HYDRALAZINE HYDROCHLORIDE 50 MG: 50 TABLET, FILM COATED ORAL at 06:40

## 2020-03-08 RX ADMIN — SODIUM CHLORIDE, PRESERVATIVE FREE 10 ML: 5 INJECTION INTRAVENOUS at 08:52

## 2020-03-08 RX ADMIN — HYDRALAZINE HYDROCHLORIDE 10 MG: 20 INJECTION INTRAMUSCULAR; INTRAVENOUS at 23:58

## 2020-03-08 RX ADMIN — CHLORPROMAZINE HYDROCHLORIDE 25 MG: 25 TABLET, SUGAR COATED ORAL at 11:33

## 2020-03-08 RX ADMIN — Medication 20 MG: at 08:52

## 2020-03-08 RX ADMIN — Medication 1500 MG: at 08:51

## 2020-03-08 RX ADMIN — LISINOPRIL 40 MG: 20 TABLET ORAL at 08:51

## 2020-03-08 RX ADMIN — Medication 10 ML: at 06:42

## 2020-03-08 RX ADMIN — HYDRALAZINE HYDROCHLORIDE 50 MG: 50 TABLET, FILM COATED ORAL at 21:31

## 2020-03-08 ASSESSMENT — PULMONARY FUNCTION TESTS
PIF_VALUE: 20
PIF_VALUE: 21
PIF_VALUE: 26
PIF_VALUE: 19
PIF_VALUE: 22
PIF_VALUE: 20

## 2020-03-08 NOTE — PROGRESS NOTES
Pharmacy Vancomycin Consult     Vancomycin Day: 3  Current Dosin mg IV every 12 hours    Temp max:  38.3    Recent Labs     20  0510 20  0600   BUN 26* 24*       Recent Labs     20  0510 20  0600   CREATININE 0.88 1.04       Recent Labs     20  0510 20  0600   WBC 33.2* 21.9*         Intake/Output Summary (Last 24 hours) at 3/8/2020 0852  Last data filed at 3/8/2020 0600  Gross per 24 hour   Intake 4215 ml   Output 2715 ml   Net 1500 ml       Culture Date      Source                       Results  3/5                       Blood                         NG X 3 days  3/5                       Sputum                     Pseudomonas                       MRSA nasal swab     Positive    Ht Readings from Last 1 Encounters:   20 6' 1\" (1.854 m)        Wt Readings from Last 1 Encounters:   20 238 lb 12.1 oz (108.3 kg)         Body mass index is 31.5 kg/m². Estimated Creatinine Clearance: 112 mL/min (based on SCr of 1.04 mg/dL). Trough: 15.9    Assessment/Plan:  Continue with current regimen of 1500 mg IVPB every 12 hours. Will continue to monitor. Yoladna Abrams, Pharm. D.  3/8/2020  9:03 AM

## 2020-03-08 NOTE — PROGRESS NOTES
50mg Q8h. Clonidine stopped due to bradycardia. Fevers in the afternoon despite Zosyn. Pancultured. Lipase/Amylase normal.  Tube feeds, aspirin, and Brilinta restarted. 3/6:  FiO2 increased to 60, PEEP increased to 10. Repeat ABG, CXR stable at that time. Persistent fevers and worsening leukocytosis, Zosyn changed to Cefepime. CT chest/abd/pelvis revealed multifocal pneumonia, no other source of infection. Found to have bilateral DVTs involving R gastroc, L common femoral to external iliac, and left gastroc. Will start high intensity heparin infusion, no boluses and hold Brilinta. Last 24h:   No acute events overnight. Blood pressure trend overall improved. Fevers and WBC improved, ID changed antibiotics back to Zosyn and Vancomycin. PTT therapeutic.      CURRENT MEDICATIONS:  SCHEDULED MEDICATIONS:   piperacillin-tazobactam  4.5 g Intravenous 4x daily    acetaminophen  650 mg Oral Q4H    bisacodyl  10 mg Rectal Once    vancomycin  1,500 mg Intravenous Q12H    vancomycin (VANCOCIN) intermittent dosing (placeholder)   Other RX Placeholder    hydrALAZINE  50 mg Oral 3 times per day    aspirin  81 mg Oral Daily    [Held by provider] ticagrelor  90 mg Oral BID    pantoprazole  40 mg Oral QAM AC    fluticasone  2 spray Each Nostril Daily    sodium chloride  2 spray Each Nostril Daily    lisinopril  40 mg Oral Daily    folic acid  1 mg Oral BID    rosuvastatin  40 mg Oral Nightly    amLODIPine  10 mg Oral Daily    FLUoxetine  20 mg Per NG tube Daily    chlorhexidine  15 mL Mouth/Throat BID    sennosides-docusate sodium  2 tablet Oral Daily    sodium chloride flush  10 mL Intravenous 2 times per day     CONTINUOUS INFUSIONS:   heparin (porcine) 20 Units/kg/hr (03/07/20 1723)    sodium chloride 75 mL/hr at 03/04/20 1017     PRN MEDICATIONS:   chlorproMAZINE, iohexol, bisacodyl, fentanNYL, hydrALAZINE, labetalol, magnesium hydroxide, sodium chloride flush, polyethylene glycol, ondansetron, promethazine    VITALS:  Temperature Range: Temp: 100.2 °F (37.9 °C) Temp  Av.6 °F (37.6 °C)  Min: 98.5 °F (36.9 °C)  Max: 101.5 °F (38.6 °C)  BP Range: Systolic (34WFV), FZL:717 , Min:123 , MEZ:347     Diastolic (03GSP), CWQ:43, Min:59, Max:80    Pulse Range: Pulse  Av.8  Min: 60  Max: 77  Respiration Range: Resp  Av.6  Min: 22  Max: 23  Current Pulse Ox: SpO2: 100 %  24HR Pulse Ox Range: SpO2  Av.9 %  Min: 95 %  Max: 100 %  Patient Vitals for the past 12 hrs:   BP Temp Pulse SpO2   20 1939 -- -- 69 100 %   20 1900 -- -- 77 100 %   20 1850 (!) 153/70 -- 70 --   20 1804 (!) 146/72 -- 68 99 %   20 1800 -- -- 65 --   20 1748 139/72 -- 64 98 %   20 1648 (!) 143/67 100.2 °F (37.9 °C) 65 97 %   20 1600 -- -- 65 --   20 1548 (!) 141/70 -- 60 --   20 1532 -- -- 71 --   20 1449 139/77 -- 70 --   20 1433 137/67 -- -- --   20 1349 137/67 -- 64 --   20 1249 132/65 99.8 °F (37.7 °C) 62 99 %   20 1200 -- -- 65 --   20 1148 (!) 145/72 -- 69 --   20 1140 -- -- 64 --   20 1000 -- -- 70 --   20 0955 137/65 -- 76 --   20 0912 132/60 -- -- --   20 0903 -- -- 66 97 %   20 0900 -- -- 68 99 %   20 0856 -- -- -- 95 %   20 0854 137/65 -- 68 --   20 0821 -- -- 65 98 %     Estimated body mass index is 31.5 kg/m² as calculated from the following:    Height as of this encounter: 6' 1\" (1.854 m).     Weight as of this encounter: 238 lb 12.1 oz (108.3 kg).  []<16 Severe malnutrition  []16-16.99 Moderate malnutrition  []17-18.49 Mild malnutrition  []18.5-24.9 Normal  []25-29.9 Overweight (not obese)  [x]30-34.9 Obese class 1 (Low Risk)  []35-39.9 Obese class 2 (Moderate Risk)  []?40 Obese class 3 (High Risk)    RECENT LABS:   Lab Results   Component Value Date    WBC 33.2 (HH) 2020    HGB 9.6 (L) 2020    HCT 30.6 (L) 2020     2020    CHOL 140

## 2020-03-08 NOTE — PROGRESS NOTES
medications, social history, and family history, and I have updated the database accordingly.   Past Medical History:     Past Medical History:   Diagnosis Date    [facility] ([facility]     Hyperlipidemia     Hypertension     Stroke ([facility] 02/13/2020       Past Surgical  History:     Past Surgical History:   Procedure Laterality Date    CARDIAC SURGERY      CORONARY ANGIOPLASTY WITH STENT PLACEMENT  2019    x 1 stent for LAD    FOOT SURGERY Bilateral     KNEE SURGERY Right     TRACHEOSTOMY N/A 3/4/2020    TRACHEOTOMY performed by Romain Paula MD at [address] N/A 3/4/2020    EGD ESOPHAGOGASTRODUODENOSCOPY PEG TUBE INSERTION performed by Romain Paula MD at [facility]       Medications:      piperacillin-tazobactam  4.5 g Intravenous 4x daily    bisacodyl  10 mg Rectal Once    vancomycin  1,500 mg Intravenous Q12H    vancomycin (VANCOCIN) intermittent dosing (placeholder)   Other RX Placeholder    hydrALAZINE  50 mg Oral 3 times per day    aspirin  81 mg Oral Daily    [Held by provider] ticagrelor  90 mg Oral BID    pantoprazole  40 mg Oral QAM AC    fluticasone  2 spray Each Nostril Daily    sodium chloride  2 spray Each Nostril Daily    lisinopril  40 mg Oral Daily    folic acid  1 mg Oral BID    rosuvastatin  40 mg Oral Nightly    amLODIPine  10 mg Oral Daily    FLUoxetine  20 mg Per NG tube Daily    chlorhexidine  15 mL Mouth/Throat BID    sennosides-docusate sodium  2 tablet Oral Daily    sodium chloride flush  10 mL Intravenous 2 times per day       Social History:     Social History     Socioeconomic History    Marital status: Single     Spouse name: Not on file    Number of children: Not on file    Years of education: Not on file    Highest education level: Not on file   Occupational History    Not on file   Social Needs    Financial resource strain: Not on file    Food insecurity     Worry: Not on file     Inability: Not on bruising. Neurologic: Headache, weakness  Integument: No rash, no ulcers. Psychiatric: No depression. Endocrine: No polyuria, no polydipsia, no polyphagia. Physical Examination :     Patient Vitals for the past 8 hrs:   BP Temp Pulse SpO2   03/08/20 0751 -- -- 78 99 %   03/08/20 0650 (!) 158/78 -- 74 96 %   03/08/20 0550 (!) 160/74 -- 72 97 %   03/08/20 0450 (!) 163/77 -- 71 98 %   03/08/20 0350 (!) 152/78 101 °F (38.3 °C) 76 97 %   03/08/20 0326 -- -- 79 95 %   03/08/20 0150 (!) 153/74 -- 73 99 %   03/08/20 0050 138/77 -- 72 98 %     General Appearance: Awake, not interactive  Head:  Normocephalic, no trauma  Eyes: Pupils equal, round, reactive to light; sclera anicteric; conjunctivae pink. No embolic phenomena. ENT: Oropharynx clear, without erythema, exudate, or thrush. No tenderness of sinuses. Mouth/throat: mucosa pink and moist. No lesions. Dentition in good repair. Neck:Supple, without lymphadenopathy. Thyroid normal, No bruits. Pulmonary/Chest: Clear to auscultation, without wheezes, rales, or rhonchi. No dullness to percussion. Cardiovascular: Regular rate and rhythm without murmurs, rubs, or gallops. Abdomen: Tense, no rebound. Bowel sounds normal. No organomegaly. PEG in place  All four Extremities: No cyanosis, clubbing, edema, or effusions. Neurologic: Lt side weakness  Skin: Warm and dry with good turgor. No signs of peripheral arterial or venous insufficiency. No ulcerations. No open wounds.     Medical Decision Making -Laboratory:   I have independently reviewed/ordered the following labs:    CBC with Differential:   Recent Labs     03/06/20  1428 03/07/20  0510 03/08/20  0600   WBC 39.2* 33.2* 21.9*   HGB 11.3* 9.6* 9.2*   HCT 34.9* 30.6* 29.8*    254 261   LYMPHOPCT 5*  --   --    MONOPCT 5  --   --      BMP:   Recent Labs     03/07/20  0510 03/08/20  0600    141   K 3.5* 3.9   * 107   CO2 23 23   BUN 26* 24*   CREATININE 0.88 1.04     Hepatic Function Panel: Recent Labs     03/05/20  1639   PROT 6.4   LABALBU 2.8*   BILIDIR 0.13   IBILI 0.33   BILITOT 0.46   ALKPHOS 98   ALT 57*   AST 36     No results for input(s): RPR in the last 72 hours. No results for input(s): HIV in the last 72 hours. No results for input(s): BC in the last 72 hours. Lab Results   Component Value Date    MUCUS 1+ 03/05/2020    RBC 3.22 03/08/2020    TRICHOMONAS NOT REPORTED 03/05/2020    WBC 21.9 03/08/2020    YEAST NOT REPORTED 03/05/2020    TURBIDITY CLOUDY 03/05/2020     Lab Results   Component Value Date    CREATININE 1.04 03/08/2020    GLUCOSE 117 03/08/2020       Medical Decision Making-Imaging:     Chest XRAY 3/7/2020: Moderate cardiomegaly.       Otherwise, unremarkable single supine portable AP view of the chest.           EXAMINATION:   ONE XRAY VIEW OF THE CHEST       3/6/2020 7:26 am       COMPARISON:   03/05/2020       HISTORY:   ORDERING SYSTEM PROVIDED HISTORY: trach, large amount of secretions, fever,   leukocytosis   TECHNOLOGIST PROVIDED HISTORY:   trach, large amount of secretions, fever, leukocytosis       FINDINGS:   Tracheostomy tube remains in place.  The heart is stable in size.  Slight   increase in patchy perihilar opacities which may be due to atelectasis and/or   developing infection.  In part this is related to vascular crowding.  No   lobar consolidation is seen.  No large pleural effusions.  The patient is   slightly rotated.           Impression   Slight increase in subtle patchy perihilar opacities. EXAMINATION:   CT OF THE CHEST, ABDOMEN, AND PELVIS WITH CONTRAST 3/6/2020 4:18 pm       TECHNIQUE:   CT of the chest, abdomen and pelvis was performed with the administration of   intravenous contrast. Multiplanar reformatted images are provided for review.    Dose modulation, iterative reconstruction, and/or weight based adjustment of   the mA/kV was utilized to reduce the radiation dose to as low as reasonably   achievable.       COMPARISON:   02/26/2020     HISTORY:   ORDERING SYSTEM PROVIDED HISTORY: leukocytosis, recent peg, fevers   TECHNOLOGIST PROVIDED HISTORY:   leukocytosis, recent peg, fevers       Reason for Exam: leukocytosis, recent peg, fevers       FINDINGS:       Chest:       Mediastinum: The thyroid is within normal limits.  Tracheostomy tube tip   below the clavicles.  No pericardial or pleural effusion.  The esophagus is   within normal limits.  No mediastinal adenopathy.       Lungs/pleura: Extensive patchy airspace opacities are present predominantly   in both lower lobes as well as the right upper lobe.  No pleural effusions. The central airways are patent.       Soft Tissues/Bones: No acute osseous abnormality.           Abdomen/Pelvis:       Organs: The liver, kidneys, adrenals, pancreas, bile ducts, and stomach are   without acute process.  A PEG tube is in appropriate position.  The ureters   are nondilated.  The bladder is within normal limits.  Mildly enlarged   prostate.       GI/Bowel: Normal appendix.  No evidence of obstruction.       Pelvis: No acute abnormality.       Peritoneum/Retroperitoneum: Mild atherosclerosis.  No lymphadenopathy.  No   free air or free fluid.       Bones/Soft Tissues: No acute osseous abnormality.           Impression   1. Findings compatible with multifocal pneumonia predominantly involving the   lower lobes and right upper lobe.  Aspiration is a potential etiology,   although a tracheostomy tube appears to be in appropriate position. 2. No acute process in the abdomen or pelvis.  The PEG tube is in appropriate   position.           Medical Decision Cihqmm-Miwwmfjt-Zrtoc:     3/4/2020 10:08 AM - Jordan Clark Incoming Lab Results From [employer]     Specimen Information: Endotracheal        Component Collected Lab   Specimen Description 03/02/2020 11:40  Benjamin Abdi   . ENDOTRACHEAL    Special Requests 03/02/2020 11:40  Benjamin Abdi   NOT REPORTED    Direct Exam 03/02/2020

## 2020-03-08 NOTE — PROGRESS NOTES
Daily Progress Note  Neuro Critical Care    Patient Name: Michael Staton  Patient : 1970  Room/Bed: 7170/9924-63  Code Status: Full  Allergies: No Known Allergies    CHIEF COMPLAINT:      Aphasia, left sided weakness     INTERVAL HISTORY    Initial Presentation (Admitted 20): The patient is a 53 yo male with history of HTN, CAD s/p stent (May 2019), and recent right pontine infarct who presented as a transfer from [facility] for worsening aphasia, dysphagia, and left hemiparesis. Patient was recently admitted to [facility] on 20 after presenting with dysarthria and left hemiparesis and found to have a right pontine infarct. He was out of the tPA window. 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 He was maintained on his aspirin and brilinta and was treated with low dose heparin infusion for 48 hours. Repeat CTA head/neck on 2/15 remained stable and patient's clinical exam remained unchanged after heparin was discontinued. He was discharged to [facility] on . At the time of discharge he had a left facial droop and mild to moderate left sided weakness (LUE 3/5, LLE 4/5). For the last few days prior to re-admission, family states they started to notice patient's speech declining and he was having more difficulty swallowing. On , he was noted by the nurse to have decreased word output and to be coughing with liquids. CT head performed showing stable right pontine infarct. On , patient was sent to [facility] ED due to hypertensive urgency (237/130). Given 20 mg IV labetalol with improvement of BP down to 152/00. Started on IV fluids for RASHMI. Repeat CT head showed stable right pontine infarct. MRI brain showed increasing multifocal infarct in the mayuri extending to medulla and new punctate infarct left cerebellar hemisphere. Patient initially admitted to the ICU at [facility].  Christin Garcia but then decision made to transfer to [facility] Neuro ICU for further management. On arrival to [facility]. 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 CT brain perfusion with no perfusion mismatch. Started on low dose heparin infusion with 4000 unit bolus and maintained on aspirin and brilinta. Hospital Course:   2/25: Low intensity heparin infusion, PTT this AM 35.7. Loaded with 180mg Brilinta around 0300 this AM via NG tube. 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 Diagnostic cerebral angiogram revealed mid basilar artery occlusion distal to anterior inferior cerebellar artery and proximal to superior cerebellar artery with retrograde filling to the superior cerebral artery from anterior circulation. Sheath remained in place post op.  2/26: Right groin with mild oozing and tenderness. STAT CT head stable. CT abd/pelvis negative for hematoma. Family meeting for update on severity of deficits. 2/27: Urology consulted for hematuria. Heparin held. Intubated for airway protection and impending respiratory failure. Gen Surg consulted for trach/PEG. Antiplatelets held. Started on Unasyn for pneumonia. 2/28: Jacques removed, hematuria cleared. Heparin infusion restarted. Remains intubated and on Propofol 10mcg/kg/min to help with coughing. Clonidine patch 0.2mg/24h started for improved BP control. 2/29: Off propofol. Febrile. Pink frothy sputum noted by respiratory.   3/1: Febrile, resolved with IV Tylenol. Clonidine increased to 0.2mg BID, home Lisinopril 40mg QD restarted. 3/2: Free water flushes started, 150 mL q6h. Discussion regarding trach/peg, awaiting family decision. 3/3: Family discussion held, decision to change code status to [facility] and proceed with trach/peg. General surgery contacted, informed consent obtained, surgery planned for 3/4 at 0730.  3/4: Trach and PEG. Clonidine decreased to 0.1mg BID due to bradycardia.   3/5: Hydralazine increased to promethazine    VITALS:  Temperature Range: Temp: 101.4 °F (38.6 °C) Temp  Av.2 °F (37.9 °C)  Min: 97.2 °F (36.2 °C)  Max: 101.4 °F (38.6 °C)  BP Range: Systolic (74RTH), GRY:875 , Min:135 , YSS:283     Diastolic (24RMY), DSZ:27, Min:62, Max:153    Pulse Range: Pulse  Av  Min: 64  Max: 92  Respiration Range: No data recorded  Current Pulse Ox: SpO2: 98 %  24HR Pulse Ox Range: SpO2  Av.5 %  Min: 95 %  Max: 100 %  Patient Vitals for the past 12 hrs:   BP Temp Pulse SpO2   20 1600 -- -- 81 --   20 1529 -- -- 81 98 %   20 1417 (!) 174/71 -- -- --   20 1351 (!) 160/76 -- 81 --   20 1251 (!) 161/77 -- 82 --   20 1202 -- -- 92 99 %   20 1200 -- 101.4 °F (38.6 °C) 85 95 %   20 1151 (!) 202/153 -- 85 --   20 1130 -- -- 79 98 %   20 1100 -- -- 78 98 %   20 1051 (!) 164/85 -- 77 --   20 1000 -- -- 75 97 %   20 0951 (!) 172/90 -- 74 --   20 0900 -- -- 77 96 %   20 0851 (!) 154/62 100.8 °F (38.2 °C) 77 95 %   20 0800 -- -- 77 95 %   20 0751 (!) 135/97 -- 78 99 %   20 0650 (!) 158/78 -- 74 96 %   20 0550 (!) 160/74 -- 72 97 %   20 0450 (!) 163/77 -- 71 98 %     Estimated body mass index is 31.5 kg/m² as calculated from the following:    Height as of this encounter: 6' 1\" (1.854 m).     Weight as of this encounter: 238 lb 12.1 oz (108.3 kg).  []<16 Severe malnutrition  []16-16.99 Moderate malnutrition  []17-18.49 Mild malnutrition  []18.5-24.9 Normal  []25-29.9 Overweight (not obese)  [x]30-34.9 Obese class 1 (Low Risk)  []35-39.9 Obese class 2 (Moderate Risk)  []?40 Obese class 3 (High Risk)    RECENT LABS:   Lab Results   Component Value Date    WBC 21.9 (H) 2020    HGB 9.2 (L) 2020    HCT 29.8 (L) 2020     2020    CHOL 140 2020    TRIG 70 2020    HDL 41 2020    ALT 57 (H) 2020    AST 36 2020     2020    K 3.9 2020  03/08/2020    CREATININE 1.04 03/08/2020    BUN 24 (H) 03/08/2020    CO2 23 03/08/2020    INR 1.1 02/24/2020    LABA1C 5.6 02/14/2020     24 HOUR INTAKE/OUTPUT:    Intake/Output Summary (Last 24 hours) at 3/8/2020 1623  Last data filed at 3/8/2020 1200  Gross per 24 hour   Intake 4310 ml   Output 3335 ml   Net 975 ml       IMAGING:   XR CHEST PORTABLE   Final Result   Moderate cardiomegaly. Otherwise, unremarkable single supine portable AP view of the chest.         CT CHEST ABDOMEN PELVIS W CONTRAST   Preliminary Result   1. Findings compatible with multifocal pneumonia predominantly involving the   lower lobes and right upper lobe. Aspiration is a potential etiology,   although a tracheostomy tube appears to be in appropriate position. 2. No acute process in the abdomen or pelvis. The PEG tube is in appropriate   position. CT HEAD WO CONTRAST   Final Result   Hypodense foci within the mayuri that may relate to patient's history of areas   of infarct. No acute intracranial abnormality. VL DUP LOWER EXTREMITY VENOUS BILATERAL   Final Result      XR CHEST PORTABLE   Final Result   Slight increase in subtle patchy perihilar opacities. XR CHEST PORTABLE   Final Result   Increased bilateral perihilar opacities, which could be related to pulmonary   edema or infection. XR CHEST PORTABLE   Final Result   Tracheostomy tube in place. No pneumothorax identified. Similar appearance of basilar linear opacities, left greater than right,   favoring atelectasis versus inflammatory process. XR CHEST PORTABLE   Final Result   No significant interval change with redemonstration of bilateral   heterogeneous opacities. No new consolidation. XR CHEST PORTABLE   Final Result   Persistent perihilar opacities most suggestive of pulmonary vascular   congestion. XR CHEST PORTABLE   Final Result   Cardiomegaly and mild central vascular congestion.   Small left pleural   effusion. XR CHEST PORTABLE   Final Result   *Tubes in satisfactory positions. *No significant change in chest findings. XR CHEST PORTABLE   Final Result   No significant change from prior study. Stable cardiomegaly and pulmonary   vascular congestion. XR CHEST PORTABLE   Final Result   1. Endotracheal tube remains in appropriate position. 2. Vascular congestion without overt edema. XR CHEST PORTABLE   Final Result   1. Endotracheal and enteric tubes in place. 2.  Unchanged mild vascular congestion. No radiographic findings to indicate   significant pulmonary edema. CT HEAD WO CONTRAST   Final Result   Stable known pontine infarct without acute hemorrhage or other acute   intracranial finding. Slightly worsened sinus disease, as above. XR CHEST PORTABLE   Final Result   Endotracheal tube tip projects approximately 7 cm above the verónica. Advancement by 2 cm suggested for optimal positioning. XR CHEST PORTABLE   Final Result   Vascular congestion without overt edema. XR CHEST PORTABLE   Final Result   No acute cardiopulmonary disease         CT ABDOMEN PELVIS WO CONTRAST Additional Contrast? None   Final Result   No evidence of retroperitoneal hematoma. CT HEAD WO CONTRAST   Final Result   Pontine infarcts without evidence for hemorrhagic conversion. No additional   findings compared to recent prior study. .         IR ANGIOGRAM CAROTID C EREBRAL BILATERAL   Final Result      XR ABDOMEN FOR NG/OG/NE TUBE PLACEMENT   Final Result   Enteric tube in the stomach as above. No evidence of bowel obstruction. CT BRAIN PERFUSION   Final Result   As above . CTA HEAD NECK W CONTRAST   Final Result   1. Hypodense areas in the mayuri concordant with acute infarct visualized at MR.   2. No perfusion mismatch. 3. 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    4. Otherwise patent

## 2020-03-08 NOTE — PLAN OF CARE
Problem: MECHANICAL VENTILATION  Goal: Mobility/activity is maintained at optimum level for patient  Outcome: Ongoing  Goal: Ability to express needs and understand communication  Outcome: Ongoing  Goal: Patient will maintain patent airway  Outcome: Ongoing  Goal: Oral health is maintained or improved  Outcome: Ongoing

## 2020-03-09 ENCOUNTER — APPOINTMENT (OUTPATIENT)
Dept: GENERAL RADIOLOGY | Age: 50
DRG: 005 | End: 2020-03-09
Attending: SPECIALIST
Payer: MEDICARE

## 2020-03-09 LAB
-: NORMAL
ALLEN TEST: NORMAL
AMORPHOUS: NORMAL
ANION GAP SERPL CALCULATED.3IONS-SCNC: 10 MMOL/L (ref 9–17)
BACTERIA: NORMAL
BILIRUBIN URINE: NEGATIVE
BUN BLDV-MCNC: 18 MG/DL (ref 6–20)
BUN/CREAT BLD: ABNORMAL (ref 9–20)
CALCIUM IONIZED: 1.18 MMOL/L (ref 1.13–1.33)
CALCIUM SERPL-MCNC: 8.7 MG/DL (ref 8.6–10.4)
CASTS UA: NORMAL /LPF (ref 0–8)
CHLORIDE BLD-SCNC: 108 MMOL/L (ref 98–107)
CO2: 23 MMOL/L (ref 20–31)
COLOR: YELLOW
COMMENT UA: ABNORMAL
CREAT SERPL-MCNC: 0.77 MG/DL (ref 0.7–1.2)
CRYSTALS, UA: NORMAL /HPF
EPITHELIAL CELLS UA: NORMAL /HPF (ref 0–5)
FIO2: 30
GFR AFRICAN AMERICAN: >60 ML/MIN
GFR NON-AFRICAN AMERICAN: >60 ML/MIN
GFR SERPL CREATININE-BSD FRML MDRD: ABNORMAL ML/MIN/{1.73_M2}
GFR SERPL CREATININE-BSD FRML MDRD: ABNORMAL ML/MIN/{1.73_M2}
GLUCOSE BLD-MCNC: 112 MG/DL (ref 70–99)
GLUCOSE URINE: NEGATIVE
HCT VFR BLD CALC: 29.8 % (ref 40.7–50.3)
HEMOGLOBIN: 9.6 G/DL (ref 13–17)
INTERVENTION: NORMAL
INTERVENTION: NORMAL
KETONES, URINE: NEGATIVE
LEUKOCYTE ESTERASE, URINE: NEGATIVE
MCH RBC QN AUTO: 29.7 PG (ref 25.2–33.5)
MCHC RBC AUTO-ENTMCNC: 32.2 G/DL (ref 28.4–34.8)
MCV RBC AUTO: 92.3 FL (ref 82.6–102.9)
MODE: NORMAL
MUCUS: NORMAL
NEGATIVE BASE EXCESS, ART: NORMAL (ref 0–2)
NITRITE, URINE: NEGATIVE
NRBC AUTOMATED: 0 PER 100 WBC
O2 DEVICE/FLOW/%: NORMAL
OTHER OBSERVATIONS UA: NORMAL
PATIENT TEMP: 38
PDW BLD-RTO: 13 % (ref 11.8–14.4)
PH UA: 5 (ref 5–8)
PLATELET # BLD: 288 K/UL (ref 138–453)
PMV BLD AUTO: 11.1 FL (ref 8.1–13.5)
POC HCO3: 27.2 MMOL/L (ref 21–28)
POC O2 SATURATION: 97 % (ref 94–98)
POC PCO2 TEMP: 42 MM HG
POC PCO2: 39.9 MM HG (ref 35–48)
POC PH TEMP: 7.43
POC PH: 7.44 (ref 7.35–7.45)
POC PO2 TEMP: 89 MM HG
POC PO2: 83.3 MM HG (ref 83–108)
POSITIVE BASE EXCESS, ART: 3 (ref 0–3)
POTASSIUM SERPL-SCNC: 3.6 MMOL/L (ref 3.7–5.3)
PROCALCITONIN: 0.19 NG/ML
PROTEIN UA: ABNORMAL
RBC # BLD: 3.23 M/UL (ref 4.21–5.77)
RBC UA: NORMAL /HPF (ref 0–4)
RENAL EPITHELIAL, UA: NORMAL /HPF
SAMPLE SITE: NORMAL
SODIUM BLD-SCNC: 141 MMOL/L (ref 135–144)
SPECIFIC GRAVITY UA: 1.02 (ref 1–1.03)
TCO2 (CALC), ART: 28 MMOL/L (ref 22–29)
TRICHOMONAS: NORMAL
TURBIDITY: ABNORMAL
URINE HGB: NEGATIVE
UROBILINOGEN, URINE: NORMAL
WBC # BLD: 20.4 K/UL (ref 3.5–11.3)
WBC UA: NORMAL /HPF (ref 0–5)
YEAST: NORMAL

## 2020-03-09 PROCEDURE — 84145 PROCALCITONIN (PCT): CPT

## 2020-03-09 PROCEDURE — 97530 THERAPEUTIC ACTIVITIES: CPT

## 2020-03-09 PROCEDURE — 94761 N-INVAS EAR/PLS OXIMETRY MLT: CPT

## 2020-03-09 PROCEDURE — 6370000000 HC RX 637 (ALT 250 FOR IP): Performed by: STUDENT IN AN ORGANIZED HEALTH CARE EDUCATION/TRAINING PROGRAM

## 2020-03-09 PROCEDURE — 76937 US GUIDE VASCULAR ACCESS: CPT

## 2020-03-09 PROCEDURE — 6370000000 HC RX 637 (ALT 250 FOR IP): Performed by: NURSE PRACTITIONER

## 2020-03-09 PROCEDURE — 6360000002 HC RX W HCPCS: Performed by: INTERNAL MEDICINE

## 2020-03-09 PROCEDURE — 82803 BLOOD GASES ANY COMBINATION: CPT

## 2020-03-09 PROCEDURE — 2000000003 HC NEURO ICU R&B

## 2020-03-09 PROCEDURE — 87070 CULTURE OTHR SPECIMN AEROBIC: CPT

## 2020-03-09 PROCEDURE — 87040 BLOOD CULTURE FOR BACTERIA: CPT

## 2020-03-09 PROCEDURE — 89220 SPUTUM SPECIMEN COLLECTION: CPT

## 2020-03-09 PROCEDURE — 71045 X-RAY EXAM CHEST 1 VIEW: CPT

## 2020-03-09 PROCEDURE — 81001 URINALYSIS AUTO W/SCOPE: CPT

## 2020-03-09 PROCEDURE — 2500000003 HC RX 250 WO HCPCS: Performed by: STUDENT IN AN ORGANIZED HEALTH CARE EDUCATION/TRAINING PROGRAM

## 2020-03-09 PROCEDURE — 6360000002 HC RX W HCPCS: Performed by: PSYCHIATRY & NEUROLOGY

## 2020-03-09 PROCEDURE — 85027 COMPLETE CBC AUTOMATED: CPT

## 2020-03-09 PROCEDURE — 2580000003 HC RX 258: Performed by: STUDENT IN AN ORGANIZED HEALTH CARE EDUCATION/TRAINING PROGRAM

## 2020-03-09 PROCEDURE — 97535 SELF CARE MNGMENT TRAINING: CPT

## 2020-03-09 PROCEDURE — 2580000003 HC RX 258: Performed by: PSYCHIATRY & NEUROLOGY

## 2020-03-09 PROCEDURE — 99233 SBSQ HOSP IP/OBS HIGH 50: CPT | Performed by: INTERNAL MEDICINE

## 2020-03-09 PROCEDURE — 94770 HC ETCO2 MONITOR DAILY: CPT

## 2020-03-09 PROCEDURE — 87186 SC STD MICRODIL/AGAR DIL: CPT

## 2020-03-09 PROCEDURE — 87205 SMEAR GRAM STAIN: CPT

## 2020-03-09 PROCEDURE — 2700000000 HC OXYGEN THERAPY PER DAY

## 2020-03-09 PROCEDURE — 2580000003 HC RX 258: Performed by: INTERNAL MEDICINE

## 2020-03-09 PROCEDURE — 36415 COLL VENOUS BLD VENIPUNCTURE: CPT

## 2020-03-09 PROCEDURE — 99233 SBSQ HOSP IP/OBS HIGH 50: CPT | Performed by: PSYCHIATRY & NEUROLOGY

## 2020-03-09 PROCEDURE — 87077 CULTURE AEROBIC IDENTIFY: CPT

## 2020-03-09 PROCEDURE — 82330 ASSAY OF CALCIUM: CPT

## 2020-03-09 PROCEDURE — 80048 BASIC METABOLIC PNL TOTAL CA: CPT

## 2020-03-09 PROCEDURE — 6360000002 HC RX W HCPCS: Performed by: STUDENT IN AN ORGANIZED HEALTH CARE EDUCATION/TRAINING PROGRAM

## 2020-03-09 PROCEDURE — 94003 VENT MGMT INPAT SUBQ DAY: CPT

## 2020-03-09 PROCEDURE — 87184 SC STD DISK METHOD PER PLATE: CPT

## 2020-03-09 PROCEDURE — 37799 UNLISTED PX VASCULAR SURGERY: CPT

## 2020-03-09 RX ORDER — ACETAMINOPHEN 325 MG/1
650 TABLET ORAL EVERY 4 HOURS PRN
Status: DISCONTINUED | OUTPATIENT
Start: 2020-03-09 | End: 2020-03-11 | Stop reason: HOSPADM

## 2020-03-09 RX ORDER — BROMOCRIPTINE MESYLATE 2.5 MG/1
5 TABLET ORAL 2 TIMES DAILY
Status: DISCONTINUED | OUTPATIENT
Start: 2020-03-09 | End: 2020-03-11 | Stop reason: HOSPADM

## 2020-03-09 RX ORDER — HYDROCHLOROTHIAZIDE 25 MG/1
25 TABLET ORAL DAILY
Status: DISCONTINUED | OUTPATIENT
Start: 2020-03-09 | End: 2020-03-11 | Stop reason: HOSPADM

## 2020-03-09 RX ADMIN — CEFEPIME HYDROCHLORIDE 2 G: 2 INJECTION, POWDER, FOR SOLUTION INTRAVENOUS at 22:37

## 2020-03-09 RX ADMIN — CEFEPIME HYDROCHLORIDE 2 G: 2 INJECTION, POWDER, FOR SOLUTION INTRAVENOUS at 03:14

## 2020-03-09 RX ADMIN — Medication 1500 MG: at 09:07

## 2020-03-09 RX ADMIN — FOLIC ACID 1 MG: 1 TABLET ORAL at 20:45

## 2020-03-09 RX ADMIN — HYDRALAZINE HYDROCHLORIDE 75 MG: 50 TABLET, FILM COATED ORAL at 16:01

## 2020-03-09 RX ADMIN — BROMOCRIPTINE MESYLATE 5 MG: 2.5 TABLET ORAL at 20:45

## 2020-03-09 RX ADMIN — SODIUM CHLORIDE, PRESERVATIVE FREE 10 ML: 5 INJECTION INTRAVENOUS at 09:08

## 2020-03-09 RX ADMIN — Medication 1500 MG: at 20:49

## 2020-03-09 RX ADMIN — APIXABAN 5 MG: 5 TABLET, FILM COATED ORAL at 20:11

## 2020-03-09 RX ADMIN — Medication 10 MG: at 02:34

## 2020-03-09 RX ADMIN — SODIUM CHLORIDE, PRESERVATIVE FREE 10 ML: 5 INJECTION INTRAVENOUS at 20:45

## 2020-03-09 RX ADMIN — CHLORPROMAZINE HYDROCHLORIDE 25 MG: 25 TABLET, SUGAR COATED ORAL at 22:50

## 2020-03-09 RX ADMIN — STANDARDIZED SENNA CONCENTRATE AND DOCUSATE SODIUM 2 TABLET: 8.6; 5 TABLET ORAL at 09:06

## 2020-03-09 RX ADMIN — ASPIRIN 81 MG: 81 TABLET, CHEWABLE ORAL at 09:06

## 2020-03-09 RX ADMIN — ROSUVASTATIN CALCIUM 40 MG: 20 TABLET, FILM COATED ORAL at 20:45

## 2020-03-09 RX ADMIN — ACETAMINOPHEN 1000 MG: 10 INJECTION, SOLUTION INTRAVENOUS at 01:01

## 2020-03-09 RX ADMIN — HYDRALAZINE HYDROCHLORIDE 50 MG: 50 TABLET, FILM COATED ORAL at 06:13

## 2020-03-09 RX ADMIN — AMLODIPINE BESYLATE 10 MG: 10 TABLET ORAL at 09:06

## 2020-03-09 RX ADMIN — APIXABAN 5 MG: 5 TABLET, FILM COATED ORAL at 09:06

## 2020-03-09 RX ADMIN — CEFEPIME HYDROCHLORIDE 2 G: 2 INJECTION, POWDER, FOR SOLUTION INTRAVENOUS at 16:49

## 2020-03-09 RX ADMIN — LISINOPRIL 40 MG: 20 TABLET ORAL at 09:06

## 2020-03-09 RX ADMIN — ACETAMINOPHEN 1000 MG: 10 INJECTION, SOLUTION INTRAVENOUS at 16:00

## 2020-03-09 RX ADMIN — SALINE NASAL SPRAY 2 SPRAY: 1.5 SOLUTION NASAL at 09:07

## 2020-03-09 RX ADMIN — Medication 15 ML: at 09:07

## 2020-03-09 RX ADMIN — Medication 15 ML: at 20:44

## 2020-03-09 RX ADMIN — FLUTICASONE PROPIONATE 2 SPRAY: 50 SPRAY, METERED NASAL at 09:07

## 2020-03-09 RX ADMIN — FOLIC ACID 1 MG: 1 TABLET ORAL at 09:08

## 2020-03-09 RX ADMIN — HYDROCHLOROTHIAZIDE 25 MG: 25 TABLET ORAL at 20:45

## 2020-03-09 RX ADMIN — HYDRALAZINE HYDROCHLORIDE 75 MG: 50 TABLET, FILM COATED ORAL at 20:45

## 2020-03-09 RX ADMIN — Medication 20 MG: at 09:07

## 2020-03-09 RX ADMIN — ACETAMINOPHEN 1000 MG: 10 INJECTION, SOLUTION INTRAVENOUS at 06:13

## 2020-03-09 ASSESSMENT — PAIN SCALES - WONG BAKER: WONGBAKER_NUMERICALRESPONSE: 0

## 2020-03-09 ASSESSMENT — PULMONARY FUNCTION TESTS
PIF_VALUE: 16
PIF_VALUE: 18
PIF_VALUE: 24
PIF_VALUE: 16
PIF_VALUE: 15
PIF_VALUE: 16
PIF_VALUE: 20
PIF_VALUE: 22
PIF_VALUE: 16
PIF_VALUE: 17

## 2020-03-09 NOTE — PROGRESS NOTES
Physical Therapy  Facility/Department: [address]  Daily Treatment Note  NAME: José Antonio Session  : 1970  MRN: [id number]    Date of Service: 3/9/2020    Discharge Recommendations:  Patient would benefit from continued therapy after discharge   PT Equipment Recommendations  Equipment Needed: (CTA)    Assessment   Body structures, Functions, Activity limitations: Decreased functional mobility ; Decreased cognition;Decreased strength;Decreased balance  Assessment: Pt required MaxA x2 for bed mob and MaxA to maintain EOB, fair ability to follow commands . Pt would benefit from continued acute PT to address deficits. Pt currently unsafe to perform any aspect of mobility without assistance. Prognosis: Fair  PT Education: General Safety; Functional Mobility Training  Patient Education: pt's son educated on purpose of PT, use of FDS  Barriers to Learning: cognition  REQUIRES PT FOLLOW UP: Yes  Activity Tolerance  Activity Tolerance: Patient limited by cognitive status; Patient limited by fatigue;Patient limited by endurance     Patient Diagnosis(es): There were no encounter diagnoses. has a past medical history of Heart attack ([facility] [address] Hyperlipidemia, Hypertension, and Stroke ([facility] [address] has a past surgical history that includes knee surgery (Right); Foot surgery (Bilateral); Coronary angioplasty with stent (2019); Cardiac surgery; Upper gastrointestinal endoscopy (N/A, 3/4/2020); and tracheostomy (N/A, 3/4/2020). Restrictions  Restrictions/Precautions  Restrictions/Precautions: Fall Risk, Up as Tolerated, Contact Precautions, General Precautions  Required Braces or Orthoses?: No  Position Activity Restriction  Other position/activity restrictions: Goal -180  Subjective   General  Chart Reviewed: Yes  Response To Previous Treatment: Patient with no complaints from previous session. Family / Caregiver Present: Yes(son)  Subjective  Subjective: RN and pt agreeable to PT.  Pt in bed upon arrival, lethargic but Patient/Caregiver Education & Training, Transfer Training  Safety Devices  Type of devices:  All fall risk precautions in place, Call light within reach, Patient at risk for falls, Left in bed, Nurse notified  Restraints  Initially in place: No     Therapy Time   Individual Concurrent Group Co-treatment   Time In 0920         Time Out 0950         Minutes 30         Timed Code Treatment Minutes: 15 Minutes(1 unit to FISCHER for co-treat)       Alva Bowels, PTA

## 2020-03-09 NOTE — PROGRESS NOTES
[facility] - Progress Note  Today's Date and Time: 3/9/2020, 11:02 AM    Impression :   · Pontine infarction   · MRSA carrier  · Reactive leukocytosis  · Acute DVT  · Fevers  · Patchy areas of bronchopneumonia by CAT scan   · Pseudomonas in sputum. 3-2-20    Recommendations:     · Continue IV Cefepime 2g q8 hrs  · Continue Vancomycin (day 6) because of MRSA carrier state    Medical Decision Making/Summary/Discussion:3/9/2020     · Patient with initial Rt pontine infarct 2-13-20  · Hypertensive urgency at [facility] on 2-24-20 MRI with increased number of infarcts   · Transferred back to [facility].  · Has developed leukemoid reaction and fevers. Cause unclear  · Abdomen tense. PEG in place. CT of abdomen would be helpful to exclude hematoma or fluid collection  · If CT non yielding repeat MRI should be considered to follow evolution of infarcts  · CT shows scattered areas of bronchopneumonia. Pseudomonas in sputum but also MRSA carrier  · Antibiotics altered to Vancomycin and Zosyn to add MRSA and anaerobic coverage  · Zosyn switched back to cefepime as Pseudomonas shows intermediate sensitivity to Zosyn  Infection Control Recommendations   · [place] Precautions  · Contact Isolation MRSA    Antimicrobial Stewardship Recommendations     · Simplification of therapy  · Targeted therapy    Coordination of Outpatient Care:   · Estimated Length of IV antimicrobials:TBD  · Patient will need Midline Catheter Insertion: No  · Patient will need PICC line Insertion:No  · Patient will need: Home IV , [place],  SNF,  LTAC:TBD  · Patient will need outpatient wound care:No    Chief complaint/reason for consultation:   · Fevers, leukocytosis      History of Present Illness:   Evert Cruz is a 52y.o.-year-old  male who was initially admitted on 2/24/2020. Patient seen at the request of .      INITIAL HISTORY:    Patient previously admitted to [address] [place] [address] on 2-13-20 with dysarthria and Lt hemiparesis secondary to Rt pontine infarction. Treated with heparin and then ASA and Brilinta. Transferred to [facility] on 2-19-20 where he exhibited some decline in speech and ability to swallow. Transferred to [facility] on 2-24-20 with hypertensive urgency. MRI showed increasing numbers of infarcts in the mayuri extending to medulla and a new Lt cerebellar infarct. Transferred to [address] [place] [address] on 2-24-20 where he has been continued on heparin, ASA and Brilinta. On 2-2720 felt to have pneumonia and started on Unasyn. Intermittent low grade temps to 100F were noted. Temp increased to 101-102 F on 3-5- and 3-6-20. Sputum culture 3-5-20 with Gram negative bacilli     Current exam does not suggest Pseudomonas pneumonia. Source of fevers and leukemoid reaction unclear. CT of the abdomen and possible repeat MRI of brain would be helpful. CURRENT EVALUATION : 3/9/2020    Patient evaluated and examined in the ICU. T-max 101.7 °F.  VS stable, except for HTN  Required ice packs earlier    Having hiccoughs  Respiratory secretions frequent. Requiring suctioning    CT reviewed. No abdominal pathology. Chest with scattered areas of bronchopneumonia. Pseudomonas in sputum but also known MRSA carrier  Antibiotics altered to Vancomycin and Zosyn to add MRSA and anaerobic coverage. Zosyn switched back to cefepime because of intermediate sensitivity of pseudomonas to zosyn. Lower extremity duplex revealed bilateral acute DVTs and was started on high-dose heparin infusion. Brilinta held.   Status post trach and PEG placed on 3/4      Labs, X rays reviewed: 3/9/2020    BUN:25-->18--> 24-->18  Cr:1.09-->0.90--> 1.04-->0.77    WBC: 17-->20.7-->37.0-->33.2-> 21.9-->20.4  Hb:10.8-->9.6-> 9.6  Plat: 206-->254-> 261-->288    Cultures:  Urine: No growth  ·   Blood:  · 3-1-20: No growth   Sputum :  · 3-8-20: Pseudomonas  · 3-5-20: Pseudomonas  · 3-2-20: Pseudomonas  Wound:  ·   HIV- negative     Discussed with patient, RN, family. Dr Peace Vizcaino. I have personally reviewed the past medical history, past surgical history, medications, social history, and family history, and I have updated the database accordingly.   Past Medical History:     Past Medical History:   Diagnosis Date    Heart attack ([facility] [address]     Hyperlipidemia     Hypertension     Stroke ([facility] [address] 02/13/2020       Past Surgical  History:     Past Surgical History:   Procedure Laterality Date    CARDIAC SURGERY      CORONARY ANGIOPLASTY WITH STENT PLACEMENT  2019    x 1 stent for LAD    FOOT SURGERY Bilateral     KNEE SURGERY Right     TRACHEOSTOMY N/A 3/4/2020    TRACHEOTOMY performed by Mary Denny MD at [facility] N/A 3/4/2020    EGD ESOPHAGOGASTRODUODENOSCOPY PEG TUBE INSERTION performed by [facility] Denny MD [facility] OR       Medications:      hydrALAZINE  75 mg Oral 3 times per day    apixaban  5 mg Oral BID    cefepime  2 g Intravenous Q8H    acetaminophen  1,000 mg Intravenous Q6H    bisacodyl  10 mg Rectal Once    vancomycin  1,500 mg Intravenous Q12H    vancomycin (VANCOCIN) intermittent dosing (placeholder)   Other RX Placeholder    aspirin  81 mg Oral Daily    pantoprazole  40 mg Oral QAM AC    fluticasone  2 spray Each Nostril Daily    sodium chloride  2 spray Each Nostril Daily    lisinopril  40 mg Oral Daily    folic acid  1 mg Oral BID    rosuvastatin  40 mg Oral Nightly    amLODIPine  10 mg Oral Daily    FLUoxetine  20 mg Per NG tube Daily    chlorhexidine  15 mL Mouth/Throat BID    sennosides-docusate sodium  2 tablet Oral Daily    sodium chloride flush  10 mL Intravenous 2 times per day       Social History:     Social History     Socioeconomic History    Marital status: Single     Spouse name: Not on file    Number of children: Not on file    Years of education: Not on file    Highest education level: Not on file   Occupational History    Not on file   Social Needs    Financial resource strain: Not on file    Food insecurity     Worry: Not on file     Inability: Not on file    Transportation needs     Medical: Not on file     Non-medical: Not on file   Tobacco Use    Smoking status: Never Smoker    Smokeless tobacco: Never Used   Substance and Sexual Activity    Alcohol use: Yes    Drug use: Never    Sexual activity: Not on file   Lifestyle    Physical activity     Days per week: Not on file     Minutes per session: Not on file    Stress: Not on file   Relationships    Social connections     Talks on phone: Not on file     Gets together: Not on file     Attends Islam service: Not on file     Active member of club or organization: Not on file     Attends meetings of clubs or organizations: Not on file     Relationship status: Not on file    Intimate partner violence     Fear of current or ex partner: Not on file     Emotionally abused: Not on file     Physically abused: Not on file     Forced sexual activity: Not on file   Other Topics Concern    Not on file   Social History Narrative    Not on file       Family History:     Family History   Problem Relation Age of Onset    High Blood Pressure Mother     High Blood Pressure Father     Hypertension Sister     Hypertension Brother         Allergies:   Patient has no known allergies. Review of Systems:     Unable to provide. Sedated on ventilator. 3/9/2020      Constitutional: No fevers or chills. No systemic complaints  Head: No headaches  Eyes: No double vision or blurry vision. No conjunctival inflammation. ENT: No sore throat or runny nose. . No hearing loss, tinnitus or vertigo. Cardiovascular: No chest pain or palpitations. No shortness of breath. No CRAWFORD  Lung: No shortness of breath or cough. No sputum production  Abdomen: No nausea, vomiting, diarrhea, or abdominal pain. Jackie Wade No cramps. Genitourinary: No increased urinary frequency, or dysuria. No hematuria.  No suprapubic or CVA pain  Musculoskeletal: No muscle aches or pains. No joint effusions, swelling or deformities  Hematologic: No bleeding or bruising. Neurologic: Headache, weakness  Integument: No rash, no ulcers. Psychiatric: No depression. Endocrine: No polyuria, no polydipsia, no polyphagia. Physical Examination :     Patient Vitals for the past 8 hrs:   BP Temp Temp src Pulse SpO2   03/09/20 0906 (!) 169/67 -- -- -- --   03/09/20 0730 -- -- -- 66 92 %   03/09/20 0550 -- -- -- 65 96 %   03/09/20 0450 -- -- -- 78 96 %   03/09/20 0350 (!) 187/94 100.4 °F (38 °C) Oral 69 94 %   03/09/20 0320 -- -- -- 70 97 %     General Appearance: Awake, not interactive  Head:  Normocephalic, no trauma  Eyes: Pupils equal, round, reactive to light; sclera anicteric; conjunctivae pink. No embolic phenomena. ENT: Oropharynx clear, without erythema, exudate, or thrush. No tenderness of sinuses. Mouth/throat: mucosa pink and moist. No lesions. Dentition in good repair. Neck:Supple, without lymphadenopathy. Thyroid normal, No bruits. Pulmonary/Chest: Clear to auscultation, without wheezes, rales, or rhonchi. No dullness to percussion. Cardiovascular: Regular rate and rhythm without murmurs, rubs, or gallops. Abdomen: Tense, no rebound. Bowel sounds normal. No organomegaly. PEG in place  All four Extremities: No cyanosis, clubbing, edema, or effusions. Neurologic: Lt side weakness  Skin: Warm and dry with good turgor. No signs of peripheral arterial or venous insufficiency. No ulcerations. No open wounds. Medical Decision Making -Laboratory:   I have independently reviewed/ordered the following labs:    CBC with Differential:   Recent Labs     03/06/20  1428  03/08/20  0600 03/09/20  0535   WBC 39.2*   < > 21.9* 20.4*   HGB 11.3*   < > 9.2* 9.6*   HCT 34.9*   < > 29.8* 29.8*      < > 261 288   LYMPHOPCT 5*  --   --   --    MONOPCT 5  --   --   --     < > = values in this interval not displayed.      BMP:   Recent Labs

## 2020-03-09 NOTE — CARE COORDINATION
Care Transition  Called Adria Muñoz at [facility] to see if patient able to go today. He is working on obtaining a bed. Per Georgia Villanueva, patient has auth. Called [facility] to discuss bed availability and he is waiting on a discharge. Met with patient's family and updated he could have a bed today at [facility], will keep them updated. Spoke with Yogi Maria, ICU NP, he is not medically stable for discharge. Called [facility] and left vm to update.

## 2020-03-09 NOTE — PLAN OF CARE
Problem: ACTIVITY INTOLERANCE/IMPAIRED MOBILITY  Goal: Mobility/activity is maintained at optimum level for patient  3/9/2020 1929 by Kinza Carrero RCP  Outcome: Ongoing     Problem: COMMUNICATION IMPAIRMENT  Goal: Ability to express needs and understand communication  3/9/2020 1929 by Kinza Carrero RCP  Outcome: Ongoing     Problem: MECHANICAL VENTILATION  Goal: Mobility/activity is maintained at optimum level for patient  3/9/2020 1929 by Kinza Carrero RCP  Outcome: Ongoing     Problem: MECHANICAL VENTILATION  Goal: Ability to express needs and understand communication  3/9/2020 1929 by Kinza Carrero RCP  Outcome: Ongoing     Problem: MECHANICAL VENTILATION  Goal: Patient will maintain patent airway  3/9/2020 1929 by Kinza Carrero RCP  Outcome: Ongoing     Problem: MECHANICAL VENTILATION  Goal: Oral health is maintained or improved  3/9/2020 1929 by Kinza Carrero RCP  Outcome: Ongoing     Problem: MECHANICAL VENTILATION  Goal: Tracheostomy will be managed safely  3/9/2020 1929 by Kinza Carrero RCP  Outcome: Ongoing

## 2020-03-09 NOTE — PROGRESS NOTES
OT    Goals  Short term goals  Time Frame for Short term goals: By discharge, pt will:  Short term goal 1: Demo Min A x2 for bed mobility in order to increase safety and independence with functional ADLs  Short term goal 2: Demo Mod A for feeding and grooming tasks with setup and VCs/TCs throughout  Short term goal 3: Demo Min A for static/dynamic sitting balance for 8+ minutes for increased independence with functional tasks and NMRE  Short term goal 4: Demo Mod A with setup, increased time, AE, and VC/TCs for UB bathing/dressing tasks  Short term goal 5: Notify OTR for updated goals once appropriate       Therapy Time   Individual Concurrent Group Co-treatment   Time In  9:30      PTA- for ~ 20 min   Time Out  10:00         Minutes  30             Time coded min: 30, billed for 1 ADL d/t co-tx    Page JIM LairdA/L

## 2020-03-09 NOTE — PROGRESS NOTES
TODAY:      AWAKE & FOLLOWING COMMANDS:  [] No   [x] Yes    INTUBATED:   [x] No  Trach [] Yes    SEDATION/ANALGESIA:    [] Propofol gtt  [] Versed gtt  [] Ativan gtt   [x] No Sedation  Pain medications:      FEEDING: Able to take PO?  [] No:  [] NPO for:  [] NG/OG [x] PEG  Tube Feeds:      [x] Yes:  Diet:   DVT Prophylaxis:  [] Yes:           [x] No rationale:     Stress Ulcer Prophylaxis: [] Yes:   [x] Not indicated Eliquis     VASOPRESSORS:  [x] No    [] Yes  [] Levophed [] Dopamine [] Vasopressin  [] Dobutamine [] Phenylephrine [] Epinephrine    CENTRAL/ARTERIAL LINES:  [x] No    [] Yes:  Location: , Date placed: , Indication:     MARTIN CATHETER: [x] No    [] Yes:  Location: , Date placed: , Indication:     DRAINS: [x] No    [] Yes:  Location: , Date placed: , Output:     Head of Bed: [x] Elevated:          [] Flat    Glucose management: [x] Not indicated, consistently less than 180 [] Sliding Scale :            [] Long Acting:    Secondary Stroke PPX: [] Antiplatelet:  ASA              [] Statin:    Crestor

## 2020-03-09 NOTE — PROGRESS NOTES
Daily Progress Note  Neuro Critical Care    Patient Name: Rose Cruz  Patient : 1970  Room/Bed: 0680/4853-82  Code Status: Full  Allergies: No Known Allergies    CHIEF COMPLAINT:      Aphasia, left sided weakness     INTERVAL HISTORY    Initial Presentation (Admitted 20): The patient is a 53 yo male with history of HTN, CAD s/p stent (May 2019), and recent right pontine infarct who presented as a transfer from [facility] for worsening aphasia, dysphagia, and left hemiparesis. Patient was recently admitted to [facility] on 20 after presenting with dysarthria and left hemiparesis and found to have a right pontine infarct. He was out of the tPA window. 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 He was maintained on his aspirin and brilinta and was treated with low dose heparin infusion for 48 hours. Repeat CTA head/neck on 2/15 remained stable and patient's clinical exam remained unchanged after heparin was discontinued. He was discharged to [facility] on . At the time of discharge he had a left facial droop and mild to moderate left sided weakness (LUE 3/5, LLE 4/5). For the last few days prior to re-admission, family states they started to notice patient's speech declining and he was having more difficulty swallowing. On , he was noted by the nurse to have decreased word output and to be coughing with liquids. CT head performed showing stable right pontine infarct. On , patient was sent to [facility] ED due to hypertensive urgency (237/130). Given 20 mg IV labetalol with improvement of BP down to 152/00. Started on IV fluids for RASHMI. Repeat CT head showed stable right pontine infarct. MRI brain showed increasing multifocal infarct in the mayuri extending to medulla and new punctate infarct left cerebellar hemisphere. Patient initially admitted to the ICU at [facility].  Adali Miller but then decision made to transfer to [facility] Neuro ICU Result   Cardiomegaly and mild central vascular congestion. Small left pleural   effusion. XR CHEST PORTABLE   Final Result   *Tubes in satisfactory positions. *No significant change in chest findings. XR CHEST PORTABLE   Final Result   No significant change from prior study. Stable cardiomegaly and pulmonary   vascular congestion. XR CHEST PORTABLE   Final Result   1. Endotracheal tube remains in appropriate position. 2. Vascular congestion without overt edema. XR CHEST PORTABLE   Final Result   1. Endotracheal and enteric tubes in place. 2.  Unchanged mild vascular congestion. No radiographic findings to indicate   significant pulmonary edema. CT HEAD WO CONTRAST   Final Result   Stable known pontine infarct without acute hemorrhage or other acute   intracranial finding. Slightly worsened sinus disease, as above. XR CHEST PORTABLE   Final Result   Endotracheal tube tip projects approximately 7 cm above the verónica. Advancement by 2 cm suggested for optimal positioning. XR CHEST PORTABLE   Final Result   Vascular congestion without overt edema. XR CHEST PORTABLE   Final Result   No acute cardiopulmonary disease         CT ABDOMEN PELVIS WO CONTRAST Additional Contrast? None   Final Result   No evidence of retroperitoneal hematoma. CT HEAD WO CONTRAST   Final Result   Pontine infarcts without evidence for hemorrhagic conversion. No additional   findings compared to recent prior study. .         IR ANGIOGRAM CAROTID C EREBRAL BILATERAL   Final Result      XR ABDOMEN FOR NG/OG/NE TUBE PLACEMENT   Final Result   Enteric tube in the stomach as above. No evidence of bowel obstruction. CT BRAIN PERFUSION   Final Result   As above . CTA HEAD NECK W CONTRAST   Final Result   1. Hypodense areas in the mayuri concordant with acute infarct visualized at MR.   2. No perfusion mismatch.    3. 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 4. Otherwise patent cervical and cerebral vasculature         CT HEAD WO CONTRAST   Final Result   1. Hypodense areas in the mayuri concordant with acute infarct visualized at MR.   2. No perfusion mismatch. 3. 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 4. Otherwise patent cervical and cerebral vasculature         XR CHEST PORTABLE    (Results Pending)       Labs and Images reviewed with:  [x] Dr. Jyoti Brooks. Bobbi    [] Dr. Ayr Haro  [] Dr. Vashti Prasad  [] There are no new interval images to review. PHYSICAL EXAM       CONSTITUTIONAL:  Trachestomy. Opens eyes spontaneously, tracks. Followed commands with right hand x 1 today. HEAD:  normocephalic, atraumatic    EYES:  PERRLA, EOMI.   ENT:  moist mucous membranes   NECK:  supple, symmetric   LUNGS:  Equal air entry bilaterally, clear   CARDIOVASCULAR:  normal s1 / s2, RRR, distal pulses intact   ABDOMEN:  Soft, no rigidity, normal bowel sounds, PEG site intact without drainge   NEUROLOGIC:  Mental Status: Intubated, off sedation, awake and alert             Cranial Nerves:    III: Pupils:  equal, round, reactive to light  III,IV,VI: Extra Ocular Movements: intact  VII: Facial strength: abnormal left facial weakness    Motor Exam:    Quadriplegic. Moved right pointer finger to command once today. DRAINS:  [x] There are no drains for Neuro Critical Care to monitor at this time. ASSESSMENT AND PLAN:       The patient is a 53 yo male with a history of HTN, CAD s/p stent (January 2019), and recent right pontine infarct who presented as a transfer from [place] [address] for worsening aphasia, dysphagia, and left hemiparesis. Found to have increasing multifocal acute infarctions in the mayuri extending to the medulla as well as a small acute infarct in the left cerebellar infarction. 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   Patient PRN  - Daily BMP     GI/NUTRITION:  NUTRITION:  Semi-Elemental, goal 65cc/hr  - Dysphagia secondary to pontine infarction  - POD #4 s/p PEG  - Tolerating tube feeds  - Bowel regimen: Senokot-S daily, Milk of Mag PRN  - GI prophylaxis: Protonix  - Last BM 3/8     ID:  - Febrile overnight, Tmax 38.4  - Tylenol, cooling blanket PRN  - Leukocytosis improving, WBC 20.4  - Procalcitonin 0.19  - Follow up blood cultures and sputum culture  - Start Bromocriptine 5 mg BID  - Urinalysis 3/5 & 3.9 negative  - Lipase/Amylase normal  - Blood cultures 3/5 with no growth to date  - Acute bilateral lower extremity DVT could be contributing to fever but less likely leukocytosis  - Repeat respiratory culture 3/5 growing pseudomonas (same as 3/2), follow sensitivities   - Continue Vanc/Cefepime  - ID consulted, appreciate recs  - CT chest, abdomen/pelvis - multifocal pneumonia  - Continue to monitor for fevers  - Daily CBC     HEME:   - H&H 9.6/29.8  - Platelets 685  - Heterozygous MTHFR mutation  - Concern for hypercoaguable state given patient developed acute DVT while on low intensity heparin infusion  - Negative prothrombin, Factor V Leiden, lupus anticoagulant  - Repeat homocysteine normal   - Hem/Onc following at distance  - Daily CBC     ENDOCRINE:  - Continue to monitor blood glucose, goal <180  - Recent Hemoglobin A1C 5.6     OTHER:  - Prozac 20mg QD for depression  - PT/OT/ST  - Discharge planning; referral sent to [facility]  - Code Status: FULL     PROPHYLAXIS:  Stress ulcer: Protonix     DVT PROPHYLAXIS:  - SCD sleeves - Thigh High   - Eliquis 5 mg BID    DISPOSITION:  [x] To remain ICU: close neurological monitoring and vent management  [] OK for out of ICU from Neuro Critical Care standpoint    We will continue to follow along. For any changes in exam or patient status please contact Neuro Critical Care.       BALTAZAR Gonsalves - CNP  Neuro Critical Care  Pager [phone]  3/9/2020     8:50 AM

## 2020-03-09 NOTE — PROGRESS NOTES
% Ideal Body 130%  · BMI Classification: BMI 30.0 - 34.9 Obese Class I    Nutrition Interventions:   Continue current Tube Feeding  Continued Inpatient Monitoring, Education Not Indicated    Nutrition Evaluation:   · Evaluation: Goal achieved   · Goals: provide greater than 75% of nutrition needs   · Monitoring: TF Intake, TF Tolerance, Monitor Bowel Function      Electronically signed by Peng Gage RD, LD on 3/9/20 at 2:30 PM EDT    Contact Number: [phone]

## 2020-03-09 NOTE — PLAN OF CARE
Problem: ACTIVITY INTOLERANCE/IMPAIRED MOBILITY  Goal: Mobility/activity is maintained at optimum level for patient  3/9/2020 0750 by Paul López RCP  Outcome: Ongoing  3/8/2020 1925 by Jemal Delaney RCP  Outcome: Ongoing     Problem: COMMUNICATION IMPAIRMENT  Goal: Ability to express needs and understand communication  3/9/2020 0750 by Paul López RCP  Outcome: Ongoing  3/8/2020 1925 by Jemal Delaney RCP  Outcome: Ongoing     Problem: MECHANICAL VENTILATION  Goal: Mobility/activity is maintained at optimum level for patient  3/9/2020 0750 by Paul López RCP  Outcome: Ongoing  3/8/2020 1925 by Jemal Delaney RCP  Outcome: Ongoing  Goal: Ability to express needs and understand communication  3/9/2020 0750 by Paul López RCP  Outcome: Ongoing  3/8/2020 1925 by Jemal Delaney RCP  Outcome: Ongoing  Goal: Patient will maintain patent airway  3/9/2020 0750 by Paul López RCP  Outcome: Ongoing  3/8/2020 1925 by Jemal Delaney RCP  Outcome: Ongoing  Goal: Oral health is maintained or improved  3/9/2020 0750 by Paul López RCP  Outcome: Ongoing  3/8/2020 1925 by Jemal Delaney RCP  Outcome: Ongoing  Goal: ET tube will be managed safely  Outcome: Ongoing  Goal: Tracheostomy will be managed safely  3/9/2020 0750 by Paul López RCP  Outcome: Ongoing  3/8/2020 1925 by Jemal Delaney RCP  Outcome: Ongoing

## 2020-03-09 NOTE — PROGRESS NOTES
Ventilator Bronchodilator assessment    Breath sounds: clear  Inspiratory Pressure: 24  Plateau Pressure: 23    Patient assessed at level 1          []    Bronchodilator Assessment    BRONCHODILATOR ASSESSMENT SCORE  Score 0 (Home) 1 2 3 4   Breath Sounds   []  Chronic Ventilator: Patient at baseline [x]  Mild Wheezes/ Clear []  Intermittent wheezes with good air entry []  Bilateral/unilateral wheezing with diminished air entry []  Insp/Exp wheeze and/or poor aeration   Ventilator Pressures   []  Chronic Ventilator [x]  Insp. Pressure less than 25 cm H20 []  Insp. Pressure less than 25 cm H20 []  Insp. Pressure exceeds 25 cm H20 []  Insp.  Pressure exceeds 30 cm H20   Plateau Pressure []  NA   [x]  Plateau Pressure less than 4  []  Plateau Pressure less than or equal to 5 []  Plateau Pressure greater than or equal to 6 []  Plateau Pressure greater than or equal to [place]  7:51 AM

## 2020-03-10 LAB
ANION GAP SERPL CALCULATED.3IONS-SCNC: 12 MMOL/L (ref 9–17)
BUN BLDV-MCNC: 22 MG/DL (ref 6–20)
BUN/CREAT BLD: ABNORMAL (ref 9–20)
CALCIUM IONIZED: 1.13 MMOL/L (ref 1.13–1.33)
CALCIUM SERPL-MCNC: 8.9 MG/DL (ref 8.6–10.4)
CHLORIDE BLD-SCNC: 106 MMOL/L (ref 98–107)
CO2: 24 MMOL/L (ref 20–31)
CREAT SERPL-MCNC: 0.84 MG/DL (ref 0.7–1.2)
GFR AFRICAN AMERICAN: >60 ML/MIN
GFR NON-AFRICAN AMERICAN: >60 ML/MIN
GFR SERPL CREATININE-BSD FRML MDRD: ABNORMAL ML/MIN/{1.73_M2}
GFR SERPL CREATININE-BSD FRML MDRD: ABNORMAL ML/MIN/{1.73_M2}
GLUCOSE BLD-MCNC: 120 MG/DL (ref 70–99)
HCT VFR BLD CALC: 29.5 % (ref 40.7–50.3)
HEMOGLOBIN: 8.9 G/DL (ref 13–17)
MCH RBC QN AUTO: 28.4 PG (ref 25.2–33.5)
MCHC RBC AUTO-ENTMCNC: 30.2 G/DL (ref 28.4–34.8)
MCV RBC AUTO: 94.2 FL (ref 82.6–102.9)
NRBC AUTOMATED: 0 PER 100 WBC
PDW BLD-RTO: 12.8 % (ref 11.8–14.4)
PLATELET # BLD: 283 K/UL (ref 138–453)
PMV BLD AUTO: 11.3 FL (ref 8.1–13.5)
POTASSIUM SERPL-SCNC: 3.9 MMOL/L (ref 3.7–5.3)
RBC # BLD: 3.13 M/UL (ref 4.21–5.77)
SODIUM BLD-SCNC: 142 MMOL/L (ref 135–144)
WBC # BLD: 16.6 K/UL (ref 3.5–11.3)

## 2020-03-10 PROCEDURE — 2580000003 HC RX 258: Performed by: PSYCHIATRY & NEUROLOGY

## 2020-03-10 PROCEDURE — 80048 BASIC METABOLIC PNL TOTAL CA: CPT

## 2020-03-10 PROCEDURE — 6360000002 HC RX W HCPCS: Performed by: PSYCHIATRY & NEUROLOGY

## 2020-03-10 PROCEDURE — 6370000000 HC RX 637 (ALT 250 FOR IP): Performed by: STUDENT IN AN ORGANIZED HEALTH CARE EDUCATION/TRAINING PROGRAM

## 2020-03-10 PROCEDURE — 97530 THERAPEUTIC ACTIVITIES: CPT

## 2020-03-10 PROCEDURE — 99233 SBSQ HOSP IP/OBS HIGH 50: CPT | Performed by: INTERNAL MEDICINE

## 2020-03-10 PROCEDURE — 6360000002 HC RX W HCPCS: Performed by: INTERNAL MEDICINE

## 2020-03-10 PROCEDURE — 94003 VENT MGMT INPAT SUBQ DAY: CPT

## 2020-03-10 PROCEDURE — 36415 COLL VENOUS BLD VENIPUNCTURE: CPT

## 2020-03-10 PROCEDURE — 6370000000 HC RX 637 (ALT 250 FOR IP): Performed by: NURSE PRACTITIONER

## 2020-03-10 PROCEDURE — 2580000003 HC RX 258: Performed by: NURSE PRACTITIONER

## 2020-03-10 PROCEDURE — 2060000000 HC ICU INTERMEDIATE R&B

## 2020-03-10 PROCEDURE — 94761 N-INVAS EAR/PLS OXIMETRY MLT: CPT

## 2020-03-10 PROCEDURE — 85027 COMPLETE CBC AUTOMATED: CPT

## 2020-03-10 PROCEDURE — 2700000000 HC OXYGEN THERAPY PER DAY

## 2020-03-10 PROCEDURE — 97535 SELF CARE MNGMENT TRAINING: CPT

## 2020-03-10 PROCEDURE — 2580000003 HC RX 258: Performed by: INTERNAL MEDICINE

## 2020-03-10 PROCEDURE — 97110 THERAPEUTIC EXERCISES: CPT

## 2020-03-10 PROCEDURE — 82330 ASSAY OF CALCIUM: CPT

## 2020-03-10 PROCEDURE — 99233 SBSQ HOSP IP/OBS HIGH 50: CPT | Performed by: PSYCHIATRY & NEUROLOGY

## 2020-03-10 PROCEDURE — 97112 NEUROMUSCULAR REEDUCATION: CPT

## 2020-03-10 PROCEDURE — 2580000003 HC RX 258: Performed by: STUDENT IN AN ORGANIZED HEALTH CARE EDUCATION/TRAINING PROGRAM

## 2020-03-10 PROCEDURE — 2000000003 HC NEURO ICU R&B

## 2020-03-10 RX ADMIN — BROMOCRIPTINE MESYLATE 5 MG: 2.5 TABLET ORAL at 20:54

## 2020-03-10 RX ADMIN — FOLIC ACID 1 MG: 1 TABLET ORAL at 20:54

## 2020-03-10 RX ADMIN — HYDRALAZINE HYDROCHLORIDE 75 MG: 50 TABLET, FILM COATED ORAL at 15:16

## 2020-03-10 RX ADMIN — Medication 15 ML: at 08:47

## 2020-03-10 RX ADMIN — CEFEPIME HYDROCHLORIDE 2 G: 2 INJECTION, POWDER, FOR SOLUTION INTRAVENOUS at 15:16

## 2020-03-10 RX ADMIN — AMLODIPINE BESYLATE 10 MG: 10 TABLET ORAL at 08:46

## 2020-03-10 RX ADMIN — Medication 15 ML: at 21:17

## 2020-03-10 RX ADMIN — HYDROCHLOROTHIAZIDE 25 MG: 25 TABLET ORAL at 08:46

## 2020-03-10 RX ADMIN — LISINOPRIL 40 MG: 20 TABLET ORAL at 08:46

## 2020-03-10 RX ADMIN — BROMOCRIPTINE MESYLATE 5 MG: 2.5 TABLET ORAL at 08:46

## 2020-03-10 RX ADMIN — SODIUM CHLORIDE: 9 INJECTION, SOLUTION INTRAVENOUS at 15:17

## 2020-03-10 RX ADMIN — SODIUM CHLORIDE, PRESERVATIVE FREE 10 ML: 5 INJECTION INTRAVENOUS at 08:47

## 2020-03-10 RX ADMIN — CEFEPIME HYDROCHLORIDE 2 G: 2 INJECTION, POWDER, FOR SOLUTION INTRAVENOUS at 05:36

## 2020-03-10 RX ADMIN — SALINE NASAL SPRAY 2 SPRAY: 1.5 SOLUTION NASAL at 08:47

## 2020-03-10 RX ADMIN — SODIUM CHLORIDE, PRESERVATIVE FREE 10 ML: 5 INJECTION INTRAVENOUS at 20:54

## 2020-03-10 RX ADMIN — FLUTICASONE PROPIONATE 2 SPRAY: 50 SPRAY, METERED NASAL at 08:47

## 2020-03-10 RX ADMIN — Medication 1500 MG: at 21:39

## 2020-03-10 RX ADMIN — HYDRALAZINE HYDROCHLORIDE 75 MG: 50 TABLET, FILM COATED ORAL at 05:58

## 2020-03-10 RX ADMIN — HYDRALAZINE HYDROCHLORIDE 75 MG: 50 TABLET, FILM COATED ORAL at 20:53

## 2020-03-10 RX ADMIN — Medication 20 MG: at 08:46

## 2020-03-10 RX ADMIN — APIXABAN 5 MG: 5 TABLET, FILM COATED ORAL at 08:46

## 2020-03-10 RX ADMIN — ASPIRIN 81 MG: 81 TABLET, CHEWABLE ORAL at 08:46

## 2020-03-10 RX ADMIN — FOLIC ACID 1 MG: 1 TABLET ORAL at 08:46

## 2020-03-10 RX ADMIN — APIXABAN 5 MG: 5 TABLET, FILM COATED ORAL at 20:54

## 2020-03-10 RX ADMIN — Medication 1500 MG: at 09:42

## 2020-03-10 ASSESSMENT — PULMONARY FUNCTION TESTS
PIF_VALUE: 23
PIF_VALUE: 20
PIF_VALUE: 19
PIF_VALUE: 22
PIF_VALUE: 16
PIF_VALUE: 21
PIF_VALUE: 15
PIF_VALUE: 20
PIF_VALUE: 18
PIF_VALUE: 22
PIF_VALUE: 16

## 2020-03-10 NOTE — PROGRESS NOTES
[facility] - Progress Note  Today's Date and Time: 3/10/2020, 10:40 AM    Impression :   · Pontine infarction   · MRSA carrier  · Reactive leukocytosis  · Acute DVT  · Fevers  · Patchy areas of bronchopneumonia by CAT scan   · Pseudomonas in sputum. 3-2-20    Recommendations:     · Continue IV Cefepime 2g q8 hrs  · Continue Vancomycin (day 7) because of MRSA carrier state    Medical Decision Making/Summary/Discussion:3/10/2020     · Patient with initial Rt pontine infarct 2-13-20  · Hypertensive urgency at [facility] on 2-24-20 MRI with increased number of infarcts   · Transferred back to [facility].  · Has developed leukemoid reaction and fevers. Cause unclear  · Abdomen tense. PEG in place. CT of abdomen would be helpful to exclude hematoma or fluid collection  · If CT non yielding repeat MRI should be considered to follow evolution of infarcts  · CT shows scattered areas of bronchopneumonia. Pseudomonas in sputum but also MRSA carrier  · Antibiotics altered to Vancomycin and Zosyn to add MRSA and anaerobic coverage  · Zosyn switched back to cefepime as Pseudomonas shows intermediate sensitivity to Zosyn  Infection Control Recommendations   · [place] Precautions  · Contact Isolation MRSA    Antimicrobial Stewardship Recommendations     · Simplification of therapy  · Targeted therapy    Coordination of Outpatient Care:   · Estimated Length of IV antimicrobials:TBD  · Patient will need Midline Catheter Insertion: No  · Patient will need PICC line Insertion:No  · Patient will need: Home IV , [place],  SNF,  LTAC:TBD  · Patient will need outpatient wound care:No    Chief complaint/reason for consultation:   · Fevers, leukocytosis      History of Present Illness:   José Antonio Womack is a 52y.o.-year-old  male who was initially admitted on 2/24/2020. Patient seen at the request of .      INITIAL HISTORY:    Patient previously admitted to [facility] on 2-13-20 with dysarthria and Lt hemiparesis secondary to Rt pontine infarction. Treated with heparin and then ASA and Brilinta. Transferred to [facility] on 2-19-20 where he exhibited some decline in speech and ability to swallow. Transferred to [facility] on 2-24-20 with hypertensive urgency. MRI showed increasing numbers of infarcts in the mayuri extending to medulla and a new Lt cerebellar infarct. Transferred to [facility] on 2-24-20 where he has been continued on heparin, ASA and Brilinta. On 2-2720 felt to have pneumonia and started on Unasyn. Intermittent low grade temps to 100F were noted. Temp increased to 101-102 F on 3-5- and 3-6-20. Sputum culture 3-5-20 with Gram negative bacilli     Current exam does not suggest Pseudomonas pneumonia. Source of fevers and leukemoid reaction unclear. CT of the abdomen and possible repeat MRI of brain would be helpful. CURRENT EVALUATION : 3/10/2020    Patient evaluated and examined in the ICU. Afebrile to low grade fevers but on cooling blanket  On bromocriptine for presumptive central fever  VS stable    Respiratory secretions frequent. Requiring suctioning    CT reviewed. No abdominal pathology. Chest with scattered areas of bronchopneumonia. Pseudomonas in sputum but also known MRSA carrier  Antibiotics altered to Vancomycin and cefepime  3-9-20 CXR with increasing changes Rt lung    Lower extremity duplex revealed bilateral acute DVTs and was started on high-dose heparin infusion. Brilinta held.   Status post trach and PEG placed on 3/4      Labs, X rays reviewed: 3/10/2020    BUN:25-->18--> 24-->18-->22  Cr:1.09-->0.90--> 1.04-->0.77-->0.84    WBC: 17-->20.7-->37.0-->33.2-> 21.9-->20.4-->16.6  Hb:10.8-->9.6-> 9.6-->8.9  Plat: 206-->254-> 261-->283    Cultures:  Urine: No growth  ·   Blood:  · 3-1-20: No growth   Sputum :  · 3-8-20: Pseudomonas  · 3-5-20: Pseudomonas  · 3-2-20: Pseudomonas  Wound:  ·   HIV- negative     Discussed with patient, RN, family. I have personally reviewed the past medical history, past surgical history, medications, social history, and family history, and I have updated the database accordingly.   Past Medical History:     Past Medical History:   Diagnosis Date    [facility] [address]     Hyperlipidemia     Hypertension     Stroke ([facility] [address] 02/13/2020       Past Surgical  History:     Past Surgical History:   Procedure Laterality Date    CARDIAC SURGERY      CORONARY ANGIOPLASTY WITH STENT PLACEMENT  2019    x 1 stent for LAD    FOOT SURGERY Bilateral     KNEE SURGERY Right     TRACHEOSTOMY N/A 3/4/2020    TRACHEOTOMY performed by Ady Chaudhry MD at [address] [place] [address] N/A 3/4/2020    EGD ESOPHAGOGASTRODUODENOSCOPY PEG TUBE INSERTION performed by Ady Chaudhry MD at [facility] OR       Medications:      hydrALAZINE  75 mg Oral 3 times per day    bromocriptine  5 mg Oral BID    hydroCHLOROthiazide  25 mg Per NG tube Daily    apixaban  5 mg Oral BID    cefepime  2 g Intravenous Q8H    bisacodyl  10 mg Rectal Once    vancomycin  1,500 mg Intravenous Q12H    vancomycin (VANCOCIN) intermittent dosing (placeholder)   Other RX Placeholder    aspirin  81 mg Oral Daily    pantoprazole  40 mg Oral QAM AC    fluticasone  2 spray Each Nostril Daily    sodium chloride  2 spray Each Nostril Daily    lisinopril  40 mg Oral Daily    folic acid  1 mg Oral BID    rosuvastatin  40 mg Oral Nightly    amLODIPine  10 mg Oral Daily    FLUoxetine  20 mg Per NG tube Daily    chlorhexidine  15 mL Mouth/Throat BID    sennosides-docusate sodium  2 tablet Oral Daily    sodium chloride flush  10 mL Intravenous 2 times per day       Social History:     Social History     Socioeconomic History    Marital status: Single     Spouse name: Not on file    Number of children: Not on file    Years of education: Not on file    Highest education level: Not on file   Occupational History    Not on file [place]   NOT REPORTED    Direct Exam 03/02/2020 11:40  [address]   < 10 EPITHELIAL CELLS/LPF    Direct Exam 03/02/2020 11:40  [address]   >25 NEUTROPHILS/LPF    Direct Exam Abnormal  03/02/2020 11:40  [address]   PREDOMINANT ORGANISM: GRAM NEGATIVE RODS    Direct Exam Abnormal  03/02/2020 11:40  [address]   MIXED BACTERIAL MORPHOTYPES ALSO PRESENT ON GRAM STAIN. Culture Abnormal  03/02/2020 11:40  [address]   PSEUDOMONAS AERUGINOSA HEAVY GROWTH    Culture 03/02/2020 11:40  [address]   NORMAL RESPIRATORY MIRELA HEAVY GROWTH    Testing Performed By     Lab - Abbreviation Name Director Address Valid Date Range   [facility] [place] [address] [facility] [address] 08/30/17 0801-Present   Susceptibility     Pseudomonas aeruginosa (1)     Antibiotic Interpretation CINDY Status    amikacin   Final     NOT REPORTED   ceFAZolin   Final     NOT REPORTED   cefepime Sensitive  Final     2  SUSCEPTIBLE   ciprofloxacin Sensitive  Final     <=0.25  SUSCEPTIBLE   gentamicin Sensitive  Final     <=1  SUSCEPTIBLE   meropenem   Final     NOT REPORTED   tigecycline   Final     NOT REPORTED   tobramycin Sensitive  Final     <=1  SUSCEPTIBLE   piperacillin-tazobactam Sensitive  Final     8  SUSCEPTIBLE   Lab and Collection       Medical Decision Making-Other:     Note:  · Labs, medications, radiologic studies were reviewed with personal review of films  · Large amounts of data were reviewed  · Discussed with nursing Staff, Discharge planner  · Infection Control and Prevention measures reviewed  · All prior entries were reviewed  · Administer medications as ordered  · Prognosis: Guarded  · Discharge planning reviewed  · Follow up as outpatient. Thank you for allowing us to participate in the care of this patient. Please call with questions.     Melvin Mane MD Pager: [phone] - Office: [phone]

## 2020-03-10 NOTE — PROGRESS NOTES
Physical Therapy  Facility/Department: [address]  Daily Treatment Note  NAME: Nilay Priest  : 1970  MRN: [id number]    Date of Service: 3/10/2020    Discharge Recommendations:  Patient would benefit from continued therapy after discharge     Assessment   Assessment: Pt required MaxA x2 for bed mob and MaxA to maintain EOB, fair ability to follow commands . Pt would benefit from continued acute PT to address deficits. Activity Tolerance  Activity Tolerance: Patient limited by cognitive status; Patient limited by fatigue;Patient limited by endurance  Other Comments  Comments: FDS switched to LLE post session. Patient Diagnosis(es): There were no encounter diagnoses. has a past medical history of Heart attack ([facility] [address] Hyperlipidemia, Hypertension, and Stroke ([facility] [address] has a past surgical history that includes knee surgery (Right); Foot surgery (Bilateral); Coronary angioplasty with stent (2019); Cardiac surgery; Upper gastrointestinal endoscopy (N/A, 3/4/2020); and tracheostomy (N/A, 3/4/2020). Restrictions  Restrictions/Precautions  Restrictions/Precautions: Fall Risk, Contact Precautions, Up as Tolerated, General Precautions  Required Braces or Orthoses?: No  Position Activity Restriction  Other position/activity restrictions: GOAL -180  Subjective   General  Family / Caregiver Present: Yes  Subjective  Subjective: RN and pt agreeable to PT. Pt difficult to arouse verbally, but opens his eyes during ROM and keeps them open when dangling. Pt responds well to family interaction. General Comment  Comments: Pt left in bed with family present.        Pre Treatment Pain Screening  Intervention List: Patient able to continue with treatment       Objective   Bed mobility  Scooting: Dependent/Total;2 Person assistance  Transfers  Sit to Stand: Unable to assess  Stand to sit: Unable to assess  Ambulation  Ambulation?: No  More Ambulation?: No  Stairs/Curb  Stairs?: No     Balance  Posture: Poor  Sitting - Static: Poor  Comments: EOB approx 15 min's, mx A provided facilitating upright posture, midline sitting and head control. Exercises  Comments: PROM all planes BUE/LE x15, BLE gastroc stretch 4x20\", BLE iso LAQ x3 with no detected pt mm contraction, BUE wt bearing facilitation while dangling, upright head position, postural cues provided pulling shoulders retro while pushing into lumbar spine. Pt extremely fatigued post session. Strength RLE  Strength RLE: WFL    Goals  Short term goals  Time Frame for Short term goals: 14 visits  Short term goal 1: Pt will be Briseida to EOB  Short term goal 2: Pt will be CGA while EOB 4min  Short term goal 3: Pt will be modA to transfer with victoriano steady  Short term goal 4: Pt will be safe to attempt ambulation    Plan  Times per week: 5-6x/wk  Current Treatment Recommendations: Strengthening, Balance Training, Functional Mobility Training, Home Exercise Program, Safety Education & Training, Patient/Caregiver Education & Training, Transfer Training  Safety Devices  Type of devices:  All fall risk precautions in place, Call light within reach, Patient at risk for falls, Left in bed, Nurse notified  Restraints  Initially in place: No     Therapy Time   Individual Concurrent Group Co-treatment   Time In  1315         Time Out  1353         Minutes  Alyssa 13, PTA

## 2020-03-10 NOTE — CARE COORDINATION
Per [facility] is not anticipating having a bed in the next few days. Called and spoke with patient's son Gregg Aguirre regarding sending referral to [facility] as [facility] does not anticipate having a bed in a few days . He states he needs to call and discuss this with his brother Maximino Ovalle and will let CM know. 1715 Received call from Kiah at [facility], they will have bed available tomorrow, requesting transportation to be set up for 11:00. Informed Peter Herrera NP that [facility] will have bed available for patient tomorrow, she states patient is medically cleared to be discharged and ERIKA will be completed tomorrow a.m. Requested transportation from [facility] for  time at 11:00 tomorrow 3-11. Informed pt's son Gregg Aguirre of discharge tomorrow at 11:00 to [facility]. Informed pt's RN and Kiah at [facility] of  time for tomorrow 3-11 @ 11:00.

## 2020-03-10 NOTE — PROGRESS NOTES
Occupational Therapy  Facility/Department: [address]  Daily Treatment Note  NAME: Jagdeep Perez  : 1970  MRN: [id number]    Date of Service: 3/10/2020    Discharge Recommendations:  Further therapy recommended at discharge. The patient should be able to tolerate at least three hours of therapy per day over 5 days or 15 hours over 7 days. Assessment   Performance deficits / Impairments: Decreased functional mobility ; Decreased safe awareness;Decreased balance;Decreased ADL status; Decreased posture;Decreased ROM; Decreased endurance;Decreased coordination;Decreased high-level IADLs;Decreased strength;Decreased fine motor control  Prognosis: Good  OT Education: Transfer Training  Patient Education: proper hand and body placement, posture  Barriers to Learning: pt demo P carry over req assistance from FISCHER/PTA  REQUIRES OT FOLLOW UP: Yes  Activity Tolerance  Activity Tolerance: Patient limited by fatigue  Safety Devices  Safety Devices in place: Yes  Type of devices: Nurse notified; Patient at risk for falls; Left in bed;Call light within reach; Bed alarm in place         Patient Diagnosis(es): There were no encounter diagnoses. has a past medical history of Heart attack ([facility] [address] Hyperlipidemia, Hypertension, and Stroke ([facility] [address] has a past surgical history that includes knee surgery (Right); Foot surgery (Bilateral); Coronary angioplasty with stent (2019); Cardiac surgery; Upper gastrointestinal endoscopy (N/A, 3/4/2020); and tracheostomy (N/A, 3/4/2020).     Restrictions  Restrictions/Precautions  Restrictions/Precautions: Fall Risk, Contact Precautions, Up as Tolerated, General Precautions  Required Braces or Orthoses?: No  Position Activity Restriction  Other position/activity restrictions: GOAL -180  Subjective   General  Patient assessed for rehabilitation services?: Yes  Family / Caregiver Present: Yes(son)  Diagnosis: rt paramedian pontine CVA w lt hemiparesis and dysarthria  General Comment  Comments: RN and pt agreeable to therapy  Vital Signs  Patient Currently in Pain: Other (comment)(Pt unable to verbalize pain. No pain noted by grimacing or facial expressions)   Orientation  Orientation  Overall Orientation Status: Impaired  Orientation Level: Oriented to person(RANJITH sec to pt unable to verbalize or shake head. Pt aware of name)  Objective    ADL  Grooming: Maximum assistance;Setup;Verbal cueing; Increased time to complete(face washing completed seated ar EOB with [place] assist utilizing LUE. Increased tension and  noted in L hand with increased time)  Balance  Sitting Balance: Maximum assistance(req Max A to Max of 2 during static/dynamic sitting. Pt req assistance with proper posture)  Standing Balance: Unable to assess(comment)  Standing Balance  Comment: pt not appropriate sec to sitting balance  Bed mobility  Rolling to Left: Dependent/Total  Rolling to Right: Dependent/Total  Supine to Sit: Maximum assistance;2 Person assistance  Sit to Supine: Maximum assistance;2 Person assistance  Scooting: Maximal assistance  Transfers  Stand Step Transfers: Unable to assess  Sit to stand: Unable to assess  Stand to sit: Unable to assess  Transfer Comments: pt not appropriate to stand at this time  Cognition  Overall Cognitive Status: Exceptions  Arousal/Alertness: Inconsistent responses to stimuli  Following Commands: Follows one step commands with repetition; Follows one step commands with increased time; Inconsistently follows commands  Problem Solving: Assistance required to identify errors made;Assistance required to generate solutions;Assistance required to implement solutions;Assistance required to correct errors made  Insights: Decreased awareness of deficits  Initiation: Requires cues for some  Sequencing: Requires cues for some    Co tx compelted with PTA this day sec to req max of 2. ADL task of grooming completed see above for LOF.  Pt req increased assistance and verbal instructions throughout session. During static/dynamic sitting pt req assistance with posture req support at back, shoulders and head. Pt tolerated sitting EOB for approx 15-16 min. Pt req assistance while seated at EOB for hand placement to bear weight into BUE. Increased fatigue noted, pt retired supine in bed with pt family present at session end.    Plan   Plan  Times per week: 3-5 x/wk  Current Treatment Recommendations: Strengthening, ROM, Balance Training, Functional Mobility Training, Neuromuscular Re-education, Safety Education & Training, Patient/Caregiver Education & Training, Equipment Evaluation, Education, & procurement, Self-Care / ADL, Endurance Training, Positioning  Plan Comment: continue OT   Cont POC    Goals  Short term goals  Time Frame for Short term goals: By discharge, pt will:  Short term goal 1: Demo Min A x2 for bed mobility in order to increase safety and independence with functional ADLs  Short term goal 2: Demo Mod A for feeding and grooming tasks with setup and VCs/TCs throughout  Short term goal 3: Demo Min A for static/dynamic sitting balance for 8+ minutes for increased independence with functional tasks and NMRE  Short term goal 4: Demo Mod A with setup, increased time, AE, and VC/TCs for UB bathing/dressing tasks  Short term goal 5: Notify OTR for updated goals once appropriate       Therapy Time   Individual Concurrent Group Co-treatment   Time In 1355         Time Out 1420         Minutes 25         Timed Code Treatment Minutes: 33 [facility], FISCHER/

## 2020-03-10 NOTE — PLAN OF CARE
Problem: MECHANICAL VENTILATION  Goal: Mobility/activity is maintained at optimum level for patient  Outcome: Ongoing     Problem: HEMODYNAMIC STATUS  Goal: Patient has stable vital signs and fluid balance  3/10/2020 1826 by Karen Becker RN  Outcome: Ongoing  Note: Patient with stable vital signs and fluid balance. Problem: ACTIVITY INTOLERANCE/IMPAIRED MOBILITY  Goal: Mobility/activity is maintained at optimum level for patient  Outcome: Ongoing     Problem: COMMUNICATION IMPAIRMENT  Goal: Ability to express needs and understand communication  Outcome: Ongoing     Problem: MECHANICAL VENTILATION  Goal: Mobility/activity is maintained at optimum level for patient  Outcome: Ongoing  Goal: Ability to express needs and understand communication  Outcome: Ongoing  Goal: Patient will maintain patent airway  Outcome: Ongoing  Goal: Oral health is maintained or improved  Outcome: Ongoing  Goal: ET tube will be managed safely  Outcome: Ongoing  Goal: Tracheostomy will be managed safely  Outcome: Ongoing     Problem: Falls - Risk of:  Goal: Will remain free from falls  Description: Will remain free from falls  3/10/2020 1826 by Karen Becker RN  Outcome: Ongoing  Note: Patient remains free from falls this shift. Problem: Risk for Impaired Skin Integrity  Goal: Tissue integrity - skin and mucous membranes  Description: Structural intactness and normal physiological function of skin and  mucous membranes. 3/10/2020 1826 by Karen Becker RN  Outcome: Ongoing  Note: No new skin breakdown noted. Patient repositioned q2h.

## 2020-03-10 NOTE — PROGRESS NOTES
Daily Progress Note  Neuro Critical Care    Patient Name: Evert Cruz  Patient : 1970  Room/Bed: 7125/9781-93  Code Status: FULL  Allergies: No Known Allergies    CHIEF COMPLAINT:     Brainstem infarction     INTERVAL HISTORY    Initial Presentation (Admitted 20): The patient is a 53 yo with a history of HTN, CAD s/p stent (2019), and recent right pontine infarct who presented as a transfer from [facility] for worsening aphasia, dysphagia, and left hemiparesis. Patient was recently admitted to [address] [place] [address] on 20 after presenting with dysarthria and left hemiparesis and found to have a right pontine infarction. He was out of window for tPA. 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 He was maintained on his home ASA and Brilinta and was treated with a low dose heparin infusion for 48h. Repeat CTA Head/Neck on 2/15 remained stable and patient's clinical exam remained unchanged after heparin was stopped. He was discharged to [facility] on . At the time of discharge he had a left facial droop and mild to moderate left sided weakness (LUE 3/5, LLE 4/5). For the last few days prior to re-admission, family states they started to notice patient's speech started to decline and he was having more difficulty swallowing. On , he was noted by the nurse to have decreased word output and to be coughing with liquids. CT Head performed which showed a stable right pontine infarct. On , patient was sent to [facility] ED due to hypertensive urgency (237/130). Given 20mg IV Labetalol with improvement of BP down to 152/99. Started on IV fluids for RASHMI. Repeat CT head showed stable right pontine infarct. MRI Brain showed increasing multifocal infarct in the mayuri extending to medulla and new punctate infarct left cerebellar hemisphere.   Patient initially admitted to the ICU at [facility] but then decision made to transfer to bisacodyl  10 mg Rectal Once    vancomycin  1,500 mg Intravenous Q12H    vancomycin (VANCOCIN) intermittent dosing (placeholder)   Other RX Placeholder    aspirin  81 mg Oral Daily    pantoprazole  40 mg Oral QAM AC    fluticasone  2 spray Each Nostril Daily    sodium chloride  2 spray Each Nostril Daily    lisinopril  40 mg Oral Daily    folic acid  1 mg Oral BID    rosuvastatin  40 mg Oral Nightly    amLODIPine  10 mg Oral Daily    FLUoxetine  20 mg Per NG tube Daily    chlorhexidine  15 mL Mouth/Throat BID    sennosides-docusate sodium  2 tablet Oral Daily    sodium chloride flush  10 mL Intravenous 2 times per day     CONTINUOUS INFUSIONS:   sodium chloride 75 mL/hr at 03/10/20 1517     PRN MEDICATIONS:   acetaminophen, chlorproMAZINE, iohexol, bisacodyl, fentanNYL, hydrALAZINE, labetalol, magnesium hydroxide, sodium chloride flush, polyethylene glycol, ondansetron, promethazine    VITALS:  Temperature Range: Temp: 96.7 °F (35.9 °C) Temp  Av.3 °F (36.8 °C)  Min: 96.7 °F (35.9 °C)  Max: 99.4 °F (37.4 °C)  BP Range: Systolic (04IMG), DHE:284 , Min:117 , VEO:771     Diastolic (55QFL), IZM:35, Min:60, Max:82    Pulse Range: Pulse  Av.7  Min: 56  Max: 73  Respiration Range: Resp  Av  Min: 22  Max: 22  Current Pulse Ox: SpO2: 97 %  24HR Pulse Ox Range: SpO2  Av %  Min: 90 %  Max: 98 %  Patient Vitals for the past 12 hrs:   BP Temp Temp src Pulse SpO2   03/10/20 1543 -- -- -- 58 97 %   03/10/20 1423 135/74 -- -- 63 93 %   03/10/20 1311 123/60 -- -- 61 98 %   03/10/20 1211 117/62 -- -- 58 93 %   03/10/20 1209 -- -- -- 57 95 %   03/10/20 1208 -- 96.7 °F (35.9 °C) Axillary -- --   03/10/20 1207 -- -- -- 57 95 %   03/10/20 1111 (!) 142/74 -- -- 62 96 %   03/10/20 1011 130/70 -- -- 57 96 %   03/10/20 0911 136/69 -- -- 56 97 %   03/10/20 0829 -- 97.9 °F (36.6 °C) Axillary -- --   03/10/20 0811 139/66 -- -- 64 93 %   03/10/20 0710 139/67 -- -- 64 93 %   03/10/20 0611 129/61 -- -- 62 92 % Contrast  Result Date: 2/25/2020  1. Hypodense areas in the mayuri concordant with acute infarct visualized at MR. 2. No perfusion mismatch. 3. 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 4. Otherwise patent cervical and cerebral vasculature     Mri Brain Wo Contrast  Result Date: 2/24/2020  Increasing multifocal acute infarct in the mayuri. New punctate acute infarct left anterolateral cerebellar hemisphere Multifocal periventricular and subcortical white matter small vessel ischemic changes are again noted bilaterally. Labs and Images reviewed with:  [] Dr. Melida Webster. Bobbi    [x] Dr. Ephraim Russo  [] Dr. Marino Perry  [] There are no new interval images to review. PHYSICAL EXAM       CONSTITUTIONAL:  Intubated, off sedation. Awake and tracking bilaterally. HEAD:  normocephalic, atraumatic    EYES:  PERRL, EOMI   ENT:  Dry mucous membranes   NECK:  supple, symmetric, trach site intact with tan drainage   LUNGS:  Equal air entry bilaterally, clear   CARDIOVASCULAR:  normal s1 / s2, systolic murmur, distal pulses intact   ABDOMEN:  Soft, no rigidity, normal bowel sounds, PEG site intact without drainge, small dried blood   NEUROLOGIC:  Mental Status:  Intubated, off sedation. Awake, tracking bilaterally. Cranial Nerves:    III: Pupils:  equal, round, reactive to light  III,IV,VI: Extra Ocular Movements: intact  VII: Facial strength: not able to open mouth/smile or stick out tongue    Motor Exam:    Quadriplegic. No spontaneous movements on examination this morning, patient able to wiggle his right toes intermittently. DRAINS:  [x] There are no drains for Neuro Critical Care to monitor at this time. ASSESSMENT AND PLAN:       The patient is a 51 yo male with a history of HTN, CAD s/p stent (January 2019), and recent right pontine infarct who presented as a transfer from [facility] for worsening aphasia, dysphagia, and left hemiparesis.   Found to have increasing for pneumonia  - Repeat respiratory culture +pseudomonas, sensitive to Cefepime    RENAL/FLUID/ELECTROLYTE:  - Normal renal functioning  - BUN 22/ Creatinine 0.84  - Monitor I&O; 3232/1620  - Urology evaluated for neurogenic bladder and hematuria; Bladder scan Qshift and straight cath for PVR>400ml, hematuria resolved  - Total fluids 100cc/hr  - Replace electrolytes PRN  - Daily BMP    GI/NUTRITION:  NUTRITION:  Semi-Elemental, goal 65cc/hr  - Dysphagia secondary to pontine infarction  - POD #6 s/p PEG  - Tolerating tube feeds at goal  - Large BM last night  - Bowel regimen: Senokot-S daily, Milk of Mag PRN  - GI prophylaxis: Protonix    ID:  - No further fevers since Bromocriptine  - Surface cooling discontinued  - Leukocytosis improving  - Lipase/Amylase normal  - Acute bilateral lower extremity DVT could be contributing to fever but less likely leukocytosis  - Repeat respiratory culture 3/5 growing pseudomonas (same as 3/2)  - Repeat blood and sputum culture 3/9 with no growth, UA negative  - ID following; continue on Vanc/Cefepime for pneumonia  - Continue to monitor for fevers  - Daily CBC    HEME:   - H&H 8.9/29.5  - Platelets 034  - Heterozygous MTHFR mutation  - Concern for hypercoaguable state given patient developed acute DVT while on low intensity heparin infusion  - Currently on Eliquis for acute DVT  - F/U Hem/Onc outpatient  - Daily CBC    ENDOCRINE:  - Continue to monitor blood glucose, goal <180  - Recent Hemoglobin A1C 5.6    OTHER:  - Prozac 10mg QD for depression  - PT/OT/ST  - Discharge planning; referral sent to [facility], no bed for next few days, family considering [facility]  - Code Status: FULL    PROPHYLAXIS:  Stress ulcer: Protonix    DVT PROPHYLAXIS:  - SCD sleeves - Thigh High   - Eliquis for DVT    DISPOSITION:  [x] OK for transfer out of ICU for 4A. We will continue to follow along. For any changes in exam or patient status please contact Neuro Critical Care. Mary Canales, APRN - [phone]  Neuro Critical Care  Pager [phone]  3/10/2020     4:48 PM

## 2020-03-10 NOTE — CARE COORDINATION
Transition plan    Spoke with Mayo Boswell from [place] regarding bed availability. He states they are still working on bed. Mayo Boswell updated per neuro notes that patient should be able to be discharged in next day or two when patient medically cleared.

## 2020-03-10 NOTE — PLAN OF CARE
Problem: HEMODYNAMIC STATUS  Goal: Patient has stable vital signs and fluid balance  Outcome: Ongoing  Note: Patient with stable vital signs and fluid balance. Problem: Falls - Risk of:  Goal: Will remain free from falls  Description: Will remain free from falls  Outcome: Ongoing  Note: Patient remains free from falls this shift. Problem: Risk for Impaired Skin Integrity  Goal: Tissue integrity - skin and mucous membranes  Description: Structural intactness and normal physiological function of skin and  mucous membranes. Outcome: Ongoing  Note: No new skin breakdown noted. Patient repositioned q2h.

## 2020-03-11 ENCOUNTER — HOSPITAL ENCOUNTER (OUTPATIENT)
Dept: MEDSURG UNIT | Age: 50
Discharge: NURSING, REHAB OR LONG-TERM CARE | End: 2020-05-01
Attending: INTERNAL MEDICINE | Admitting: INTERNAL MEDICINE

## 2020-03-11 VITALS
RESPIRATION RATE: 23 BRPM | HEART RATE: 54 BPM | SYSTOLIC BLOOD PRESSURE: 128 MMHG | WEIGHT: 238.76 LBS | TEMPERATURE: 98.2 F | OXYGEN SATURATION: 97 % | DIASTOLIC BLOOD PRESSURE: 69 MMHG | HEIGHT: 73 IN | BODY MASS INDEX: 31.64 KG/M2

## 2020-03-11 LAB
ANION GAP SERPL CALCULATED.3IONS-SCNC: 10 MMOL/L (ref 9–17)
BUN BLDV-MCNC: 26 MG/DL (ref 6–20)
BUN/CREAT BLD: ABNORMAL (ref 9–20)
CALCIUM IONIZED: 1.28 MMOL/L (ref 1.13–1.33)
CALCIUM SERPL-MCNC: 9 MG/DL (ref 8.6–10.4)
CHLORIDE BLD-SCNC: 101 MMOL/L (ref 98–107)
CO2: 27 MMOL/L (ref 20–31)
CREAT SERPL-MCNC: 0.76 MG/DL (ref 0.7–1.2)
CULTURE: NORMAL
CULTURE: NORMAL
GFR AFRICAN AMERICAN: >60 ML/MIN
GFR NON-AFRICAN AMERICAN: >60 ML/MIN
GFR SERPL CREATININE-BSD FRML MDRD: ABNORMAL ML/MIN/{1.73_M2}
GFR SERPL CREATININE-BSD FRML MDRD: ABNORMAL ML/MIN/{1.73_M2}
GLUCOSE BLD-MCNC: 106 MG/DL (ref 70–99)
HCT VFR BLD CALC: 28.9 % (ref 40.7–50.3)
HEMOGLOBIN: 8.7 G/DL (ref 13–17)
Lab: NORMAL
Lab: NORMAL
MCH RBC QN AUTO: 28.1 PG (ref 25.2–33.5)
MCHC RBC AUTO-ENTMCNC: 30.1 G/DL (ref 28.4–34.8)
MCV RBC AUTO: 93.2 FL (ref 82.6–102.9)
NRBC AUTOMATED: 0 PER 100 WBC
PDW BLD-RTO: 12.6 % (ref 11.8–14.4)
PLATELET # BLD: 297 K/UL (ref 138–453)
PMV BLD AUTO: 11.1 FL (ref 8.1–13.5)
POTASSIUM SERPL-SCNC: 3.6 MMOL/L (ref 3.7–5.3)
RBC # BLD: 3.1 M/UL (ref 4.21–5.77)
SODIUM BLD-SCNC: 138 MMOL/L (ref 135–144)
SPECIMEN DESCRIPTION: NORMAL
SPECIMEN DESCRIPTION: NORMAL
WBC # BLD: 14.3 K/UL (ref 3.5–11.3)

## 2020-03-11 PROCEDURE — 6360000002 HC RX W HCPCS: Performed by: INTERNAL MEDICINE

## 2020-03-11 PROCEDURE — 6360000002 HC RX W HCPCS: Performed by: PSYCHIATRY & NEUROLOGY

## 2020-03-11 PROCEDURE — 6370000000 HC RX 637 (ALT 250 FOR IP): Performed by: STUDENT IN AN ORGANIZED HEALTH CARE EDUCATION/TRAINING PROGRAM

## 2020-03-11 PROCEDURE — 36415 COLL VENOUS BLD VENIPUNCTURE: CPT

## 2020-03-11 PROCEDURE — 94761 N-INVAS EAR/PLS OXIMETRY MLT: CPT

## 2020-03-11 PROCEDURE — 99233 SBSQ HOSP IP/OBS HIGH 50: CPT | Performed by: INTERNAL MEDICINE

## 2020-03-11 PROCEDURE — 2580000003 HC RX 258: Performed by: STUDENT IN AN ORGANIZED HEALTH CARE EDUCATION/TRAINING PROGRAM

## 2020-03-11 PROCEDURE — APPNB60 APP NON BILLABLE TIME 46-60 MINS: Performed by: NURSE PRACTITIONER

## 2020-03-11 PROCEDURE — 94003 VENT MGMT INPAT SUBQ DAY: CPT

## 2020-03-11 PROCEDURE — 85027 COMPLETE CBC AUTOMATED: CPT

## 2020-03-11 PROCEDURE — 82330 ASSAY OF CALCIUM: CPT

## 2020-03-11 PROCEDURE — 80048 BASIC METABOLIC PNL TOTAL CA: CPT

## 2020-03-11 PROCEDURE — 2580000003 HC RX 258: Performed by: INTERNAL MEDICINE

## 2020-03-11 PROCEDURE — 2700000000 HC OXYGEN THERAPY PER DAY

## 2020-03-11 PROCEDURE — 6370000000 HC RX 637 (ALT 250 FOR IP): Performed by: NURSE PRACTITIONER

## 2020-03-11 RX ORDER — BISACODYL 10 MG
10 SUPPOSITORY, RECTAL RECTAL DAILY PRN
Qty: 30 SUPPOSITORY | Refills: 1
Start: 2020-03-11 | End: 2020-04-10

## 2020-03-11 RX ORDER — FOLIC ACID 1 MG/1
1 TABLET ORAL 2 TIMES DAILY
Qty: 30 TABLET | Refills: 3
Start: 2020-03-11

## 2020-03-11 RX ORDER — 0.9 % SODIUM CHLORIDE 0.9 %
500 INTRAVENOUS SOLUTION INTRAVENOUS ONCE
Status: COMPLETED | OUTPATIENT
Start: 2020-03-11 | End: 2020-03-11

## 2020-03-11 RX ORDER — BROMOCRIPTINE MESYLATE 2.5 MG/1
5 TABLET ORAL 2 TIMES DAILY
Qty: 10 TABLET | Refills: 0
Start: 2020-03-11 | End: 2020-03-16

## 2020-03-11 RX ORDER — SENNA AND DOCUSATE SODIUM 50; 8.6 MG/1; MG/1
2 TABLET, FILM COATED ORAL DAILY
Qty: 60 TABLET | Refills: 3
Start: 2020-03-11

## 2020-03-11 RX ORDER — POLYETHYLENE GLYCOL 3350 17 G/17G
17 POWDER, FOR SOLUTION ORAL DAILY PRN
Qty: 527 G | Refills: 1 | COMMUNITY
Start: 2020-03-11 | End: 2020-04-10

## 2020-03-11 RX ORDER — FLUOXETINE HYDROCHLORIDE 20 MG/5ML
20 LIQUID ORAL DAILY
Qty: 150 ML | Refills: 3
Start: 2020-03-11

## 2020-03-11 RX ORDER — FLUTICASONE PROPIONATE 50 MCG
2 SPRAY, SUSPENSION (ML) NASAL DAILY
Qty: 1 BOTTLE | Refills: 3
Start: 2020-03-11

## 2020-03-11 RX ORDER — HYDROCHLOROTHIAZIDE 25 MG/1
25 TABLET ORAL DAILY
Qty: 30 TABLET | Refills: 3
Start: 2020-03-11

## 2020-03-11 RX ORDER — HYDRALAZINE HYDROCHLORIDE 25 MG/1
50 TABLET, FILM COATED ORAL EVERY 8 HOURS SCHEDULED
Qty: 90 TABLET | Refills: 3
Start: 2020-03-11

## 2020-03-11 RX ORDER — CHLORHEXIDINE GLUCONATE 0.12 MG/ML
15 RINSE ORAL 2 TIMES DAILY
Qty: 420 ML | Refills: 0
Start: 2020-03-11 | End: 2020-03-25

## 2020-03-11 RX ORDER — PANTOPRAZOLE SODIUM 40 MG/1
40 TABLET, DELAYED RELEASE ORAL
Qty: 30 TABLET | Refills: 3
Start: 2020-03-11

## 2020-03-11 RX ORDER — CHLORPROMAZINE HYDROCHLORIDE 25 MG/1
25 TABLET, FILM COATED ORAL 4 TIMES DAILY PRN
Qty: 60 TABLET | Refills: 1
Start: 2020-03-11

## 2020-03-11 RX ORDER — ECHINACEA PURPUREA EXTRACT 125 MG
2 TABLET ORAL DAILY
Qty: 1 BOTTLE | Refills: 3
Start: 2020-03-11

## 2020-03-11 RX ORDER — ROSUVASTATIN CALCIUM 40 MG/1
40 TABLET, COATED ORAL NIGHTLY
Qty: 30 TABLET | Refills: 3
Start: 2020-03-11

## 2020-03-11 RX ADMIN — FLUTICASONE PROPIONATE 2 SPRAY: 50 SPRAY, METERED NASAL at 09:21

## 2020-03-11 RX ADMIN — STANDARDIZED SENNA CONCENTRATE AND DOCUSATE SODIUM 2 TABLET: 8.6; 5 TABLET ORAL at 09:17

## 2020-03-11 RX ADMIN — AMLODIPINE BESYLATE 10 MG: 10 TABLET ORAL at 09:17

## 2020-03-11 RX ADMIN — LISINOPRIL 40 MG: 20 TABLET ORAL at 09:17

## 2020-03-11 RX ADMIN — ROSUVASTATIN CALCIUM 40 MG: 20 TABLET, FILM COATED ORAL at 00:02

## 2020-03-11 RX ADMIN — ASPIRIN 81 MG: 81 TABLET, CHEWABLE ORAL at 09:17

## 2020-03-11 RX ADMIN — CEFEPIME HYDROCHLORIDE 2 G: 2 INJECTION, POWDER, FOR SOLUTION INTRAVENOUS at 06:14

## 2020-03-11 RX ADMIN — Medication 1500 MG: at 09:21

## 2020-03-11 RX ADMIN — FOLIC ACID 1 MG: 1 TABLET ORAL at 09:17

## 2020-03-11 RX ADMIN — Medication 20 MG: at 09:17

## 2020-03-11 RX ADMIN — HYDROCHLOROTHIAZIDE 25 MG: 25 TABLET ORAL at 09:17

## 2020-03-11 RX ADMIN — APIXABAN 5 MG: 5 TABLET, FILM COATED ORAL at 09:17

## 2020-03-11 RX ADMIN — CEFEPIME HYDROCHLORIDE 2 G: 2 INJECTION, POWDER, FOR SOLUTION INTRAVENOUS at 00:09

## 2020-03-11 RX ADMIN — BROMOCRIPTINE MESYLATE 5 MG: 2.5 TABLET ORAL at 09:17

## 2020-03-11 RX ADMIN — SODIUM CHLORIDE 500 ML: 0.9 INJECTION, SOLUTION INTRAVENOUS at 04:36

## 2020-03-11 RX ADMIN — Medication 15 ML: at 09:21

## 2020-03-11 ASSESSMENT — PULMONARY FUNCTION TESTS
PIF_VALUE: 17
PIF_VALUE: 24

## 2020-03-11 NOTE — PROGRESS NOTES
[facility] - Progress Note  Today's Date and Time: 3/11/2020, 11:00 AM    Impression :   · Pontine infarction   · MRSA carrier  · Reactive leukocytosis  · Acute DVT  · Fevers  · Patchy areas of bronchopneumonia by CAT scan   · Pseudomonas in sputum. 3-2-20    Recommendations:     · Continue IV Cefepime 2g q8 hrs  · Discontinue Vancomycin   · Will follow at [address] [place] [address]/Summary/Discussion:3/11/2020     · Patient with initial Rt pontine infarct 2-13-20  · Hypertensive urgency at [facility] on 2-24-20 MRI with increased number of infarcts   · Transferred back to [facility].  · Has developed leukemoid reaction and fevers. Cause unclear  · Abdomen tense. PEG in place. CT of abdomen would be helpful to exclude hematoma or fluid collection  · If CT non yielding repeat MRI should be considered to follow evolution of infarcts  · CT shows scattered areas of bronchopneumonia. Pseudomonas in sputum but also MRSA carrier  · Antibiotics altered to Vancomycin and Zosyn to add MRSA and anaerobic coverage  · Zosyn switched back to cefepime as Pseudomonas shows intermediate sensitivity to Zosyn  Infection Control Recommendations   · [place] Precautions  · Contact Isolation MRSA    Antimicrobial Stewardship Recommendations     · Simplification of therapy  · Targeted therapy    Coordination of Outpatient Care:   · Estimated Length of IV antimicrobials:TBD  · Patient will need Midline Catheter Insertion: No  · Patient will need PICC line Insertion:No  · Patient will need: Home IV , [place],  SNF,  LTAC:TBD  · Patient will need outpatient wound care:No    Chief complaint/reason for consultation:   · Fevers, leukocytosis      History of Present Illness:   Oscar Briones is a 52y.o.-year-old  male who was initially admitted on 2/24/2020. Patient seen at the request of .      INITIAL HISTORY:    Patient previously admitted to [facility] on 2-13-20 with dysarthria and Lt hemiparesis secondary to Rt pontine infarction. Treated with heparin and then ASA and Brilinta. Transferred to [facility] on 2-19-20 where he exhibited some decline in speech and ability to swallow. Transferred to [facility] HOME on 2-24-20 with hypertensive urgency. MRI showed increasing numbers of infarcts in the mayuri extending to medulla and a new Lt cerebellar infarct. Transferred to [facility] on 2-24-20 where he has been continued on heparin, ASA and Brilinta. On 2-2720 felt to have pneumonia and started on Unasyn. Intermittent low grade temps to 100F were noted. Temp increased to 101-102 F on 3-5- and 3-6-20. Sputum culture 3-5-20 with Gram negative bacilli     Current exam does not suggest Pseudomonas pneumonia. Source of fevers and leukemoid reaction unclear. CT of the abdomen and possible repeat MRI of brain would be helpful. CURRENT EVALUATION : 3/11/2020    Patient evaluated and examined in the ICU. Afebrile after being placed on bromocriptine for presumptive central fever  VS stable    Respiratory secretions frequent. Requiring suctioning  No overall change in physical exam.  Will be transferred to [facility] today. Will D/C vancomycin    CT reviewed. No abdominal pathology. Chest with scattered areas of bronchopneumonia. Pseudomonas in sputum but also known MRSA carrier  Antibiotics altered to Vancomycin and cefepime  3-9-20 CXR with increasing changes Rt lung    Lower extremity duplex revealed bilateral acute DVTs and was started on high-dose heparin infusion. Brilinta held.   Status post trach and PEG placed on 3/4      Labs, X rays reviewed: 3/11/2020    BUN:25-->18--> 24-->18-->22  Cr:1.09-->0.90--> 1.04-->0.77-->0.84    WBC: 17-->20.7-->37.0-->33.2-> 21.9-->20.4-->16.6  Hb:10.8-->9.6-> 9.6-->8.9  Plat: 206-->254-> 261-->283    Cultures:  Urine: No growth  ·   Blood:  · 3-1-20: No growth   Sputum :  · 3-8-20: Pseudomonas  · 3-5-20: Pseudomonas  · 3-2-20: Pseudomonas  Wound:  ·   HIV- negative     Discussed with patient, RN, family. I have personally reviewed the past medical history, past surgical history, medications, social history, and family history, and I have updated the database accordingly.   Past Medical History:     Past Medical History:   Diagnosis Date    [facility] [address]     Hyperlipidemia     Hypertension     Stroke ([facility] [address] 02/13/2020       Past Surgical  History:     Past Surgical History:   Procedure Laterality Date    CARDIAC SURGERY      CORONARY ANGIOPLASTY WITH STENT PLACEMENT  2019    x 1 stent for LAD    FOOT SURGERY Bilateral     KNEE SURGERY Right     TRACHEOSTOMY N/A 3/4/2020    TRACHEOTOMY performed by Perri Case MD at [facility] N/A 3/4/2020    EGD ESOPHAGOGASTRODUODENOSCOPY PEG TUBE INSERTION performed by Perri Case MD at [facility] OR       Medications:      potassium bicarb-citric acid  40 mEq Oral Once    hydrALAZINE  75 mg Oral 3 times per day    bromocriptine  5 mg Oral BID    hydroCHLOROthiazide  25 mg Per NG tube Daily    apixaban  5 mg Oral BID    cefepime  2 g Intravenous Q8H    bisacodyl  10 mg Rectal Once    vancomycin  1,500 mg Intravenous Q12H    vancomycin (VANCOCIN) intermittent dosing (placeholder)   Other RX Placeholder    aspirin  81 mg Oral Daily    pantoprazole  40 mg Oral QAM AC    fluticasone  2 spray Each Nostril Daily    sodium chloride  2 spray Each Nostril Daily    lisinopril  40 mg Oral Daily    folic acid  1 mg Oral BID    rosuvastatin  40 mg Oral Nightly    amLODIPine  10 mg Oral Daily    FLUoxetine  20 mg Per NG tube Daily    chlorhexidine  15 mL Mouth/Throat BID    sennosides-docusate sodium  2 tablet Oral Daily    sodium chloride flush  10 mL Intravenous 2 times per day       Social History:     Social History     Socioeconomic History    Marital status: Single     Spouse name: Not on file   Laurel Dumont Number of children: Not on file    Years of education: Not on file    Highest education level: Not on file   Occupational History    Not on file   Social Needs    Financial resource strain: Not on file    Food insecurity     Worry: Not on file     Inability: Not on file    Transportation needs     Medical: Not on file     Non-medical: Not on file   Tobacco Use    Smoking status: Never Smoker    Smokeless tobacco: Never Used   Substance and Sexual Activity    Alcohol use: Yes    Drug use: Never    Sexual activity: Not on file   Lifestyle    Physical activity     Days per week: Not on file     Minutes per session: Not on file    Stress: Not on file   Relationships    Social connections     Talks on phone: Not on file     Gets together: Not on file     Attends Congregational service: Not on file     Active member of club or organization: Not on file     Attends meetings of clubs or organizations: Not on file     Relationship status: Not on file    Intimate partner violence     Fear of current or ex partner: Not on file     Emotionally abused: Not on file     Physically abused: Not on file     Forced sexual activity: Not on file   Other Topics Concern    Not on file   Social History Narrative    Not on file       Family History:     Family History   Problem Relation Age of Onset    High Blood Pressure Mother     High Blood Pressure Father     Hypertension Sister     Hypertension Brother         Allergies:   Patient has no known allergies. Review of Systems:     Unable to provide. Sedated on ventilator. 3/11/2020      Constitutional: No fevers or chills. No systemic complaints  Head: No headaches  Eyes: No double vision or blurry vision. No conjunctival inflammation. ENT: No sore throat or runny nose. . No hearing loss, tinnitus or vertigo. Cardiovascular: No chest pain or palpitations. No shortness of breath. No CRAWFORD  Lung: No shortness of breath or cough.  No sputum production  Abdomen: No nausea, vomiting, diarrhea, or abdominal pain. Whitaker Pander No cramps. Genitourinary: No increased urinary frequency, or dysuria. No hematuria. No suprapubic or CVA pain  Musculoskeletal: No muscle aches or pains. No joint effusions, swelling or deformities  Hematologic: No bleeding or bruising. Neurologic: Headache, weakness  Integument: No rash, no ulcers. Psychiatric: No depression. Endocrine: No polyuria, no polydipsia, no polyphagia. Physical Examination :     Patient Vitals for the past 8 hrs:   BP Temp Temp src Pulse Resp SpO2   03/11/20 0800 128/69 98.2 °F (36.8 °C) Axillary -- -- --   03/11/20 0759 -- -- -- -- 23 97 %   03/11/20 0749 -- -- -- 54 21 94 %   03/11/20 0436 116/62 98.2 °F (36.8 °C) Axillary 57 18 97 %   03/11/20 0331 -- -- -- 54 18 94 %     General Appearance: Awake, not interactive  Head:  Normocephalic, no trauma  Eyes: Pupils equal, round, reactive to light; sclera anicteric; conjunctivae pink. No embolic phenomena. ENT: Oropharynx clear, without erythema, exudate, or thrush. No tenderness of sinuses. Mouth/throat: mucosa pink and moist. No lesions. Dentition in good repair. Neck:Supple, without lymphadenopathy. Thyroid normal, No bruits. Pulmonary/Chest: Clear to auscultation, without wheezes, rales, or rhonchi. No dullness to percussion. Cardiovascular: Regular rate and rhythm without murmurs, rubs, or gallops. Abdomen: Tense, no rebound. Bowel sounds normal. No organomegaly. PEG in place  All four Extremities: No cyanosis, clubbing, edema, or effusions. Neurologic: Lt side weakness  Skin: Warm and dry with good turgor. No signs of peripheral arterial or venous insufficiency. No ulcerations. No open wounds.     Medical Decision Making -Laboratory:   I have independently reviewed/ordered the following labs:    CBC with Differential:   Recent Labs     03/10/20  0434 03/11/20  0448   WBC 16.6* 14.3*   HGB 8.9* 8.7*   HCT 29.5* 28.9*    297     BMP:   Recent Labs     03/10/20  0439 03/11/20  0448    138   K 3.9 3.6*    101   CO2 24 27   BUN 22* 26*   CREATININE 0.84 0.76     Hepatic Function Panel:   No results for input(s): PROT, LABALBU, BILIDIR, IBILI, BILITOT, ALKPHOS, ALT, AST in the last 72 hours. No results for input(s): RPR in the last 72 hours. No results for input(s): HIV in the last 72 hours. No results for input(s): BC in the last 72 hours. Lab Results   Component Value Date    MUCUS NOT REPORTED 03/09/2020    RBC 3.10 03/11/2020    TRICHOMONAS NOT REPORTED 03/09/2020    WBC 14.3 03/11/2020    YEAST NOT REPORTED 03/09/2020    TURBIDITY CLOUDY 03/09/2020     Lab Results   Component Value Date    CREATININE 0.76 03/11/2020    GLUCOSE 106 03/11/2020       Medical Decision Making-Imaging:     Chest XRAY 3/7/2020: Moderate cardiomegaly.       Otherwise, unremarkable single supine portable AP view of the chest.           EXAMINATION:   ONE XRAY VIEW OF THE CHEST       3/6/2020 7:26 am       COMPARISON:   03/05/2020       HISTORY:   ORDERING SYSTEM PROVIDED HISTORY: trach, large amount of secretions, fever,   leukocytosis   TECHNOLOGIST PROVIDED HISTORY:   trach, large amount of secretions, fever, leukocytosis       FINDINGS:   Tracheostomy tube remains in place.  The heart is stable in size.  Slight   increase in patchy perihilar opacities which may be due to atelectasis and/or   developing infection.  In part this is related to vascular crowding.  No   lobar consolidation is seen.  No large pleural effusions.  The patient is   slightly rotated.           Impression   Slight increase in subtle patchy perihilar opacities. EXAMINATION:   CT OF THE CHEST, ABDOMEN, AND PELVIS WITH CONTRAST 3/6/2020 4:18 pm       TECHNIQUE:   CT of the chest, abdomen and pelvis was performed with the administration of   intravenous contrast. Multiplanar reformatted images are provided for review.    Dose modulation, iterative reconstruction, and/or weight based adjustment of   the mA/kV Special Requests 03/02/2020 11:40  [address]   NOT REPORTED    Direct Exam 03/02/2020 11:40  [address]   < 10 EPITHELIAL CELLS/LPF    Direct Exam 03/02/2020 11:40  [address]   >25 NEUTROPHILS/LPF    Direct Exam Abnormal  03/02/2020 11:40  [address]   PREDOMINANT ORGANISM: GRAM NEGATIVE RODS    Direct Exam Abnormal  03/02/2020 11:40  [address]   MIXED BACTERIAL MORPHOTYPES ALSO PRESENT ON GRAM STAIN. Culture Abnormal  03/02/2020 11:40  [address]   PSEUDOMONAS AERUGINOSA HEAVY GROWTH    Culture 03/02/2020 11:40  [address]   NORMAL RESPIRATORY MIRELA HEAVY GROWTH    Testing Performed By     Lab - Abbreviation Name Director Address Valid Date Range   [address] [address] Razo [place] [address] [address] [facility] [address] 08/30/17 0801-Present   Susceptibility     Pseudomonas aeruginosa (1)     Antibiotic Interpretation CINDY Status    amikacin   Final     NOT REPORTED   ceFAZolin   Final     NOT REPORTED   cefepime Sensitive  Final     2  SUSCEPTIBLE   ciprofloxacin Sensitive  Final     <=0.25  SUSCEPTIBLE   gentamicin Sensitive  Final     <=1  SUSCEPTIBLE   meropenem   Final     NOT REPORTED   tigecycline   Final     NOT REPORTED   tobramycin Sensitive  Final     <=1  SUSCEPTIBLE   piperacillin-tazobactam Sensitive  Final     8  SUSCEPTIBLE   Lab and Collection       Medical Decision Making-Other:     Note:  · Labs, medications, radiologic studies were reviewed with personal review of films  · Large amounts of data were reviewed  · Discussed with nursing Staff, Discharge planner  · Infection Control and Prevention measures reviewed  · All prior entries were reviewed  · Administer medications as ordered  · Prognosis: Guarded  · Discharge planning reviewed  · Follow up as outpatient.     Thank you for allowing us to participate in the care of this

## 2020-03-11 NOTE — PROGRESS NOTES
Neuro critical care:      No fever spikes   SBP long term target < 160   HCTZ & hydrallazine were started in this admission for his uncontrolled blood pressures - while baseline pressures were > 180   Antibiotics to continue- ID to follow   Eliquis for 3 months for left LLE DVT

## 2020-03-11 NOTE — PROGRESS NOTES
Pt status post trach and peg. Trach collar sutures removed (X4). Pt tolerated well.      Hiram Presley PGY4

## 2020-03-11 NOTE — DISCHARGE INSTR - DIET
 Tube feeds per Dietician recommendations   Good nutrition is important when healing from an illness, injury, or surgery. Follow any nutrition recommendations given to you during your hospital stay.  If you were given an oral nutrition supplement while in the hospital, continue to take this supplement at home. You can take it with meals, in-between meals, and/or before bedtime. These supplements can be purchased at most local grocery stores, pharmacies, and chain super-stores.  If you have any questions about your diet or nutrition, call the hospital and ask for the dietitian.

## 2020-03-11 NOTE — PLAN OF CARE
Problem: HEMODYNAMIC STATUS  Goal: Patient has stable vital signs and fluid balance  3/11/2020 0531 by Clifton Ochoa RN  Outcome: Ongoing     Problem: ACTIVITY INTOLERANCE/IMPAIRED MOBILITY  Goal: Mobility/activity is maintained at optimum level for patient  3/11/2020 0531 by Clifton Ochoa RN  Outcome: Ongoing     Problem: COMMUNICATION IMPAIRMENT  Goal: Ability to express needs and understand communication  3/11/2020 0531 by Clifton Ochoa RN  Outcome: Ongoing    Neuro assessments completed. Fall and aspiration precautions in place. Barriers in communication and mobility assessed. Interventions to assist in communication and mobility in place. Adaptive devices used as needed.

## 2020-03-11 NOTE — DISCHARGE SUMMARY
Neuro Critical Care   Discharge Summary      PATIENT NAME: Tierra Collado  YOB: 1970  MEDICAL RECORD NO. [id number]  DATE: 3/11/2020  PRIMARY CARE PHYSICIAN: Bobo Contreras PA-C  DISCHARGE DATE:  03/11/20  DISCHARGE DIAGNOSIS:   Patient Active Problem List   Diagnosis Code    [facility]) [facility] I21.3    Coronary artery disease involving native coronary artery of native heart without angina pectoris I25.10    Acute ST segment elevation myocardial infarction ([facility] I21.3    Essential hypertension I10    Class 1 obesity due to excess calories with body mass index (BMI) of 32.0 to 32.9 in adult E66.09, Z68.32    Stroke-like symptoms R29.90    Ischemic stroke ([facility] I63.9    Hypertensive emergency I16.1    Numbness R20.0    Basilar artery stenosis/occlusion with infarction (HCC) I63.22    Right arm weakness R29.898    Right pontine stroke (HCC) I63.50    Left-sided weakness P80.8    Systolic murmur G74.9    Acute CVA (cerebrovascular accident) ([facility] I63.9    Adjustment disorder with depressed mood F43.21    Hypertensive urgency I16.0    Brainstem infarction (HCC) I63.9    Brain stem infarction (HCC) I63.9    Basilar artery stenosis I65.1    Acute respiratory failure with hypoxia (HCC) J96.01    Tracheostomy dependence ([facility] Z93.0    Pneumonia of both lungs due to Pseudomonas species [facility]) J15.1       Shashi Resendiz is a 52 y.o. yo male with a history of HTN, CAD s/p stent (January 2019), and recent right pontine infarct who presented to [facility] as a transfer from [facility] on 2/24/2020  9:40 PM with worsening aphasia, dysphagia, and left hemiparesis. Patient was recently admitted to [facility] on 2/13/20 after presenting with dysarthria and left hemiparesis and found to have a right pontine infarction. He was out of window for tPA. 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   He was maintained on his home ASA and Brilinta and was treated with a low dose heparin infusion for 48h. Repeat CTA Head/Neck on 2/15 remained stable and patient's clinical exam remained unchanged after heparin was stopped. He was discharged to [facility] on 2/19. At the time of discharge he had a left facial droop and mild to moderate left sided weakness (LUE 3/5, LLE 4/5). For the last few days prior to re-admission, family states they started to notice patient's speech started to decline and he was having more difficulty swallowing. On 2/22, he was noted by the nurse to have decreased word output and to be coughing with liquids. CT Head performed which showed a stable right pontine infarct. On 2/24, patient was sent to [place] [address] ED due to hypertensive urgency (237/130). Given 20mg IV Labetalol with improvement of BP down to 152/99. Started on IV fluids for RASHMI. Repeat CT head showed stable right pontine infarct. MRI Brain showed increasing multifocal infarct in the mayuri extending to medulla and new punctate infarct left cerebellar hemisphere. Patient initially admitted to the ICU at [place] [address] but then decision made to transfer to [facility] Neuro ICU for further evaluation and management. 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 CT Brain perfusion with no perfusion mismatch. Started on a low dose heparin infusion with 4000u bolus and maintained on Aspirin and Brilinta. Patient underwent diagnostic cerebral angiogram on 2/25 which revealed mid basilar artery occlusion distal to anterior inferior cerebellar artery and proximal to superior cerebellar artery with retrograde filling to the superior cerebral artery from anterior circulation. No further intervention recommended by the Neuro Endovascular team.  Medical management recommended; Aspirin and Brilinta as well as statin therapy.   Patient exam progressed to hours Compound per protocol. Duration of antibiotics to be determined by ID. CONTINUE taking these medications    amLODIPine 10 MG tablet  Commonly known as:  NORVASC  Take 1 tablet by mouth daily     aspirin 81 MG EC tablet  Take 1 tablet by mouth daily     lisinopril 40 MG tablet  Commonly known as:  PRINIVIL;ZESTRIL  Take 1 tablet by mouth daily        STOP taking these medications    atorvastatin 80 MG tablet  Commonly known as:  LIPITOR     meloxicam 7.5 MG tablet  Commonly known as:  Mobic     nitroGLYCERIN 0.4 MG SL tablet  Commonly known as:  NITROSTAT     tadalafil 20 MG tablet  Commonly known as:  CIALIS     ticagrelor 90 MG Tabs tablet  Commonly known as:  BRILINTA           Where to Get Your Medications      You can get these medications from any pharmacy    You don't need a prescription for these medications  · polyethylene glycol packet     Information about where to get these medications is not yet available    Ask your nurse or doctor about these medications  · apixaban 5 MG Tabs tablet  · bisacodyl 10 MG suppository  · bromocriptine 2.5 MG tablet  · cefepime  infusion  · chlorhexidine 0.12 % solution  · chlorproMAZINE 25 MG tablet  · FLUoxetine 20 MG/5ML solution  · fluticasone 50 MCG/ACT nasal spray  · folic acid 1 MG tablet  · hydrALAZINE 25 MG tablet  · hydroCHLOROthiazide 25 MG tablet  · pantoprazole 40 MG tablet  · rosuvastatin 40 MG tablet  · sennosides-docusate sodium 8.6-50 MG tablet  · sodium chloride 0.65 % nasal spray  · vancomycin  infusion       Diet: No diet orders on file diet as tolerated  Activity: No restrictions  Follow-up:  Neuro Endovascular Fellow 4-6 weeks, attending 3 months. Dr Gurpreet Love (Neuro ICU) in 4 weeks. Urology regarding hematuria, neurogenic bladder. PM&R Dr. Kelly Median. Dr. Cecily Valle (Cards) in 6 months regarding aortic root dilation. ID to follow at [facility]. Dr. Portia Olguin (Vascular) in 3 months with repeat LE duplex regarding DVT on Eliquis.  Hem/Onc regarding

## 2020-03-12 ENCOUNTER — TELEPHONE (OUTPATIENT)
Dept: INFECTIOUS DISEASES | Age: 50
End: 2020-03-12

## 2020-03-12 ENCOUNTER — TELEPHONE (OUTPATIENT)
Dept: PRIMARY CARE CLINIC | Age: 50
End: 2020-03-12

## 2020-03-12 LAB
CULTURE: ABNORMAL
CULTURE: ABNORMAL
DIRECT EXAM: ABNORMAL
ESTIMATED AVERAGE GLUCOSE: 126 MG/DL
HBA1C MFR BLD: 6 % (ref 4–6)
INTERVENTION: NORMAL
Lab: ABNORMAL
SPECIMEN DESCRIPTION: ABNORMAL
VITAMIN D 25-HYDROXY: 10.3 NG/ML (ref 30–100)

## 2020-03-12 PROCEDURE — 83036 HEMOGLOBIN GLYCOSYLATED A1C: CPT

## 2020-03-12 PROCEDURE — 82306 VITAMIN D 25 HYDROXY: CPT

## 2020-03-12 PROCEDURE — 84443 ASSAY THYROID STIM HORMONE: CPT

## 2020-03-12 NOTE — TELEPHONE ENCOUNTER
----- Message from Liane Avila sent at 3/12/2020  7:41 AM EDT -----  Rhianna Courtney, please see message from Dr Suzie Sterling and call to schedule. Thank you.   ----- Message -----  From: Cynthia Peters MD  Sent: 3/11/2020   5:03 PM EDT  To: [facility] Clinical Staff    Please schedule for a follow up appt if not yet done.  Thank you  ----- Message -----  From: Carson Benitez MD  Sent: 3/11/2020   1:34 PM EDT  To: Cynthia Peters MD

## 2020-03-13 LAB
FERRITIN: 505 UG/L (ref 30–400)
IRON SATURATION: 25 % (ref 20–55)
IRON: 42 UG/DL (ref 59–158)
TOTAL IRON BINDING CAPACITY: 170 UG/DL (ref 250–450)
UNSATURATED IRON BINDING CAPACITY: 128 UG/DL (ref 112–347)

## 2020-03-13 PROCEDURE — 83540 ASSAY OF IRON: CPT

## 2020-03-13 PROCEDURE — 83550 IRON BINDING TEST: CPT

## 2020-03-13 PROCEDURE — 82728 ASSAY OF FERRITIN: CPT

## 2020-03-15 LAB
CULTURE: NORMAL
CULTURE: NORMAL
Lab: NORMAL
Lab: NORMAL
SPECIMEN DESCRIPTION: NORMAL
SPECIMEN DESCRIPTION: NORMAL

## 2020-03-16 LAB — TSH SERPL DL<=0.05 MIU/L-ACNC: 0.59 MIU/L (ref 0.3–5)

## 2020-03-27 PROCEDURE — 87070 CULTURE OTHR SPECIMN AEROBIC: CPT

## 2020-03-27 PROCEDURE — 87077 CULTURE AEROBIC IDENTIFY: CPT

## 2020-03-27 PROCEDURE — 87205 SMEAR GRAM STAIN: CPT

## 2020-03-27 PROCEDURE — 86403 PARTICLE AGGLUT ANTBDY SCRN: CPT

## 2020-03-27 PROCEDURE — 87184 SC STD DISK METHOD PER PLATE: CPT

## 2020-03-27 PROCEDURE — 87186 SC STD MICRODIL/AGAR DIL: CPT

## 2020-04-01 LAB
CULTURE: ABNORMAL
CULTURE: ABNORMAL
DIRECT EXAM: ABNORMAL
DIRECT EXAM: ABNORMAL
Lab: ABNORMAL
SPECIMEN DESCRIPTION: ABNORMAL

## 2020-04-16 LAB
ALBUMIN SERPL-MCNC: 3.9 G/DL (ref 3.5–5.2)
ALBUMIN/GLOBULIN RATIO: ABNORMAL (ref 1–2.5)
ALP BLD-CCNC: 131 U/L (ref 40–129)
ALT SERPL-CCNC: 42 U/L (ref 5–41)
ANION GAP SERPL CALCULATED.3IONS-SCNC: 12 MMOL/L (ref 9–17)
AST SERPL-CCNC: 28 U/L
BILIRUB SERPL-MCNC: 0.28 MG/DL (ref 0.3–1.2)
BUN BLDV-MCNC: 53 MG/DL (ref 6–20)
BUN/CREAT BLD: 45 (ref 9–20)
CALCIUM SERPL-MCNC: 10.4 MG/DL (ref 8.6–10.4)
CHLORIDE BLD-SCNC: 96 MMOL/L (ref 98–107)
CO2: 31 MMOL/L (ref 20–31)
CREAT SERPL-MCNC: 1.17 MG/DL (ref 0.7–1.2)
GFR AFRICAN AMERICAN: >60 ML/MIN
GFR NON-AFRICAN AMERICAN: >60 ML/MIN
GFR SERPL CREATININE-BSD FRML MDRD: ABNORMAL ML/MIN/{1.73_M2}
GFR SERPL CREATININE-BSD FRML MDRD: ABNORMAL ML/MIN/{1.73_M2}
GLUCOSE BLD-MCNC: 95 MG/DL (ref 70–99)
MAGNESIUM: 2.3 MG/DL (ref 1.6–2.6)
PHOSPHORUS: 4.9 MG/DL (ref 2.5–4.5)
POTASSIUM SERPL-SCNC: 4 MMOL/L (ref 3.7–5.3)
SODIUM BLD-SCNC: 139 MMOL/L (ref 135–144)
TOTAL PROTEIN: 7.6 G/DL (ref 6.4–8.3)

## 2020-04-16 PROCEDURE — 80053 COMPREHEN METABOLIC PANEL: CPT

## 2020-04-16 PROCEDURE — 84100 ASSAY OF PHOSPHORUS: CPT

## 2020-04-16 PROCEDURE — 83735 ASSAY OF MAGNESIUM: CPT

## 2020-06-05 ENCOUNTER — TELEPHONE (OUTPATIENT)
Dept: PRIMARY CARE CLINIC | Age: 50
End: 2020-06-05

## 2020-06-05 NOTE — TELEPHONE ENCOUNTER
Pt is requesting documentation for expert evaluation, patient will need to be evaluated by MD or LISW, will discuss with LISW in office and possible refer to them or psychiatry when they are in office.

## 2020-06-09 ENCOUNTER — TELEPHONE (OUTPATIENT)
Dept: PRIMARY CARE CLINIC | Age: 50
End: 2020-06-09

## 2020-06-10 ENCOUNTER — TELEPHONE (OUTPATIENT)
Dept: PRIMARY CARE CLINIC | Age: 50
End: 2020-06-10

## 2023-03-21 NOTE — PROGRESS NOTES
Interval History:  Patient had episode of pain and bump in troponin.  Started on heparin drip.  Has had no further bleeding from his tracheostomy.  He did have an episode of vomiting earlier today and has somewhat been and worsening distress.  Increasing oxygen requirements and trach will be changed out today and attached to pressure support.    Objective:     Vital Signs (Most Recent):  Temp: 98.2 °F (36.8 °C) (03/21/23 1200)  Pulse: (!) 142 (03/21/23 1422)  Resp: 17.3 (03/21/23 1422)  BP: (!) 95/54 (03/21/23 1422)  SpO2: 100 % (03/21/23 1422)   Vital Signs (24h Range):  Temp:  [97.5 °F (36.4 °C)-98.7 °F (37.1 °C)] 98.2 °F (36.8 °C)  Pulse:  [] 142  Resp:  [16-30] 17.3  SpO2:  [85 %-100 %] 100 %  BP: ()/(44-73) 95/54     Weight: 68 kg (150 lb)  Body mass index is 22.81 kg/m².    Intake/Output Summary (Last 24 hours) at 3/21/2023 1432  Last data filed at 3/21/2023 1106  Gross per 24 hour   Intake 2369 ml   Output 1550 ml   Net 819 ml        Physical Exam  Vitals and nursing note reviewed.   Constitutional:       General: He is not in acute distress.     Appearance: He is well-developed. He is ill-appearing. He is not diaphoretic.      Comments: disheveled   HENT:      Head: Normocephalic and atraumatic.      Right Ear: External ear normal.      Left Ear: External ear normal.   Eyes:      General:         Right eye: No discharge.         Left eye: No discharge.      Conjunctiva/sclera: Conjunctivae normal.   Neck:      Thyroid: No thyromegaly.      Comments: Trach in place.  No evidence of bleeding now.  Cardiovascular:      Rate and Rhythm: Normal rate and regular rhythm.      Heart sounds: No murmur heard.  Pulmonary:      Effort: Pulmonary effort is normal. No respiratory distress.      Breath sounds: Normal breath sounds.   Abdominal:      General: Bowel sounds are normal. There is no distension.      Palpations: Abdomen is soft. There is no mass.      Tenderness: There is no abdominal tenderness.      Problem: OXYGENATION/RESPIRATORY FUNCTION  Goal: Patient will maintain patent airway  Outcome: Ongoing  Goal: Patient will achieve/maintain normal respiratory rate/effort  Respiratory rate and effort will be within normal limits for the patient  Outcome: Ongoing    Problem: MECHANICAL VENTILATION  Goal: Patient will maintain patent airway  Outcome: Ongoing  Goal: Oral health is maintained or improved  Outcome: Ongoing  Goal: ET tube will be managed safely  Outcome: Ongoing  Goal: Ability to express needs and understand communication  Outcome: Ongoing  Goal: Mobility/activity is maintained at optimum level for patient  Outcome: Ongoing    Problem: ASPIRATION PRECAUTIONS  Goal: Patients risk of aspiration is minimized  Outcome: Ongoing    Problem: SKIN INTEGRITY  Goal: Skin integrity is maintained or improved  Outcome: Ongoing  Comments: Peg tube in place, abdomen somewhat distended but states it's because he ate   Musculoskeletal:         General: No deformity.      Cervical back: Normal range of motion and neck supple.   Skin:     General: Skin is warm and dry.   Neurological:      Mental Status: He is alert and oriented to person, place, and time.      Comments: Symmetric movement of BUE and BLE against gravity. Attempts to speak and communicates by writing in his notebook.  Diffusely weak   Psychiatric:      Comments: Anxious       Significant Labs: All pertinent labs within the past 24 hours have been reviewed.    Significant Imaging: I have reviewed all pertinent imaging results/findings within the past 24 hours.    Review of Systems

## 2025-05-27 NOTE — CARE COORDINATION
Pt had said he would return to Dr. Richardson Singh his pcp for follow up, but now says it has been a long time and would like to follow up here at the Primary care clinic with Dr. Alyse Burrell. He is asking me to make that appt tomorrow and let him know. I told him I would. independent/needs device

## (undated) DEVICE — SPONGE DRN W4XL4IN RAYON/POLYESTER 6 PLY NONWOVEN PRECUT

## (undated) DEVICE — SUTURE VCRL + SZ 3-0 L27IN ABSRB UD L26MM SH 1/2 CIR VCP416H

## (undated) DEVICE — BINDER ABD UNISX 9IN 62IN L AND XL UNIV

## (undated) DEVICE — Z DISCONTINUED BY MEDLINE USE 2280062 TUBE TRACH SZ 8 L79MM OD12.2MM ID7.6MM CUF DISP INNR CANN

## (undated) DEVICE — METER,URINE,400ML,DRAIN BAG,L/F,LL: Brand: MEDLINE

## (undated) DEVICE — BLADE CLIPPER GEN PURP NS

## (undated) DEVICE — SUTURE PROL SZ 3-0 L30IN NONABSORBABLE BLU L26MM SH 1/2 CIR 8832H

## (undated) DEVICE — SUTURE PERMAHAND SZ 3-0 L18IN NONABSORBABLE BLK SILK BRAID A184H

## (undated) DEVICE — BLADE ES ELASTOMERIC COAT INSUL DURABLE BEND UPTO 90DEG

## (undated) DEVICE — SVMMC HD AND NK PK

## (undated) DEVICE — HOLDER TUBE TRACH LG COTTON STRP HK LOOP CLOSURE BRTRC FOAM

## (undated) DEVICE — SUTURE PROL SZ 2-0 L30IN NONABSORBABLE BLU L26MM CT-2 1/2 8411H

## (undated) DEVICE — TOWEL,OR,DSP,ST,NATURAL,DLX,4/PK,20PK/CS: Brand: MEDLINE

## (undated) DEVICE — STERILE LATEX POWDER-FREE SURGICAL GLOVESWITH NITRILE COATING: Brand: PROTEXIS

## (undated) DEVICE — MIC* SAFETY PERCUTANEOUS ENDOSCOPIC GASTROSTOMY PEG KIT - 20 FR - PULL: Brand: MIC PEG TUBE

## (undated) DEVICE — GLOVE ORANGE PI 8   MSG9080

## (undated) DEVICE — SPONGE,PEANUT,XRAY,ST,SM,3/8",5/CARD: Brand: MEDLINE INDUSTRIES, INC.

## (undated) DEVICE — GLOVE ORANGE PI 7   MSG9070

## (undated) DEVICE — Z DISCONTINUED APPLICATOR SURG PREP 0.35OZ 2% CHG 70% ISO ALC W/ HI LT

## (undated) DEVICE — SUTURE PERMAHAND SZ 3-0 L30IN NONABSORBABLE BLK SH L26MM K832H

## (undated) DEVICE — MITT SURG PREP L ADH DISPOSABLE